# Patient Record
Sex: FEMALE | Race: WHITE | NOT HISPANIC OR LATINO | Employment: OTHER | URBAN - METROPOLITAN AREA
[De-identification: names, ages, dates, MRNs, and addresses within clinical notes are randomized per-mention and may not be internally consistent; named-entity substitution may affect disease eponyms.]

---

## 2017-02-16 ENCOUNTER — TELEPHONE (OUTPATIENT)
Dept: FAMILY MEDICINE | Facility: CLINIC | Age: 77
End: 2017-02-16

## 2017-02-16 NOTE — TELEPHONE ENCOUNTER
----- Message from Edwin Ramirez sent at 2/16/2017  1:01 PM CST -----  Contact: Self  Pt wants to know if she will need labs before her OV on 2/23. Pt can be reached @ [phone].

## 2017-02-17 NOTE — TELEPHONE ENCOUNTER
Patient advised to come to visit fasting since this will be the first  Physical  Visit & Dr. Martinez may order labs once you complete the visit.

## 2017-02-23 ENCOUNTER — OFFICE VISIT (OUTPATIENT)
Dept: FAMILY MEDICINE | Facility: CLINIC | Age: 77
End: 2017-02-23
Payer: MEDICARE

## 2017-02-23 ENCOUNTER — LAB VISIT (OUTPATIENT)
Dept: LAB | Facility: HOSPITAL | Age: 77
End: 2017-02-23
Attending: FAMILY MEDICINE
Payer: MEDICARE

## 2017-02-23 VITALS
WEIGHT: 146.63 LBS | SYSTOLIC BLOOD PRESSURE: 122 MMHG | RESPIRATION RATE: 16 BRPM | OXYGEN SATURATION: 96 % | BODY MASS INDEX: 25.03 KG/M2 | DIASTOLIC BLOOD PRESSURE: 72 MMHG | HEART RATE: 60 BPM | HEIGHT: 64 IN | TEMPERATURE: 98 F

## 2017-02-23 DIAGNOSIS — Z86.2 HISTORY OF IRON DEFICIENCY ANEMIA: ICD-10-CM

## 2017-02-23 DIAGNOSIS — I10 ESSENTIAL HYPERTENSION: Chronic | ICD-10-CM

## 2017-02-23 DIAGNOSIS — F13.20 SEDATIVE HYPNOTIC OR ANXIOLYTIC DEPENDENCE: Chronic | ICD-10-CM

## 2017-02-23 DIAGNOSIS — N60.82 SEBACEOUS CYST OF BREAST, LEFT: ICD-10-CM

## 2017-02-23 DIAGNOSIS — G47.00 INSOMNIA, UNSPECIFIED TYPE: ICD-10-CM

## 2017-02-23 DIAGNOSIS — F41.9 ANXIETY: ICD-10-CM

## 2017-02-23 DIAGNOSIS — Z00.00 ROUTINE GENERAL MEDICAL EXAMINATION AT A HEALTH CARE FACILITY: Primary | ICD-10-CM

## 2017-02-23 DIAGNOSIS — E78.5 HYPERLIPIDEMIA, UNSPECIFIED HYPERLIPIDEMIA TYPE: Chronic | ICD-10-CM

## 2017-02-23 DIAGNOSIS — N39.41 URGE INCONTINENCE: ICD-10-CM

## 2017-02-23 LAB
ALBUMIN SERPL BCP-MCNC: 3.9 G/DL
ALP SERPL-CCNC: 70 U/L
ALT SERPL W/O P-5'-P-CCNC: 23 U/L
ANION GAP SERPL CALC-SCNC: 10 MMOL/L
AST SERPL-CCNC: 29 U/L
BASOPHILS # BLD AUTO: 0.01 K/UL
BASOPHILS NFR BLD: 0.2 %
BILIRUB SERPL-MCNC: 0.5 MG/DL
BUN SERPL-MCNC: 15 MG/DL
CALCIUM SERPL-MCNC: 9.9 MG/DL
CHLORIDE SERPL-SCNC: 102 MMOL/L
CHOLEST/HDLC SERPL: 2.9 {RATIO}
CO2 SERPL-SCNC: 28 MMOL/L
CREAT SERPL-MCNC: 0.8 MG/DL
DIFFERENTIAL METHOD: ABNORMAL
EOSINOPHIL # BLD AUTO: 0 K/UL
EOSINOPHIL NFR BLD: 0.5 %
ERYTHROCYTE [DISTWIDTH] IN BLOOD BY AUTOMATED COUNT: 12.9 %
EST. GFR  (AFRICAN AMERICAN): >60 ML/MIN/1.73 M^2
EST. GFR  (NON AFRICAN AMERICAN): >60 ML/MIN/1.73 M^2
FERRITIN SERPL-MCNC: 47 NG/ML
GLUCOSE SERPL-MCNC: 97 MG/DL
HCT VFR BLD AUTO: 39.4 %
HDL/CHOLESTEROL RATIO: 34.1 %
HDLC SERPL-MCNC: 205 MG/DL
HDLC SERPL-MCNC: 70 MG/DL
HGB BLD-MCNC: 13.4 G/DL
IRON SERPL-MCNC: 78 UG/DL
LDLC SERPL CALC-MCNC: 116.2 MG/DL
LYMPHOCYTES # BLD AUTO: 1.2 K/UL
LYMPHOCYTES NFR BLD: 18.1 %
MCH RBC QN AUTO: 31.6 PG
MCHC RBC AUTO-ENTMCNC: 34 %
MCV RBC AUTO: 93 FL
MONOCYTES # BLD AUTO: 0.8 K/UL
MONOCYTES NFR BLD: 11.7 %
NEUTROPHILS # BLD AUTO: 4.5 K/UL
NEUTROPHILS NFR BLD: 69.5 %
NONHDLC SERPL-MCNC: 135 MG/DL
PLATELET # BLD AUTO: 246 K/UL
PMV BLD AUTO: 9.7 FL
POTASSIUM SERPL-SCNC: 4.8 MMOL/L
PROT SERPL-MCNC: 8.1 G/DL
RBC # BLD AUTO: 4.24 M/UL
SATURATED IRON: 17 %
SODIUM SERPL-SCNC: 140 MMOL/L
TOTAL IRON BINDING CAPACITY: 460 UG/DL
TRANSFERRIN SERPL-MCNC: 311 MG/DL
TRIGL SERPL-MCNC: 94 MG/DL
WBC # BLD AUTO: 6.4 K/UL

## 2017-02-23 PROCEDURE — 3078F DIAST BP <80 MM HG: CPT | Mod: S$GLB,,, | Performed by: FAMILY MEDICINE

## 2017-02-23 PROCEDURE — 36415 COLL VENOUS BLD VENIPUNCTURE: CPT | Mod: PO

## 2017-02-23 PROCEDURE — 3074F SYST BP LT 130 MM HG: CPT | Mod: S$GLB,,, | Performed by: FAMILY MEDICINE

## 2017-02-23 PROCEDURE — 99499 UNLISTED E&M SERVICE: CPT | Mod: S$GLB,,, | Performed by: FAMILY MEDICINE

## 2017-02-23 PROCEDURE — 82728 ASSAY OF FERRITIN: CPT

## 2017-02-23 PROCEDURE — 99397 PER PM REEVAL EST PAT 65+ YR: CPT | Mod: S$GLB,,, | Performed by: FAMILY MEDICINE

## 2017-02-23 PROCEDURE — 85025 COMPLETE CBC W/AUTO DIFF WBC: CPT

## 2017-02-23 PROCEDURE — 80061 LIPID PANEL: CPT

## 2017-02-23 PROCEDURE — 99999 PR PBB SHADOW E&M-EST. PATIENT-LVL III: CPT | Mod: PBBFAC,,, | Performed by: FAMILY MEDICINE

## 2017-02-23 PROCEDURE — 80053 COMPREHEN METABOLIC PANEL: CPT

## 2017-02-23 PROCEDURE — 83540 ASSAY OF IRON: CPT

## 2017-02-23 RX ORDER — SERTRALINE HYDROCHLORIDE 100 MG/1
100 TABLET, FILM COATED ORAL DAILY
Qty: 30 TABLET | Refills: 5 | Status: SHIPPED | OUTPATIENT
Start: 2017-02-23 | End: 2017-10-24 | Stop reason: SDUPTHER

## 2017-02-23 RX ORDER — ZOLPIDEM TARTRATE 5 MG/1
5 TABLET ORAL NIGHTLY PRN
Qty: 30 TABLET | Refills: 2 | Status: SHIPPED | OUTPATIENT
Start: 2017-02-23 | End: 2017-10-24 | Stop reason: SDUPTHER

## 2017-02-23 RX ORDER — METOPROLOL TARTRATE 25 MG/1
12.5 TABLET, FILM COATED ORAL 2 TIMES DAILY
Qty: 60 TABLET | Refills: 3 | Status: SHIPPED | OUTPATIENT
Start: 2017-02-23 | End: 2017-08-18 | Stop reason: SDUPTHER

## 2017-02-23 RX ORDER — HYDROCHLOROTHIAZIDE 25 MG/1
25 TABLET ORAL DAILY
Qty: 30 TABLET | Refills: 5 | Status: SHIPPED | OUTPATIENT
Start: 2017-02-23 | End: 2017-10-24 | Stop reason: SDUPTHER

## 2017-02-23 RX ORDER — LOSARTAN POTASSIUM 50 MG/1
50 TABLET ORAL DAILY
Qty: 30 TABLET | Refills: 5 | Status: SHIPPED | OUTPATIENT
Start: 2017-02-23 | End: 2017-12-19 | Stop reason: SDUPTHER

## 2017-02-23 RX ORDER — PRAVASTATIN SODIUM 20 MG/1
20 TABLET ORAL DAILY
Qty: 30 TABLET | Refills: 5 | Status: SHIPPED | OUTPATIENT
Start: 2017-02-23 | End: 2017-10-24 | Stop reason: SDUPTHER

## 2017-02-23 RX ORDER — OXYBUTYNIN CHLORIDE 5 MG/1
5 TABLET ORAL 2 TIMES DAILY
Qty: 60 TABLET | Refills: 11 | Status: SHIPPED | OUTPATIENT
Start: 2017-02-23 | End: 2017-07-11

## 2017-02-23 NOTE — MR AVS SNAPSHOT
[facility]  [address]  [place] [address]  Phone: [phone]  Fax: [phone]                  Nereyda Hernandez   2017 10:00 AM   Office Visit    Description:  Female : 1940   Provider:  Genny Martinez MD   Department:  [facility]           Reason for Visit     Annual Exam           Diagnoses this Visit        Comments    Routine general medical examination at a health care facility    -  Primary     Essential hypertension         Urge incontinence         Hyperlipidemia, unspecified hyperlipidemia type         Anxiety         Insomnia, unspecified type         Sedative hypnotic or anxiolytic dependence         History of iron deficiency anemia         Sebaceous cyst of breast, left                To Do List           Goals (5 Years of Data)              Today    10/26/16    10/25/16    Blood Pressure < 150/90   122/72  128/82  128/76      Follow-Up and Disposition     Return in about 6 months (around 2017) for Follow up.       These Medications        Disp Refills Start End    hydrochlorothiazide (HYDRODIURIL) 25 MG tablet 30 tablet 5 2017     Take 1 tablet (25 mg total) by mouth once daily. - Oral    Pharmacy: [address] Ph #: [phone]       metoprolol tartrate (LOPRESSOR) 25 MG tablet 60 tablet 3 2017     Take 0.5 tablets (12.5 mg total) by mouth 2 (two) times daily. - Oral    Pharmacy: [address] - [address] Ph #: [phone]       losartan (COZAAR) 50 MG tablet 30 tablet 5 2017     Take 1 tablet (50 mg total) by mouth once daily. - Oral    Pharmacy: [facility] [address] [place] [address] [address] Ph #: [phone]       oxybutynin (DITROPAN) 5 MG Tab 60 tablet 11 2017    Take 1 tablet (5 mg total) by mouth 2 (two) times daily. - Oral    Pharmacy: [address] Ph #: [phone]       pravastatin  (PRAVACHOL) 20 MG tablet 30 tablet 5 2/23/2017     Take 1 tablet (20 mg total) by mouth once daily. - Oral    Pharmacy: [facility] [address] [place] [address] [address] Ph #: [phone]       sertraline (ZOLOFT) 100 MG tablet 30 tablet 5 2/23/2017     Take 1 tablet (100 mg total) by mouth once daily. - Oral    Pharmacy: [address] Ph #: [phone]       zolpidem (AMBIEN) 5 MG Tab 30 tablet 2 2/23/2017 5/24/2017    Take 1 tablet (5 mg total) by mouth nightly as needed. - Oral    Pharmacy: [address] [address] Ph #: [phone]         [facility]     [facility] On Call Nurse [facility] Line - 24/7 Assistance  Registered nurses in the [facility] On Call Center provide clinical advisement, health education, appointment booking, and other advisory services.  Call for this free service at [phone].             Medications           Message regarding Medications     Verify the changes and/or additions to your medication regime listed below are the same as discussed with your clinician today.  If any of these changes or additions are incorrect, please notify your healthcare provider.        STOP taking these medications     estradiol (ESTRACE) 0.01 % (0.1 mg/gram) vaginal cream Place 1 g vaginally once daily.           Verify that the below list of medications is an accurate representation of the medications you are currently taking.  If none reported, the list may be blank. If incorrect, please contact your healthcare provider. Carry this list with you in case of emergency.           Current Medications     aspirin (ECOTRIN) 81 MG EC tablet Take 81 mg by mouth once daily.    calcium citrate-vitamin D3 315-200 mg (CITRACAL+D) 315-200 mg-unit per tablet Take 1 tablet by mouth 2 (two) times daily.      fish oil-omega-3 fatty acids 300-1,000 mg capsule Take 2 g by mouth once daily.      hydrochlorothiazide (HYDRODIURIL) 25 MG tablet Take 1 tablet (25  "mg total) by mouth once daily.    latanoprost 0.005 % ophthalmic solution 1 drop every evening.    losartan (COZAAR) 50 MG tablet Take 1 tablet (50 mg total) by mouth once daily.    metoprolol tartrate (LOPRESSOR) 25 MG tablet Take 0.5 tablets (12.5 mg total) by mouth 2 (two) times daily.    multivit-iron-min-folic acid (MULTIVITAMIN-IRON-MINERALS-FOLIC ACID) 3,500-18-0.4 unit-mg-mg Chew Take by mouth.      oxybutynin (DITROPAN) 5 MG Tab Take 1 tablet (5 mg total) by mouth 2 (two) times daily.    pravastatin (PRAVACHOL) 20 MG tablet Take 1 tablet (20 mg total) by mouth once daily.    sertraline (ZOLOFT) 100 MG tablet Take 1 tablet (100 mg total) by mouth once daily.    zolpidem (AMBIEN) 5 MG Tab Take 1 tablet (5 mg total) by mouth nightly as needed.           Clinical Reference Information           Your Vitals Were     BP Pulse Temp Resp Height Weight    122/72 (BP Location: Left arm, Patient Position: Sitting, BP Method: Manual) 60 98.1 °F (36.7 °C) (Oral) 16 5' 4" (1.626 m) 66.5 kg (146 lb 9.7 oz)    SpO2 BMI             96% 25.16 kg/m2         Blood Pressure          Most Recent Value    BP  122/72      Allergies as of 2/23/2017     No Known Allergies      Immunizations Administered on Date of Encounter - 2/23/2017     None      Orders Placed During Today's Visit     Future Labs/Procedures Expected by Expires    CBC auto differential  2/23/2017 2/23/2018    Comprehensive metabolic panel  2/23/2017 2/23/2018    Ferritin  2/23/2017 2/23/2018    Iron and TIBC  2/23/2017 2/23/2018    Lipid panel  2/23/2017 4/24/2018      Language Assistance Services     ATTENTION: Language assistance services are available, free of charge. Please call [phone].      ATENCIÓN: Si habla elmer, tiene a craig disposición servicios gratuitos de asistencia lingüística. Llame al [phone].     CHÚ Ý: N?u b?n nói Ti?ng Vi?t, có các d?ch v? h? tr? ngôn ng? mi?n phí dành cho b?n. G?i s? [phone].         [facility] complies with applicable Federal civil rights laws and does not discriminate on the basis of race, color, national origin, age, disability, or sex.

## 2017-02-23 NOTE — PROGRESS NOTES
Chief Complaint   Patient presents with    Annual Exam       HPI  Nereyda Hernandez is a 76 y.o. female with multiple medical diagnoses as listed in the medical history and problem list that presents for annual exam .    She is concerned about a cyst on her left breast that resembles a pimple. It is non painful and she has not tried to drain it. She also needs refills on her medications. She has chronic allergic rhinitis, along with bloating.    PAST MEDICAL HISTORY:  Past Medical History   Diagnosis Date    Anxiety     Chronic disease anemia     Hyperlipidemia     Hypertension     Insomnia     KANWAL (obstructive sleep apnea)     Osteoporosis     Rosacea     Vertigo        PAST SURGICAL HISTORY:  Past Surgical History   Procedure Laterality Date    Tonsillectomy      Belt abdominoplasty      Total abdominal hysterectomy      Hernia repair       [facility]       SOCIAL HISTORY:  Social History     Social History    Marital status:      Spouse name: N/A    Number of children: 6    Years of education: college     Occupational History    retired  for [employer]      Social History Main Topics    Smoking status: Former Smoker     Packs/day: 0.50     Years: 10.00     Quit date: 9/20/1980    Smokeless tobacco: Not on file    Alcohol use 3.0 oz/week     5 Glasses of wine per week      Comment: Occasionally    Drug use: No    Sexual activity: Not Currently     Partners: Male     Other Topics Concern    Not on file     Social History Narrative       FAMILY HISTORY:  Family History   Problem Relation Age of Onset    Cancer Mother      liver       ALLERGIES AND MEDICATIONS: updated and reviewed.  Review of patient's allergies indicates:  No Known Allergies  Current Outpatient Prescriptions   Medication Sig Dispense Refill    aspirin (ECOTRIN) 81 MG EC tablet Take 81 mg by mouth once daily.      calcium citrate-vitamin D3 315-200 mg (CITRACAL+D) 315-200 mg-unit per tablet Take 1 tablet  by mouth 2 (two) times daily.        fish oil-omega-3 fatty acids 300-1,000 mg capsule Take 2 g by mouth once daily.        hydrochlorothiazide (HYDRODIURIL) 25 MG tablet Take 1 tablet (25 mg total) by mouth once daily. 30 tablet 5    latanoprost 0.005 % ophthalmic solution 1 drop every evening.      losartan (COZAAR) 50 MG tablet Take 1 tablet (50 mg total) by mouth once daily. 30 tablet 5    metoprolol tartrate (LOPRESSOR) 25 MG tablet Take 0.5 tablets (12.5 mg total) by mouth 2 (two) times daily. 60 tablet 3    multivit-iron-min-folic acid (MULTIVITAMIN-IRON-MINERALS-FOLIC ACID) 3,500-18-0.4 unit-mg-mg Chew Take by mouth.        oxybutynin (DITROPAN) 5 MG Tab Take 1 tablet (5 mg total) by mouth 2 (two) times daily. 60 tablet 11    pravastatin (PRAVACHOL) 20 MG tablet Take 1 tablet (20 mg total) by mouth once daily. 30 tablet 5    sertraline (ZOLOFT) 100 MG tablet Take 1 tablet (100 mg total) by mouth once daily. 30 tablet 5    zolpidem (AMBIEN) 5 MG Tab Take 1 tablet (5 mg total) by mouth nightly as needed. 30 tablet 2     No current facility-administered medications for this visit.        ROS  Review of Systems   Constitutional: Negative for chills, diaphoresis, fatigue, fever and unexpected weight change.   HENT: Negative for rhinorrhea, sinus pressure, sore throat and tinnitus.    Eyes: Negative for photophobia and visual disturbance.   Respiratory: Negative for cough, shortness of breath and wheezing.    Cardiovascular: Negative for chest pain and palpitations.   Gastrointestinal: Positive for constipation. Negative for abdominal pain, blood in stool, diarrhea, nausea and vomiting.   Genitourinary: Negative for dysuria, flank pain, frequency and vaginal discharge.   Musculoskeletal: Negative for arthralgias and joint swelling.   Skin: Positive for color change. Negative for rash.   Neurological: Negative for speech difficulty, weakness, light-headedness and headaches.   Psychiatric/Behavioral:  "Negative for behavioral problems and dysphoric mood.       Physical Exam  Vitals:    02/23/17 0935   BP: 122/72   BP Location: Left arm   Patient Position: Sitting   BP Method: Manual   Pulse: 60   Resp: 16   Temp: 98.1 °F (36.7 °C)   TempSrc: Oral   SpO2: 96%   Weight: 66.5 kg (146 lb 9.7 oz)   Height: 5' 4" (1.626 m)    Body mass index is 25.16 kg/(m^2).  Weight: 66.5 kg (146 lb 9.7 oz)   Height: 5' 4" (162.6 cm)     Physical Exam   Constitutional: She is oriented to person, place, and time. She appears well-developed and well-nourished. No distress.   HENT:   Head: Normocephalic and atraumatic.   Nose: Nose normal.   Mouth/Throat: No oropharyngeal exudate.   Eyes: EOM are normal.   Neck: Neck supple. No thyromegaly present.   Cardiovascular: Normal rate and regular rhythm.  Exam reveals no gallop and no friction rub.    No murmur heard.  Pulmonary/Chest: Effort normal and breath sounds normal. No respiratory distress. She has no wheezes. She has no rales.   Abdominal: Soft. Bowel sounds are normal. She exhibits no distension and no mass. There is no tenderness. There is no rebound and no guarding.   Genitourinary:   Genitourinary Comments: Left breast with small 0.5 cm sebaceous cyst present, no signs of infection   Lymphadenopathy:     She has no cervical adenopathy.   Neurological: She is alert and oriented to person, place, and time.   Skin: Skin is warm and dry. No rash noted.   Psychiatric: She has a normal mood and affect. Her behavior is normal.   Nursing note and vitals reviewed.      Health Maintenance       Date Due Completion Date    Lipid Panel 2/18/2017 2/18/2016    DEXA SCAN 6/23/2017 6/23/2014    Override on 8/12/2013: Done    TETANUS VACCINE 6/9/2024 6/9/2014            ASSESSMENT     1. Routine general medical examination at a health care facility    2. Essential hypertension    3. Urge incontinence    4. Hyperlipidemia, unspecified hyperlipidemia type    5. Anxiety    6. Insomnia, unspecified " type    7. Sedative hypnotic or anxiolytic dependence    8. History of iron deficiency anemia    9. Sebaceous cyst of breast, left        PLAN:     Routine general medical examination at a health care facility    Essential hypertension  -This is a chronic medical condition that is stable under the current regimen. Continue to monitor and we will make medication adjustments as needed.     -     hydrochlorothiazide (HYDRODIURIL) 25 MG tablet; Take 1 tablet (25 mg total) by mouth once daily.  Dispense: 30 tablet; Refill: 5  -     metoprolol tartrate (LOPRESSOR) 25 MG tablet; Take 0.5 tablets (12.5 mg total) by mouth 2 (two) times daily.  Dispense: 60 tablet; Refill: 3  -     losartan (COZAAR) 50 MG tablet; Take 1 tablet (50 mg total) by mouth once daily.  Dispense: 30 tablet; Refill: 5  -     Comprehensive metabolic panel; Future; Expected date: 2/23/17    Urge incontinence  -This is a chronic medical condition that is stable under the current regimen. Continue to monitor and we will make medication adjustments as needed.     -     oxybutynin (DITROPAN) 5 MG Tab; Take 1 tablet (5 mg total) by mouth 2 (two) times daily.  Dispense: 60 tablet; Refill: 11    Hyperlipidemia, unspecified hyperlipidemia type  -This is a chronic medical condition that is stable under the current regimen. Continue to monitor and we will make medication adjustments as needed.     -     Lipid panel; Future; Expected date: 2/23/17  -     pravastatin (PRAVACHOL) 20 MG tablet; Take 1 tablet (20 mg total) by mouth once daily.  Dispense: 30 tablet; Refill: 5    Anxiety  -This is a chronic medical condition that is stable under the current regimen. Continue to monitor and we will make medication adjustments as needed.     -     sertraline (ZOLOFT) 100 MG tablet; Take 1 tablet (100 mg total) by mouth once daily.  Dispense: 30 tablet; Refill: 5    Insomnia, unspecified type  -This is a chronic medical condition that is stable under the current regimen.  Continue to monitor and we will make medication adjustments as needed.     -     zolpidem (AMBIEN) 5 MG Tab; Take 1 tablet (5 mg total) by mouth nightly as needed.  Dispense: 30 tablet; Refill: 2    Sedative hypnotic or anxiolytic dependence  -This is a chronic medical condition that is stable under the current regimen. Continue to monitor and we will make medication adjustments as needed.     -     zolpidem (AMBIEN) 5 MG Tab; Take 1 tablet (5 mg total) by mouth nightly as needed.  Dispense: 30 tablet; Refill: 2    History of iron deficiency anemia  -     CBC auto differential; Future; Expected date: 2/23/17  -     Iron and TIBC; Future; Expected date: 2/23/17  -     Ferritin; Future; Expected date: 2/23/17    Sebaceous cyst of breast, left  -continue to monitor, if symptoms worsen we will consult dermatology        Genny Martinez MD  02/23/2017 10:33 AM        Return in about 6 months (around 8/23/2017) for Follow up.

## 2017-02-25 ENCOUNTER — PATIENT MESSAGE (OUTPATIENT)
Dept: FAMILY MEDICINE | Facility: CLINIC | Age: 77
End: 2017-02-25

## 2017-03-31 ENCOUNTER — TELEPHONE (OUTPATIENT)
Dept: SURGERY | Facility: CLINIC | Age: 77
End: 2017-03-31

## 2017-03-31 NOTE — TELEPHONE ENCOUNTER
"Patient called stated she saw her PCP and it was a blackhead on her left breast. Her PCP told her to go see a Dermatologist she call Dr. Bains could not get in until May. I asked the patient if she would like to see [facility], she replied, "No, she would like to wait until Dr Beckham returns to the office."  "

## 2017-04-27 ENCOUNTER — OFFICE VISIT (OUTPATIENT)
Dept: SLEEP MEDICINE | Facility: CLINIC | Age: 77
End: 2017-04-27
Payer: MEDICARE

## 2017-04-27 VITALS
HEART RATE: 59 BPM | SYSTOLIC BLOOD PRESSURE: 146 MMHG | OXYGEN SATURATION: 96 % | BODY MASS INDEX: 26.11 KG/M2 | WEIGHT: 152.13 LBS | DIASTOLIC BLOOD PRESSURE: 80 MMHG

## 2017-04-27 DIAGNOSIS — G47.00 INSOMNIA, UNSPECIFIED TYPE: ICD-10-CM

## 2017-04-27 DIAGNOSIS — G47.33 OSA (OBSTRUCTIVE SLEEP APNEA): Primary | ICD-10-CM

## 2017-04-27 PROCEDURE — 3077F SYST BP >= 140 MM HG: CPT | Mod: S$GLB,,, | Performed by: INTERNAL MEDICINE

## 2017-04-27 PROCEDURE — 1157F ADVNC CARE PLAN IN RCRD: CPT | Mod: S$GLB,,, | Performed by: INTERNAL MEDICINE

## 2017-04-27 PROCEDURE — 1159F MED LIST DOCD IN RCRD: CPT | Mod: S$GLB,,, | Performed by: INTERNAL MEDICINE

## 2017-04-27 PROCEDURE — 1160F RVW MEDS BY RX/DR IN RCRD: CPT | Mod: S$GLB,,, | Performed by: INTERNAL MEDICINE

## 2017-04-27 PROCEDURE — 99999 PR PBB SHADOW E&M-EST. PATIENT-LVL III: CPT | Mod: PBBFAC,,, | Performed by: INTERNAL MEDICINE

## 2017-04-27 PROCEDURE — 3079F DIAST BP 80-89 MM HG: CPT | Mod: S$GLB,,, | Performed by: INTERNAL MEDICINE

## 2017-04-27 PROCEDURE — 99204 OFFICE O/P NEW MOD 45 MIN: CPT | Mod: S$GLB,,, | Performed by: INTERNAL MEDICINE

## 2017-04-27 PROCEDURE — 1126F AMNT PAIN NOTED NONE PRSNT: CPT | Mod: S$GLB,,, | Performed by: INTERNAL MEDICINE

## 2017-04-27 NOTE — MR AVS SNAPSHOT
[facility]  [address]  [place] [address]  Phone: [phone]  Fax: [phone]                  Nereyda Hernandez   2017 10:00 AM   Office Visit    Description:  Female : 1940   Provider:  Koffi Preciado MD   Department:  [facility]           Reason for Visit     Sleep Apnea           Diagnoses this Visit        Comments    KANWAL (obstructive sleep apnea)    -  Primary     Insomnia, unspecified type                To Do List           Goals (5 Years of Data)              Today    2/23/17    10/26/16    Blood Pressure < 150/90   146/80  146/80  146/80      Follow-Up and Disposition     Return in about 3 months (around 2017).      [facility] On Call     [facility] Nurse Care Line -  Assistance  Unless otherwise directed by your provider, please contact [facility], our nurse care line that is available for  assistance.     Registered nurses in the [facility] On Call Center provide: appointment scheduling, clinical advisement, health education, and other advisory services.  Call: [phone] (toll free)               Medications           Message regarding Medications     Verify the changes and/or additions to your medication regime listed below are the same as discussed with your clinician today.  If any of these changes or additions are incorrect, please notify your healthcare provider.             Verify that the below list of medications is an accurate representation of the medications you are currently taking.  If none reported, the list may be blank. If incorrect, please contact your healthcare provider. Carry this list with you in case of emergency.           Current Medications     aspirin (ECOTRIN) 81 MG EC tablet Take 81 mg by mouth once daily.    calcium citrate-vitamin D3 315-200 mg (CITRACAL+D) 315-200 mg-unit per tablet Take 1 tablet by mouth 2 (two) times daily.      fish oil-omega-3 fatty acids 300-1,000 mg capsule Take 2 g by mouth once  daily.      hydrochlorothiazide (HYDRODIURIL) 25 MG tablet Take 1 tablet (25 mg total) by mouth once daily.    latanoprost 0.005 % ophthalmic solution 1 drop every evening.    losartan (COZAAR) 50 MG tablet Take 1 tablet (50 mg total) by mouth once daily.    metoprolol tartrate (LOPRESSOR) 25 MG tablet Take 0.5 tablets (12.5 mg total) by mouth 2 (two) times daily.    multivit-iron-min-folic acid (MULTIVITAMIN-IRON-MINERALS-FOLIC ACID) 3,500-18-0.4 unit-mg-mg Chew Take by mouth.      oxybutynin (DITROPAN) 5 MG Tab Take 1 tablet (5 mg total) by mouth 2 (two) times daily.    pravastatin (PRAVACHOL) 20 MG tablet Take 1 tablet (20 mg total) by mouth once daily.    sertraline (ZOLOFT) 100 MG tablet Take 1 tablet (100 mg total) by mouth once daily.    zolpidem (AMBIEN) 5 MG Tab Take 1 tablet (5 mg total) by mouth nightly as needed.           Clinical Reference Information           Your Vitals Were     BP Pulse Weight SpO2 BMI    146/80 59 69 kg (152 lb 1.9 oz) 96% 26.11 kg/m2      Blood Pressure          Most Recent Value    BP  (!)  146/80      Allergies as of 4/27/2017     No Known Allergies      Immunizations Administered on Date of Encounter - 4/27/2017     None      Orders Placed During Today's Visit      Normal Orders This Visit    CPAP FOR HOME USE       Instructions    Stimulus control  1. Go to bed only when sleepy   2. Do not watch television, read, eat, or worry while in bed. Use bed only for sleep and sex.   3. Get out of bed if unable to fall asleep within twenty minutes and go to another room.  Do something different like reading a book.  Dont engage in stimulating activity.  Return to bed only when you are sleepy.  Repeat this step as many times as necessary throughout the night.   4. Set an alarm clock to wake up at a fixed time each morning including weekends.   5. Do not take a nap during the day.        Language Assistance Services     ATTENTION: Language assistance services are available, free of  charge. Please call [phone].      ATENCIÓN: Si habla español, tiene a craig disposición servicios gratuitos de asistencia lingüística. Llame al [phone].     CHÚ Ý: N?u b?n nói Ti?ng Vi?t, có các d?ch v? h? tr? ngôn ng? mi?n phí dành cho b?n. G?i s? [phone].         [facility] complies with applicable Federal civil rights laws and does not discriminate on the basis of race, color, national origin, age, disability, or sex.

## 2017-04-27 NOTE — PATIENT INSTRUCTIONS
Stimulus control  1. Go to bed only when sleepy   2. Do not watch television, read, eat, or worry while in bed. Use bed only for sleep and sex.   3. Get out of bed if unable to fall asleep within twenty minutes and go to another room.  Do something different like reading a book.  Dont engage in stimulating activity.  Return to bed only when you are sleepy.  Repeat this step as many times as necessary throughout the night.   4. Set an alarm clock to wake up at a fixed time each morning including weekends.   5. Do not take a nap during the day.

## 2017-04-27 NOTE — PROGRESS NOTES
Nereyda Hernandez  was seen as a follow up.    CHIEF COMPLAINT:    Chief Complaint   Patient presents with    Sleep Apnea       HISTORY OF PRESENT ILLNESS: Nereyda Hernandez is a 76 y.o. female is here for sleep evaluation.    Patient was diagnosed with kaylee in 2004.  Pt currently on cpap of 9 cm H20.  Pt has been using machine nightly until 1 month ago.  +leakage around full face mask.  +snoring without machine.  +fatigue upon awakening.  No rls symptoms.  No parasomnia.  No cataplexy.  No bruxism.      On today's [place] Sleepiness Scale the patient scores a 8.    Last appointment was 2012.  At that time, patient was referred to Dr. Voss for oral appliance.  Patient was set up with [facility].  +persistent fatigue.  Still with fatigue.  Patient also has issue with teeth shifting.  She is enquiring about resuming cpap.      SLEEP ROUTINE:  Activity the hour prior to sleep: read     Bed partner:  none  Time to bed:  10 pm  Lights off:  yes  Sleep onset latency:  5 minutes with ambien 2.5 mg        Disruptions or awakenings:   1-2 times  (no difficulty going back to sleep)  Wakeup time:      5:45 am  Perceived sleep quality:  tired       Daytime naps:      none  Weekend sleep routine:      10 pm to 7 am  Caffeine use: 2 cups of coffee in am  exercise habit:   Line dancing 2 x per week.      PAST MEDICAL HISTORY:    Active Ambulatory Problems     Diagnosis Date Noted    Osteoporosis, unspecified 08/08/2012    Anxiety 08/08/2012    Essential hypertension 08/08/2012    KAYLEE on CPAP 08/08/2012    Hyperlipidemia 08/08/2012    Insomnia 08/12/2013    Urge incontinence 10/16/2014    White coat hypertension 06/18/2015    History of iron deficiency anemia 08/26/2015    Sedative hypnotic or anxiolytic dependence 10/25/2016     Resolved Ambulatory Problems     Diagnosis Date Noted    Chronic disease anemia 08/12/2013     Past Medical History:   Diagnosis Date    Anxiety     Chronic disease anemia     Hyperlipidemia     Hypertension      Insomnia     KANWAL (obstructive sleep apnea)     Osteoporosis     Rosacea     Vertigo                 PAST SURGICAL HISTORY:    Past Surgical History:   Procedure Laterality Date    BELT ABDOMINOPLASTY      HERNIA REPAIR      [facility]    TONSILLECTOMY      TOTAL ABDOMINAL HYSTERECTOMY           FAMILY HISTORY:                Family History   Problem Relation Age of Onset    Cancer Mother      liver       SOCIAL HISTORY:          Tobacco:   History   Smoking Status    Former Smoker    Packs/day: 0.50    Years: 10.00    Quit date: 9/20/1980   Smokeless Tobacco    Not on file       alcohol use:    History   Alcohol Use    3.0 oz/week    5 Glasses of wine per week     Comment: Occasionally                 Occupation: retired housewife, former customer service at [employer].    ALLERGIES:  Review of patient's allergies indicates:  No Known Allergies    CURRENT MEDICATIONS:    Current Outpatient Prescriptions   Medication Sig Dispense Refill    aspirin (ECOTRIN) 81 MG EC tablet Take 81 mg by mouth once daily.      calcium citrate-vitamin D3 315-200 mg (CITRACAL+D) 315-200 mg-unit per tablet Take 1 tablet by mouth 2 (two) times daily.        fish oil-omega-3 fatty acids 300-1,000 mg capsule Take 2 g by mouth once daily.        hydrochlorothiazide (HYDRODIURIL) 25 MG tablet Take 1 tablet (25 mg total) by mouth once daily. 30 tablet 5    latanoprost 0.005 % ophthalmic solution 1 drop every evening.      losartan (COZAAR) 50 MG tablet Take 1 tablet (50 mg total) by mouth once daily. 30 tablet 5    metoprolol tartrate (LOPRESSOR) 25 MG tablet Take 0.5 tablets (12.5 mg total) by mouth 2 (two) times daily. 60 tablet 3    multivit-iron-min-folic acid (MULTIVITAMIN-IRON-MINERALS-FOLIC ACID) 3,500-18-0.4 unit-mg-mg Chew Take by mouth.        oxybutynin (DITROPAN) 5 MG Tab Take 1 tablet (5 mg total) by mouth 2 (two) times daily. 60 tablet 11    pravastatin (PRAVACHOL) 20 MG tablet Take 1 tablet (20 mg total)  by mouth once daily. 30 tablet 5    sertraline (ZOLOFT) 100 MG tablet Take 1 tablet (100 mg total) by mouth once daily. 30 tablet 5    zolpidem (AMBIEN) 5 MG Tab Take 1 tablet (5 mg total) by mouth nightly as needed. 30 tablet 2     No current facility-administered medications for this visit.                   REVIEW OF SYSTEMS:     Sleep related symptoms as per HPI.  CONST:Denies weight gain    HEENT: Denies sinus problems  PULM: Denies dyspnea  CARD:  Denies palpitations   GI:  Denies acid reflux  : Denies polyuria  NEURO: Denies headaches  PSYCH: Denies mood disturbance  HEME: Denies anemia   Otherwise, a balance of systems reviewed is negative.          PHYSICAL EXAM:  Vitals:    04/27/17 0955   BP: (!) 146/80   Pulse: (!) 59   SpO2: 96%   Weight: 69 kg (152 lb 1.9 oz)   PainSc: 0-No pain     Body mass index is 26.11 kg/(m^2).     GENERAL: Normal development, well groomed  HEENT:  Conjunctivae are non-erythematous; Pupils equal, round, and reactive to light; Nose is symmetrical; Nasal mucosa is pink and moist; Septum is midline; Inferior turbinates are normal; Nasal airflow is normal; Posterior pharynx is pink; Modified Mallampati: 4; Posterior palate is normal; Tonsils +1; Uvula is normal and pink;Tongue is normal; Dentition is fair; No TMJ tenderness; Jaw opening and protrusion without click and without discomfort.  NECK: Supple. Neck circumference is 14 inches. No thyromegaly. No palpable nodes.     SKIN: On face and neck: No abrasions, no rashes, no lesions.  No subcutaneous nodules are palpable.  RESPIRATORY: Chest is clear to auscultation.  Normal chest expansion and non-labored breathing at rest.  CARDIOVASCULAR: Normal S1, S2.  No murmurs, gallops or rubs. No carotid bruits bilaterally.  EXTREMITIES: No edema. No clubbing. No cyanosis. Station normal. Gait normal.        NEURO/PSYCH: Oriented to time, place and person. Normal attention span and concentration. Affect is full. Mood is normal.                                               DATA 1/20/04 rdi 12.3 with optimal cpap of 9  ASSESSMENT  1. KANWAL (obstructive sleep apnea)  CPAP FOR HOME USE   2. Insomnia, unspecified type         PLAN:    Sleep Apnea - difficulty with tolerating mask.  Last titration was 2004.  Will switch back to apap.  Patient will decide pending co-pay.      Insomnia - doing well with 2.5 mg ambien.  Stimulus control d/w patient.      Education: During our discussion today, we talked about the etiology of obstructive sleep apnea as well as the potential ramifications of untreated sleep apnea, which could include daytime sleepiness, hypertension, heart disease and/or stroke.        Patient will Return in about 3 months (around 7/27/2017).    This is 45 minutes visit, over 50% of time spent in direct consultation with patient.

## 2017-05-19 ENCOUNTER — TELEPHONE (OUTPATIENT)
Dept: FAMILY MEDICINE | Facility: CLINIC | Age: 77
End: 2017-05-19

## 2017-05-19 NOTE — TELEPHONE ENCOUNTER
----- Message from Cally CRUZ Rascon sent at 5/19/2017  3:38 PM CDT -----  Contact: self   Pt request a mammogram order and also request a x-ray order for her tailbone. Please contact the pt at [phone]. Thanks!

## 2017-05-29 ENCOUNTER — OFFICE VISIT (OUTPATIENT)
Dept: FAMILY MEDICINE | Facility: CLINIC | Age: 77
End: 2017-05-29
Payer: MEDICARE

## 2017-05-29 ENCOUNTER — HOSPITAL ENCOUNTER (OUTPATIENT)
Dept: RADIOLOGY | Facility: HOSPITAL | Age: 77
Discharge: HOME | End: 2017-05-29
Attending: FAMILY MEDICINE
Payer: MEDICARE

## 2017-05-29 VITALS
OXYGEN SATURATION: 98 % | TEMPERATURE: 99 F | SYSTOLIC BLOOD PRESSURE: 130 MMHG | WEIGHT: 149.06 LBS | BODY MASS INDEX: 25.45 KG/M2 | HEART RATE: 55 BPM | DIASTOLIC BLOOD PRESSURE: 84 MMHG | HEIGHT: 64 IN

## 2017-05-29 DIAGNOSIS — M81.0 AGE-RELATED OSTEOPOROSIS WITHOUT CURRENT PATHOLOGICAL FRACTURE: ICD-10-CM

## 2017-05-29 DIAGNOSIS — N64.4 BREAST PAIN: ICD-10-CM

## 2017-05-29 DIAGNOSIS — Z13.820 SCREENING FOR OSTEOPOROSIS: ICD-10-CM

## 2017-05-29 DIAGNOSIS — M53.3 TAIL BONE PAIN: ICD-10-CM

## 2017-05-29 DIAGNOSIS — Z12.31 ENCOUNTER FOR SCREENING MAMMOGRAM FOR BREAST CANCER: ICD-10-CM

## 2017-05-29 DIAGNOSIS — M53.3 TAIL BONE PAIN: Primary | ICD-10-CM

## 2017-05-29 PROCEDURE — 1159F MED LIST DOCD IN RCRD: CPT | Mod: S$GLB,,, | Performed by: FAMILY MEDICINE

## 2017-05-29 PROCEDURE — 99999 PR PBB SHADOW E&M-EST. PATIENT-LVL III: CPT | Mod: PBBFAC,,, | Performed by: FAMILY MEDICINE

## 2017-05-29 PROCEDURE — 72220 X-RAY EXAM SACRUM TAILBONE: CPT | Mod: 26,,, | Performed by: RADIOLOGY

## 2017-05-29 PROCEDURE — 99214 OFFICE O/P EST MOD 30 MIN: CPT | Mod: S$GLB,,, | Performed by: FAMILY MEDICINE

## 2017-05-29 PROCEDURE — 72220 X-RAY EXAM SACRUM TAILBONE: CPT | Mod: TC,PO

## 2017-05-29 PROCEDURE — 99499 UNLISTED E&M SERVICE: CPT | Mod: S$GLB,,, | Performed by: FAMILY MEDICINE

## 2017-05-29 PROCEDURE — 1125F AMNT PAIN NOTED PAIN PRSNT: CPT | Mod: S$GLB,,, | Performed by: FAMILY MEDICINE

## 2017-05-29 NOTE — PROGRESS NOTES
Chief Complaint   Patient presents with    Tailbone Pain       HPI  Nereyda Hernandez is a 76 y.o. female with multiple medical diagnoses as listed in the medical history and problem list that presents for evaluation of tail bone pain that has been present since New Year's vonda when she fell while reaching from a stool and hit a corner of her counter. The pain has never really healed but was bearable. She has not seen a provider for this. It has been getting worse with getting up from a seated position. She has gotten cushions for her mattress to relieve pain while sitting in bed. She does not have numbness or tingling. She has a hx of osteoporosis and has been on a drug holiday as it had improved.     She has also been having pain in her right breast and would like to get her mammogram, she is unsure about her family history. She does feel the pain more when she works in her garden.    PAST MEDICAL HISTORY:  Past Medical History:   Diagnosis Date    Anxiety     Chronic disease anemia     Hyperlipidemia     Hypertension     Insomnia     KANWAL (obstructive sleep apnea)     Osteoporosis     Rosacea     Vertigo        PAST SURGICAL HISTORY:  Past Surgical History:   Procedure Laterality Date    BELT ABDOMINOPLASTY      HERNIA REPAIR      [facility]ral    TONSILLECTOMY      TOTAL ABDOMINAL HYSTERECTOMY         SOCIAL HISTORY:  Social History     Social History    Marital status:      Spouse name: N/A    Number of children: 6    Years of education: college     Occupational History    retired  for [employer]      Social History Main Topics    Smoking status: Former Smoker     Packs/day: 0.50     Years: 10.00     Quit date: 9/20/1980    Smokeless tobacco: Not on file    Alcohol use 3.0 oz/week     5 Glasses of wine per week      Comment: Occasionally    Drug use: No    Sexual activity: Not Currently     Partners: Male     Other Topics Concern    Not on file     Social History Narrative     No narrative on file       FAMILY HISTORY:  Family History   Problem Relation Age of Onset    Cancer Mother      liver       ALLERGIES AND MEDICATIONS: updated and reviewed.  Review of patient's allergies indicates:  No Known Allergies  Current Outpatient Prescriptions   Medication Sig Dispense Refill    aspirin (ECOTRIN) 81 MG EC tablet Take 81 mg by mouth once daily.      calcium citrate-vitamin D3 315-200 mg (CITRACAL+D) 315-200 mg-unit per tablet Take 1 tablet by mouth 2 (two) times daily.        fish oil-omega-3 fatty acids 300-1,000 mg capsule Take 2 g by mouth once daily.        hydrochlorothiazide (HYDRODIURIL) 25 MG tablet Take 1 tablet (25 mg total) by mouth once daily. 30 tablet 5    latanoprost 0.005 % ophthalmic solution 1 drop every evening.      losartan (COZAAR) 50 MG tablet Take 1 tablet (50 mg total) by mouth once daily. 30 tablet 5    metoprolol tartrate (LOPRESSOR) 25 MG tablet Take 0.5 tablets (12.5 mg total) by mouth 2 (two) times daily. 60 tablet 3    multivit-iron-min-folic acid (MULTIVITAMIN-IRON-MINERALS-FOLIC ACID) 3,500-18-0.4 unit-mg-mg Chew Take by mouth.        oxybutynin (DITROPAN) 5 MG Tab Take 1 tablet (5 mg total) by mouth 2 (two) times daily. 60 tablet 11    pravastatin (PRAVACHOL) 20 MG tablet Take 1 tablet (20 mg total) by mouth once daily. 30 tablet 5    sertraline (ZOLOFT) 100 MG tablet Take 1 tablet (100 mg total) by mouth once daily. 30 tablet 5    zolpidem (AMBIEN) 5 MG Tab Take 1 tablet (5 mg total) by mouth nightly as needed. 30 tablet 2     No current facility-administered medications for this visit.        ROS  Review of Systems   Constitutional: Negative for chills, diaphoresis, fatigue, fever and unexpected weight change.   HENT: Negative for rhinorrhea, sinus pressure, sore throat and tinnitus.    Eyes: Negative for photophobia and visual disturbance.   Respiratory: Negative for cough, shortness of breath and wheezing.    Cardiovascular: Negative for  "chest pain and palpitations.   Gastrointestinal: Negative for abdominal pain, blood in stool, constipation, diarrhea, nausea and vomiting.   Genitourinary: Negative for dysuria, flank pain, frequency and vaginal discharge.   Musculoskeletal: Positive for arthralgias and back pain. Negative for joint swelling.   Skin: Negative for rash.   Neurological: Negative for speech difficulty, weakness, light-headedness and headaches.   Psychiatric/Behavioral: Negative for behavioral problems and dysphoric mood.       Physical Exam  Vitals:    05/29/17 1014   BP: 130/84   BP Location: Right arm   Patient Position: Sitting   BP Method: Manual   Pulse: (!) 55   Temp: 98.6 °F (37 °C)   SpO2: 98%   Weight: 67.6 kg (149 lb 0.5 oz)   Height: 5' 4" (1.626 m)    Body mass index is 25.58 kg/m².  Weight: 67.6 kg (149 lb 0.5 oz)   Height: 5' 4" (162.6 cm)     Physical Exam   Constitutional: She is oriented to person, place, and time. She appears well-developed and well-nourished.   Eyes: EOM are normal.   Musculoskeletal:   Pain is very deep between her buttocks, reproduced with palpation   Neurological: She is alert and oriented to person, place, and time.   Negative straight leg raise bilaterally   Skin: Skin is warm and dry. No rash noted. No erythema.   Psychiatric: She has a normal mood and affect. Her behavior is normal.   Nursing note and vitals reviewed.      Health Maintenance       Date Due Completion Date    DEXA SCAN 06/23/2017 6/23/2014    Override on 8/12/2013: Done    Influenza Vaccine 08/01/2017 10/25/2016    Lipid Panel 02/23/2018 2/23/2017    TETANUS VACCINE 06/09/2024 6/9/2014            ASSESSMENT     1. Tail bone pain    2. Age-related osteoporosis without current pathological fracture    3. Screening for osteoporosis    4. Encounter for screening mammogram for breast cancer    5. Breast pain        PLAN:     Tail bone pain  -     DXA Bone Density Spine And Hip; Future; Expected date: 05/29/2017  -     X-Ray Sacrum " And Coccyx; Future; Expected date: 05/29/2017    Age-related osteoporosis without current pathological fracture  -     DXA Bone Density Spine And Hip; Future; Expected date: 05/29/2017    Screening for osteoporosis  -     DXA Bone Density Spine And Hip; Future; Expected date: 05/29/2017    Encounter for screening mammogram for breast cancer  -     Mammo Digital Screening Bilat with CAD; Future; Expected date: 05/29/2017    Breast pain  -     Mammo Digital Screening Bilat with CAD; Future; Expected date: 05/29/2017      Breast pain likely muscle strain but will confirm with mammogram  Will get DXA and make sure her osteoporosis is not worsening  X ray to r/o fracture of tailbone  Follow up pending x ray results    Genny Martinez MD  05/29/2017 10:40 AM        Return in about 6 weeks (around 7/10/2017) for Follow up.

## 2017-06-02 ENCOUNTER — TELEPHONE (OUTPATIENT)
Dept: FAMILY MEDICINE | Facility: CLINIC | Age: 77
End: 2017-06-02

## 2017-06-02 DIAGNOSIS — R30.0 DYSURIA: Primary | ICD-10-CM

## 2017-06-02 NOTE — TELEPHONE ENCOUNTER
----- Message from [employer] CRUZ Rascon sent at 6/1/2017  3:44 PM CDT -----  Contact: self   Pt request a urine test order for the pt for Monday. Please contact [phone]. Thanks!

## 2017-06-05 ENCOUNTER — HOSPITAL ENCOUNTER (OUTPATIENT)
Dept: RADIOLOGY | Facility: HOSPITAL | Age: 77
Discharge: HOME | End: 2017-06-05
Attending: FAMILY MEDICINE
Payer: MEDICARE

## 2017-06-05 ENCOUNTER — HOSPITAL ENCOUNTER (OUTPATIENT)
Dept: RADIOLOGY | Facility: CLINIC | Age: 77
Discharge: HOME | End: 2017-06-05
Attending: FAMILY MEDICINE
Payer: MEDICARE

## 2017-06-05 DIAGNOSIS — M53.3 TAIL BONE PAIN: ICD-10-CM

## 2017-06-05 DIAGNOSIS — N64.4 BREAST PAIN: ICD-10-CM

## 2017-06-05 DIAGNOSIS — M81.0 AGE-RELATED OSTEOPOROSIS WITHOUT CURRENT PATHOLOGICAL FRACTURE: ICD-10-CM

## 2017-06-05 DIAGNOSIS — Z12.31 ENCOUNTER FOR SCREENING MAMMOGRAM FOR BREAST CANCER: ICD-10-CM

## 2017-06-05 DIAGNOSIS — Z13.820 SCREENING FOR OSTEOPOROSIS: ICD-10-CM

## 2017-06-05 PROCEDURE — 77067 SCR MAMMO BI INCL CAD: CPT | Mod: TC

## 2017-06-05 PROCEDURE — 77063 BREAST TOMOSYNTHESIS BI: CPT | Mod: 26,,, | Performed by: RADIOLOGY

## 2017-06-05 PROCEDURE — 77067 SCR MAMMO BI INCL CAD: CPT | Mod: 26,,, | Performed by: RADIOLOGY

## 2017-06-05 PROCEDURE — 77080 DXA BONE DENSITY AXIAL: CPT | Mod: 26,,, | Performed by: INTERNAL MEDICINE

## 2017-06-05 NOTE — TELEPHONE ENCOUNTER
Patient is requesting to do a urine sample she's c/o burning & pain with urination for a few days she's coming for a test.

## 2017-06-08 ENCOUNTER — TELEPHONE (OUTPATIENT)
Dept: FAMILY MEDICINE | Facility: CLINIC | Age: 77
End: 2017-06-08

## 2017-06-08 DIAGNOSIS — B37.9 CANDIDIASIS: Primary | ICD-10-CM

## 2017-06-08 RX ORDER — FLUCONAZOLE 150 MG/1
150 TABLET ORAL ONCE
Qty: 2 TABLET | Refills: 2 | Status: SHIPPED | OUTPATIENT
Start: 2017-06-08 | End: 2017-06-08

## 2017-06-08 NOTE — TELEPHONE ENCOUNTER
Please let her know her urine is showing a large amount of yeast. I am still waiting on the culture, but will send some diflucan as the yeast could be causing her symptoms also.    We will call her back when we have the culture results

## 2017-06-12 ENCOUNTER — TELEPHONE (OUTPATIENT)
Dept: FAMILY MEDICINE | Facility: CLINIC | Age: 77
End: 2017-06-12

## 2017-06-12 DIAGNOSIS — N39.0 URINARY TRACT INFECTION WITHOUT HEMATURIA, SITE UNSPECIFIED: Primary | ICD-10-CM

## 2017-06-12 RX ORDER — CIPROFLOXACIN 500 MG/1
500 TABLET ORAL EVERY 12 HOURS
Qty: 14 TABLET | Refills: 0 | Status: SHIPPED | OUTPATIENT
Start: 2017-06-12 | End: 2017-06-16

## 2017-06-12 NOTE — TELEPHONE ENCOUNTER
Please let her know that her urine cultures shows infection with a bacteria that is sensitive to four antibiotics. I am sending cipro to her pharmacy as this should treat it.    Let us know if her symtpoms do not improve

## 2017-06-13 NOTE — TELEPHONE ENCOUNTER
Called patient to inform her of urine results also to schedule F/U appointment no answer left message to call clinic.

## 2017-06-15 NOTE — TELEPHONE ENCOUNTER
Called patient advised of physician's recommendation. Patient verbalized an understanding. DR. Martinez patient states when she collected her previous specimen she dropped it in the toilet.

## 2017-06-16 ENCOUNTER — LAB VISIT (OUTPATIENT)
Dept: LAB | Facility: HOSPITAL | Age: 77
End: 2017-06-16
Attending: FAMILY MEDICINE
Payer: MEDICARE

## 2017-06-16 ENCOUNTER — TELEPHONE (OUTPATIENT)
Dept: FAMILY MEDICINE | Facility: CLINIC | Age: 77
End: 2017-06-16

## 2017-06-16 DIAGNOSIS — N39.0 URINARY TRACT INFECTION WITHOUT HEMATURIA, SITE UNSPECIFIED: ICD-10-CM

## 2017-06-16 DIAGNOSIS — N39.0 URINARY TRACT INFECTION WITHOUT HEMATURIA, SITE UNSPECIFIED: Primary | ICD-10-CM

## 2017-06-16 LAB
BILIRUB UR QL STRIP: NEGATIVE
CLARITY UR REFRACT.AUTO: CLEAR
COLOR UR AUTO: YELLOW
GLUCOSE UR QL STRIP: NEGATIVE
HGB UR QL STRIP: NEGATIVE
KETONES UR QL STRIP: NEGATIVE
LEUKOCYTE ESTERASE UR QL STRIP: NEGATIVE
MICROSCOPIC COMMENT: NORMAL
NITRITE UR QL STRIP: NEGATIVE
PH UR STRIP: 6 [PH] (ref 5–8)
PROT UR QL STRIP: NEGATIVE
SP GR UR STRIP: 1.01 (ref 1–1.03)
URN SPEC COLLECT METH UR: NORMAL
UROBILINOGEN UR STRIP-ACNC: NEGATIVE EU/DL
WBC #/AREA URNS AUTO: 3 /HPF (ref 0–5)

## 2017-06-16 PROCEDURE — 81001 URINALYSIS AUTO W/SCOPE: CPT

## 2017-06-16 PROCEDURE — 87086 URINE CULTURE/COLONY COUNT: CPT

## 2017-06-16 NOTE — TELEPHONE ENCOUNTER
----- Message from Irene Tijerina sent at 6/15/2017  9:43 AM CDT -----  Contact: self  Pt returning calls for her test results. Please contact her at [phone].    Thanks

## 2017-06-16 NOTE — TELEPHONE ENCOUNTER
----- Message from Cally Rascon sent at 6/12/2017  9:23 AM CDT -----  Contact: self   Request to speak to the nurse. She said she has some questions about her mammogram result [phone]. Thanks!

## 2017-06-16 NOTE — ADDENDUM NOTE
Encounter addended by: Nakul Childs MA on: 6/16/2017  2:33 PM<BR>    Actions taken:  activity accessed

## 2017-06-16 NOTE — TELEPHONE ENCOUNTER
patient's lab orders need to be re-entered they were put in as future  clinic collect for Ua  urine culture

## 2017-06-17 LAB — BACTERIA UR CULT: NO GROWTH

## 2017-06-19 ENCOUNTER — TELEPHONE (OUTPATIENT)
Dept: FAMILY MEDICINE | Facility: CLINIC | Age: 77
End: 2017-06-19

## 2017-06-19 NOTE — TELEPHONE ENCOUNTER
Pt advised of physician recommendations.  MD advised for pt to complete antibiotic therapy due to pt c/o urinary pressure. Pt verbalized an understanding.

## 2017-06-19 NOTE — TELEPHONE ENCOUNTER
Please inform the patient that her urine culture results are negative.  Keep OV with Dr. Martinez in July.     Thank you,  Diego

## 2017-07-11 ENCOUNTER — OFFICE VISIT (OUTPATIENT)
Dept: FAMILY MEDICINE | Facility: CLINIC | Age: 77
End: 2017-07-11
Payer: MEDICARE

## 2017-07-11 VITALS
SYSTOLIC BLOOD PRESSURE: 138 MMHG | HEIGHT: 63 IN | HEART RATE: 62 BPM | WEIGHT: 147.63 LBS | TEMPERATURE: 98 F | OXYGEN SATURATION: 96 % | RESPIRATION RATE: 16 BRPM | DIASTOLIC BLOOD PRESSURE: 82 MMHG | BODY MASS INDEX: 26.16 KG/M2

## 2017-07-11 DIAGNOSIS — N63.0 FIBROUS BREAST LUMPS: ICD-10-CM

## 2017-07-11 DIAGNOSIS — M81.0 AGE-RELATED OSTEOPOROSIS WITHOUT CURRENT PATHOLOGICAL FRACTURE: Primary | ICD-10-CM

## 2017-07-11 DIAGNOSIS — N64.4 BREAST PAIN: ICD-10-CM

## 2017-07-11 DIAGNOSIS — N39.41 URGE INCONTINENCE: ICD-10-CM

## 2017-07-11 PROCEDURE — 99999 PR PBB SHADOW E&M-EST. PATIENT-LVL III: CPT | Mod: PBBFAC,,, | Performed by: FAMILY MEDICINE

## 2017-07-11 PROCEDURE — 1159F MED LIST DOCD IN RCRD: CPT | Mod: S$GLB,,, | Performed by: FAMILY MEDICINE

## 2017-07-11 PROCEDURE — 1126F AMNT PAIN NOTED NONE PRSNT: CPT | Mod: S$GLB,,, | Performed by: FAMILY MEDICINE

## 2017-07-11 PROCEDURE — 99214 OFFICE O/P EST MOD 30 MIN: CPT | Mod: S$GLB,,, | Performed by: FAMILY MEDICINE

## 2017-07-11 PROCEDURE — 99499 UNLISTED E&M SERVICE: CPT | Mod: S$GLB,,, | Performed by: FAMILY MEDICINE

## 2017-07-11 RX ORDER — ALENDRONATE SODIUM 70 MG/1
70 TABLET ORAL
Qty: 4 TABLET | Refills: 5 | Status: SHIPPED | OUTPATIENT
Start: 2017-07-11 | End: 2018-01-02 | Stop reason: SDUPTHER

## 2017-07-11 RX ORDER — OXYBUTYNIN CHLORIDE 5 MG/1
5 TABLET, EXTENDED RELEASE ORAL DAILY
Qty: 30 TABLET | Refills: 2 | Status: SHIPPED | OUTPATIENT
Start: 2017-07-11 | End: 2017-08-08

## 2017-07-11 NOTE — PROGRESS NOTES
Chief Complaint   Patient presents with    Results    Follow-up    Breast Pain     both       HPI  Nereyda Hernandez is a 76 y.o. female with multiple medical diagnoses as listed in the medical history and problem list that presents for follow-up to discuss her lab results.     She had a bone scan done that showed she had decreased bone mass and treatment was recommended. She has taken fosamax in the past but was taken off. She is still having pain in her tailbone after a fall but it is less frequent. She has been having pain in her right arm pit area and it has been present for the past several weeks. It is worse with movement. She has been working in her garden.     She had a mammogram also that was normal but it showed she has fibrocystic densities. She does drink coffee but it is decaf. She does not drink tea or soda.     She has also been having dry mouth that causes her breath to smell. She is taking ditropan twice daily as she does have nighttime incontinence.    PAST MEDICAL HISTORY:  Past Medical History:   Diagnosis Date    Anxiety     Chronic disease anemia     Hyperlipidemia     Hypertension     Insomnia     KANWAL (obstructive sleep apnea)     Osteoporosis     Rosacea     Vertigo        PAST SURGICAL HISTORY:  Past Surgical History:   Procedure Laterality Date    BELT ABDOMINOPLASTY      HERNIA REPAIR      Ventral    TONSILLECTOMY      TOTAL ABDOMINAL HYSTERECTOMY         SOCIAL HISTORY:  Social History     Social History    Marital status:      Spouse name: N/A    Number of children: 6    Years of education: college     Occupational History    retired  for [employer]      Social History Main Topics    Smoking status: Former Smoker     Packs/day: 0.50     Years: 10.00     Quit date: 9/20/1980    Smokeless tobacco: Not on file    Alcohol use 3.0 oz/week     5 Glasses of wine per week      Comment: Occasionally    Drug use: No    Sexual activity: Not Currently      Partners: Male     Other Topics Concern    Not on file     Social History Narrative    No narrative on file       FAMILY HISTORY:  Family History   Problem Relation Age of Onset    Cancer Mother      liver       ALLERGIES AND MEDICATIONS: updated and reviewed.  Review of patient's allergies indicates:  No Known Allergies  Current Outpatient Prescriptions   Medication Sig Dispense Refill    aspirin (ECOTRIN) 81 MG EC tablet Take 81 mg by mouth once daily.      calcium citrate-vitamin D3 315-200 mg (CITRACAL+D) 315-200 mg-unit per tablet Take 1 tablet by mouth 2 (two) times daily.        fish oil-omega-3 fatty acids 300-1,000 mg capsule Take 2 g by mouth once daily.        hydrochlorothiazide (HYDRODIURIL) 25 MG tablet Take 1 tablet (25 mg total) by mouth once daily. 30 tablet 5    latanoprost 0.005 % ophthalmic solution 1 drop every evening.      losartan (COZAAR) 50 MG tablet Take 1 tablet (50 mg total) by mouth once daily. 30 tablet 5    metoprolol tartrate (LOPRESSOR) 25 MG tablet Take 0.5 tablets (12.5 mg total) by mouth 2 (two) times daily. 60 tablet 3    multivit-iron-min-folic acid (MULTIVITAMIN-IRON-MINERALS-FOLIC ACID) 3,500-18-0.4 unit-mg-mg Chew Take by mouth.        pravastatin (PRAVACHOL) 20 MG tablet Take 1 tablet (20 mg total) by mouth once daily. 30 tablet 5    sertraline (ZOLOFT) 100 MG tablet Take 1 tablet (100 mg total) by mouth once daily. 30 tablet 5    zolpidem (AMBIEN) 5 MG Tab Take 1 tablet (5 mg total) by mouth nightly as needed. 30 tablet 2    alendronate (FOSAMAX) 70 MG tablet Take 1 tablet (70 mg total) by mouth every 7 days. 4 tablet 5    oxybutynin (DITROPAN-XL) 5 MG TR24 Take 1 tablet (5 mg total) by mouth once daily. 30 tablet 2     No current facility-administered medications for this visit.        ROS  Review of Systems   Constitutional: Negative for chills, diaphoresis, fatigue, fever and unexpected weight change.   HENT: Negative for rhinorrhea, sinus pressure, sore  "throat and tinnitus.    Eyes: Negative for photophobia and visual disturbance.   Respiratory: Negative for cough, shortness of breath and wheezing.    Cardiovascular: Negative for chest pain and palpitations.   Gastrointestinal: Negative for abdominal pain, blood in stool, constipation, diarrhea, nausea and vomiting.   Genitourinary: Negative for dysuria, flank pain, frequency and vaginal discharge.   Musculoskeletal: Positive for myalgias. Negative for arthralgias and joint swelling.   Skin: Negative for rash.   Neurological: Negative for speech difficulty, weakness, light-headedness and headaches.   Psychiatric/Behavioral: Negative for behavioral problems and dysphoric mood.       Physical Exam  Vitals:    07/11/17 1018   BP: 138/82   Pulse: 62   Resp: 16   Temp: 98.3 °F (36.8 °C)   TempSrc: Oral   SpO2: 96%   Weight: 67 kg (147 lb 9.6 oz)   Height: 5' 3" (1.6 m)    Body mass index is 26.15 kg/m².  Weight: 67 kg (147 lb 9.6 oz)   Height: 5' 3" (160 cm)     Physical Exam   Constitutional: She is oriented to person, place, and time. She appears well-developed and well-nourished.   Eyes: EOM are normal.   Neurological: She is alert and oriented to person, place, and time.   Skin: Skin is warm and dry. No rash noted. No erythema.   Psychiatric: She has a normal mood and affect. Her behavior is normal.   Nursing note and vitals reviewed.      Health Maintenance       Date Due Completion Date    Influenza Vaccine 08/01/2017 10/25/2016    Lipid Panel 02/23/2018 2/23/2017    DEXA SCAN 06/05/2020 6/5/2017    Override on 8/12/2013: Done    TETANUS VACCINE 06/09/2024 6/9/2014            ASSESSMENT     1. Age-related osteoporosis without current pathological fracture    2. Breast pain    3. Fibrous breast lumps    4. Urge incontinence        PLAN:     Age-related osteoporosis without current pathological fracture  -     alendronate (FOSAMAX) 70 MG tablet; Take 1 tablet (70 mg total) by mouth every 7 days.  Dispense: 4 tablet; " Refill: 5    Breast pain    Fibrous breast lumps    Urge incontinence  -     oxybutynin (DITROPAN-XL) 5 MG TR24; Take 1 tablet (5 mg total) by mouth once daily.  Dispense: 30 tablet; Refill: 2      Will resume treatment after reviewing her bone scan  We have reviewed her mammogram and she has fibrous breast tissue but it is otherwise benign  Continue to monitor her pain, it is likely muscle in nature  We will try XL ditropan to see if this improves her dryness    Genny Martinez MD  07/11/2017 3:33 PM        Return in about 4 weeks (around 8/8/2017) for Follow up.

## 2017-08-08 ENCOUNTER — OFFICE VISIT (OUTPATIENT)
Dept: FAMILY MEDICINE | Facility: CLINIC | Age: 77
End: 2017-08-08
Payer: MEDICARE

## 2017-08-08 VITALS
OXYGEN SATURATION: 97 % | BODY MASS INDEX: 26.21 KG/M2 | RESPIRATION RATE: 16 BRPM | TEMPERATURE: 98 F | SYSTOLIC BLOOD PRESSURE: 124 MMHG | HEART RATE: 60 BPM | WEIGHT: 147.94 LBS | DIASTOLIC BLOOD PRESSURE: 66 MMHG | HEIGHT: 63 IN

## 2017-08-08 DIAGNOSIS — M81.0 AGE-RELATED OSTEOPOROSIS WITHOUT CURRENT PATHOLOGICAL FRACTURE: ICD-10-CM

## 2017-08-08 DIAGNOSIS — N64.4 BREAST PAIN: Primary | ICD-10-CM

## 2017-08-08 DIAGNOSIS — N39.41 URGE INCONTINENCE: ICD-10-CM

## 2017-08-08 DIAGNOSIS — N60.19 FIBROCYSTIC BREAST DISEASE, UNSPECIFIED LATERALITY: ICD-10-CM

## 2017-08-08 PROCEDURE — 99214 OFFICE O/P EST MOD 30 MIN: CPT | Mod: S$GLB,,, | Performed by: FAMILY MEDICINE

## 2017-08-08 PROCEDURE — 99499 UNLISTED E&M SERVICE: CPT | Mod: S$GLB,,, | Performed by: FAMILY MEDICINE

## 2017-08-08 PROCEDURE — 3078F DIAST BP <80 MM HG: CPT | Mod: S$GLB,,, | Performed by: FAMILY MEDICINE

## 2017-08-08 PROCEDURE — 1159F MED LIST DOCD IN RCRD: CPT | Mod: S$GLB,,, | Performed by: FAMILY MEDICINE

## 2017-08-08 PROCEDURE — 1126F AMNT PAIN NOTED NONE PRSNT: CPT | Mod: S$GLB,,, | Performed by: FAMILY MEDICINE

## 2017-08-08 PROCEDURE — 3074F SYST BP LT 130 MM HG: CPT | Mod: S$GLB,,, | Performed by: FAMILY MEDICINE

## 2017-08-08 PROCEDURE — 3008F BODY MASS INDEX DOCD: CPT | Mod: S$GLB,,, | Performed by: FAMILY MEDICINE

## 2017-08-08 PROCEDURE — 99999 PR PBB SHADOW E&M-EST. PATIENT-LVL III: CPT | Mod: PBBFAC,,, | Performed by: FAMILY MEDICINE

## 2017-08-08 RX ORDER — OXYBUTYNIN CHLORIDE 5 MG/1
5 TABLET ORAL 2 TIMES DAILY
Qty: 60 TABLET | Refills: 11 | Status: SHIPPED | OUTPATIENT
Start: 2017-08-08 | End: 2018-08-15 | Stop reason: SDUPTHER

## 2017-08-08 NOTE — PROGRESS NOTES
Chief Complaint   Patient presents with    Breast Pain     both    Follow-up       HPI  Nereyda Hernandez is a 77 y.o. female with multiple medical diagnoses as listed in the medical history and problem list that presents for follow-up for breast pain. This has been chronic for the past 6 wks. She had a negative mammogram. She has had some axillary soreness also along with a pain that is  Like a twitch in her chest. She is still having pain in her tailbone area also but this is intermittent.     PAST MEDICAL HISTORY:  Past Medical History:   Diagnosis Date    Anxiety     Chronic disease anemia     Hyperlipidemia     Hypertension     Insomnia     KANWAL (obstructive sleep apnea)     Osteoporosis     Rosacea     Vertigo        PAST SURGICAL HISTORY:  Past Surgical History:   Procedure Laterality Date    BELT ABDOMINOPLASTY      HERNIA REPAIR      [facility]    TONSILLECTOMY      TOTAL ABDOMINAL HYSTERECTOMY         SOCIAL HISTORY:  Social History     Social History    Marital status:      Spouse name: N/A    Number of children: 6    Years of education: college     Occupational History    retired  for [employer]      Social History Main Topics    Smoking status: Former Smoker     Packs/day: 0.50     Years: 10.00     Quit date: 9/20/1980    Smokeless tobacco: Not on file    Alcohol use 3.0 oz/week     5 Glasses of wine per week      Comment: Occasionally    Drug use: No    Sexual activity: Not Currently     Partners: Male     Other Topics Concern    Not on file     Social History Narrative    No narrative on file       FAMILY HISTORY:  Family History   Problem Relation Age of Onset    Cancer Mother      liver       ALLERGIES AND MEDICATIONS: updated and reviewed.  Review of patient's allergies indicates:  No Known Allergies  Current Outpatient Prescriptions   Medication Sig Dispense Refill    alendronate (FOSAMAX) 70 MG tablet Take 1 tablet (70 mg total) by mouth every 7 days. 4  tablet 5    aspirin (ECOTRIN) 81 MG EC tablet Take 81 mg by mouth once daily.      calcium citrate-vitamin D3 315-200 mg (CITRACAL+D) 315-200 mg-unit per tablet Take 1 tablet by mouth 2 (two) times daily.        fish oil-omega-3 fatty acids 300-1,000 mg capsule Take 2 g by mouth once daily.        hydrochlorothiazide (HYDRODIURIL) 25 MG tablet Take 1 tablet (25 mg total) by mouth once daily. 30 tablet 5    latanoprost 0.005 % ophthalmic solution 1 drop every evening.      losartan (COZAAR) 50 MG tablet Take 1 tablet (50 mg total) by mouth once daily. 30 tablet 5    metoprolol tartrate (LOPRESSOR) 25 MG tablet Take 0.5 tablets (12.5 mg total) by mouth 2 (two) times daily. 60 tablet 3    multivit-iron-min-folic acid (MULTIVITAMIN-IRON-MINERALS-FOLIC ACID) 3,500-18-0.4 unit-mg-mg Chew Take by mouth.        pravastatin (PRAVACHOL) 20 MG tablet Take 1 tablet (20 mg total) by mouth once daily. 30 tablet 5    sertraline (ZOLOFT) 100 MG tablet Take 1 tablet (100 mg total) by mouth once daily. 30 tablet 5    zolpidem (AMBIEN) 5 MG Tab Take 1 tablet (5 mg total) by mouth nightly as needed. 30 tablet 2    oxybutynin (DITROPAN) 5 MG Tab Take 1 tablet (5 mg total) by mouth 2 (two) times daily. 60 tablet 11     No current facility-administered medications for this visit.        ROS  Review of Systems   Constitutional: Negative for chills, diaphoresis, fatigue, fever and unexpected weight change.   HENT: Negative for rhinorrhea, sinus pressure, sore throat and tinnitus.    Eyes: Negative for photophobia and visual disturbance.   Respiratory: Negative for cough, shortness of breath and wheezing.    Cardiovascular: Negative for chest pain and palpitations.   Gastrointestinal: Negative for abdominal pain, blood in stool, constipation, diarrhea, nausea and vomiting.   Genitourinary: Negative for dysuria, flank pain, frequency and vaginal discharge.   Musculoskeletal: Positive for arthralgias and back pain. Negative for  "joint swelling.   Skin: Negative for rash.   Neurological: Negative for speech difficulty, weakness, light-headedness and headaches.   Psychiatric/Behavioral: Negative for behavioral problems and dysphoric mood.       Physical Exam  Vitals:    08/08/17 0941   BP: 124/66   Pulse: 60   Resp: 16   Temp: 98.2 °F (36.8 °C)   TempSrc: Oral   SpO2: 97%   Weight: 67.1 kg (147 lb 14.9 oz)   Height: 5' 3" (1.6 m)    Body mass index is 26.2 kg/m².  Weight: 67.1 kg (147 lb 14.9 oz)   Height: 5' 3" (160 cm)     Physical Exam   Constitutional: She is oriented to person, place, and time. She appears well-developed and well-nourished.   Eyes: EOM are normal.   Neurological: She is alert and oriented to person, place, and time.   Skin: Skin is warm and dry. No rash noted. No erythema.   Psychiatric: She has a normal mood and affect. Her behavior is normal.   Nursing note and vitals reviewed.      Health Maintenance       Date Due Completion Date    Influenza Vaccine 08/01/2017 10/25/2016    Lipid Panel 02/23/2018 2/23/2017    DEXA SCAN 06/05/2020 6/5/2017    Override on 8/12/2013: Done    TETANUS VACCINE 06/09/2024 6/9/2014            ASSESSMENT     1. Breast pain    2. Fibrocystic breast disease, unspecified laterality    3. Age-related osteoporosis without current pathological fracture    4. Urge incontinence        PLAN:     Breast pain  -     US Breast Bilateral Complete; Future; Expected date: 08/08/2017    Fibrocystic breast disease, unspecified laterality  -suspect muscle strain if ultrasound negative, avoid heavy lifting and yardwork and see if pain improves  -     US Breast Bilateral Complete; Future; Expected date: 08/08/2017    Age-related osteoporosis without current pathological fracture  -we discussed that she needs to continue her medication for this, this may improve her pain    Urge incontinence  -     oxybutynin (DITROPAN) 5 MG Tab; Take 1 tablet (5 mg total) by mouth 2 (two) times daily.  Dispense: 60 tablet; " Refill: 11          Genny Martinez MD  08/08/2017 1:55 PM        Return in about 6 weeks (around 9/19/2017) for Follow up.

## 2017-08-14 ENCOUNTER — TELEPHONE (OUTPATIENT)
Dept: FAMILY MEDICINE | Facility: CLINIC | Age: 77
End: 2017-08-14

## 2017-08-14 ENCOUNTER — HOSPITAL ENCOUNTER (OUTPATIENT)
Dept: RADIOLOGY | Facility: HOSPITAL | Age: 77
Discharge: HOME | End: 2017-08-14
Attending: FAMILY MEDICINE
Payer: MEDICARE

## 2017-08-14 VITALS — WEIGHT: 147 LBS | HEIGHT: 63 IN | BODY MASS INDEX: 26.05 KG/M2

## 2017-08-14 DIAGNOSIS — N64.4 BREAST PAIN: ICD-10-CM

## 2017-08-14 DIAGNOSIS — N60.19 FIBROCYSTIC BREAST DISEASE, UNSPECIFIED LATERALITY: ICD-10-CM

## 2017-08-14 PROCEDURE — 76641 ULTRASOUND BREAST COMPLETE: CPT | Mod: TC,50

## 2017-08-14 PROCEDURE — 76641 ULTRASOUND BREAST COMPLETE: CPT | Mod: 26,50,, | Performed by: RADIOLOGY

## 2017-08-14 NOTE — TELEPHONE ENCOUNTER
Please let her know her ultrasound is normal and her pain is likely muscle related; she should avoid lifting anything over 10 lbs for the next 2 weeks, let us know if her pain does not get better

## 2017-08-18 DIAGNOSIS — I10 ESSENTIAL HYPERTENSION: Chronic | ICD-10-CM

## 2017-08-18 RX ORDER — METOPROLOL TARTRATE 25 MG/1
12.5 TABLET, FILM COATED ORAL 2 TIMES DAILY
Qty: 60 TABLET | Refills: 3 | Status: SHIPPED | OUTPATIENT
Start: 2017-08-18 | End: 2018-04-17 | Stop reason: SDUPTHER

## 2017-08-18 RX ORDER — METOPROLOL TARTRATE 25 MG/1
12.5 TABLET, FILM COATED ORAL 2 TIMES DAILY
Qty: 60 TABLET | Refills: 3 | Status: SHIPPED | OUTPATIENT
Start: 2017-08-18 | End: 2017-08-18 | Stop reason: SDUPTHER

## 2017-08-21 ENCOUNTER — OFFICE VISIT (OUTPATIENT)
Dept: PULMONOLOGY | Facility: CLINIC | Age: 77
End: 2017-08-21
Payer: MEDICARE

## 2017-08-21 VITALS
HEART RATE: 71 BPM | BODY MASS INDEX: 26.05 KG/M2 | WEIGHT: 147 LBS | SYSTOLIC BLOOD PRESSURE: 108 MMHG | HEIGHT: 63 IN | DIASTOLIC BLOOD PRESSURE: 60 MMHG | OXYGEN SATURATION: 95 %

## 2017-08-21 DIAGNOSIS — G47.00 INSOMNIA, UNSPECIFIED TYPE: ICD-10-CM

## 2017-08-21 DIAGNOSIS — G47.33 OSA (OBSTRUCTIVE SLEEP APNEA): Primary | ICD-10-CM

## 2017-08-21 PROCEDURE — 1159F MED LIST DOCD IN RCRD: CPT | Mod: S$GLB,,, | Performed by: INTERNAL MEDICINE

## 2017-08-21 PROCEDURE — 3078F DIAST BP <80 MM HG: CPT | Mod: S$GLB,,, | Performed by: INTERNAL MEDICINE

## 2017-08-21 PROCEDURE — 99214 OFFICE O/P EST MOD 30 MIN: CPT | Mod: S$GLB,,, | Performed by: INTERNAL MEDICINE

## 2017-08-21 PROCEDURE — 3008F BODY MASS INDEX DOCD: CPT | Mod: S$GLB,,, | Performed by: INTERNAL MEDICINE

## 2017-08-21 PROCEDURE — 99999 PR PBB SHADOW E&M-EST. PATIENT-LVL III: CPT | Mod: PBBFAC,,, | Performed by: INTERNAL MEDICINE

## 2017-08-21 PROCEDURE — 1126F AMNT PAIN NOTED NONE PRSNT: CPT | Mod: S$GLB,,, | Performed by: INTERNAL MEDICINE

## 2017-08-21 PROCEDURE — 3074F SYST BP LT 130 MM HG: CPT | Mod: S$GLB,,, | Performed by: INTERNAL MEDICINE

## 2017-08-21 NOTE — PROGRESS NOTES
Nereyda Hernandez  was seen as a follow up.    CHIEF COMPLAINT:    Chief Complaint   Patient presents with    Sleep Apnea       HISTORY OF PRESENT ILLNESS: Nereyda Hernandez is a 77 y.o. female is here for sleep evaluation.    Our first encounter was 9/12.  Patient was diagnosed with kaylee in 2004.  At that time, patient was on cpap of 9 cm H20.  Pt has been using machine nightly until 1 month ago.  +leakage around full face mask.  +snoring without machine.  +fatigue upon awakening.  No rls symptoms.  No parasomnia.  No cataplexy.  No bruxism.  Patient was referred to Dr. Voss for oral appliance in 2012.        On today's Humphreys Sleepiness Scale the patient scores a 8.    Patient was set up with [facility] 2012.  +persistent fatigue.  Still with fatigue.  Patient also has issue with teeth shifting.  Patient was set up for cpap during last appointment.  Patient have been using cpap 5 times per week.  Sleep unchanged with or without cpap.      SLEEP ROUTINE:  Activity the hour prior to sleep: read     Bed partner:  none  Time to bed:  10 pm  Lights off:  yes  Sleep onset latency:  5 minutes with ambien 2.5 mg        Disruptions or awakenings:   1-2 times  (no difficulty going back to sleep)  Wakeup time:      5:45 am  Perceived sleep quality:  rested       Daytime naps:      none  Weekend sleep routine:      10 pm to 7 am  Caffeine use: 2 cups of coffee in am  exercise habit:   Line dancing 2 x per week.      PAST MEDICAL HISTORY:    Active Ambulatory Problems     Diagnosis Date Noted    Age-related osteoporosis without current pathological fracture 08/08/2012    Anxiety 08/08/2012    Essential hypertension 08/08/2012    KAYLEE on CPAP 08/08/2012    Hyperlipidemia 08/08/2012    Insomnia 08/12/2013    Urge incontinence 10/16/2014    White coat hypertension 06/18/2015    History of iron deficiency anemia 08/26/2015    Sedative hypnotic or anxiolytic dependence 10/25/2016     Resolved Ambulatory Problems     Diagnosis Date Noted     Chronic disease anemia 08/12/2013     Past Medical History:   Diagnosis Date    Anxiety     Breast cancer     Chronic disease anemia     Hyperlipidemia     Hypertension     Insomnia     Lobular carcinoma in situ     KANWAL (obstructive sleep apnea)     Osteoporosis     Rosacea     Vertigo                 PAST SURGICAL HISTORY:    Past Surgical History:   Procedure Laterality Date    BELT ABDOMINOPLASTY      BREAST BIOPSY      HERNIA REPAIR      [facility]    TONSILLECTOMY      TOTAL ABDOMINAL HYSTERECTOMY           FAMILY HISTORY:                Family History   Problem Relation Age of Onset    Cancer Mother      liver       SOCIAL HISTORY:          Tobacco:   History   Smoking Status    Former Smoker    Packs/day: 0.50    Years: 10.00    Quit date: 9/20/1980   Smokeless Tobacco    Never Used       alcohol use:    History   Alcohol Use    3.0 oz/week    5 Glasses of wine per week     Comment: Occasionally                 Occupation: retired housewife, former customer service at [employer].    ALLERGIES:  Review of patient's allergies indicates:  No Known Allergies    CURRENT MEDICATIONS:    Current Outpatient Prescriptions   Medication Sig Dispense Refill    alendronate (FOSAMAX) 70 MG tablet Take 1 tablet (70 mg total) by mouth every 7 days. 4 tablet 5    aspirin (ECOTRIN) 81 MG EC tablet Take 81 mg by mouth once daily.      calcium citrate-vitamin D3 315-200 mg (CITRACAL+D) 315-200 mg-unit per tablet Take 1 tablet by mouth 2 (two) times daily.        fish oil-omega-3 fatty acids 300-1,000 mg capsule Take 2 g by mouth once daily.        hydrochlorothiazide (HYDRODIURIL) 25 MG tablet Take 1 tablet (25 mg total) by mouth once daily. 30 tablet 5    latanoprost 0.005 % ophthalmic solution 1 drop every evening.      losartan (COZAAR) 50 MG tablet Take 1 tablet (50 mg total) by mouth once daily. 30 tablet 5    metoprolol tartrate (LOPRESSOR) 25 MG tablet Take 0.5 tablets (12.5 mg total) by mouth 2  "(two) times daily. 60 tablet 3    multivit-iron-min-folic acid (MULTIVITAMIN-IRON-MINERALS-FOLIC ACID) 3,500-18-0.4 unit-mg-mg Chew Take by mouth.        oxybutynin (DITROPAN) 5 MG Tab Take 1 tablet (5 mg total) by mouth 2 (two) times daily. 60 tablet 11    pravastatin (PRAVACHOL) 20 MG tablet Take 1 tablet (20 mg total) by mouth once daily. 30 tablet 5    sertraline (ZOLOFT) 100 MG tablet Take 1 tablet (100 mg total) by mouth once daily. 30 tablet 5    zolpidem (AMBIEN) 5 MG Tab Take 1 tablet (5 mg total) by mouth nightly as needed. 30 tablet 2     No current facility-administered medications for this visit.                   REVIEW OF SYSTEMS:     Sleep related symptoms as per HPI.  CONST:Denies weight gain    HEENT: Denies sinus problems  PULM: Denies dyspnea  CARD:  Denies palpitations   GI:  Denies acid reflux  : Denies polyuria  NEURO: Denies headaches  PSYCH: Denies mood disturbance  HEME: Denies anemia   Otherwise, a balance of systems reviewed is negative.          PHYSICAL EXAM:  Vitals:    08/21/17 1434   BP: 108/60   Pulse: 71   SpO2: 95%   Weight: 66.7 kg (147 lb)   Height: 5' 3" (1.6 m)   PainSc: 0-No pain     Body mass index is 26.04 kg/m².     GENERAL: Normal development, well groomed  HEENT:  Conjunctivae are non-erythematous; Pupils equal, round, and reactive to light; Nose is symmetrical; Nasal mucosa is pink and moist; Septum is midline; Inferior turbinates are normal; Nasal airflow is normal; Posterior pharynx is pink; Modified Mallampati: 4; Posterior palate is normal; Tonsils +1; Uvula is normal and pink;Tongue is normal; Dentition is fair; No TMJ tenderness; Jaw opening and protrusion without click and without discomfort.  NECK: Supple. Neck circumference is 14 inches. No thyromegaly. No palpable nodes.     SKIN: On face and neck: No abrasions, no rashes, no lesions.  No subcutaneous nodules are palpable.  RESPIRATORY: Chest is clear to auscultation.  Normal chest expansion and " non-labored breathing at rest.  CARDIOVASCULAR: Normal S1, S2.  No murmurs, gallops or rubs. No carotid bruits bilaterally.  EXTREMITIES: No edema. No clubbing. No cyanosis. Station normal. Gait normal.        NEURO/PSYCH: Oriented to time, place and person. Normal attention span and concentration. Affect is full. Mood is normal.                                              DATA 1/20/04 rdi 12.3 with optimal cpap of 9  ASSESSMENT  1. KANWAL (obstructive sleep apnea)     2. Insomnia, unspecified type         PLAN:    Sleep Apnea - tolerating apap.  Proper mask placement d/w patient.  Patient will need new hose.  Doing well with good compliance.      Insomnia - doing well with 2.5 mg ambien.  Stimulus control d/w patient.      Education: During our discussion today, we talked about the etiology of obstructive sleep apnea as well as the potential ramifications of untreated sleep apnea, which could include daytime sleepiness, hypertension, heart disease and/or stroke.        Patient will Return in about 6 months (around 2/21/2018).    This is 25 minutes visit, over 50% of time spent in direct consultation with patient.

## 2017-09-19 ENCOUNTER — TELEPHONE (OUTPATIENT)
Dept: FAMILY MEDICINE | Facility: CLINIC | Age: 77
End: 2017-09-19

## 2017-09-19 ENCOUNTER — OFFICE VISIT (OUTPATIENT)
Dept: FAMILY MEDICINE | Facility: CLINIC | Age: 77
End: 2017-09-19
Payer: MEDICARE

## 2017-09-19 VITALS
DIASTOLIC BLOOD PRESSURE: 76 MMHG | BODY MASS INDEX: 25.63 KG/M2 | OXYGEN SATURATION: 97 % | HEIGHT: 64 IN | TEMPERATURE: 99 F | WEIGHT: 150.13 LBS | HEART RATE: 60 BPM | SYSTOLIC BLOOD PRESSURE: 140 MMHG

## 2017-09-19 DIAGNOSIS — L01.00 IMPETIGO: ICD-10-CM

## 2017-09-19 DIAGNOSIS — I10 ESSENTIAL HYPERTENSION: Primary | Chronic | ICD-10-CM

## 2017-09-19 DIAGNOSIS — M17.11 PRIMARY OSTEOARTHRITIS OF RIGHT KNEE: ICD-10-CM

## 2017-09-19 PROCEDURE — 99214 OFFICE O/P EST MOD 30 MIN: CPT | Mod: S$GLB,,, | Performed by: FAMILY MEDICINE

## 2017-09-19 PROCEDURE — 3008F BODY MASS INDEX DOCD: CPT | Mod: S$GLB,,, | Performed by: FAMILY MEDICINE

## 2017-09-19 PROCEDURE — 99999 PR PBB SHADOW E&M-EST. PATIENT-LVL III: CPT | Mod: PBBFAC,,, | Performed by: FAMILY MEDICINE

## 2017-09-19 PROCEDURE — 3078F DIAST BP <80 MM HG: CPT | Mod: S$GLB,,, | Performed by: FAMILY MEDICINE

## 2017-09-19 PROCEDURE — 3077F SYST BP >= 140 MM HG: CPT | Mod: S$GLB,,, | Performed by: FAMILY MEDICINE

## 2017-09-19 PROCEDURE — 1159F MED LIST DOCD IN RCRD: CPT | Mod: S$GLB,,, | Performed by: FAMILY MEDICINE

## 2017-09-19 PROCEDURE — 1125F AMNT PAIN NOTED PAIN PRSNT: CPT | Mod: S$GLB,,, | Performed by: FAMILY MEDICINE

## 2017-09-19 PROCEDURE — 99499 UNLISTED E&M SERVICE: CPT | Mod: S$GLB,,, | Performed by: FAMILY MEDICINE

## 2017-09-19 RX ORDER — DOCUSATE SODIUM 100 MG/1
100 CAPSULE, LIQUID FILLED ORAL DAILY
COMMUNITY
End: 2019-07-25 | Stop reason: ALTCHOICE

## 2017-09-19 RX ORDER — MUPIROCIN 20 MG/G
OINTMENT TOPICAL 3 TIMES DAILY
Qty: 15 G | Refills: 0 | Status: SHIPPED | OUTPATIENT
Start: 2017-09-19 | End: 2018-06-05

## 2017-09-19 NOTE — TELEPHONE ENCOUNTER
Can we get her on the nurse BP schedule within the next two weeks, we did not recheck her pressure during her office visit

## 2017-09-19 NOTE — PROGRESS NOTES
Chief Complaint   Patient presents with    Follow-up     Breast pain       HPI  Nereyda Hernandez is a 77 y.o. female with multiple medical diagnoses as listed in the medical history and problem list that presents for follow-up for pain in her breast. She reports improvement in this and the pain has resolved. She has changed how she uses her elliptical machine so that she does not overextend her arms.    She has been having pain in her right knee that has occurred for some time now. Associated with clicking and in the mornings it feels weak. She has some swelling. She has not been icing it but has been wearing compression stockings, though this is difficult due to the heat.     She has also had an area on her left cheek that keeps scabbing up and will not resolve.    PAST MEDICAL HISTORY:  Past Medical History:   Diagnosis Date    Anxiety     Breast cancer     Chronic disease anemia     Hyperlipidemia     Hypertension     Insomnia     Lobular carcinoma in situ     KANWAL (obstructive sleep apnea)     Osteoporosis     Rosacea     Vertigo        PAST SURGICAL HISTORY:  Past Surgical History:   Procedure Laterality Date    BELT ABDOMINOPLASTY      BREAST BIOPSY      HERNIA REPAIR      [facility]    TONSILLECTOMY      TOTAL ABDOMINAL HYSTERECTOMY         SOCIAL HISTORY:  Social History     Social History    Marital status:      Spouse name: N/A    Number of children: 6    Years of education: college     Occupational History    retired  for [employer]      Social History Main Topics    Smoking status: Former Smoker     Packs/day: 0.50     Years: 10.00     Quit date: 9/20/1980    Smokeless tobacco: Never Used    Alcohol use 3.0 oz/week     5 Glasses of wine per week      Comment: Occasionally    Drug use: No    Sexual activity: Not Currently     Partners: Male     Other Topics Concern    Not on file     Social History Narrative    No narrative on file       FAMILY HISTORY:  Family  History   Problem Relation Age of Onset    Cancer Mother      liver       ALLERGIES AND MEDICATIONS: updated and reviewed.  Review of patient's allergies indicates:  No Known Allergies  Current Outpatient Prescriptions   Medication Sig Dispense Refill    alendronate (FOSAMAX) 70 MG tablet Take 1 tablet (70 mg total) by mouth every 7 days. 4 tablet 5    aspirin (ECOTRIN) 81 MG EC tablet Take 81 mg by mouth once daily.      calcium citrate-vitamin D3 500 mg calcium -400 unit Chew Take 1 tablet by mouth 2 (two) times daily.        docusate sodium (COLACE) 100 MG capsule Take 100 mg by mouth once daily.      fish oil-omega-3 fatty acids 300-1,000 mg capsule Take 2 g by mouth once daily.        hydrochlorothiazide (HYDRODIURIL) 25 MG tablet Take 1 tablet (25 mg total) by mouth once daily. 30 tablet 5    latanoprost 0.005 % ophthalmic solution 1 drop every evening.      losartan (COZAAR) 50 MG tablet Take 1 tablet (50 mg total) by mouth once daily. 30 tablet 5    metoprolol tartrate (LOPRESSOR) 25 MG tablet Take 0.5 tablets (12.5 mg total) by mouth 2 (two) times daily. 60 tablet 3    multivit-iron-min-folic acid (MULTIVITAMIN-IRON-MINERALS-FOLIC ACID) 3,500-18-0.4 unit-mg-mg Chew Take by mouth.        oxybutynin (DITROPAN) 5 MG Tab Take 1 tablet (5 mg total) by mouth 2 (two) times daily. 60 tablet 11    pravastatin (PRAVACHOL) 20 MG tablet Take 1 tablet (20 mg total) by mouth once daily. 30 tablet 5    sertraline (ZOLOFT) 100 MG tablet Take 1 tablet (100 mg total) by mouth once daily. 30 tablet 5    zolpidem (AMBIEN) 5 MG Tab Take 1 tablet (5 mg total) by mouth nightly as needed. 30 tablet 2    mupirocin (BACTROBAN) 2 % ointment Apply topically 3 (three) times daily. 15 g 0     No current facility-administered medications for this visit.        ROS  Review of Systems   Constitutional: Negative for chills, diaphoresis, fatigue, fever and unexpected weight change.   HENT: Negative for rhinorrhea, sinus  "pressure, sore throat and tinnitus.    Eyes: Negative for photophobia and visual disturbance.   Respiratory: Negative for cough, shortness of breath and wheezing.    Cardiovascular: Negative for chest pain and palpitations.   Gastrointestinal: Negative for abdominal pain, blood in stool, constipation, diarrhea, nausea and vomiting.   Genitourinary: Negative for dysuria, flank pain, frequency and vaginal discharge.   Musculoskeletal: Positive for arthralgias and gait problem. Negative for joint swelling.   Skin: Negative for rash.   Neurological: Negative for speech difficulty, weakness, light-headedness and headaches.   Psychiatric/Behavioral: Negative for behavioral problems and dysphoric mood.       Physical Exam  Vitals:    09/19/17 0942   BP: (!) 140/76   BP Location: Left arm   Patient Position: Sitting   Pulse: 60   Temp: 98.5 °F (36.9 °C)   TempSrc: Oral   SpO2: 97%   Weight: 68.1 kg (150 lb 2.1 oz)   Height: 5' 4" (1.626 m)    Body mass index is 25.77 kg/m².  Weight: 68.1 kg (150 lb 2.1 oz)   Height: 5' 4" (162.6 cm)     Physical Exam   Constitutional: She is oriented to person, place, and time. She appears well-developed and well-nourished.   HENT:   Head: Normocephalic and atraumatic.       Eyes: EOM are normal.   Musculoskeletal:   Right knee with a small amt of edema present, crepitus with flexion, no laxity of ligaments   Neurological: She is alert and oriented to person, place, and time.   Skin: Skin is warm and dry. No rash noted. No erythema.   Psychiatric: She has a normal mood and affect. Her behavior is normal.   Nursing note and vitals reviewed.      Health Maintenance       Date Due Completion Date    Influenza Vaccine 08/01/2017 10/25/2016    Lipid Panel 02/23/2018 2/23/2017    DEXA SCAN 06/05/2020 6/5/2017    Override on 8/12/2013: Done    TETANUS VACCINE 06/09/2024 6/9/2014            ASSESSMENT     1. Essential hypertension    2. Primary osteoarthritis of right knee    3. Impetigo  "       PLAN:     Problem List Items Addressed This Visit        Cardiac/Vascular    Essential hypertension - Primary (Chronic)  -BP recheck at nurse BP clinic within two weeks       Orthopedic    Osteoarthritis of right knee    Current Assessment & Plan     Recommend knee brace with exercise, apply ice, low impact exercise such as elliptical           Other Visit Diagnoses     Impetigo      -will consult derm if this does not clear as biopsy may be needed    Relevant Medications    mupirocin (BACTROBAN) 2 % ointment            Genny Martinez MD  09/19/2017 10:52 AM        Return in about 3 months (around 12/19/2017) for Follow up.

## 2017-09-26 ENCOUNTER — CLINICAL SUPPORT (OUTPATIENT)
Dept: FAMILY MEDICINE | Facility: CLINIC | Age: 77
End: 2017-09-26
Payer: MEDICARE

## 2017-09-26 VITALS — HEART RATE: 68 BPM | SYSTOLIC BLOOD PRESSURE: 120 MMHG | DIASTOLIC BLOOD PRESSURE: 64 MMHG

## 2017-09-26 DIAGNOSIS — I10 BENIGN ESSENTIAL HTN: Primary | ICD-10-CM

## 2017-09-26 PROCEDURE — 99499 UNLISTED E&M SERVICE: CPT | Mod: S$GLB,,, | Performed by: FAMILY MEDICINE

## 2017-09-26 PROCEDURE — 99999 PR PBB SHADOW E&M-EST. PATIENT-LVL I: CPT | Mod: PBBFAC,,,

## 2017-09-26 NOTE — PROGRESS NOTES
Nereyda Hernandez 77 y.o. female is here today for Blood Pressure check.   History of HTN yes.    Review of patient's allergies indicates:  No Known Allergies  Creatinine   Date Value Ref Range Status   02/23/2017 0.8 0.5 - 1.4 mg/dL Final     Sodium   Date Value Ref Range Status   02/23/2017 140 136 - 145 mmol/L Final     Potassium   Date Value Ref Range Status   02/23/2017 4.8 3.5 - 5.1 mmol/L Final   ]  Patient verifies taking blood pressure medications on a regular basis at the same time of the day.     Current Outpatient Prescriptions:     alendronate (FOSAMAX) 70 MG tablet, Take 1 tablet (70 mg total) by mouth every 7 days., Disp: 4 tablet, Rfl: 5    aspirin (ECOTRIN) 81 MG EC tablet, Take 81 mg by mouth once daily., Disp: , Rfl:     calcium citrate-vitamin D3 500 mg calcium -400 unit Chew, Take 1 tablet by mouth 2 (two) times daily.  , Disp: , Rfl:     docusate sodium (COLACE) 100 MG capsule, Take 100 mg by mouth once daily., Disp: , Rfl:     fish oil-omega-3 fatty acids 300-1,000 mg capsule, Take 2 g by mouth once daily.  , Disp: , Rfl:     hydrochlorothiazide (HYDRODIURIL) 25 MG tablet, Take 1 tablet (25 mg total) by mouth once daily., Disp: 30 tablet, Rfl: 5    latanoprost 0.005 % ophthalmic solution, 1 drop every evening., Disp: , Rfl:     losartan (COZAAR) 50 MG tablet, Take 1 tablet (50 mg total) by mouth once daily., Disp: 30 tablet, Rfl: 5    metoprolol tartrate (LOPRESSOR) 25 MG tablet, Take 0.5 tablets (12.5 mg total) by mouth 2 (two) times daily., Disp: 60 tablet, Rfl: 3    multivit-iron-min-folic acid (MULTIVITAMIN-IRON-MINERALS-FOLIC ACID) 3,500-18-0.4 unit-mg-mg Chew, Take by mouth.  , Disp: , Rfl:     mupirocin (BACTROBAN) 2 % ointment, Apply topically 3 (three) times daily., Disp: 15 g, Rfl: 0    oxybutynin (DITROPAN) 5 MG Tab, Take 1 tablet (5 mg total) by mouth 2 (two) times daily., Disp: 60 tablet, Rfl: 11    pravastatin (PRAVACHOL) 20 MG tablet, Take 1 tablet (20 mg total) by mouth  once daily., Disp: 30 tablet, Rfl: 5    sertraline (ZOLOFT) 100 MG tablet, Take 1 tablet (100 mg total) by mouth once daily., Disp: 30 tablet, Rfl: 5    zolpidem (AMBIEN) 5 MG Tab, Take 1 tablet (5 mg total) by mouth nightly as needed., Disp: 30 tablet, Rfl: 2  Does patient have record of home blood pressure readings yes. Readings have been averaging 145//68.   Last dose of blood pressure medication was taken at 7:30 this morning.  Patient is asymptomatic.   Complains of none.    Vitals:    09/26/17 0952   BP: 120/64   BP Location: Right arm   Patient Position: Sitting   BP Method: Small (Manual)   Pulse: 68         Dr. Martinez notified.

## 2017-10-09 ENCOUNTER — TELEPHONE (OUTPATIENT)
Dept: FAMILY MEDICINE | Facility: CLINIC | Age: 77
End: 2017-10-09

## 2017-10-09 DIAGNOSIS — R21 RASH: Primary | ICD-10-CM

## 2017-10-09 NOTE — TELEPHONE ENCOUNTER
----- Message from Taj Ahn sent at 10/9/2017  9:22 AM CDT -----  Contact: Self/[phone]  Patient would like to speak to the staff regarding a Dermatology Physician as discussed at her previous visit. Thank you.

## 2017-10-09 NOTE — TELEPHONE ENCOUNTER
Pt requesting the referral to a dermatologist.  Pt reports that bactroban ointment did not assist.  Please advise

## 2017-10-24 DIAGNOSIS — F13.20 SEDATIVE HYPNOTIC OR ANXIOLYTIC DEPENDENCE: Chronic | ICD-10-CM

## 2017-10-24 DIAGNOSIS — F41.9 ANXIETY: ICD-10-CM

## 2017-10-24 DIAGNOSIS — I10 ESSENTIAL HYPERTENSION: Chronic | ICD-10-CM

## 2017-10-24 DIAGNOSIS — E78.5 HYPERLIPIDEMIA, UNSPECIFIED HYPERLIPIDEMIA TYPE: Chronic | ICD-10-CM

## 2017-10-24 DIAGNOSIS — G47.00 INSOMNIA, UNSPECIFIED TYPE: ICD-10-CM

## 2017-10-24 RX ORDER — ZOLPIDEM TARTRATE 5 MG/1
TABLET ORAL
Qty: 30 TABLET | Refills: 2 | Status: SHIPPED | OUTPATIENT
Start: 2017-10-24 | End: 2018-04-25 | Stop reason: SDUPTHER

## 2017-10-24 RX ORDER — PRAVASTATIN SODIUM 20 MG/1
TABLET ORAL
Qty: 30 TABLET | Refills: 5 | Status: SHIPPED | OUTPATIENT
Start: 2017-10-24 | End: 2018-04-25 | Stop reason: SDUPTHER

## 2017-10-24 RX ORDER — HYDROCHLOROTHIAZIDE 25 MG/1
TABLET ORAL
Qty: 30 TABLET | Refills: 5 | Status: SHIPPED | OUTPATIENT
Start: 2017-10-24 | End: 2018-04-25 | Stop reason: SDUPTHER

## 2017-10-24 RX ORDER — SERTRALINE HYDROCHLORIDE 100 MG/1
TABLET, FILM COATED ORAL
Qty: 30 TABLET | Refills: 5 | Status: SHIPPED | OUTPATIENT
Start: 2017-10-24 | End: 2018-04-25 | Stop reason: SDUPTHER

## 2017-10-25 DIAGNOSIS — F13.20 SEDATIVE HYPNOTIC OR ANXIOLYTIC DEPENDENCE: Chronic | ICD-10-CM

## 2017-10-25 DIAGNOSIS — G47.00 INSOMNIA, UNSPECIFIED TYPE: ICD-10-CM

## 2017-10-25 RX ORDER — ZOLPIDEM TARTRATE 5 MG/1
TABLET ORAL
Qty: 30 TABLET | Refills: 2 | OUTPATIENT
Start: 2017-10-25

## 2017-10-26 NOTE — TELEPHONE ENCOUNTER
----- Message from Hannah Healy sent at 10/26/2017  8:09 AM CDT -----  Contact: Self   Patient would like her Ambien sent to [facility] instead of her having to come here to pick it up. Please call patient at [phone].

## 2017-11-07 ENCOUNTER — CLINICAL SUPPORT (OUTPATIENT)
Dept: FAMILY MEDICINE | Facility: CLINIC | Age: 77
End: 2017-11-07
Payer: MEDICARE

## 2017-11-07 DIAGNOSIS — Z23 NEED FOR PROPHYLACTIC VACCINATION AND INOCULATION AGAINST INFLUENZA: Primary | ICD-10-CM

## 2017-11-07 PROCEDURE — 90662 IIV NO PRSV INCREASED AG IM: CPT | Mod: S$GLB,,, | Performed by: FAMILY MEDICINE

## 2017-11-07 PROCEDURE — G0008 ADMIN INFLUENZA VIRUS VAC: HCPCS | Mod: S$GLB,,, | Performed by: FAMILY MEDICINE

## 2017-11-07 PROCEDURE — 99499 UNLISTED E&M SERVICE: CPT | Mod: S$GLB,,, | Performed by: FAMILY MEDICINE

## 2017-11-07 NOTE — PROGRESS NOTES
Flu given &.VIS given. Tolerated injection well.Patient instructed to wait 15 minutes for monitoring.

## 2017-12-05 ENCOUNTER — OFFICE VISIT (OUTPATIENT)
Dept: FAMILY MEDICINE | Facility: CLINIC | Age: 77
End: 2017-12-05
Payer: MEDICARE

## 2017-12-05 VITALS
DIASTOLIC BLOOD PRESSURE: 80 MMHG | HEART RATE: 60 BPM | HEIGHT: 64 IN | RESPIRATION RATE: 14 BRPM | OXYGEN SATURATION: 97 % | WEIGHT: 151.56 LBS | BODY MASS INDEX: 25.88 KG/M2 | SYSTOLIC BLOOD PRESSURE: 136 MMHG | TEMPERATURE: 98 F

## 2017-12-05 DIAGNOSIS — R13.10 DYSPHAGIA, UNSPECIFIED TYPE: Primary | ICD-10-CM

## 2017-12-05 DIAGNOSIS — M81.0 AGE-RELATED OSTEOPOROSIS WITHOUT CURRENT PATHOLOGICAL FRACTURE: ICD-10-CM

## 2017-12-05 DIAGNOSIS — I10 ESSENTIAL HYPERTENSION: Chronic | ICD-10-CM

## 2017-12-05 DIAGNOSIS — J30.9 CHRONIC ALLERGIC RHINITIS, UNSPECIFIED SEASONALITY, UNSPECIFIED TRIGGER: ICD-10-CM

## 2017-12-05 PROCEDURE — 99999 PR PBB SHADOW E&M-EST. PATIENT-LVL IV: CPT | Mod: PBBFAC,,, | Performed by: FAMILY MEDICINE

## 2017-12-05 PROCEDURE — 99499 UNLISTED E&M SERVICE: CPT | Mod: S$GLB,,, | Performed by: FAMILY MEDICINE

## 2017-12-05 PROCEDURE — 99214 OFFICE O/P EST MOD 30 MIN: CPT | Mod: S$GLB,,, | Performed by: FAMILY MEDICINE

## 2017-12-05 NOTE — PROGRESS NOTES
Chief Complaint   Patient presents with    Hypertension    Follow-up    Discuss Derm visit       HPI  Nereyda Hernandez is a 77 y.o. female with multiple medical diagnoses as listed in the medical history and problem list that presents for follow-up for hypertension and to discuss her derm visit. She was referred for a patch of skin on her left cheek and biopsy showed squamous cell carcinoma. She has followup with surgery for this condition and is awaiting an appt. She feels fine. She is still having right breast pain made worse by her sleeping position at night. She also has right groin pain that is intermittent. She has also been having trouble with swallowing where food feels stuck and at times she has vomited food up. This has been worse the past two months. She has been taking fosamax for her osteoporosis.    PAST MEDICAL HISTORY:  Past Medical History:   Diagnosis Date    Anxiety     Breast cancer     Chronic disease anemia     Hyperlipidemia     Hypertension     Insomnia     Lobular carcinoma in situ     KANWAL (obstructive sleep apnea)     Osteoporosis     Rosacea     Vertigo        PAST SURGICAL HISTORY:  Past Surgical History:   Procedure Laterality Date    BELT ABDOMINOPLASTY      BREAST BIOPSY      HERNIA REPAIR      Ventral    TONSILLECTOMY      TOTAL ABDOMINAL HYSTERECTOMY         SOCIAL HISTORY:  Social History     Social History    Marital status:      Spouse name: N/A    Number of children: 6    Years of education: college     Occupational History    retired  for [employer]      Social History Main Topics    Smoking status: Former Smoker     Packs/day: 0.50     Years: 10.00     Quit date: 9/20/1980    Smokeless tobacco: Never Used    Alcohol use 3.0 oz/week     5 Glasses of wine per week      Comment: Occasionally    Drug use: No    Sexual activity: Not Currently     Partners: Male     Other Topics Concern    Not on file     Social History Narrative     No narrative on file       FAMILY HISTORY:  Family History   Problem Relation Age of Onset    Cancer Mother      liver       ALLERGIES AND MEDICATIONS: updated and reviewed.  Review of patient's allergies indicates:  No Known Allergies  Current Outpatient Prescriptions   Medication Sig Dispense Refill    alendronate (FOSAMAX) 70 MG tablet Take 1 tablet (70 mg total) by mouth every 7 days. 4 tablet 5    aspirin (ECOTRIN) 81 MG EC tablet Take 81 mg by mouth once daily.      calcium citrate-vitamin D3 500 mg calcium -400 unit Chew Take 1 tablet by mouth 2 (two) times daily.        docusate sodium (COLACE) 100 MG capsule Take 100 mg by mouth once daily.      fish oil-omega-3 fatty acids 300-1,000 mg capsule Take 2 g by mouth once daily.        hydroCHLOROthiazide (HYDRODIURIL) 25 MG tablet TAKE ONE TABLET BY MOUTH ONCE DAILY 30 tablet 5    latanoprost 0.005 % ophthalmic solution 1 drop every evening.      losartan (COZAAR) 50 MG tablet Take 1 tablet (50 mg total) by mouth once daily. 30 tablet 5    metoprolol tartrate (LOPRESSOR) 25 MG tablet Take 0.5 tablets (12.5 mg total) by mouth 2 (two) times daily. 60 tablet 3    multivit-iron-min-folic acid (MULTIVITAMIN-IRON-MINERALS-FOLIC ACID) 3,500-18-0.4 unit-mg-mg Chew Take by mouth.        mupirocin (BACTROBAN) 2 % ointment Apply topically 3 (three) times daily. 15 g 0    oxybutynin (DITROPAN) 5 MG Tab Take 1 tablet (5 mg total) by mouth 2 (two) times daily. 60 tablet 11    pravastatin (PRAVACHOL) 20 MG tablet TAKE ONE TABLET BY MOUTH ONCE DAILY 30 tablet 5    sertraline (ZOLOFT) 100 MG tablet TAKE ONE TABLET BY MOUTH ONCE DAILY 30 tablet 5    zolpidem (AMBIEN) 5 MG Tab TAKE ONE TABLET BY MOUTH NIGHTLY AS NEEDED 30 tablet 2     No current facility-administered medications for this visit.        ROS  Review of Systems   Constitutional: Negative for chills, diaphoresis, fatigue, fever and unexpected weight change.   HENT: Negative for rhinorrhea, sinus  "pressure, sore throat and tinnitus.    Eyes: Negative for photophobia and visual disturbance.   Respiratory: Negative for cough, shortness of breath and wheezing.    Cardiovascular: Negative for chest pain and palpitations.   Gastrointestinal: Negative for abdominal pain, blood in stool, constipation, diarrhea, nausea and vomiting.   Genitourinary: Negative for dysuria, flank pain, frequency and vaginal discharge.   Musculoskeletal: Positive for arthralgias. Negative for joint swelling.   Skin: Negative for rash.   Neurological: Negative for speech difficulty, weakness, light-headedness and headaches.   Psychiatric/Behavioral: Negative for behavioral problems and dysphoric mood.       Physical Exam  Vitals:    12/05/17 1001   BP: 136/80   Pulse: 60   Resp: 14   Temp: 98.2 °F (36.8 °C)   TempSrc: Oral   SpO2: 97%   Weight: 68.8 kg (151 lb 9.1 oz)   Height: 5' 4" (1.626 m)    Body mass index is 26.02 kg/m².  Weight: 68.8 kg (151 lb 9.1 oz)   Height: 5' 4" (162.6 cm)     Physical Exam   Constitutional: She is oriented to person, place, and time. She appears well-developed and well-nourished. No distress.   Eyes: EOM are normal.   Neck: Neck supple.   Cardiovascular: Normal rate and regular rhythm.  Exam reveals no gallop and no friction rub.    No murmur heard.  Pulmonary/Chest: Effort normal and breath sounds normal. No respiratory distress. She has no wheezes. She has no rales.   Abdominal: Soft. Bowel sounds are normal. She exhibits no distension and no mass. There is no tenderness. There is no rebound and no guarding. No hernia.   Right groin exam with no point tenderness or hernia palpated   Lymphadenopathy:     She has no cervical adenopathy.   Neurological: She is alert and oriented to person, place, and time.   Skin: Skin is warm and dry. No rash noted.   Psychiatric: She has a normal mood and affect. Her behavior is normal.   Nursing note and vitals reviewed.      Health Maintenance       Date Due Completion " Date    Lipid Panel 02/23/2018 2/23/2017    DEXA SCAN 06/05/2020 6/5/2017    Override on 8/12/2013: Done    TETANUS VACCINE 06/09/2024 6/9/2014            ASSESSMENT     1. Dysphagia, unspecified type    2. Squamous cell carcinoma    3. Essential hypertension    4. Age-related osteoporosis without current pathological fracture    5. Chronic allergic rhinitis, unspecified seasonality, unspecified trigger        PLAN:     Problem List Items Addressed This Visit        Cardiac/Vascular    Essential hypertension (Chronic)  -This is a chronic medical condition that is stable under the current regimen. Continue to monitor and we will make medication adjustments as needed.          Oncology    Squamous cell carcinoma  -she has follow up with derm for this       Orthopedic    Age-related osteoporosis without current pathological fracture  -consult GI for eval, we may have to stop the fosamax      Other Visit Diagnoses     Dysphagia, unspecified type    -  Primary    Relevant Orders    Ambulatory consult to Gastroenterology    Chronic allergic rhinitis, unspecified seasonality, unspecified trigger      -she may take an OTC antihistamine but avoid ones that have a D present            Genny Martinez MD  12/05/2017 10:35 AM        Return in about 3 months (around 3/5/2018) for Follow up.

## 2017-12-19 DIAGNOSIS — I10 ESSENTIAL HYPERTENSION: Chronic | ICD-10-CM

## 2017-12-19 RX ORDER — LOSARTAN POTASSIUM 50 MG/1
TABLET ORAL
Qty: 30 TABLET | Refills: 5 | Status: SHIPPED | OUTPATIENT
Start: 2017-12-19 | End: 2018-06-21 | Stop reason: SDUPTHER

## 2018-01-02 DIAGNOSIS — M81.0 AGE-RELATED OSTEOPOROSIS WITHOUT CURRENT PATHOLOGICAL FRACTURE: ICD-10-CM

## 2018-01-02 RX ORDER — ALENDRONATE SODIUM 70 MG/1
TABLET ORAL
Qty: 4 TABLET | Refills: 5 | Status: SHIPPED | OUTPATIENT
Start: 2018-01-02 | End: 2018-07-09 | Stop reason: SDUPTHER

## 2018-01-04 ENCOUNTER — TELEPHONE (OUTPATIENT)
Dept: ADMINISTRATIVE | Facility: HOSPITAL | Age: 78
End: 2018-01-04

## 2018-03-05 ENCOUNTER — OFFICE VISIT (OUTPATIENT)
Dept: FAMILY MEDICINE | Facility: CLINIC | Age: 78
End: 2018-03-05
Payer: MEDICARE

## 2018-03-05 VITALS
BODY MASS INDEX: 26.34 KG/M2 | OXYGEN SATURATION: 97 % | SYSTOLIC BLOOD PRESSURE: 110 MMHG | HEIGHT: 64 IN | HEART RATE: 63 BPM | DIASTOLIC BLOOD PRESSURE: 70 MMHG | TEMPERATURE: 98 F | WEIGHT: 154.31 LBS

## 2018-03-05 DIAGNOSIS — F13.20 SEDATIVE HYPNOTIC OR ANXIOLYTIC DEPENDENCE: Chronic | ICD-10-CM

## 2018-03-05 DIAGNOSIS — L84 CALLUS OF FOOT: ICD-10-CM

## 2018-03-05 DIAGNOSIS — R13.19 OTHER DYSPHAGIA: ICD-10-CM

## 2018-03-05 DIAGNOSIS — M81.0 AGE-RELATED OSTEOPOROSIS WITHOUT CURRENT PATHOLOGICAL FRACTURE: ICD-10-CM

## 2018-03-05 DIAGNOSIS — I10 ESSENTIAL HYPERTENSION: Primary | Chronic | ICD-10-CM

## 2018-03-05 DIAGNOSIS — R49.0 HOARSENESS: ICD-10-CM

## 2018-03-05 DIAGNOSIS — Z86.2 HISTORY OF IRON DEFICIENCY ANEMIA: ICD-10-CM

## 2018-03-05 PROCEDURE — 3078F DIAST BP <80 MM HG: CPT | Mod: S$GLB,,, | Performed by: FAMILY MEDICINE

## 2018-03-05 PROCEDURE — 99999 PR PBB SHADOW E&M-EST. PATIENT-LVL III: CPT | Mod: PBBFAC,,, | Performed by: FAMILY MEDICINE

## 2018-03-05 PROCEDURE — 99499 UNLISTED E&M SERVICE: CPT | Mod: S$GLB,,, | Performed by: FAMILY MEDICINE

## 2018-03-05 PROCEDURE — 99214 OFFICE O/P EST MOD 30 MIN: CPT | Mod: S$GLB,,, | Performed by: FAMILY MEDICINE

## 2018-03-05 PROCEDURE — 3074F SYST BP LT 130 MM HG: CPT | Mod: S$GLB,,, | Performed by: FAMILY MEDICINE

## 2018-03-05 RX ORDER — PANTOPRAZOLE SODIUM 20 MG/1
20 TABLET, DELAYED RELEASE ORAL DAILY
COMMUNITY
End: 2019-05-21 | Stop reason: SDUPTHER

## 2018-03-05 NOTE — PROGRESS NOTES
Chief Complaint   Patient presents with    Hypertension    Osteoporosis    GI follow up       HPI  Nereyda Hernandez is a 77 y.o. female with multiple medical diagnoses as listed in the medical history and problem list that presents for follow-up for hypertension and osteoporosis. She has been seen by GI with Dr. 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 She was also started on GERD medication. She still has some hoarseness and a feeling of a frog in her throat. She was recommended to see ENT for this but wants to delay it until she has a colonoscopy.    PAST MEDICAL HISTORY:  Past Medical History:   Diagnosis Date    Anxiety     Breast cancer     Chronic disease anemia     Hyperlipidemia     Hypertension     Insomnia     Lobular carcinoma in situ     KANWAL (obstructive sleep apnea)     Osteoporosis     Rosacea     Vertigo        PAST SURGICAL HISTORY:  Past Surgical History:   Procedure Laterality Date    BELT ABDOMINOPLASTY      BREAST BIOPSY      HERNIA REPAIR      Ventral    TONSILLECTOMY      TOTAL ABDOMINAL HYSTERECTOMY         SOCIAL HISTORY:  Social History     Social History    Marital status:      Spouse name: N/A    Number of children: 6    Years of education: college     Occupational History    retired  for [email or web address]      Social History Main Topics    Smoking status: Former Smoker     Packs/day: 0.50     Years: 10.00     Quit date: 9/20/1980    Smokeless tobacco: Never Used    Alcohol use 3.0 oz/week     5 Glasses of wine per week      Comment: Occasionally    Drug use: No    Sexual activity: Not Currently     Partners: Male     Other Topics Concern    Not on file     Social History Narrative    No narrative on file       FAMILY HISTORY:  Family History   Problem Relation Age of Onset    Cancer Mother      liver       ALLERGIES AND MEDICATIONS: updated and reviewed.  Review of patient's allergies indicates:  No Known Allergies  Current  Outpatient Prescriptions   Medication Sig Dispense Refill    alendronate (FOSAMAX) 70 MG tablet TAKE ONE TABLET BY MOUTH ONCE EVERY 7 DAYS 4 tablet 5    aspirin (ECOTRIN) 81 MG EC tablet Take 81 mg by mouth once daily.      calcium citrate-vitamin D3 500 mg calcium -400 unit Chew Take 1 tablet by mouth 2 (two) times daily.        docusate sodium (COLACE) 100 MG capsule Take 100 mg by mouth once daily.      fish oil-omega-3 fatty acids 300-1,000 mg capsule Take 2 g by mouth once daily.        hydroCHLOROthiazide (HYDRODIURIL) 25 MG tablet TAKE ONE TABLET BY MOUTH ONCE DAILY 30 tablet 5    latanoprost 0.005 % ophthalmic solution 1 drop every evening.      losartan (COZAAR) 50 MG tablet TAKE ONE TABLET BY MOUTH ONCE DAILY 30 tablet 5    metoprolol tartrate (LOPRESSOR) 25 MG tablet Take 0.5 tablets (12.5 mg total) by mouth 2 (two) times daily. 60 tablet 3    multivit-iron-min-folic acid (MULTIVITAMIN-IRON-MINERALS-FOLIC ACID) 3,500-18-0.4 unit-mg-mg Chew Take by mouth.        mupirocin (BACTROBAN) 2 % ointment Apply topically 3 (three) times daily. 15 g 0    oxybutynin (DITROPAN) 5 MG Tab Take 1 tablet (5 mg total) by mouth 2 (two) times daily. 60 tablet 11    pantoprazole (PROTONIX) 20 MG tablet Take 20 mg by mouth once daily.      pravastatin (PRAVACHOL) 20 MG tablet TAKE ONE TABLET BY MOUTH ONCE DAILY 30 tablet 5    sertraline (ZOLOFT) 100 MG tablet TAKE ONE TABLET BY MOUTH ONCE DAILY 30 tablet 5    zolpidem (AMBIEN) 5 MG Tab TAKE ONE TABLET BY MOUTH NIGHTLY AS NEEDED 30 tablet 2     No current facility-administered medications for this visit.        ROS  Review of Systems   Constitutional: Negative for chills, diaphoresis, fatigue, fever and unexpected weight change.   HENT: Positive for trouble swallowing. Negative for rhinorrhea, sinus pressure, sore throat and tinnitus.    Eyes: Negative for photophobia and visual disturbance.   Respiratory: Negative for cough, shortness of breath and wheezing.   "  Cardiovascular: Negative for chest pain and palpitations.   Gastrointestinal: Negative for abdominal pain, blood in stool, constipation, diarrhea, nausea and vomiting.   Genitourinary: Negative for dysuria, flank pain, frequency and vaginal discharge.   Musculoskeletal: Negative for arthralgias and joint swelling.   Skin: Negative for rash.   Neurological: Negative for speech difficulty, weakness, light-headedness and headaches.   Psychiatric/Behavioral: Negative for behavioral problems and dysphoric mood.       Physical Exam  Vitals:    03/05/18 1024   BP: 110/70   BP Location: Left arm   Patient Position: Sitting   BP Method: Large (Manual)   Pulse: 63   Temp: 98.1 °F (36.7 °C)   TempSrc: Oral   SpO2: 97%   Weight: 70 kg (154 lb 5.2 oz)   Height: 5' 4" (1.626 m)    Body mass index is 26.49 kg/m².  Weight: 70 kg (154 lb 5.2 oz)   Height: 5' 4" (162.6 cm)     Physical Exam   Constitutional: She is oriented to person, place, and time. She appears well-developed and well-nourished.   Eyes: EOM are normal.   Neurological: She is alert and oriented to person, place, and time.   Skin: Skin is warm and dry. No rash noted. No erythema.   Psychiatric: She has a normal mood and affect. Her behavior is normal.   Nursing note and vitals reviewed.      Health Maintenance       Date Due Completion Date    Lipid Panel 02/23/2018 2/23/2017    DEXA SCAN 06/05/2020 6/5/2017    Override on 8/12/2013: Done    TETANUS VACCINE 06/09/2024 6/9/2014            ASSESSMENT     1. Essential hypertension    2. Age-related osteoporosis without current pathological fracture    3. Sedative hypnotic or anxiolytic dependence    4. Other dysphagia    5. Hoarseness    6. History of iron deficiency anemia    7. Callus of foot        PLAN:     Problem List Items Addressed This Visit        Psychiatric    Sedative hypnotic or anxiolytic dependence (Chronic)  -This is a chronic medical condition that is stable under the current regimen. Continue to " monitor and we will make medication adjustments as needed.          ENT    Hoarseness  -discuss ENT follow up for vocal cord pathology, she has been given the number and will make the appointment       Cardiac/Vascular    Essential hypertension - Primary (Chronic)  -This is a chronic medical condition that is stable under the current regimen. Continue to monitor and we will make medication adjustments as needed.       Relevant Orders    Comprehensive metabolic panel    Lipid panel       Oncology    History of iron deficiency anemia  -check iron levels    Relevant Orders    CBC auto differential    Comprehensive metabolic panel    Ferritin    Iron and TIBC       GI    Other dysphagia  -s/p esophageal dilatation, continue PPI as recommended       Orthopedic    Age-related osteoporosis without current pathological fracture  -continue current medication      Other Visit Diagnoses     Callus of foot      -she would like to have this debrided    Relevant Orders    Ambulatory consult to Podiatry            Genny Martinez MD  03/05/2018 10:51 AM        Follow-up in about 3 months (around 6/5/2018) for Follow up.

## 2018-03-08 ENCOUNTER — LAB VISIT (OUTPATIENT)
Dept: LAB | Facility: HOSPITAL | Age: 78
End: 2018-03-08
Attending: FAMILY MEDICINE
Payer: MEDICARE

## 2018-03-08 DIAGNOSIS — I10 ESSENTIAL HYPERTENSION: Chronic | ICD-10-CM

## 2018-03-08 DIAGNOSIS — Z86.2 HISTORY OF IRON DEFICIENCY ANEMIA: ICD-10-CM

## 2018-03-08 LAB
ALBUMIN SERPL BCP-MCNC: 3.7 G/DL
ALP SERPL-CCNC: 58 U/L
ALT SERPL W/O P-5'-P-CCNC: 30 U/L
ANION GAP SERPL CALC-SCNC: 8 MMOL/L
AST SERPL-CCNC: 31 U/L
BASOPHILS # BLD AUTO: 0.04 K/UL
BASOPHILS NFR BLD: 1.1 %
BILIRUB SERPL-MCNC: 0.4 MG/DL
BUN SERPL-MCNC: 14 MG/DL
CALCIUM SERPL-MCNC: 9.6 MG/DL
CHLORIDE SERPL-SCNC: 102 MMOL/L
CHOLEST SERPL-MCNC: 159 MG/DL
CHOLEST/HDLC SERPL: 2.4 {RATIO}
CO2 SERPL-SCNC: 31 MMOL/L
CREAT SERPL-MCNC: 0.8 MG/DL
DIFFERENTIAL METHOD: ABNORMAL
EOSINOPHIL # BLD AUTO: 0.1 K/UL
EOSINOPHIL NFR BLD: 1.7 %
ERYTHROCYTE [DISTWIDTH] IN BLOOD BY AUTOMATED COUNT: 12.2 %
EST. GFR  (AFRICAN AMERICAN): >60 ML/MIN/1.73 M^2
EST. GFR  (NON AFRICAN AMERICAN): >60 ML/MIN/1.73 M^2
FERRITIN SERPL-MCNC: 45 NG/ML
GLUCOSE SERPL-MCNC: 95 MG/DL
HCT VFR BLD AUTO: 34.9 %
HDLC SERPL-MCNC: 65 MG/DL
HDLC SERPL: 40.9 %
HGB BLD-MCNC: 11.8 G/DL
IMM GRANULOCYTES # BLD AUTO: 0.01 K/UL
IMM GRANULOCYTES NFR BLD AUTO: 0.3 %
IRON SERPL-MCNC: 82 UG/DL
LDLC SERPL CALC-MCNC: 82 MG/DL
LYMPHOCYTES # BLD AUTO: 1 K/UL
LYMPHOCYTES NFR BLD: 26.9 %
MCH RBC QN AUTO: 31.1 PG
MCHC RBC AUTO-ENTMCNC: 33.8 G/DL
MCV RBC AUTO: 92 FL
MONOCYTES # BLD AUTO: 0.4 K/UL
MONOCYTES NFR BLD: 11.8 %
NEUTROPHILS # BLD AUTO: 2.1 K/UL
NEUTROPHILS NFR BLD: 58.2 %
NONHDLC SERPL-MCNC: 94 MG/DL
NRBC BLD-RTO: 0 /100 WBC
PLATELET # BLD AUTO: 204 K/UL
PMV BLD AUTO: 9.9 FL
POTASSIUM SERPL-SCNC: 4.2 MMOL/L
PROT SERPL-MCNC: 7.1 G/DL
RBC # BLD AUTO: 3.79 M/UL
SATURATED IRON: 19 %
SODIUM SERPL-SCNC: 141 MMOL/L
TOTAL IRON BINDING CAPACITY: 426 UG/DL
TRANSFERRIN SERPL-MCNC: 288 MG/DL
TRIGL SERPL-MCNC: 60 MG/DL
WBC # BLD AUTO: 3.57 K/UL

## 2018-03-08 PROCEDURE — 83540 ASSAY OF IRON: CPT

## 2018-03-08 PROCEDURE — 36415 COLL VENOUS BLD VENIPUNCTURE: CPT | Mod: PO

## 2018-03-08 PROCEDURE — 80053 COMPREHEN METABOLIC PANEL: CPT

## 2018-03-08 PROCEDURE — 82728 ASSAY OF FERRITIN: CPT

## 2018-03-08 PROCEDURE — 85025 COMPLETE CBC W/AUTO DIFF WBC: CPT

## 2018-03-08 PROCEDURE — 80061 LIPID PANEL: CPT

## 2018-03-13 ENCOUNTER — TELEPHONE (OUTPATIENT)
Dept: FAMILY MEDICINE | Facility: CLINIC | Age: 78
End: 2018-03-13

## 2018-03-13 NOTE — TELEPHONE ENCOUNTER
Spoke with pt and she refused to schedule her Podiatry referral stating she made a appt to see Dr.Dana Campoverde who is a Dermatology.

## 2018-03-16 ENCOUNTER — PES CALL (OUTPATIENT)
Dept: ADMINISTRATIVE | Facility: CLINIC | Age: 78
End: 2018-03-16

## 2018-04-17 DIAGNOSIS — I10 ESSENTIAL HYPERTENSION: Chronic | ICD-10-CM

## 2018-04-18 RX ORDER — METOPROLOL TARTRATE 25 MG/1
12.5 TABLET, FILM COATED ORAL 2 TIMES DAILY
Qty: 30 TABLET | Refills: 0 | Status: SHIPPED | OUTPATIENT
Start: 2018-04-18 | End: 2018-04-27 | Stop reason: SDUPTHER

## 2018-04-25 DIAGNOSIS — F13.20 SEDATIVE HYPNOTIC OR ANXIOLYTIC DEPENDENCE: Chronic | ICD-10-CM

## 2018-04-25 DIAGNOSIS — G47.00 INSOMNIA, UNSPECIFIED TYPE: ICD-10-CM

## 2018-04-25 DIAGNOSIS — F41.9 ANXIETY: ICD-10-CM

## 2018-04-25 DIAGNOSIS — I10 ESSENTIAL HYPERTENSION: Chronic | ICD-10-CM

## 2018-04-25 DIAGNOSIS — E78.5 HYPERLIPIDEMIA, UNSPECIFIED HYPERLIPIDEMIA TYPE: Chronic | ICD-10-CM

## 2018-04-25 RX ORDER — ZOLPIDEM TARTRATE 5 MG/1
TABLET ORAL
Qty: 30 TABLET | Refills: 2 | Status: SHIPPED | OUTPATIENT
Start: 2018-04-25 | End: 2018-08-23 | Stop reason: SDUPTHER

## 2018-04-25 RX ORDER — HYDROCHLOROTHIAZIDE 25 MG/1
TABLET ORAL
Qty: 30 TABLET | Refills: 5 | Status: SHIPPED | OUTPATIENT
Start: 2018-04-25 | End: 2018-08-23 | Stop reason: SDUPTHER

## 2018-04-25 RX ORDER — PRAVASTATIN SODIUM 20 MG/1
TABLET ORAL
Qty: 30 TABLET | Refills: 5 | Status: SHIPPED | OUTPATIENT
Start: 2018-04-25 | End: 2018-08-23 | Stop reason: SDUPTHER

## 2018-04-25 RX ORDER — SERTRALINE HYDROCHLORIDE 100 MG/1
TABLET, FILM COATED ORAL
Qty: 30 TABLET | Refills: 5 | Status: SHIPPED | OUTPATIENT
Start: 2018-04-25 | End: 2018-08-23 | Stop reason: SDUPTHER

## 2018-04-27 ENCOUNTER — OFFICE VISIT (OUTPATIENT)
Dept: CARDIOLOGY | Facility: CLINIC | Age: 78
End: 2018-04-27
Payer: MEDICARE

## 2018-04-27 VITALS
OXYGEN SATURATION: 96 % | HEART RATE: 64 BPM | DIASTOLIC BLOOD PRESSURE: 72 MMHG | WEIGHT: 143.31 LBS | BODY MASS INDEX: 24.6 KG/M2 | RESPIRATION RATE: 20 BRPM | SYSTOLIC BLOOD PRESSURE: 124 MMHG

## 2018-04-27 DIAGNOSIS — E78.2 MIXED HYPERLIPIDEMIA: ICD-10-CM

## 2018-04-27 DIAGNOSIS — R07.9 CHEST PAIN, UNSPECIFIED TYPE: ICD-10-CM

## 2018-04-27 DIAGNOSIS — R06.02 SHORTNESS OF BREATH: ICD-10-CM

## 2018-04-27 DIAGNOSIS — I10 ESSENTIAL HYPERTENSION: Chronic | ICD-10-CM

## 2018-04-27 DIAGNOSIS — Z87.891 FORMER SMOKER, STOPPED SMOKING IN DISTANT PAST: ICD-10-CM

## 2018-04-27 DIAGNOSIS — G47.33 OSA ON CPAP: ICD-10-CM

## 2018-04-27 DIAGNOSIS — R00.2 PALPITATIONS: Primary | ICD-10-CM

## 2018-04-27 DIAGNOSIS — I10 HYPERTENSION, ESSENTIAL: ICD-10-CM

## 2018-04-27 DIAGNOSIS — R07.9 ACUTE CHEST PAIN: ICD-10-CM

## 2018-04-27 DIAGNOSIS — I10 HTN (HYPERTENSION): ICD-10-CM

## 2018-04-27 PROCEDURE — 99214 OFFICE O/P EST MOD 30 MIN: CPT | Mod: S$GLB,,, | Performed by: INTERNAL MEDICINE

## 2018-04-27 PROCEDURE — 99999 PR PBB SHADOW E&M-EST. PATIENT-LVL III: CPT | Mod: PBBFAC,,, | Performed by: INTERNAL MEDICINE

## 2018-04-27 PROCEDURE — 99499 UNLISTED E&M SERVICE: CPT | Mod: S$PBB,,, | Performed by: INTERNAL MEDICINE

## 2018-04-27 PROCEDURE — 3078F DIAST BP <80 MM HG: CPT | Mod: CPTII,S$GLB,, | Performed by: INTERNAL MEDICINE

## 2018-04-27 PROCEDURE — 93010 ELECTROCARDIOGRAM REPORT: CPT | Mod: S$GLB,,, | Performed by: INTERNAL MEDICINE

## 2018-04-27 PROCEDURE — 3074F SYST BP LT 130 MM HG: CPT | Mod: CPTII,S$GLB,, | Performed by: INTERNAL MEDICINE

## 2018-04-27 RX ORDER — METOPROLOL TARTRATE 25 MG/1
12.5 TABLET, FILM COATED ORAL 2 TIMES DAILY
Qty: 30 TABLET | Refills: 3 | Status: SHIPPED | OUTPATIENT
Start: 2018-04-27 | End: 2018-09-14 | Stop reason: SDUPTHER
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
## 2018-05-11 ENCOUNTER — HOSPITAL ENCOUNTER (OUTPATIENT)
Dept: CARDIOLOGY | Facility: HOSPITAL | Age: 78
Discharge: HOME | End: 2018-05-11
Attending: INTERNAL MEDICINE
Payer: MEDICARE

## 2018-05-11 ENCOUNTER — HOSPITAL ENCOUNTER (OUTPATIENT)
Dept: RADIOLOGY | Facility: HOSPITAL | Age: 78
Discharge: HOME | End: 2018-05-11
Attending: INTERNAL MEDICINE
Payer: MEDICARE

## 2018-05-11 DIAGNOSIS — R07.9 ACUTE CHEST PAIN: ICD-10-CM

## 2018-05-11 DIAGNOSIS — R00.2 PALPITATIONS: ICD-10-CM

## 2018-05-11 DIAGNOSIS — R06.02 SHORTNESS OF BREATH: ICD-10-CM

## 2018-05-11 DIAGNOSIS — R07.9 CHEST PAIN, UNSPECIFIED TYPE: ICD-10-CM

## 2018-05-11 LAB
AORTIC VALVE REGURGITATION: ABNORMAL
DIASTOLIC DYSFUNCTION: NO
DIASTOLIC DYSFUNCTION: YES
ESTIMATED PA SYSTOLIC PRESSURE: 36.52
GLOBAL PERICARDIAL EFFUSION: ABNORMAL
MITRAL VALVE MOBILITY: NORMAL
MITRAL VALVE REGURGITATION: ABNORMAL
RETIRED EF AND QEF - SEE NOTES: 55 (ref 55–65)
TRICUSPID VALVE REGURGITATION: ABNORMAL

## 2018-05-11 PROCEDURE — 93226 XTRNL ECG REC<48 HR SCAN A/R: CPT

## 2018-05-11 PROCEDURE — 93018 CV STRESS TEST I&R ONLY: CPT | Mod: ,,, | Performed by: INTERNAL MEDICINE

## 2018-05-11 PROCEDURE — 93016 CV STRESS TEST SUPVJ ONLY: CPT | Mod: ,,, | Performed by: INTERNAL MEDICINE

## 2018-05-11 PROCEDURE — A9502 TC99M TETROFOSMIN: HCPCS

## 2018-05-11 PROCEDURE — 93306 TTE W/DOPPLER COMPLETE: CPT

## 2018-05-11 PROCEDURE — 93017 CV STRESS TEST TRACING ONLY: CPT

## 2018-05-11 PROCEDURE — 63600175 PHARM REV CODE 636 W HCPCS

## 2018-05-11 PROCEDURE — 78452 HT MUSCLE IMAGE SPECT MULT: CPT | Mod: 26,,, | Performed by: INTERNAL MEDICINE

## 2018-05-11 PROCEDURE — 93306 TTE W/DOPPLER COMPLETE: CPT | Mod: 26,,, | Performed by: INTERNAL MEDICINE

## 2018-05-11 PROCEDURE — 93227 XTRNL ECG REC<48 HR R&I: CPT | Mod: ,,, | Performed by: INTERNAL MEDICINE

## 2018-05-11 RX ORDER — REGADENOSON 0.08 MG/ML
INJECTION, SOLUTION INTRAVENOUS
Status: DISPENSED
Start: 2018-05-11 | End: 2018-05-11

## 2018-05-25 ENCOUNTER — OFFICE VISIT (OUTPATIENT)
Dept: CARDIOLOGY | Facility: CLINIC | Age: 78
End: 2018-05-25
Payer: MEDICARE

## 2018-05-25 VITALS
RESPIRATION RATE: 20 BRPM | HEART RATE: 78 BPM | OXYGEN SATURATION: 96 % | DIASTOLIC BLOOD PRESSURE: 76 MMHG | BODY MASS INDEX: 24.6 KG/M2 | SYSTOLIC BLOOD PRESSURE: 138 MMHG | WEIGHT: 143.31 LBS

## 2018-05-25 DIAGNOSIS — E78.2 MIXED HYPERLIPIDEMIA: ICD-10-CM

## 2018-05-25 DIAGNOSIS — G47.33 OSA ON CPAP: ICD-10-CM

## 2018-05-25 DIAGNOSIS — R07.9 CHEST PAIN, UNSPECIFIED TYPE: ICD-10-CM

## 2018-05-25 DIAGNOSIS — R06.02 SHORTNESS OF BREATH: ICD-10-CM

## 2018-05-25 DIAGNOSIS — R00.2 PALPITATIONS: Primary | ICD-10-CM

## 2018-05-25 DIAGNOSIS — Z87.891 FORMER SMOKER, STOPPED SMOKING IN DISTANT PAST: ICD-10-CM

## 2018-05-25 DIAGNOSIS — I10 HYPERTENSION, ESSENTIAL: ICD-10-CM

## 2018-05-25 PROCEDURE — 99499 UNLISTED E&M SERVICE: CPT | Mod: S$PBB,,, | Performed by: INTERNAL MEDICINE

## 2018-05-25 PROCEDURE — 99214 OFFICE O/P EST MOD 30 MIN: CPT | Mod: S$GLB,,, | Performed by: INTERNAL MEDICINE

## 2018-05-25 PROCEDURE — 3075F SYST BP GE 130 - 139MM HG: CPT | Mod: CPTII,S$GLB,, | Performed by: INTERNAL MEDICINE

## 2018-05-25 PROCEDURE — 99999 PR PBB SHADOW E&M-EST. PATIENT-LVL III: CPT | Mod: PBBFAC,,, | Performed by: INTERNAL MEDICINE

## 2018-05-25 PROCEDURE — 3078F DIAST BP <80 MM HG: CPT | Mod: CPTII,S$GLB,, | Performed by: INTERNAL MEDICINE

## 2018-05-25 NOTE — PROGRESS NOTES
Subjective:    Patient ID:  Nereyda Hernandez is a 77 y.o. female who presents for follow-up of No chief complaint on file.      HPI   previous history:  Here for follow-up of shortness of breath, chest pain and palpitations.  She was last seen 10-16.  She still from time to time has the same palpitations but again feels less her to a degree on the beta blocker.  She says they are associated with chest pain shortness of breath.  They can occur at rest or with exertion.  She says she usually just lets them resolve spontaneously.  She denies any PND, orthopnea or lower edema.  She's not expressing dizziness, presyncope or syncope.    Today:  Here follow-up of shortness of breath, chest pain and palpitations.  She denies any cardiopulmonary complaints.  She still is having occasional palpitations but better on beta blocker.  She underwent diagnostic testing as below.  This was within normal limits.  Otherwise she's been in her usual state of health without any lifestyle limiting symptoms.    Review of Systems   Constitution: Negative.   HENT: Negative for congestion.    Eyes: Negative.    Cardiovascular: Positive for chest pain, irregular heartbeat and palpitations. Negative for dyspnea on exertion, leg swelling, near-syncope, orthopnea, paroxysmal nocturnal dyspnea and syncope.   Respiratory: Positive for shortness of breath. Negative for cough.    Skin: Negative.    Musculoskeletal: Negative.    Gastrointestinal: Negative for abdominal pain, constipation and diarrhea.   Genitourinary: Negative for dysuria.   Neurological: Negative.    Psychiatric/Behavioral: Negative.         Objective:    Physical Exam   Constitutional: She is oriented to person, place, and time. She appears well-developed and well-nourished.   HENT:   Head: Normocephalic and atraumatic.   Eyes: Conjunctivae and EOM are normal. Pupils are equal, round, and reactive to light.   Neck: Normal range of motion. Neck supple. No thyromegaly present.    Cardiovascular: Normal rate and regular rhythm.    No murmur heard.  Pulmonary/Chest: Effort normal and breath sounds normal. No respiratory distress.   Abdominal: Soft. Bowel sounds are normal.   Musculoskeletal: She exhibits edema.   Prominent varicosities bilateral lower extremities   Neurological: She is alert and oriented to person, place, and time.   Skin: Skin is warm and dry.   Psychiatric: She has a normal mood and affect. Her behavior is normal.       echo: 5-18  CONCLUSIONS     1 - Normal left ventricular systolic function (EF 55-60%).     2 - No wall motion abnormalities.     3 - Concentric remodeling.     4 - Impaired LV relaxation, elevated LAP (grade 2 diastolic dysfunction).     5 - Trivial to mild aortic regurgitation.     6 - Trivial mitral regurgitation.     7 - Trivial tricuspid regurgitation.     8 - The estimated PA systolic pressure is 37 mmHg.     NST:  Impression: NORMAL MYOCARDIAL PERFUSION  1. The perfusion scan is free of evidence for myocardial ischemia or injury.   2. There is a trivial to mild intensity fixed defect in the anteroapical wall of the left ventricle, secondary to breast attenuation.   3. Resting wall motion is physiologic.   4. Visually estimated LV function is normal.   5. The ventricular volumes are normal at rest and stress.   6. The extracardiac distribution of radioactivity is normal.   7. When compared to the previous study from 11/02/2015, anteroapical attenuation artifact now suggested.    Holter within normal limits    Assessment:       1. Palpitations    2. Chest pain, unspecified type    3. Shortness of breath    4. Former smoker, stopped smoking in distant past    5. Mixed hyperlipidemia    6. Hypertension, essential    7. KANWAL on CPAP         Plan:       -Mainly reassurance in light of testing  -improved low dose bb, may consider weaning off in future if stable  -avoid stimulants  -consider inhaler ie spiriva or singulair if sob/cough worsens  -Compression  stockings    RTC 6 mo

## 2018-05-29 ENCOUNTER — TELEPHONE (OUTPATIENT)
Dept: FAMILY MEDICINE | Facility: CLINIC | Age: 78
End: 2018-05-29

## 2018-05-29 DIAGNOSIS — Z12.31 BREAST CANCER SCREENING BY MAMMOGRAM: Primary | ICD-10-CM

## 2018-05-29 NOTE — TELEPHONE ENCOUNTER
----- Message from Pati Goff sent at 5/28/2018  4:03 PM CDT -----  Contact: [phone]/PT  Calling TO speak with nurse TO set up orders for Mammo and labs

## 2018-06-05 ENCOUNTER — OFFICE VISIT (OUTPATIENT)
Dept: FAMILY MEDICINE | Facility: CLINIC | Age: 78
End: 2018-06-05
Payer: MEDICARE

## 2018-06-05 VITALS
HEART RATE: 63 BPM | SYSTOLIC BLOOD PRESSURE: 118 MMHG | BODY MASS INDEX: 25.95 KG/M2 | TEMPERATURE: 99 F | WEIGHT: 152 LBS | HEIGHT: 64 IN | OXYGEN SATURATION: 97 % | DIASTOLIC BLOOD PRESSURE: 68 MMHG | RESPIRATION RATE: 16 BRPM

## 2018-06-05 DIAGNOSIS — R13.19 OTHER DYSPHAGIA: ICD-10-CM

## 2018-06-05 DIAGNOSIS — Z86.2 HISTORY OF IRON DEFICIENCY ANEMIA: ICD-10-CM

## 2018-06-05 DIAGNOSIS — R09.82 POST-NASAL DRIP: ICD-10-CM

## 2018-06-05 DIAGNOSIS — R49.0 HOARSENESS: Primary | ICD-10-CM

## 2018-06-05 DIAGNOSIS — I10 ESSENTIAL HYPERTENSION: Chronic | ICD-10-CM

## 2018-06-05 PROCEDURE — 99999 PR PBB SHADOW E&M-EST. PATIENT-LVL III: CPT | Mod: PBBFAC,,, | Performed by: FAMILY MEDICINE

## 2018-06-05 PROCEDURE — 99499 UNLISTED E&M SERVICE: CPT | Mod: S$PBB,,, | Performed by: FAMILY MEDICINE

## 2018-06-05 PROCEDURE — 3074F SYST BP LT 130 MM HG: CPT | Mod: CPTII,S$GLB,, | Performed by: FAMILY MEDICINE

## 2018-06-05 PROCEDURE — 3078F DIAST BP <80 MM HG: CPT | Mod: CPTII,S$GLB,, | Performed by: FAMILY MEDICINE

## 2018-06-05 PROCEDURE — 99214 OFFICE O/P EST MOD 30 MIN: CPT | Mod: S$GLB,,, | Performed by: FAMILY MEDICINE

## 2018-06-05 RX ORDER — FLUTICASONE PROPIONATE 50 MCG
1 SPRAY, SUSPENSION (ML) NASAL 2 TIMES DAILY
Qty: 1 BOTTLE | Refills: 5 | Status: SHIPPED | OUTPATIENT
Start: 2018-06-05 | End: 2018-08-23 | Stop reason: SDUPTHER

## 2018-06-05 NOTE — PROGRESS NOTES
Chief Complaint   Patient presents with    Hypertension    Follow-up       HPI  Nereyda Hernandez is a 77 y.o. female with multiple medical diagnoses as listed in the medical history and problem list that presents for follow-up for hypertension, dysphagia, and hoarseness. She has seen Dr. Esteves and had treatment for an esophageal ring. She was also evaluated for hoarseness by an ENT and allergist. She had negative testing but was told this could be due to the medicine she takes. She has throat clearing and a nasal drip. No fever/chills.    PAST MEDICAL HISTORY:  Past Medical History:   Diagnosis Date    Anxiety     Breast cancer     Chronic disease anemia     Hyperlipidemia     Hypertension     Insomnia     Lobular carcinoma in situ     KANWAL (obstructive sleep apnea)     Osteoporosis     Rosacea     Vertigo        PAST SURGICAL HISTORY:  Past Surgical History:   Procedure Laterality Date    BELT ABDOMINOPLASTY      BREAST BIOPSY      HERNIA REPAIR      Ventral    TONSILLECTOMY      TOTAL ABDOMINAL HYSTERECTOMY         SOCIAL HISTORY:  Social History     Social History    Marital status:      Spouse name: N/A    Number of children: 6    Years of education: college     Occupational History    retired  for [employer]      Social History Main Topics    Smoking status: Former Smoker     Packs/day: 0.50     Years: 10.00     Quit date: 9/20/1980    Smokeless tobacco: Never Used    Alcohol use 3.0 oz/week     5 Glasses of wine per week      Comment: Occasionally    Drug use: No    Sexual activity: Not Currently     Partners: Male     Other Topics Concern    Not on file     Social History Narrative    No narrative on file       FAMILY HISTORY:  Family History   Problem Relation Age of Onset    Cancer Mother         liver       ALLERGIES AND MEDICATIONS: updated and reviewed.  Review of patient's allergies indicates:  No Known Allergies  Current Outpatient Prescriptions    Medication Sig Dispense Refill    alendronate (FOSAMAX) 70 MG tablet TAKE ONE TABLET BY MOUTH ONCE EVERY 7 DAYS 4 tablet 5    aspirin (ECOTRIN) 81 MG EC tablet Take 81 mg by mouth once daily.      calcium citrate-vitamin D3 500 mg calcium -400 unit Chew Take 1 tablet by mouth 2 (two) times daily.        docusate sodium (COLACE) 100 MG capsule Take 100 mg by mouth once daily.      fish oil-omega-3 fatty acids 300-1,000 mg capsule Take 2 g by mouth once daily.        hydroCHLOROthiazide (HYDRODIURIL) 25 MG tablet TAKE ONE TABLET BY MOUTH ONCE DAILY 30 tablet 5    latanoprost 0.005 % ophthalmic solution 1 drop every evening.      losartan (COZAAR) 50 MG tablet TAKE ONE TABLET BY MOUTH ONCE DAILY 30 tablet 5    metoprolol tartrate (LOPRESSOR) 25 MG tablet Take 0.5 tablets (12.5 mg total) by mouth 2 (two) times daily. 30 tablet 3    multivit-iron-min-folic acid (MULTIVITAMIN-IRON-MINERALS-FOLIC ACID) 3,500-18-0.4 unit-mg-mg Chew Take by mouth.        oxybutynin (DITROPAN) 5 MG Tab Take 1 tablet (5 mg total) by mouth 2 (two) times daily. 60 tablet 11    pantoprazole (PROTONIX) 20 MG tablet Take 20 mg by mouth once daily.      pravastatin (PRAVACHOL) 20 MG tablet TAKE ONE TABLET BY MOUTH ONCE DAILY 30 tablet 5    sertraline (ZOLOFT) 100 MG tablet TAKE ONE TABLET BY MOUTH ONCE DAILY 30 tablet 5    zolpidem (AMBIEN) 5 MG Tab TAKE ONE TABLET BY MOUTH AT BEDTIME AS NEEDED 30 tablet 2    fluticasone (FLONASE) 50 mcg/actuation nasal spray 1 spray (50 mcg total) by Each Nare route 2 (two) times daily. 1 Bottle 5     No current facility-administered medications for this visit.        ROS  Review of Systems   Constitutional: Negative for chills, diaphoresis, fatigue, fever and unexpected weight change.   HENT: Positive for postnasal drip. Negative for rhinorrhea, sinus pressure, sore throat and tinnitus.    Eyes: Negative for photophobia and visual disturbance.   Respiratory: Negative for cough, shortness of  "breath and wheezing.    Cardiovascular: Negative for chest pain and palpitations.   Gastrointestinal: Negative for abdominal pain, blood in stool, constipation, diarrhea, nausea and vomiting.   Genitourinary: Negative for dysuria, flank pain, frequency and vaginal discharge.   Musculoskeletal: Negative for arthralgias and joint swelling.   Skin: Negative for rash.   Neurological: Negative for speech difficulty, weakness, light-headedness and headaches.   Psychiatric/Behavioral: Negative for behavioral problems and dysphoric mood.       Physical Exam  Vitals:    06/05/18 1014   BP: 118/68   Pulse: 63   Resp: 16   Temp: 98.5 °F (36.9 °C)   TempSrc: Oral   SpO2: 97%   Weight: 68.9 kg (152 lb)   Height: 5' 4" (1.626 m)    Body mass index is 26.09 kg/m².  Weight: 68.9 kg (152 lb)   Height: 5' 4" (162.6 cm)     Physical Exam   Constitutional: She is oriented to person, place, and time. She appears well-developed and well-nourished. No distress.   HENT:   Head: Normocephalic and atraumatic.   Left TM- WNL    Right TM-WNL    Maxillary and Frontal sinus-no pain with palpation    Turbinates-boggy, erythematous and enlarged     Eyes: EOM are normal.   Neck: Neck supple.   Cardiovascular: Normal rate and regular rhythm.  Exam reveals no gallop and no friction rub.    No murmur heard.  Pulmonary/Chest: Effort normal and breath sounds normal. No respiratory distress. She has no wheezes. She has no rales.   Lymphadenopathy:     She has no cervical adenopathy.   Neurological: She is alert and oriented to person, place, and time.   Skin: Skin is warm and dry. No rash noted.   Psychiatric: She has a normal mood and affect. Her behavior is normal.   Nursing note and vitals reviewed.      Health Maintenance       Date Due Completion Date    Influenza Vaccine 08/01/2018 11/7/2017    Lipid Panel 03/08/2019 3/8/2018    DEXA SCAN 06/05/2019 6/5/2017    Override on 8/12/2013: Done    TETANUS VACCINE 06/09/2024 6/9/2014            ASSESSMENT "     1. Hoarseness    2. Essential hypertension    3. Post-nasal drip    4. Other dysphagia    5. History of iron deficiency anemia        PLAN:     Problem List Items Addressed This Visit        ENT    Hoarseness - Primary  -get records from ENT, she reports normal vocal cord evaluation       Cardiac/Vascular    Essential hypertension (Chronic)  -stable on current regimen    Relevant Orders    CBC auto differential    Lipid panel    Comprehensive metabolic panel       Oncology    History of iron deficiency anemia  -she is taking vitamin with iron  -will check lab work in three months    Relevant Orders    Iron and TIBC    Ferritin       GI    Other dysphagia  -pending record review, will get records, this has improved after treatment, continue PPI      Other Visit Diagnoses     Post-nasal drip      -discussed proper nasal spray use    Relevant Medications    fluticasone (FLONASE) 50 mcg/actuation nasal spray            Genny Martinez MD  06/05/2018 11:36 AM        Follow-up in about 3 months (around 9/5/2018) for Follow up.

## 2018-06-12 ENCOUNTER — HOSPITAL ENCOUNTER (OUTPATIENT)
Dept: RADIOLOGY | Facility: HOSPITAL | Age: 78
Discharge: HOME | End: 2018-06-12
Attending: FAMILY MEDICINE
Payer: MEDICARE

## 2018-06-12 VITALS — HEIGHT: 64 IN | WEIGHT: 152 LBS | BODY MASS INDEX: 25.95 KG/M2

## 2018-06-12 DIAGNOSIS — Z12.31 BREAST CANCER SCREENING BY MAMMOGRAM: ICD-10-CM

## 2018-06-12 PROCEDURE — 77067 SCR MAMMO BI INCL CAD: CPT | Mod: TC,PO

## 2018-06-12 PROCEDURE — 77063 BREAST TOMOSYNTHESIS BI: CPT | Mod: 26,,, | Performed by: RADIOLOGY

## 2018-06-12 PROCEDURE — 77067 SCR MAMMO BI INCL CAD: CPT | Mod: 26,,, | Performed by: RADIOLOGY

## 2018-06-21 DIAGNOSIS — I10 ESSENTIAL HYPERTENSION: Chronic | ICD-10-CM

## 2018-06-22 RX ORDER — LOSARTAN POTASSIUM 50 MG/1
TABLET ORAL
Qty: 30 TABLET | Refills: 5 | Status: SHIPPED | OUTPATIENT
Start: 2018-06-22 | End: 2018-08-23 | Stop reason: SDUPTHER

## 2018-06-23 ENCOUNTER — TELEPHONE (OUTPATIENT)
Dept: ADMINISTRATIVE | Facility: HOSPITAL | Age: 78
End: 2018-06-23

## 2018-07-09 DIAGNOSIS — M81.0 AGE-RELATED OSTEOPOROSIS WITHOUT CURRENT PATHOLOGICAL FRACTURE: ICD-10-CM

## 2018-07-09 RX ORDER — ALENDRONATE SODIUM 70 MG/1
TABLET ORAL
Qty: 4 TABLET | Refills: 5 | Status: SHIPPED | OUTPATIENT
Start: 2018-07-09 | End: 2018-08-23 | Stop reason: SDUPTHER

## 2018-08-09 ENCOUNTER — LAB VISIT (OUTPATIENT)
Dept: LAB | Facility: HOSPITAL | Age: 78
End: 2018-08-09
Attending: FAMILY MEDICINE
Payer: MEDICARE

## 2018-08-09 DIAGNOSIS — Z86.2 HISTORY OF IRON DEFICIENCY ANEMIA: ICD-10-CM

## 2018-08-09 DIAGNOSIS — I10 ESSENTIAL HYPERTENSION: Chronic | ICD-10-CM

## 2018-08-09 LAB
ALBUMIN SERPL BCP-MCNC: 3.6 G/DL
ALP SERPL-CCNC: 57 U/L
ALT SERPL W/O P-5'-P-CCNC: 25 U/L
ANION GAP SERPL CALC-SCNC: 7 MMOL/L
AST SERPL-CCNC: 30 U/L
BASOPHILS # BLD AUTO: 0.03 K/UL
BASOPHILS NFR BLD: 0.8 %
BILIRUB SERPL-MCNC: 0.5 MG/DL
BUN SERPL-MCNC: 13 MG/DL
CALCIUM SERPL-MCNC: 9.4 MG/DL
CHLORIDE SERPL-SCNC: 103 MMOL/L
CHOLEST SERPL-MCNC: 177 MG/DL
CHOLEST/HDLC SERPL: 2.9 {RATIO}
CO2 SERPL-SCNC: 28 MMOL/L
CREAT SERPL-MCNC: 0.8 MG/DL
DIFFERENTIAL METHOD: ABNORMAL
EOSINOPHIL # BLD AUTO: 0 K/UL
EOSINOPHIL NFR BLD: 1.1 %
ERYTHROCYTE [DISTWIDTH] IN BLOOD BY AUTOMATED COUNT: 12.6 %
EST. GFR  (AFRICAN AMERICAN): >60 ML/MIN/1.73 M^2
EST. GFR  (NON AFRICAN AMERICAN): >60 ML/MIN/1.73 M^2
FERRITIN SERPL-MCNC: 36 NG/ML
GLUCOSE SERPL-MCNC: 93 MG/DL
HCT VFR BLD AUTO: 36.3 %
HDLC SERPL-MCNC: 61 MG/DL
HDLC SERPL: 34.5 %
HGB BLD-MCNC: 12 G/DL
IMM GRANULOCYTES # BLD AUTO: 0.02 K/UL
IMM GRANULOCYTES NFR BLD AUTO: 0.6 %
IRON SERPL-MCNC: 69 UG/DL
LDLC SERPL CALC-MCNC: 100.8 MG/DL
LYMPHOCYTES # BLD AUTO: 1.1 K/UL
LYMPHOCYTES NFR BLD: 29.8 %
MCH RBC QN AUTO: 31.4 PG
MCHC RBC AUTO-ENTMCNC: 33.1 G/DL
MCV RBC AUTO: 95 FL
MONOCYTES # BLD AUTO: 0.4 K/UL
MONOCYTES NFR BLD: 10.2 %
NEUTROPHILS # BLD AUTO: 2.1 K/UL
NEUTROPHILS NFR BLD: 57.5 %
NONHDLC SERPL-MCNC: 116 MG/DL
NRBC BLD-RTO: 0 /100 WBC
PLATELET # BLD AUTO: 209 K/UL
PMV BLD AUTO: 9.9 FL
POTASSIUM SERPL-SCNC: 4.1 MMOL/L
PROT SERPL-MCNC: 7.1 G/DL
RBC # BLD AUTO: 3.82 M/UL
SATURATED IRON: 16 %
SODIUM SERPL-SCNC: 138 MMOL/L
TOTAL IRON BINDING CAPACITY: 438 UG/DL
TRANSFERRIN SERPL-MCNC: 296 MG/DL
TRIGL SERPL-MCNC: 76 MG/DL
WBC # BLD AUTO: 3.62 K/UL

## 2018-08-09 PROCEDURE — 82728 ASSAY OF FERRITIN: CPT

## 2018-08-09 PROCEDURE — 80061 LIPID PANEL: CPT

## 2018-08-09 PROCEDURE — 85025 COMPLETE CBC W/AUTO DIFF WBC: CPT

## 2018-08-09 PROCEDURE — 36415 COLL VENOUS BLD VENIPUNCTURE: CPT | Mod: PO

## 2018-08-09 PROCEDURE — 80053 COMPREHEN METABOLIC PANEL: CPT

## 2018-08-09 PROCEDURE — 83540 ASSAY OF IRON: CPT

## 2018-08-15 DIAGNOSIS — N39.41 URGE INCONTINENCE: ICD-10-CM

## 2018-08-15 RX ORDER — OXYBUTYNIN CHLORIDE 5 MG/1
TABLET ORAL
Qty: 60 TABLET | Refills: 11 | Status: SHIPPED | OUTPATIENT
Start: 2018-08-15 | End: 2018-08-23 | Stop reason: SDUPTHER

## 2018-08-23 ENCOUNTER — PES CALL (OUTPATIENT)
Dept: ADMINISTRATIVE | Facility: CLINIC | Age: 78
End: 2018-08-23

## 2018-08-23 ENCOUNTER — OFFICE VISIT (OUTPATIENT)
Dept: FAMILY MEDICINE | Facility: CLINIC | Age: 78
End: 2018-08-23
Payer: MEDICARE

## 2018-08-23 VITALS
HEART RATE: 58 BPM | DIASTOLIC BLOOD PRESSURE: 70 MMHG | TEMPERATURE: 98 F | WEIGHT: 152 LBS | RESPIRATION RATE: 18 BRPM | SYSTOLIC BLOOD PRESSURE: 118 MMHG | HEIGHT: 63 IN | OXYGEN SATURATION: 96 % | BODY MASS INDEX: 26.93 KG/M2

## 2018-08-23 DIAGNOSIS — I10 ESSENTIAL HYPERTENSION: Primary | Chronic | ICD-10-CM

## 2018-08-23 DIAGNOSIS — N39.41 URGE INCONTINENCE: ICD-10-CM

## 2018-08-23 DIAGNOSIS — M81.0 AGE-RELATED OSTEOPOROSIS WITHOUT CURRENT PATHOLOGICAL FRACTURE: ICD-10-CM

## 2018-08-23 DIAGNOSIS — F41.9 ANXIETY: ICD-10-CM

## 2018-08-23 DIAGNOSIS — G47.00 INSOMNIA, UNSPECIFIED TYPE: ICD-10-CM

## 2018-08-23 DIAGNOSIS — G47.33 OSA ON CPAP: ICD-10-CM

## 2018-08-23 DIAGNOSIS — F13.20 SEDATIVE HYPNOTIC OR ANXIOLYTIC DEPENDENCE: Chronic | ICD-10-CM

## 2018-08-23 DIAGNOSIS — E78.5 HYPERLIPIDEMIA, UNSPECIFIED HYPERLIPIDEMIA TYPE: Chronic | ICD-10-CM

## 2018-08-23 DIAGNOSIS — R09.82 POST-NASAL DRIP: ICD-10-CM

## 2018-08-23 PROCEDURE — 99214 OFFICE O/P EST MOD 30 MIN: CPT | Mod: S$GLB,,, | Performed by: FAMILY MEDICINE

## 2018-08-23 PROCEDURE — 3074F SYST BP LT 130 MM HG: CPT | Mod: CPTII,S$GLB,, | Performed by: FAMILY MEDICINE

## 2018-08-23 PROCEDURE — 99999 PR PBB SHADOW E&M-EST. PATIENT-LVL IV: CPT | Mod: PBBFAC,,, | Performed by: FAMILY MEDICINE

## 2018-08-23 PROCEDURE — 3078F DIAST BP <80 MM HG: CPT | Mod: CPTII,S$GLB,, | Performed by: FAMILY MEDICINE

## 2018-08-23 RX ORDER — FLUTICASONE PROPIONATE 50 MCG
1 SPRAY, SUSPENSION (ML) NASAL 2 TIMES DAILY
Qty: 1 BOTTLE | Refills: 5 | Status: SHIPPED | OUTPATIENT
Start: 2018-08-23 | End: 2019-05-21 | Stop reason: SDUPTHER

## 2018-08-23 RX ORDER — OXYBUTYNIN CHLORIDE 5 MG/1
5 TABLET ORAL 2 TIMES DAILY
Qty: 90 TABLET | Refills: 1 | Status: SHIPPED | OUTPATIENT
Start: 2018-08-23 | End: 2019-05-21 | Stop reason: SDUPTHER

## 2018-08-23 RX ORDER — SERTRALINE HYDROCHLORIDE 100 MG/1
100 TABLET, FILM COATED ORAL DAILY
Qty: 90 TABLET | Refills: 1 | Status: SHIPPED | OUTPATIENT
Start: 2018-08-23 | End: 2018-11-20

## 2018-08-23 RX ORDER — HYDROCHLOROTHIAZIDE 25 MG/1
25 TABLET ORAL DAILY
Qty: 90 TABLET | Refills: 1 | Status: SHIPPED | OUTPATIENT
Start: 2018-08-23 | End: 2019-01-15 | Stop reason: SDUPTHER

## 2018-08-23 RX ORDER — PRAVASTATIN SODIUM 20 MG/1
20 TABLET ORAL DAILY
Qty: 90 TABLET | Refills: 1 | Status: SHIPPED | OUTPATIENT
Start: 2018-08-23 | End: 2019-04-16 | Stop reason: SDUPTHER

## 2018-08-23 RX ORDER — ZOLPIDEM TARTRATE 5 MG/1
5 TABLET ORAL NIGHTLY PRN
Qty: 30 TABLET | Refills: 2 | Status: SHIPPED | OUTPATIENT
Start: 2018-08-23 | End: 2018-09-04

## 2018-08-23 RX ORDER — LOSARTAN POTASSIUM 50 MG/1
50 TABLET ORAL DAILY
Qty: 90 TABLET | Refills: 1 | Status: SHIPPED | OUTPATIENT
Start: 2018-08-23 | End: 2019-05-21 | Stop reason: SDUPTHER

## 2018-08-23 RX ORDER — ALENDRONATE SODIUM 70 MG/1
70 TABLET ORAL WEEKLY
Qty: 12 TABLET | Refills: 3 | Status: SHIPPED | OUTPATIENT
Start: 2018-08-23 | End: 2019-05-21 | Stop reason: SDUPTHER

## 2018-08-23 NOTE — PROGRESS NOTES
Chief Complaint   Patient presents with    Hypertension    Follow-up       HPI  Nereyda Hernandez is a 78 y.o. female with multiple medical diagnoses as listed in the medical history and problem list that presents for follow-up for hypertension. She has been having some fatigue, she has not been able to use her CPAP machine while she is having some left sided tooth pain.     PAST MEDICAL HISTORY:  Past Medical History:   Diagnosis Date    Anxiety     Breast cancer     Chronic disease anemia     Hyperlipidemia     Hypertension     Insomnia     Lobular carcinoma in situ     KANWAL (obstructive sleep apnea)     Osteoporosis     Rosacea     Vertigo        PAST SURGICAL HISTORY:  Past Surgical History:   Procedure Laterality Date    BELT ABDOMINOPLASTY      BREAST BIOPSY      BREAST LUMPECTOMY      HERNIA REPAIR      [facility]ral    OOPHORECTOMY      TONSILLECTOMY      TOTAL ABDOMINAL HYSTERECTOMY         SOCIAL HISTORY:  Social History     Socioeconomic History    Marital status:      Spouse name: Not on file    Number of children: 6    Years of education: college    Highest education level: Not on file   Social Needs    Financial resource strain: Not on file    Food insecurity - worry: Not on file    Food insecurity - inability: Not on file    Transportation needs - medical: Not on file    Transportation needs - non-medical: Not on file   Occupational History    Occupation: retired  for [employer]   Tobacco Use    Smoking status: Former Smoker     Packs/day: 0.50     Years: 10.00     Pack years: 5.00     Last attempt to quit: 1980     Years since quittin.9    Smokeless tobacco: Never Used   Substance and Sexual Activity    Alcohol use: Yes     Alcohol/week: 3.0 oz     Types: 5 Glasses of wine per week     Comment: Occasionally    Drug use: No    Sexual activity: Not Currently     Partners: Male   Other Topics Concern    Not on file   Social History Narrative     Not on file       FAMILY HISTORY:  Family History   Problem Relation Age of Onset    Cancer Mother         liver       ALLERGIES AND MEDICATIONS: updated and reviewed.  Review of patient's allergies indicates:  No Known Allergies  Current Outpatient Medications   Medication Sig Dispense Refill    alendronate (FOSAMAX) 70 MG tablet Take 1 tablet (70 mg total) by mouth once a week. 12 tablet 3    aspirin (ECOTRIN) 81 MG EC tablet Take 81 mg by mouth once daily.      calcium citrate-vitamin D3 500 mg calcium -400 unit Chew Take 1 tablet by mouth 2 (two) times daily.        docusate sodium (COLACE) 100 MG capsule Take 100 mg by mouth once daily.      fish oil-omega-3 fatty acids 300-1,000 mg capsule Take 2 g by mouth once daily.        fluticasone (FLONASE) 50 mcg/actuation nasal spray 1 spray (50 mcg total) by Each Nare route 2 (two) times daily. 1 Bottle 5    hydroCHLOROthiazide (HYDRODIURIL) 25 MG tablet Take 1 tablet (25 mg total) by mouth once daily. 90 tablet 1    latanoprost 0.005 % ophthalmic solution 1 drop every evening.      losartan (COZAAR) 50 MG tablet Take 1 tablet (50 mg total) by mouth once daily. 90 tablet 1    metoprolol tartrate (LOPRESSOR) 25 MG tablet Take 0.5 tablets (12.5 mg total) by mouth 2 (two) times daily. 30 tablet 3    multivit-iron-min-folic acid (MULTIVITAMIN-IRON-MINERALS-FOLIC ACID) 3,500-18-0.4 unit-mg-mg Chew Take by mouth.        oxybutynin (DITROPAN) 5 MG Tab Take 1 tablet (5 mg total) by mouth 2 (two) times daily. 90 tablet 1    pantoprazole (PROTONIX) 20 MG tablet Take 20 mg by mouth once daily.      pravastatin (PRAVACHOL) 20 MG tablet Take 1 tablet (20 mg total) by mouth once daily. 90 tablet 1    sertraline (ZOLOFT) 100 MG tablet Take 1 tablet (100 mg total) by mouth once daily. 90 tablet 1    zolpidem (AMBIEN) 5 MG Tab Take 1 tablet (5 mg total) by mouth nightly as needed. 30 tablet 2     No current facility-administered medications for this visit.   "      ROS  Review of Systems   Constitutional: Negative for chills, diaphoresis, fatigue, fever and unexpected weight change.   HENT: Negative for rhinorrhea, sinus pressure, sore throat and tinnitus.    Eyes: Negative for photophobia and visual disturbance.   Respiratory: Negative for cough, shortness of breath and wheezing.    Cardiovascular: Negative for chest pain and palpitations.   Gastrointestinal: Negative for abdominal pain, blood in stool, constipation, diarrhea, nausea and vomiting.   Genitourinary: Negative for dysuria, flank pain, frequency and vaginal discharge.   Musculoskeletal: Negative for arthralgias and joint swelling.   Skin: Negative for rash.   Neurological: Negative for speech difficulty, weakness, light-headedness and headaches.   Psychiatric/Behavioral: Negative for behavioral problems and dysphoric mood.       Physical Exam  Vitals:    08/23/18 1000   BP: 118/70   Pulse: (!) 58   Resp: 18   Temp: 97.8 °F (36.6 °C)   SpO2: 96%   Weight: 68.9 kg (152 lb)   Height: 5' 3" (1.6 m)    Body mass index is 26.93 kg/m².  Weight: 68.9 kg (152 lb)   Height: 5' 3" (160 cm)     Physical Exam   Constitutional: She is oriented to person, place, and time. She appears well-developed and well-nourished.   HENT:   Head: Normocephalic and atraumatic.   Eyes: EOM are normal.   Neurological: She is alert and oriented to person, place, and time.   Skin: Skin is warm and dry. No rash noted. No erythema.   Psychiatric: She has a normal mood and affect. Her behavior is normal.   Nursing note and vitals reviewed.      Health Maintenance       Date Due Completion Date    Influenza Vaccine 08/01/2018 11/7/2017    DEXA SCAN 06/05/2019 6/5/2017    Override on 8/12/2013: Done    Lipid Panel 08/09/2019 8/9/2018    Colonoscopy 04/26/2023 4/26/2018    TETANUS VACCINE 06/09/2024 6/9/2014            ASSESSMENT     1. Essential hypertension    2. Age-related osteoporosis without current pathological fracture    3. Post-nasal " drip    4. Urge incontinence    5. Hyperlipidemia, unspecified hyperlipidemia type    6. Anxiety    7. Insomnia, unspecified type    8. Sedative hypnotic or anxiolytic dependence    9. KANWAL on CPAP        PLAN:     Problem List Items Addressed This Visit        Psychiatric    Sedative hypnotic or anxiolytic dependence (Chronic)    Current Assessment & Plan     Continue ambien         Relevant Medications    zolpidem (AMBIEN) 5 MG Tab    Anxiety    Current Assessment & Plan     She is going to decrease this and begin taking half a tab to see if this helps with her fatigue         Relevant Medications    sertraline (ZOLOFT) 100 MG tablet       Cardiac/Vascular    Essential hypertension - Primary (Chronic)    Current Assessment & Plan     Controlled on current medication, hctz and losartan         Relevant Medications    hydroCHLOROthiazide (HYDRODIURIL) 25 MG tablet    losartan (COZAAR) 50 MG tablet    Hyperlipidemia (Chronic)    Current Assessment & Plan     On statin         Relevant Medications    pravastatin (PRAVACHOL) 20 MG tablet       Renal/    Urge incontinence    Current Assessment & Plan     Continue ditropan at current dose         Relevant Medications    oxybutynin (DITROPAN) 5 MG Tab       Orthopedic    Age-related osteoporosis without current pathological fracture    Current Assessment & Plan      On alendronate, continue         Relevant Medications    alendronate (FOSAMAX) 70 MG tablet       Other    KANWAL on CPAP    Current Assessment & Plan     Continue CPAP, she will begin use as soon as she can         Insomnia    Current Assessment & Plan     Continue ambien         Relevant Medications    zolpidem (AMBIEN) 5 MG Tab      Other Visit Diagnoses     Post-nasal drip      -continue flonase at current dose    Relevant Medications    fluticasone (FLONASE) 50 mcg/actuation nasal spray            Genny Martinez MD  08/23/2018 10:30 AM        Follow-up in about 3 months (around 11/23/2018) for Follow  up.

## 2018-09-02 DIAGNOSIS — G47.00 INSOMNIA, UNSPECIFIED TYPE: ICD-10-CM

## 2018-09-02 DIAGNOSIS — F13.20 SEDATIVE HYPNOTIC OR ANXIOLYTIC DEPENDENCE: Chronic | ICD-10-CM

## 2018-09-04 RX ORDER — ZOLPIDEM TARTRATE 5 MG/1
TABLET ORAL
Qty: 30 TABLET | Refills: 2 | Status: SHIPPED | OUTPATIENT
Start: 2018-09-04 | End: 2018-11-20

## 2018-09-14 DIAGNOSIS — I10 ESSENTIAL HYPERTENSION: Chronic | ICD-10-CM

## 2018-09-17 RX ORDER — METOPROLOL TARTRATE 25 MG/1
12.5 TABLET, FILM COATED ORAL 2 TIMES DAILY
Qty: 30 TABLET | Refills: 3 | Status: SHIPPED | OUTPATIENT
Start: 2018-09-17 | End: 2018-12-11 | Stop reason: SDUPTHER

## 2018-10-30 ENCOUNTER — CLINICAL SUPPORT (OUTPATIENT)
Dept: FAMILY MEDICINE | Facility: CLINIC | Age: 78
End: 2018-10-30
Payer: MEDICARE

## 2018-10-30 DIAGNOSIS — Z23 NEED FOR PROPHYLACTIC VACCINATION AND INOCULATION AGAINST INFLUENZA: Primary | ICD-10-CM

## 2018-10-30 PROCEDURE — 99499 UNLISTED E&M SERVICE: CPT | Mod: S$PBB,HCNC,, | Performed by: FAMILY MEDICINE

## 2018-10-30 PROCEDURE — 90662 IIV NO PRSV INCREASED AG IM: CPT | Mod: PBBFAC,HCNC,PO

## 2018-10-30 NOTE — PROGRESS NOTES
Patient tolerated flu injection well.  Instructed to wait 15 minutes after injection for monitoring. Verbalized understanding. VIS given

## 2018-11-20 ENCOUNTER — OFFICE VISIT (OUTPATIENT)
Dept: FAMILY MEDICINE | Facility: CLINIC | Age: 78
End: 2018-11-20
Payer: MEDICARE

## 2018-11-20 VITALS
RESPIRATION RATE: 18 BRPM | BODY MASS INDEX: 26.29 KG/M2 | HEART RATE: 61 BPM | DIASTOLIC BLOOD PRESSURE: 78 MMHG | SYSTOLIC BLOOD PRESSURE: 126 MMHG | WEIGHT: 154 LBS | OXYGEN SATURATION: 97 % | TEMPERATURE: 98 F | HEIGHT: 64 IN

## 2018-11-20 DIAGNOSIS — G47.33 OSA ON CPAP: ICD-10-CM

## 2018-11-20 DIAGNOSIS — G47.00 INSOMNIA, UNSPECIFIED TYPE: ICD-10-CM

## 2018-11-20 DIAGNOSIS — F41.9 ANXIETY: ICD-10-CM

## 2018-11-20 DIAGNOSIS — I10 ESSENTIAL HYPERTENSION: Primary | Chronic | ICD-10-CM

## 2018-11-20 PROCEDURE — 99999 PR PBB SHADOW E&M-EST. PATIENT-LVL IV: CPT | Mod: PBBFAC,HCNC,, | Performed by: FAMILY MEDICINE

## 2018-11-20 PROCEDURE — 3078F DIAST BP <80 MM HG: CPT | Mod: CPTII,HCNC,S$GLB, | Performed by: FAMILY MEDICINE

## 2018-11-20 PROCEDURE — 3074F SYST BP LT 130 MM HG: CPT | Mod: CPTII,HCNC,S$GLB, | Performed by: FAMILY MEDICINE

## 2018-11-20 PROCEDURE — 99214 OFFICE O/P EST MOD 30 MIN: CPT | Mod: HCNC,S$GLB,, | Performed by: FAMILY MEDICINE

## 2018-11-20 PROCEDURE — 1101F PT FALLS ASSESS-DOCD LE1/YR: CPT | Mod: CPTII,HCNC,S$GLB, | Performed by: FAMILY MEDICINE

## 2018-11-20 RX ORDER — SERTRALINE HYDROCHLORIDE 25 MG/1
TABLET, FILM COATED ORAL
Qty: 30 TABLET | Refills: 0 | Status: SHIPPED | OUTPATIENT
Start: 2018-11-20 | End: 2019-02-19

## 2018-11-20 NOTE — PROGRESS NOTES
Chief Complaint   Patient presents with    Hypertension    Anxiety    Follow-up       HPI  Nereyda Hernandez is a 78 y.o. female with multiple medical diagnoses as listed in the medical history and problem list that presents for follow-up for anxiety and insomnia.    Anxiety-she has been weaning herself off of her zoloft and has decreased it to 50mg. She is wondering what the next step is to getting off of it. She has not noticed any worsening of mood    Insomnia- she has a letter from [facility] stating they will not cover her ambien any more.     She is still having some dry mouth and would like to stop any medications that are causing this with the exception of her bladder medication.    PAST MEDICAL HISTORY:  Past Medical History:   Diagnosis Date    Anxiety     Breast cancer     Chronic disease anemia     Hyperlipidemia     Hypertension     Insomnia     Lobular carcinoma in situ     KANWAL (obstructive sleep apnea)     Osteoporosis     Rosacea     Vertigo        PAST SURGICAL HISTORY:  Past Surgical History:   Procedure Laterality Date    BELT ABDOMINOPLASTY      BREAST BIOPSY      BREAST LUMPECTOMY      HERNIA REPAIR      Ventral    OOPHORECTOMY      TONSILLECTOMY      TOTAL ABDOMINAL HYSTERECTOMY         SOCIAL HISTORY:  Social History     Socioeconomic History    Marital status:      Spouse name: Not on file    Number of children: 6    Years of education: college    Highest education level: Not on file   Social Needs    Financial resource strain: Not on file    Food insecurity - worry: Not on file    Food insecurity - inability: Not on file    Transportation needs - medical: Not on file    Transportation needs - non-medical: Not on file   Occupational History    Occupation: retired  for [employer]   Tobacco Use    Smoking status: Former Smoker     Packs/day: 0.50     Years: 10.00     Pack years: 5.00     Last attempt to quit: 9/20/1980     Years since quitting:  38.1    Smokeless tobacco: Never Used   Substance and Sexual Activity    Alcohol use: Yes     Alcohol/week: 3.0 oz     Types: 5 Glasses of wine per week     Comment: Occasionally    Drug use: No    Sexual activity: Not Currently     Partners: Male   Other Topics Concern    Not on file   Social History Narrative    Not on file       FAMILY HISTORY:  Family History   Problem Relation Age of Onset    Cancer Mother         liver       ALLERGIES AND MEDICATIONS: updated and reviewed.  Review of patient's allergies indicates:  No Known Allergies  Current Outpatient Medications   Medication Sig Dispense Refill    alendronate (FOSAMAX) 70 MG tablet Take 1 tablet (70 mg total) by mouth once a week. 12 tablet 3    aspirin (ECOTRIN) 81 MG EC tablet Take 81 mg by mouth once daily.      calcium citrate-vitamin D3 500 mg calcium -400 unit Chew Take 1 tablet by mouth 2 (two) times daily.        docusate sodium (COLACE) 100 MG capsule Take 100 mg by mouth once daily.      fish oil-omega-3 fatty acids 300-1,000 mg capsule Take 2 g by mouth once daily.        fluticasone (FLONASE) 50 mcg/actuation nasal spray 1 spray (50 mcg total) by Each Nare route 2 (two) times daily. 1 Bottle 5    hydroCHLOROthiazide (HYDRODIURIL) 25 MG tablet Take 1 tablet (25 mg total) by mouth once daily. 90 tablet 1    latanoprost 0.005 % ophthalmic solution 1 drop every evening.      losartan (COZAAR) 50 MG tablet Take 1 tablet (50 mg total) by mouth once daily. 90 tablet 1    metoprolol tartrate (LOPRESSOR) 25 MG tablet Take 0.5 tablets (12.5 mg total) by mouth 2 (two) times daily. 30 tablet 3    multivit-iron-min-folic acid (MULTIVITAMIN-IRON-MINERALS-FOLIC ACID) 3,500-18-0.4 unit-mg-mg Chew Take by mouth.        oxybutynin (DITROPAN) 5 MG Tab Take 1 tablet (5 mg total) by mouth 2 (two) times daily. 90 tablet 1    pantoprazole (PROTONIX) 20 MG tablet Take 20 mg by mouth once daily.      pravastatin (PRAVACHOL) 20 MG tablet Take 1  "tablet (20 mg total) by mouth once daily. 90 tablet 1    sertraline (ZOLOFT) 25 MG tablet Every other day for two weeks 30 tablet 0    suvorexant (BELSOMRA) 5 mg Tab Take 1 tablet by mouth every evening. 30 tablet 2     No current facility-administered medications for this visit.        ROS  Review of Systems   Constitutional: Negative for chills, diaphoresis, fatigue, fever and unexpected weight change.   HENT: Negative for rhinorrhea, sinus pressure, sore throat and tinnitus.    Eyes: Negative for photophobia and visual disturbance.   Respiratory: Negative for cough, shortness of breath and wheezing.    Cardiovascular: Negative for chest pain and palpitations.   Gastrointestinal: Negative for abdominal pain, blood in stool, constipation, diarrhea, nausea and vomiting.   Genitourinary: Negative for dysuria, flank pain, frequency and vaginal discharge.   Musculoskeletal: Negative for arthralgias and joint swelling.   Skin: Negative for rash.   Neurological: Negative for speech difficulty, weakness, light-headedness and headaches.   Psychiatric/Behavioral: Positive for sleep disturbance. Negative for behavioral problems and dysphoric mood.       Physical Exam  Vitals:    11/20/18 1046   BP: 126/78   Pulse: 61   Resp: 18   Temp: 98 °F (36.7 °C)   TempSrc: Oral   SpO2: 97%   Weight: 69.9 kg (154 lb)   Height: 5' 4" (1.626 m)    Body mass index is 26.43 kg/m².  Weight: 69.9 kg (154 lb)   Height: 5' 4" (162.6 cm)     Physical Exam   Constitutional: She is oriented to person, place, and time. She appears well-developed and well-nourished.   HENT:   Head: Normocephalic and atraumatic.   Eyes: EOM are normal.   Neurological: She is alert and oriented to person, place, and time.   Skin: Skin is warm and dry. No rash noted. No erythema.   Psychiatric: She has a normal mood and affect. Her behavior is normal.   Nursing note and vitals reviewed.      Health Maintenance       Date Due Completion Date    DEXA SCAN 06/05/2019 " 6/5/2017    Override on 8/12/2013: Done    Lipid Panel 08/09/2019 8/9/2018    Colonoscopy 04/26/2023 4/26/2018    TETANUS VACCINE 06/09/2024 6/9/2014            ASSESSMENT     1. Essential hypertension    2. Anxiety    3. Insomnia, unspecified type    4. KANWAL on CPAP        PLAN:     Problem List Items Addressed This Visit        Psychiatric    Anxiety  -decrease dose to 25mg and take every other day for two weeks then stop    Relevant Medications    sertraline (ZOLOFT) 25 MG tablet       Cardiac/Vascular    Essential hypertension - Primary (Chronic)  -decrease hctz to half dose  -continue current regimen       Other    KANWAL on CPAP  -she is having this adjusted so that she can improve her compliance with it    Insomnia  -d/c ambien  -begin lowest dose, notify me if side effects occur    Relevant Medications    suvorexant (BELSOMRA) 5 mg Tab            Genny Martinez MD  11/20/2018 11:12 AM        Follow-up in about 3 months (around 2/20/2019) for Follow up.

## 2018-12-11 DIAGNOSIS — I10 ESSENTIAL HYPERTENSION: Chronic | ICD-10-CM

## 2018-12-12 RX ORDER — METOPROLOL TARTRATE 25 MG/1
12.5 TABLET, FILM COATED ORAL 2 TIMES DAILY
Qty: 30 TABLET | Refills: 0 | Status: SHIPPED | OUTPATIENT
Start: 2018-12-12 | End: 2019-01-15 | Stop reason: SDUPTHER

## 2018-12-19 ENCOUNTER — OFFICE VISIT (OUTPATIENT)
Dept: URGENT CARE | Facility: CLINIC | Age: 78
End: 2018-12-19
Payer: MEDICARE

## 2018-12-19 VITALS
OXYGEN SATURATION: 98 % | HEART RATE: 86 BPM | BODY MASS INDEX: 25.61 KG/M2 | DIASTOLIC BLOOD PRESSURE: 75 MMHG | HEIGHT: 64 IN | TEMPERATURE: 97 F | SYSTOLIC BLOOD PRESSURE: 118 MMHG | WEIGHT: 150 LBS

## 2018-12-19 DIAGNOSIS — H81.11 BENIGN PAROXYSMAL POSITIONAL VERTIGO OF RIGHT EAR: ICD-10-CM

## 2018-12-19 DIAGNOSIS — R30.0 DYSURIA: Primary | ICD-10-CM

## 2018-12-19 DIAGNOSIS — B02.8 HERPES ZOSTER WITH COMPLICATION: ICD-10-CM

## 2018-12-19 LAB
BILIRUB UR QL STRIP: NEGATIVE
GLUCOSE UR QL STRIP: NEGATIVE
KETONES UR QL STRIP: NEGATIVE
LEUKOCYTE ESTERASE UR QL STRIP: POSITIVE
PH, POC UA: 6.5
POC BLOOD, URINE: NEGATIVE
POC NITRATES, URINE: NEGATIVE
PROT UR QL STRIP: NEGATIVE
SP GR UR STRIP: 1.01 (ref 1–1.03)
UROBILINOGEN UR STRIP-ACNC: NORMAL (ref 0.1–1.1)

## 2018-12-19 PROCEDURE — 99214 OFFICE O/P EST MOD 30 MIN: CPT | Mod: 25,S$GLB,, | Performed by: NURSE PRACTITIONER

## 2018-12-19 PROCEDURE — 3078F DIAST BP <80 MM HG: CPT | Mod: CPTII,S$GLB,, | Performed by: NURSE PRACTITIONER

## 2018-12-19 PROCEDURE — 81003 URINALYSIS AUTO W/O SCOPE: CPT | Mod: QW,S$GLB,, | Performed by: NURSE PRACTITIONER

## 2018-12-19 PROCEDURE — 1101F PT FALLS ASSESS-DOCD LE1/YR: CPT | Mod: CPTII,S$GLB,, | Performed by: NURSE PRACTITIONER

## 2018-12-19 PROCEDURE — 3074F SYST BP LT 130 MM HG: CPT | Mod: CPTII,S$GLB,, | Performed by: NURSE PRACTITIONER

## 2018-12-19 PROCEDURE — 99000 SPECIMEN HANDLING OFFICE-LAB: CPT | Mod: S$GLB,,, | Performed by: NURSE PRACTITIONER

## 2018-12-19 RX ORDER — MECLIZINE HYDROCHLORIDE 25 MG/1
12.5 TABLET ORAL 3 TIMES DAILY PRN
Qty: 12 TABLET | Refills: 0 | Status: SHIPPED | OUTPATIENT
Start: 2018-12-19 | End: 2019-07-25

## 2018-12-19 RX ORDER — VALACYCLOVIR HYDROCHLORIDE 1 G/1
1000 TABLET, FILM COATED ORAL 3 TIMES DAILY
Qty: 21 TABLET | Refills: 0 | Status: SHIPPED | OUTPATIENT
Start: 2018-12-19 | End: 2019-07-25

## 2018-12-19 RX ORDER — CIPROFLOXACIN 500 MG/1
500 TABLET ORAL 2 TIMES DAILY
Qty: 6 TABLET | Refills: 0 | Status: SHIPPED | OUTPATIENT
Start: 2018-12-19 | End: 2018-12-22

## 2018-12-19 NOTE — PROGRESS NOTES
"Subjective:       Patient ID: Nereyda Hernandez is a 78 y.o. female.    Vitals:  height is 5' 4" (1.626 m) and weight is 68 kg (150 lb). Her oral temperature is 97 °F (36.1 °C). Her blood pressure is 118/75 and her pulse is 86. Her oxygen saturation is 98%.     Chief Complaint: Rash (sore throat )    Patient presents today with several different symptoms. She has right eyelid swelling , rash on right side of face. She reports it burns slightly does not itch. She also says rash has extended to her mouth causing her to have mouth pain. Patient says she also has neck stiffness on the right side also ,and irritation when she urinates .   Has a history of vertigo and also notes dizziness when he turns his head.        Rash   This is a new problem. The current episode started yesterday. The problem has been gradually worsening since onset. The affected locations include the face, neck and right eye. The rash is characterized by burning, pain and redness. Associated symptoms include a sore throat. Pertinent negatives include no cough or fever. Past treatments include nothing. The treatment provided no relief.       Constitution: Negative for chills and fever.   HENT: Positive for ear pain, mouth sores and sore throat. Negative for facial swelling.    Neck: Positive for neck stiffness. Negative for painful lymph nodes.   Eyes: Positive for eyelid swelling. Negative for eye itching.   Respiratory: Negative for cough.    Genitourinary: Positive for dysuria.   Musculoskeletal: Negative for joint pain and joint swelling.   Skin: Positive for rash, erythema and hives. Negative for color change, pale, wound, abrasion, laceration, lesion, skin thickening/induration, puncture wound, bruising, abscess and avulsion.   Allergic/Immunologic: Positive for hives. Negative for environmental allergies and immunocompromised state.   Hematologic/Lymphatic: Negative for swollen lymph nodes.       Objective:      Physical Exam   Constitutional: She " is oriented to person, place, and time. She appears well-developed and well-nourished.   HENT:   Head: Normocephalic and atraumatic. Head is without abrasion, without contusion and without laceration.   Right Ear: External ear normal.   Left Ear: External ear normal.   Nose: Nose normal.   Mouth/Throat: Oropharynx is clear and moist.   Eyes: Conjunctivae, EOM and lids are normal. Pupils are equal, round, and reactive to light.   Neck: Trachea normal, full passive range of motion without pain and phonation normal. Neck supple.   Cardiovascular: Normal rate, regular rhythm and normal heart sounds.   Pulmonary/Chest: Effort normal and breath sounds normal. No stridor. No respiratory distress.   Musculoskeletal: Normal range of motion.   Neurological: She is alert and oriented to person, place, and time. She has normal strength. She is not disoriented. No cranial nerve deficit or sensory deficit. She displays a negative Romberg sign. GCS eye subscore is 4. GCS verbal subscore is 5. GCS motor subscore is 6.   Skin: Skin is warm, dry and intact. Capillary refill takes less than 2 seconds. Rash (noted to right cheek and upper lip as well as the lower portion of right eye area.  Also noted on hard palate of the mouth area. ) noted. No abrasion, no bruising, no burn, no ecchymosis, no laceration and no lesion noted. Rash is vesicular. There is erythema. No cyanosis. Nails show no clubbing.        Psychiatric: She has a normal mood and affect. Her speech is normal and behavior is normal. Judgment and thought content normal. Cognition and memory are normal.   Nursing note and vitals reviewed.      Assessment:       1. Dysuria    2. Herpes zoster with complication    3. Benign paroxysmal positional vertigo of right ear        Plan:       Advised to follow up with an eye doctor today and follow up with PCP within 48 hours in order for them to monitor her rash progression.  Verbalize understanding.     Dysuria  -     POCT  Urinalysis, Dipstick, Automated, W/O Scope  -     Urine culture  -     ciprofloxacin HCl (CIPRO) 500 MG tablet; Take 1 tablet (500 mg total) by mouth 2 (two) times daily. for 3 days  Dispense: 6 tablet; Refill: 0    Herpes zoster with complication  -     valACYclovir (VALTREX) 1000 MG tablet; Take 1 tablet (1,000 mg total) by mouth 3 (three) times daily. for 7 days  Dispense: 21 tablet; Refill: 0    Benign paroxysmal positional vertigo of right ear  -     meclizine (ANTIVERT) 25 mg tablet; Take 0.5 tablets (12.5 mg total) by mouth 3 (three) times daily as needed for Dizziness. Can cause drowsiness  Dispense: 12 tablet; Refill: 0      Patient Instructions   Please follow up with your Primary care provider within 2-5 days if your signs and symptoms have not resolved or worsen.     If your condition worsens or fails to improve we recommend that you receive another evaluation at the emergency room immediately or contact your primary medical clinic to discuss your concerns.    You must understand that you have received an Urgent Care treatment only and that you may be released before all of your medical problems are known or treated.   You, the patient, will arrange for follow up care as instructed.       You have been given an antibiotic to treat your condition today.  Please complete the antibiotic as directed on the bottle.     As with any antibiotics, use probiotics and/or high culture yogurt about 2 hours apart from the antibiotic and about 1 week after the antibiotic to replace the gut maurilio lost with antibiotic use.      If you are female and on BCP use additional methods to prevent pregnancy while on antibiotics and for one cycle after.   Flouroquinolones have a risk of Tendon Rupture.  Please avoid physical activity during it's use.      If you notice pain in the joints, particularly the achilles tendon, discontinue the medication and contact your regular doctor immediately.  Make them aware you are on a  flouroquinolone.       As with any antibiotics, use probiotics and/or high culture yogurt about 2 hours apart from the antibiotic and about 1 week after the antibiotic to replace the gut maurilio lost with antibiotic use.      If you are female and on BCP use additional methods to prevent pregnancy while on antibiotics and for one cycle after.     Urinary Tract Infection    If your condition worsens or fails to improve we recommend that you receive another evaluation at the emergency room immediately or contact your primary medical clinic to discuss your concerns.    You must understand that you have received an Urgent Care treatment only and that you may be released before all of your medical problems are known or treated.     You, the patient, will arrange for follow up care as instructed.   You have been given an antibiotic to treat your condition today.  Please complete the antibiotic as directed on the bottle.     If you are female and on BCP use additional methods to prevent pregnancy while on antibiotics and for one cycle after.     You may take AZO for discomfort as directed on box. Take after a meal.  Use NO MORE than 2 full days as this can mask worsening symptoms.      You have been given an antibiotic to treat your condition today.  Please complete the antibiotic as directed on the bottle.     As with any antibiotics, use probiotics and/or high culture yogurt about 2 hours apart from the antibiotic and about 1 week after the antibiotic to replace the gut maurilio lost with antibiotic use.      If you are female and on BCP use additional methods to prevent pregnancy while on antibiotics and for one cycle after.     You have been given an antiviral today for treatment of your condition.  Please complete the antiviral as directed.       Urinary Tract Infections in Women    Urinary tract infections (UTIs) are most often caused by bacteria (germs). These bacteria enter the urinary tract. The bacteria may come from  outside the body. Or they may travel from the skin outside the rectum or vagina into the urethra. Female anatomy makes it easy for bacteria from the bowel to enter a womans urinary tract, which is the most common source of UTI. This means women develop UTIs more often than men. Pain in or around the urinary tract is a common UTI symptom. But the only way to know for sure if you have a UTI for the healthcare provider to test your urine. The two tests that may be done are the urinalysis and urine culture.  Types of UTIs  · Cystitis: A bladder infection (cystitis) is the most common UTI in women. You may have urgent or frequent urination. You may also have pain, burning when you urinate, and bloody urine.  · Urethritis: This is an inflamed urethra, which is the tube that carries urine from the bladder to outside the body. You may have lower stomach or back pain. You may also have urgent or frequent urination.  · Pyelonephritis: This is a kidney infection. If not treated, it can be serious and damage your kidneys. In severe cases, you may be hospitalized. You may have a fever and lower back pain.  Medicines to treat a UTI  Most UTIs are treated with antibiotics. These kill the bacteria. The length of time you need to take them depends on the type of infection. It may be as short as 3 days. If you have repeated UTIs, a low-dose antibiotic may be needed for several months. Take antibiotics exactly as directed. Dont stop taking them until all of the medicine is gone. If you stop taking the antibiotic too soon, the infection may not go away, and you may develop a resistance to the antibiotic. This can make it much harder to treat.  Lifestyle changes to treat and prevent UTIs  The lifestyle changes below will help get rid of your UTI. They may also help prevent future UTIs.  · Drink plenty of fluids. This includes water, juice, or other caffeine-free drinks. Fluids help flush bacteria out of your body.  · Empty your  bladder. Always empty your bladder when you feel the urge to urinate. And always urinate before going to sleep. Urine that stays in your bladder can lead to infection. Try to urinate before and after sex as well.  · Practice good personal hygiene. Wipe yourself from front to back after using the toilet. This helps keep bacteria from getting into the urethra.  · Use condoms during sex. These help prevent UTIs caused by sexually transmitted bacteria. Also, avoid using spermicides during sex. These can increase the risk of UTIs. Choose other forms of birth control instead. For women who tend to get UTIs after sex, a low-dose of a preventive antibiotic may be used. Be sure to discuss this option with your healthcare provider.  · Follow up with your healthcare provider as directed. He or she may test to make sure the infection has cleared. If needed, more treatment may be started.  Date Last Reviewed: 1/1/2017  © 9013-5876 [employer]. [address] [place] [address], [place] [address]. All rights reserved. This information is not intended as a substitute for professional medical care. Always follow your healthcare professional's instructions.

## 2018-12-19 NOTE — PATIENT INSTRUCTIONS
Please follow up with your Primary care provider within 2-5 days if your signs and symptoms have not resolved or worsen.     If your condition worsens or fails to improve we recommend that you receive another evaluation at the emergency room immediately or contact your primary medical clinic to discuss your concerns.    You must understand that you have received an Urgent Care treatment only and that you may be released before all of your medical problems are known or treated.   You, the patient, will arrange for follow up care as instructed.       You have been given an antibiotic to treat your condition today.  Please complete the antibiotic as directed on the bottle.     As with any antibiotics, use probiotics and/or high culture yogurt about 2 hours apart from the antibiotic and about 1 week after the antibiotic to replace the gut maurilio lost with antibiotic use.      If you are female and on BCP use additional methods to prevent pregnancy while on antibiotics and for one cycle after.   Flouroquinolones have a risk of Tendon Rupture.  Please avoid physical activity during it's use.      If you notice pain in the joints, particularly the achilles tendon, discontinue the medication and contact your regular doctor immediately.  Make them aware you are on a flouroquinolone.       As with any antibiotics, use probiotics and/or high culture yogurt about 2 hours apart from the antibiotic and about 1 week after the antibiotic to replace the gut maurilio lost with antibiotic use.      If you are female and on BCP use additional methods to prevent pregnancy while on antibiotics and for one cycle after.     Urinary Tract Infection    If your condition worsens or fails to improve we recommend that you receive another evaluation at the emergency room immediately or contact your primary medical clinic to discuss your concerns.    You must understand that you have received an Urgent Care treatment only and that you may be released  before all of your medical problems are known or treated.     You, the patient, will arrange for follow up care as instructed.   You have been given an antibiotic to treat your condition today.  Please complete the antibiotic as directed on the bottle.     If you are female and on BCP use additional methods to prevent pregnancy while on antibiotics and for one cycle after.     You may take AZO for discomfort as directed on box. Take after a meal.  Use NO MORE than 2 full days as this can mask worsening symptoms.      You have been given an antibiotic to treat your condition today.  Please complete the antibiotic as directed on the bottle.     As with any antibiotics, use probiotics and/or high culture yogurt about 2 hours apart from the antibiotic and about 1 week after the antibiotic to replace the gut maurilio lost with antibiotic use.      If you are female and on BCP use additional methods to prevent pregnancy while on antibiotics and for one cycle after.     You have been given an antiviral today for treatment of your condition.  Please complete the antiviral as directed.       Urinary Tract Infections in Women    Urinary tract infections (UTIs) are most often caused by bacteria (germs). These bacteria enter the urinary tract. The bacteria may come from outside the body. Or they may travel from the skin outside the rectum or vagina into the urethra. Female anatomy makes it easy for bacteria from the bowel to enter a womans urinary tract, which is the most common source of UTI. This means women develop UTIs more often than men. Pain in or around the urinary tract is a common UTI symptom. But the only way to know for sure if you have a UTI for the healthcare provider to test your urine. The two tests that may be done are the urinalysis and urine culture.  Types of UTIs  · Cystitis: A bladder infection (cystitis) is the most common UTI in women. You may have urgent or frequent urination. You may also have pain,  burning when you urinate, and bloody urine.  · Urethritis: This is an inflamed urethra, which is the tube that carries urine from the bladder to outside the body. You may have lower stomach or back pain. You may also have urgent or frequent urination.  · Pyelonephritis: This is a kidney infection. If not treated, it can be serious and damage your kidneys. In severe cases, you may be hospitalized. You may have a fever and lower back pain.  Medicines to treat a UTI  Most UTIs are treated with antibiotics. These kill the bacteria. The length of time you need to take them depends on the type of infection. It may be as short as 3 days. If you have repeated UTIs, a low-dose antibiotic may be needed for several months. Take antibiotics exactly as directed. Dont stop taking them until all of the medicine is gone. If you stop taking the antibiotic too soon, the infection may not go away, and you may develop a resistance to the antibiotic. This can make it much harder to treat.  Lifestyle changes to treat and prevent UTIs  The lifestyle changes below will help get rid of your UTI. They may also help prevent future UTIs.  · Drink plenty of fluids. This includes water, juice, or other caffeine-free drinks. Fluids help flush bacteria out of your body.  · Empty your bladder. Always empty your bladder when you feel the urge to urinate. And always urinate before going to sleep. Urine that stays in your bladder can lead to infection. Try to urinate before and after sex as well.  · Practice good personal hygiene. Wipe yourself from front to back after using the toilet. This helps keep bacteria from getting into the urethra.  · Use condoms during sex. These help prevent UTIs caused by sexually transmitted bacteria. Also, avoid using spermicides during sex. These can increase the risk of UTIs. Choose other forms of birth control instead. For women who tend to get UTIs after sex, a low-dose of a preventive antibiotic may be used. Be  sure to discuss this option with your healthcare provider.  · Follow up with your healthcare provider as directed. He or she may test to make sure the infection has cleared. If needed, more treatment may be started.  Date Last Reviewed: 1/1/2017  © 2151-1973 [employer]. [address] [place] [address]. All rights reserved. This information is not intended as a substitute for professional medical care. Always follow your healthcare professional's instructions.

## 2018-12-23 ENCOUNTER — TELEPHONE (OUTPATIENT)
Dept: URGENT CARE | Facility: CLINIC | Age: 78
End: 2018-12-23

## 2018-12-23 LAB
BACTERIA UR CULT: ABNORMAL
BACTERIA UR CULT: ABNORMAL
OTHER ANTIBIOTIC SUSC ISLT: ABNORMAL

## 2018-12-24 ENCOUNTER — TELEPHONE (OUTPATIENT)
Dept: FAMILY MEDICINE | Facility: CLINIC | Age: 78
End: 2018-12-24

## 2018-12-24 ENCOUNTER — OFFICE VISIT (OUTPATIENT)
Dept: FAMILY MEDICINE | Facility: CLINIC | Age: 78
End: 2018-12-24
Payer: MEDICARE

## 2018-12-24 VITALS
DIASTOLIC BLOOD PRESSURE: 78 MMHG | HEART RATE: 63 BPM | RESPIRATION RATE: 18 BRPM | BODY MASS INDEX: 26.29 KG/M2 | HEIGHT: 64 IN | SYSTOLIC BLOOD PRESSURE: 120 MMHG | TEMPERATURE: 98 F | WEIGHT: 154 LBS | OXYGEN SATURATION: 96 %

## 2018-12-24 DIAGNOSIS — B02.8 HERPES ZOSTER COMPLICATED: Primary | ICD-10-CM

## 2018-12-24 PROCEDURE — 99999 PR PBB SHADOW E&M-EST. PATIENT-LVL V: CPT | Mod: PBBFAC,HCNC,, | Performed by: FAMILY MEDICINE

## 2018-12-24 PROCEDURE — 3074F SYST BP LT 130 MM HG: CPT | Mod: CPTII,HCNC,S$GLB, | Performed by: FAMILY MEDICINE

## 2018-12-24 PROCEDURE — 99213 OFFICE O/P EST LOW 20 MIN: CPT | Mod: HCNC,S$GLB,, | Performed by: FAMILY MEDICINE

## 2018-12-24 PROCEDURE — 3078F DIAST BP <80 MM HG: CPT | Mod: CPTII,HCNC,S$GLB, | Performed by: FAMILY MEDICINE

## 2018-12-24 PROCEDURE — 1101F PT FALLS ASSESS-DOCD LE1/YR: CPT | Mod: CPTII,HCNC,S$GLB, | Performed by: FAMILY MEDICINE

## 2018-12-24 RX ORDER — HYDROCODONE BITARTRATE AND ACETAMINOPHEN 5; 325 MG/1; MG/1
1 TABLET ORAL EVERY 6 HOURS PRN
Qty: 30 TABLET | Refills: 0 | Status: SHIPPED | OUTPATIENT
Start: 2018-12-24 | End: 2019-01-09

## 2018-12-24 NOTE — TELEPHONE ENCOUNTER
Spoke with pt,pt went to urgent care 12/19/18 for shingles but now she needs pain med.Please Advise    [facility] [id number]   [id number] [facility]   [place] [id number]  Ph.[phone]  Fax [phone]

## 2018-12-24 NOTE — PROGRESS NOTES
Chief Complaint   Patient presents with    Herpes Zoster       HPI  Nereyda Hernandez is a 78 y.o. female with multiple medical diagnoses as listed in the medical history and problem list that presents for follow-up for shingles on her right face    She was seen one week ago at urgent care.     Treated there with valtrex and cipro for UTI  She was not given pain medication and had no pain at that time; has seen the [place] [facility] and no optical involvment    Last night began having pain in her teeth and upper mouth    PAST MEDICAL HISTORY:  Past Medical History:   Diagnosis Date    Anxiety     Breast cancer     Chronic disease anemia     Hyperlipidemia     Hypertension     Insomnia     Lobular carcinoma in situ     KANWAL (obstructive sleep apnea)     Osteoporosis     Rosacea     Vertigo        PAST SURGICAL HISTORY:  Past Surgical History:   Procedure Laterality Date    BELT ABDOMINOPLASTY      BREAST BIOPSY      BREAST LUMPECTOMY      HERNIA REPAIR      [facility]    OOPHORECTOMY      TONSILLECTOMY      TOTAL ABDOMINAL HYSTERECTOMY         SOCIAL HISTORY:  Social History     Socioeconomic History    Marital status:      Spouse name: Not on file    Number of children: 6    Years of education: college    Highest education level: Not on file   Social Needs    Financial resource strain: Not on file    Food insecurity - worry: Not on file    Food insecurity - inability: Not on file    Transportation needs - medical: Not on file    Transportation needs - non-medical: Not on file   Occupational History    Occupation: retired  for [employer]   Tobacco Use    Smoking status: Former Smoker     Packs/day: 0.50     Years: 10.00     Pack years: 5.00     Last attempt to quit: 1980     Years since quittin.2    Smokeless tobacco: Never Used   Substance and Sexual Activity    Alcohol use: Yes     Alcohol/week: 3.0 oz     Types: 5 Glasses of wine per week     Comment:  Occasionally    Drug use: No    Sexual activity: Not Currently     Partners: Male   Other Topics Concern    Not on file   Social History Narrative    Not on file       FAMILY HISTORY:  Family History   Problem Relation Age of Onset    Cancer Mother         liver       ALLERGIES AND MEDICATIONS: updated and reviewed.  Review of patient's allergies indicates:  No Known Allergies  Current Outpatient Medications   Medication Sig Dispense Refill    alendronate (FOSAMAX) 70 MG tablet Take 1 tablet (70 mg total) by mouth once a week. 12 tablet 3    aspirin (ECOTRIN) 81 MG EC tablet Take 81 mg by mouth once daily.      calcium citrate-vitamin D3 500 mg calcium -400 unit Chew Take 1 tablet by mouth 2 (two) times daily.        docusate sodium (COLACE) 100 MG capsule Take 100 mg by mouth once daily.      fluticasone (FLONASE) 50 mcg/actuation nasal spray 1 spray (50 mcg total) by Each Nare route 2 (two) times daily. 1 Bottle 5    hydroCHLOROthiazide (HYDRODIURIL) 25 MG tablet Take 1 tablet (25 mg total) by mouth once daily. 90 tablet 1    latanoprost 0.005 % ophthalmic solution 1 drop every evening.      losartan (COZAAR) 50 MG tablet Take 1 tablet (50 mg total) by mouth once daily. 90 tablet 1    meclizine (ANTIVERT) 25 mg tablet Take 0.5 tablets (12.5 mg total) by mouth 3 (three) times daily as needed for Dizziness. Can cause drowsiness 12 tablet 0    metoprolol tartrate (LOPRESSOR) 25 MG tablet Take 0.5 tablets (12.5 mg total) by mouth 2 (two) times daily. 30 tablet 0    multivit-iron-min-folic acid (MULTIVITAMIN-IRON-MINERALS-FOLIC ACID) 3,500-18-0.4 unit-mg-mg Chew Take by mouth.        oxybutynin (DITROPAN) 5 MG Tab Take 1 tablet (5 mg total) by mouth 2 (two) times daily. 90 tablet 1    pantoprazole (PROTONIX) 20 MG tablet Take 20 mg by mouth once daily.      pravastatin (PRAVACHOL) 20 MG tablet Take 1 tablet (20 mg total) by mouth once daily. 90 tablet 1    valACYclovir (VALTREX) 1000 MG tablet Take  "1 tablet (1,000 mg total) by mouth 3 (three) times daily. for 7 days 21 tablet 0    fish oil-omega-3 fatty acids 300-1,000 mg capsule Take 2 g by mouth once daily.        HYDROcodone-acetaminophen (NORCO) 5-325 mg per tablet Take 1 tablet by mouth every 6 (six) hours as needed for Pain. 30 tablet 0    sertraline (ZOLOFT) 25 MG tablet Every other day for two weeks 30 tablet 0    suvorexant (BELSOMRA) 5 mg Tab Take 1 tablet by mouth every evening. 30 tablet 2     No current facility-administered medications for this visit.        ROS  Review of Systems   Constitutional: Negative for chills, diaphoresis, fatigue, fever and unexpected weight change.   HENT: Negative for rhinorrhea, sinus pressure, sore throat and tinnitus.    Eyes: Negative for photophobia and visual disturbance.   Respiratory: Negative for cough, shortness of breath and wheezing.    Cardiovascular: Negative for chest pain and palpitations.   Gastrointestinal: Negative for abdominal pain, blood in stool, constipation, diarrhea, nausea and vomiting.   Genitourinary: Negative for dysuria, flank pain, frequency and vaginal discharge.   Musculoskeletal: Negative for arthralgias and joint swelling.   Skin: Positive for rash.   Neurological: Negative for speech difficulty, weakness, light-headedness and headaches.   Psychiatric/Behavioral: Negative for behavioral problems and dysphoric mood.       Physical Exam  Vitals:    12/24/18 1210   BP: 120/78   Pulse: 63   Resp: 18   Temp: 97.8 °F (36.6 °C)   TempSrc: Oral   SpO2: 96%   Weight: 69.8 kg (153 lb 15.9 oz)   Height: 5' 4" (1.626 m)    Body mass index is 26.43 kg/m².  Weight: 69.8 kg (153 lb 15.9 oz)   Height: 5' 4" (162.6 cm)     Physical Exam   Constitutional: She is oriented to person, place, and time. She appears well-developed and well-nourished.   HENT:   Head:       Mouth/Throat:       Eyes: EOM are normal.   Neurological: She is alert and oriented to person, place, and time.   Skin: Skin is warm " and dry. No rash noted. No erythema.   Psychiatric: She has a normal mood and affect. Her behavior is normal.   Nursing note and vitals reviewed.      Health Maintenance       Date Due Completion Date    DEXA SCAN 06/05/2019 6/5/2017    Override on 8/12/2013: Done    Lipid Panel 08/09/2019 8/9/2018    Colonoscopy 04/26/2023 4/26/2018    TETANUS VACCINE 06/09/2024 6/9/2014            ASSESSMENT     1. Herpes zoster complicated        PLAN:     Problem List Items Addressed This Visit     None      Visit Diagnoses     Herpes zoster complicated    -  Primary  -continue valtrex  -add norco, take with food  -discussed contact precautions, avoid sharing drinks and spoons    Relevant Medications    HYDROcodone-acetaminophen (NORCO) 5-325 mg per tablet            Genny Martinez MD  12/24/2018 12:44 PM        Follow-up if symptoms worsen or fail to improve.

## 2018-12-24 NOTE — TELEPHONE ENCOUNTER
----- Message from Jesusita Beck LPN sent at 12/21/2018 11:50 AM CST -----  Contact: Self/[phone]      ----- Message -----  From: Taj Ahn  Sent: 12/21/2018   7:49 AM  To: [facility] Staff    Patient called to request pain medication for Shingles. She would like the staff to call it in to:      [id number]  [place] [facility] [address] [facility]  [address] [address] [place] [address]  Phone: [phone] Fax: [phone]    Thank you.

## 2019-01-09 ENCOUNTER — OFFICE VISIT (OUTPATIENT)
Dept: FAMILY MEDICINE | Facility: CLINIC | Age: 79
End: 2019-01-09
Payer: MEDICARE

## 2019-01-09 VITALS
SYSTOLIC BLOOD PRESSURE: 140 MMHG | BODY MASS INDEX: 26.69 KG/M2 | TEMPERATURE: 98 F | WEIGHT: 156.31 LBS | HEART RATE: 65 BPM | DIASTOLIC BLOOD PRESSURE: 80 MMHG | OXYGEN SATURATION: 97 % | HEIGHT: 64 IN

## 2019-01-09 DIAGNOSIS — I10 ESSENTIAL HYPERTENSION: Chronic | ICD-10-CM

## 2019-01-09 DIAGNOSIS — B37.41 CANDIDA CYSTITIS: ICD-10-CM

## 2019-01-09 DIAGNOSIS — N89.8 VAGINAL DISCHARGE: ICD-10-CM

## 2019-01-09 DIAGNOSIS — N89.8 VAGINAL IRRITATION: ICD-10-CM

## 2019-01-09 DIAGNOSIS — N30.00 ACUTE CYSTITIS WITHOUT HEMATURIA: Primary | ICD-10-CM

## 2019-01-09 LAB
BILIRUB SERPL-MCNC: NORMAL MG/DL
BLOOD URINE, POC: NORMAL
COLOR, POC UA: YELLOW
GLUCOSE UR QL STRIP: NORMAL
KETONES UR QL STRIP: NORMAL
LEUKOCYTE ESTERASE URINE, POC: NORMAL
NITRITE, POC UA: NORMAL
PH, POC UA: 8
PROTEIN, POC: NORMAL
SPECIFIC GRAVITY, POC UA: 1
UROBILINOGEN, POC UA: NORMAL

## 2019-01-09 PROCEDURE — 99499 UNLISTED E&M SERVICE: CPT | Mod: HCNC,S$GLB,, | Performed by: NURSE PRACTITIONER

## 2019-01-09 PROCEDURE — 81002 URINALYSIS NONAUTO W/O SCOPE: CPT | Mod: HCNC,S$GLB,, | Performed by: NURSE PRACTITIONER

## 2019-01-09 PROCEDURE — 87086 URINE CULTURE/COLONY COUNT: CPT | Mod: HCNC

## 2019-01-09 PROCEDURE — 81002 POCT URINE DIPSTICK WITHOUT MICROSCOPE: ICD-10-PCS | Mod: HCNC,S$GLB,, | Performed by: NURSE PRACTITIONER

## 2019-01-09 PROCEDURE — 3079F PR MOST RECENT DIASTOLIC BLOOD PRESSURE 80-89 MM HG: ICD-10-PCS | Mod: CPTII,HCNC,S$GLB, | Performed by: NURSE PRACTITIONER

## 2019-01-09 PROCEDURE — 99214 PR OFFICE/OUTPT VISIT, EST, LEVL IV, 30-39 MIN: ICD-10-PCS | Mod: HCNC,25,S$GLB, | Performed by: NURSE PRACTITIONER

## 2019-01-09 PROCEDURE — 3077F SYST BP >= 140 MM HG: CPT | Mod: CPTII,HCNC,S$GLB, | Performed by: NURSE PRACTITIONER

## 2019-01-09 PROCEDURE — 1101F PR PT FALLS ASSESS DOC 0-1 FALLS W/OUT INJ PAST YR: ICD-10-PCS | Mod: CPTII,HCNC,S$GLB, | Performed by: NURSE PRACTITIONER

## 2019-01-09 PROCEDURE — 1101F PT FALLS ASSESS-DOCD LE1/YR: CPT | Mod: CPTII,HCNC,S$GLB, | Performed by: NURSE PRACTITIONER

## 2019-01-09 PROCEDURE — 99214 OFFICE O/P EST MOD 30 MIN: CPT | Mod: HCNC,25,S$GLB, | Performed by: NURSE PRACTITIONER

## 2019-01-09 PROCEDURE — 3079F DIAST BP 80-89 MM HG: CPT | Mod: CPTII,HCNC,S$GLB, | Performed by: NURSE PRACTITIONER

## 2019-01-09 PROCEDURE — 87480 CANDIDA DNA DIR PROBE: CPT | Mod: HCNC

## 2019-01-09 PROCEDURE — 87510 GARDNER VAG DNA DIR PROBE: CPT | Mod: HCNC

## 2019-01-09 PROCEDURE — 99999 PR PBB SHADOW E&M-EST. PATIENT-LVL V: CPT | Mod: PBBFAC,HCNC,, | Performed by: NURSE PRACTITIONER

## 2019-01-09 PROCEDURE — 99999 PR PBB SHADOW E&M-EST. PATIENT-LVL V: ICD-10-PCS | Mod: PBBFAC,HCNC,, | Performed by: NURSE PRACTITIONER

## 2019-01-09 PROCEDURE — 3077F PR MOST RECENT SYSTOLIC BLOOD PRESSURE >= 140 MM HG: ICD-10-PCS | Mod: CPTII,HCNC,S$GLB, | Performed by: NURSE PRACTITIONER

## 2019-01-09 PROCEDURE — 99499 RISK ADDL DX/OHS AUDIT: ICD-10-PCS | Mod: HCNC,S$GLB,, | Performed by: NURSE PRACTITIONER

## 2019-01-09 PROCEDURE — 87088 URINE BACTERIA CULTURE: CPT | Mod: HCNC

## 2019-01-09 RX ORDER — FLUCONAZOLE 150 MG/1
150 TABLET ORAL ONCE
Qty: 1 TABLET | Refills: 0 | Status: SHIPPED | OUTPATIENT
Start: 2019-01-09 | End: 2019-01-09

## 2019-01-09 RX ORDER — NYSTATIN 100000 U/G
CREAM TOPICAL 2 TIMES DAILY
Qty: 30 G | Refills: 0 | Status: SHIPPED | OUTPATIENT
Start: 2019-01-09 | End: 2019-07-25

## 2019-01-09 RX ORDER — NITROFURANTOIN 25; 75 MG/1; MG/1
100 CAPSULE ORAL 2 TIMES DAILY
Qty: 10 CAPSULE | Refills: 0 | Status: SHIPPED | OUTPATIENT
Start: 2019-01-09 | End: 2019-01-14

## 2019-01-09 NOTE — PATIENT INSTRUCTIONS
Candida Skin Infection (Adult)  Candida is type of yeast. It grows naturally on the skin and in the mouth. If it grows out of control, it can cause an infection. Candida can cause infections in the genital area, skin folds, in the mouth, and under the breasts. Anyone can get this infection. It is more common in a person with a weak immune system, such as from diabetes, HIV, or cancer. Its also more common in someone who has been on antibiotic therapy. And its more common people who are overweight or who have incontinence. Wearing tight-fitting clothing and taking part in activities with lots of skin-to-skin contact can also put you at risk.  Candida causes the skin to become bright red and inflamed. The border of the infected part of the skin is often raised. The infection causes pain and itching. Sometimes the skin peels and bleeds. In the mouth, candida is called thrush, and may cause white thickened areas.  A Candida rash is most often treated with an antifungal cream or ointment. The rash will clear a few days after starting the medicine. Infections that dont go away may need a prescription medicine. In rare cases, a bacterial infection can also occur.  Home care  Your healthcare provider will recommend an antifungal cream or ointment for the rash. He or she may also prescribe a medicine for the itch. Follow all instructions for using these medicines. Dont use cornstarch powder. Cornstarch can cause the Candida infection to get worse.  General care:  · Keep your skin clean by washing the area twice a day.  · Use the cream as directed until your rash is gone. Once the skin has healed, keep it dry to prevent another infection.   · If you are overweight, talk with your healthcare provider about a plan to lose excess weight.  · Avoid clothes that fit tightly.  Follow-up care  Follow up with your healthcare provider, or as advised. Your rash will clear in 7 to 14 days. Call your healthcare provider if the rash  "is not gone after 14 days.  When to seek medical advice  Call your healthcare provider right away if any of these occur:  · Pain or redness that gets worse or spreads  · Fluid coming from the skin  · Yellow crusts on the skin  · Fever of 100.4°F (38°C) or higher, or as directed by your healthcare provider  Date Last Reviewed: 9/1/2016 © 2000-2017 [employer]. [address] [place] [address], [place] [address]. All rights reserved. This information is not intended as a substitute for professional medical care. Always follow your healthcare professional's instructions.          Bladder Infection, Female (Adult)    Urine is normally doesn't have any bacteria in it. But bacteria can get into the urinary tract from the skin around the rectum. Or they can travel in the blood from elsewhere in the body. Once they are in your urinary tract, they can cause infection in the urethra (urethritis), the bladder (cystitis), or the kidneys (pyelonephritis).  The most common place for an infection is in the bladder. This is called a bladder infection. This is one of the most common infections in women. Most bladder infections are easily treated. They are not serious unless the infection spreads to the kidney.  The phrases "bladder infection," "UTI," and "cystitis" are often used to describe the same thing. But they are not always the same. Cystitis is an inflammation of the bladder. The most common cause of cystitis is an infection.  Symptoms  The infection causes inflammation in the urethra and bladder. This causes many of the symptoms. The most common symptoms of a bladder infection are:  · Pain or burning when urinating  · Having to urinate more often than usual  · Urgent need to urinate  · Only a small amount of urine comes out  · Blood in urine  · Abdominal discomfort. This is usually in the lower abdomen above the pubic bone.  · Cloudy urine  · Strong- or bad-smelling urine  · Unable to urinate (urinary " retention)  · Unable to hold urine in (urinary incontinence)  · Fever  · Loss of appetite  · Confusion (in older adults)  Causes  Bladder infections are not contagious. You can't get one from someone else, from a toilet seat, or from sharing a bath.  The most common cause of bladder infections is bacteria from the bowels. The bacteria get onto the skin around the opening of the urethra. From there, they can get into the urine and travel up to the bladder, causing inflammation and infection. This usually happens because of:  · Wiping improperly after urinating. Always wipe from front to back.  · Bowel incontinence  · Pregnancy  · Procedures such as having a catheter inserted  · Older age  · Not emptying your bladder. This can allow bacteria a chance to grow in your urine.  · Dehydration  · Constipation  · Sex  · Use of a diaphragm for birth control   Treatment  Bladder infections are diagnosed by a urine test. They are treated with antibiotics and usually clear up quickly without complications. Treatment helps prevent a more serious kidney infection.  Medicines  Medicines can help in the treatment of a bladder infection:  · Take antibiotics until they are used up, even if you feel better. It is important to finish them to make sure the infection has cleared.  · You can use acetaminophen or ibuprofen for pain, fever, or discomfort, unless another medicine was prescribed. If you have chronic liver or kidney disease, talk with your healthcare provider before using these medicines. Also talk with your provider if you've ever had a stomach ulcer or gastrointestinal bleeding, or are taking blood-thinner medicines.  · If you are given phenazopydridine to reduce burning with urination, it will cause your urine to become a bright orange color. This can stain clothing.  Care and prevention  These self-care steps can help prevent future infections:  · Drink plenty of fluids to prevent dehydration and flush out your bladder. Do  this unless you must restrict fluids for other health reasons, or your doctor told you not to.  · Proper cleaning after going to the bathroom is important. Wipe from front to back after using the toilet to prevent the spread of bacteria.  · Urinate more often. Don't try to hold urine in for a long time.  · Wear loose-fitting clothes and cotton underwear. Avoid tight-fitting pants.  · Improve your diet and prevent constipation. Eat more fresh fruit and vegetables, and fiber, and less junk and fatty foods.  · Avoid sex until your symptoms are gone.  · Avoid caffeine, alcohol, and spicy foods. These can irritate your bladder.  · Urinate right after intercourse to flush out your bladder.  · If you use birth control pills and have frequent bladder infections, discuss it with your doctor.  Follow-up care  Call your healthcare provider if all symptoms are not gone after 3 days of treatment. This is especially important if you have repeat infections.  If a culture was done, you will be told if your treatment needs to be changed. If directed, you can call to find out the results.  If X-rays were done, you will be told if the results will affect your treatment.  Call 911  Call 911 if any of the following occur:  · Trouble breathing  · Hard to wake up or confusion  · Fainting or loss of consciousness  · Rapid heart rate  When to seek medical advice  Call your healthcare provider right away if any of these occur:  · Fever of 100.4ºF (38.0ºC) or higher, or as directed by your healthcare provider  · Symptoms are not better by the third day of treatment  · Back or belly (abdominal) pain that gets worse  · Repeated vomiting, or unable to keep medicine down  · Weakness or dizziness  · Vaginal discharge  · Pain, redness, or swelling in the outer vaginal area (labia)  Date Last Reviewed: 10/1/2016  © 9113-7366 [employer]. [address] [place] [address], [place] [address]. All rights reserved. This information is not intended as  a substitute for professional medical care. Always follow your healthcare professional's instructions.

## 2019-01-09 NOTE — PROGRESS NOTES
Subjective:       Patient ID: Nereyda Hernandez is a 78 y.o. female.    Chief Complaint: Urinary Tract Infection and Vaginitis (one year itching burning)    Urinary Tract Infection    This is a new problem. The current episode started gradual onset. The problem occurs every urination. The problem has been unchanged. The quality of the pain is described as aching and burning. The pain is at a severity of 4/10. The pain is moderate. There has been no fever. She is not sexually active. There is no history of pyelonephritis. Associated symptoms include a discharge, frequency and urgency. Pertinent negatives include no behavior changes, chills, flank pain, hematuria, hesitancy, nausea, possible pregnancy, sweats, vomiting, weight loss, bubble bath use, constipation, rash or withholding. She has tried nothing for the symptoms. Her past medical history is significant for recurrent UTIs.     Review of Systems   Constitutional: Negative for chills, fatigue, fever and weight loss.   Respiratory: Negative for chest tightness and shortness of breath.    Gastrointestinal: Negative for abdominal pain, constipation, nausea and vomiting.   Genitourinary: Positive for dysuria, frequency, urgency and vaginal discharge. Negative for flank pain, hematuria and hesitancy.   Musculoskeletal: Negative for arthralgias, back pain and myalgias.   Skin: Negative for rash.   Neurological: Negative for dizziness, weakness and headaches.       Objective:      Physical Exam   Constitutional: She is oriented to person, place, and time. Vital signs are normal. She appears well-developed and well-nourished.   HENT:   Head: Atraumatic.   Left Ear: External ear normal.   Nose: Nose normal.   Mouth/Throat: No oropharyngeal exudate.   Cardiovascular: Normal rate, regular rhythm and normal heart sounds.   Pulmonary/Chest: Effort normal and breath sounds normal.   Abdominal: Soft. Normal appearance and bowel sounds are normal. There is no tenderness. There is no  rigidity, no rebound, no guarding, no CVA tenderness, no tenderness at McBurney's point and negative Salcido's sign.   Genitourinary: Uterus normal. Pelvic exam was performed with patient supine. There is no rash or tenderness on the right labia. There is no rash or tenderness on the left labia. Cervix exhibits discharge. Cervix exhibits no motion tenderness and no friability. Right adnexum displays no tenderness. Left adnexum displays no tenderness. There is erythema in the vagina. No tenderness or bleeding in the vagina. Vaginal discharge found.   Neurological: She is alert and oriented to person, place, and time.   Skin: Skin is warm, dry and intact. Capillary refill takes less than 2 seconds.   Psychiatric: She has a normal mood and affect.       Assessment:       1. Acute cystitis without hematuria    2. Candida cystitis    3. Vaginal discharge    4. Vaginal irritation    5. Essential hypertension        Plan:       Nereyda was seen today for urinary tract infection and vaginitis.    Diagnoses and all orders for this visit:    Acute cystitis without hematuria  -     Urine culture  -     nitrofurantoin, macrocrystal-monohydrate, (MACROBID) 100 MG capsule; Take 1 capsule (100 mg total) by mouth 2 (two) times daily. for 5 days    Candida cystitis  -     nystatin (MYCOSTATIN) cream; Apply topically 2 (two) times daily.  -     fluconazole (DIFLUCAN) 150 MG Tab; Take 1 tablet (150 mg total) by mouth once. for 1 dose    Vaginal discharge  -     Vaginosis Screen by DNA Probe  -     POCT URINE DIPSTICK WITHOUT MICROSCOPE  -     Urinalysis; Future    Vaginal irritation  -     Vaginosis Screen by DNA Probe  -     POCT URINE DIPSTICK WITHOUT MICROSCOPE  -     Urinalysis; Future                                                              UTI   If your condition worsens or fails to improve we recommend that you receive another evaluation at the ER immediately or contact your PCP to discuss your concerns or return here. You must  understand that you've received an urgent care treatment only and that you may be released before all your medical problems are known or treated. You the patient will arrange for followup care as instructed.   If you were prescribed antibiotics, please take them to full completion.    If you had cultures done it will take 3-5 days to result. We will call you with the result.   If you are are female and on BCP use additional methods to prevent pregnancy while on the antibiotics and for one cycle after.   Cranberry juice may help. Get the 100% cranberry juice and mix 4 oz of juice with 4 oz of water and drink this 8 oz glass of liquid once a day.    Essential hypertension  The current medical regimen is effective;  continue present plan and medications.

## 2019-01-10 LAB
CANDIDA RRNA VAG QL PROBE: NEGATIVE
G VAGINALIS RRNA GENITAL QL PROBE: NEGATIVE
T VAGINALIS RRNA GENITAL QL PROBE: NEGATIVE

## 2019-01-11 LAB — BACTERIA UR CULT: NORMAL

## 2019-01-15 ENCOUNTER — OFFICE VISIT (OUTPATIENT)
Dept: CARDIOLOGY | Facility: CLINIC | Age: 79
End: 2019-01-15
Payer: MEDICARE

## 2019-01-15 VITALS
DIASTOLIC BLOOD PRESSURE: 74 MMHG | BODY MASS INDEX: 25.73 KG/M2 | HEART RATE: 78 BPM | SYSTOLIC BLOOD PRESSURE: 134 MMHG | RESPIRATION RATE: 16 BRPM | OXYGEN SATURATION: 88 % | WEIGHT: 149.94 LBS

## 2019-01-15 DIAGNOSIS — R06.02 SHORTNESS OF BREATH: ICD-10-CM

## 2019-01-15 DIAGNOSIS — G47.33 OSA ON CPAP: ICD-10-CM

## 2019-01-15 DIAGNOSIS — Z87.891 FORMER SMOKER, STOPPED SMOKING IN DISTANT PAST: ICD-10-CM

## 2019-01-15 DIAGNOSIS — R07.9 CHEST PAIN, UNSPECIFIED TYPE: ICD-10-CM

## 2019-01-15 DIAGNOSIS — E78.2 MIXED HYPERLIPIDEMIA: ICD-10-CM

## 2019-01-15 DIAGNOSIS — I10 ESSENTIAL HYPERTENSION: Chronic | ICD-10-CM

## 2019-01-15 DIAGNOSIS — R00.2 PALPITATIONS: Primary | ICD-10-CM

## 2019-01-15 DIAGNOSIS — I10 HYPERTENSION, ESSENTIAL: ICD-10-CM

## 2019-01-15 DIAGNOSIS — Z09 FOLLOW UP: ICD-10-CM

## 2019-01-15 PROCEDURE — 93000 ELECTROCARDIOGRAM COMPLETE: CPT | Mod: S$GLB,,, | Performed by: INTERNAL MEDICINE

## 2019-01-15 PROCEDURE — 1101F PT FALLS ASSESS-DOCD LE1/YR: CPT | Mod: CPTII,HCNC,S$GLB, | Performed by: INTERNAL MEDICINE

## 2019-01-15 PROCEDURE — 3078F DIAST BP <80 MM HG: CPT | Mod: CPTII,HCNC,S$GLB, | Performed by: INTERNAL MEDICINE

## 2019-01-15 PROCEDURE — 99214 PR OFFICE/OUTPT VISIT, EST, LEVL IV, 30-39 MIN: ICD-10-PCS | Mod: HCNC,S$GLB,, | Performed by: INTERNAL MEDICINE

## 2019-01-15 PROCEDURE — 99214 OFFICE O/P EST MOD 30 MIN: CPT | Mod: HCNC,S$GLB,, | Performed by: INTERNAL MEDICINE

## 2019-01-15 PROCEDURE — 3078F PR MOST RECENT DIASTOLIC BLOOD PRESSURE < 80 MM HG: ICD-10-PCS | Mod: CPTII,HCNC,S$GLB, | Performed by: INTERNAL MEDICINE

## 2019-01-15 PROCEDURE — 99999 PR PBB SHADOW E&M-EST. PATIENT-LVL III: CPT | Mod: PBBFAC,HCNC,, | Performed by: INTERNAL MEDICINE

## 2019-01-15 PROCEDURE — 3075F PR MOST RECENT SYSTOLIC BLOOD PRESS GE 130-139MM HG: ICD-10-PCS | Mod: CPTII,HCNC,S$GLB, | Performed by: INTERNAL MEDICINE

## 2019-01-15 PROCEDURE — 99999 PR PBB SHADOW E&M-EST. PATIENT-LVL III: ICD-10-PCS | Mod: PBBFAC,HCNC,, | Performed by: INTERNAL MEDICINE

## 2019-01-15 PROCEDURE — 93000 EKG 12-LEAD: ICD-10-PCS | Mod: S$GLB,,, | Performed by: INTERNAL MEDICINE

## 2019-01-15 PROCEDURE — 1101F PR PT FALLS ASSESS DOC 0-1 FALLS W/OUT INJ PAST YR: ICD-10-PCS | Mod: CPTII,HCNC,S$GLB, | Performed by: INTERNAL MEDICINE

## 2019-01-15 PROCEDURE — 3075F SYST BP GE 130 - 139MM HG: CPT | Mod: CPTII,HCNC,S$GLB, | Performed by: INTERNAL MEDICINE

## 2019-01-15 RX ORDER — METOPROLOL TARTRATE 25 MG/1
TABLET, FILM COATED ORAL
Qty: 90 TABLET | Refills: 2 | Status: SHIPPED | OUTPATIENT
Start: 2019-01-15 | End: 2019-08-20 | Stop reason: SDUPTHER

## 2019-01-15 RX ORDER — HYDROCHLOROTHIAZIDE 25 MG/1
25 TABLET ORAL DAILY
Qty: 90 TABLET | Refills: 3 | Status: SHIPPED | OUTPATIENT
Start: 2019-01-15 | End: 2019-09-16 | Stop reason: SDUPTHER

## 2019-01-15 NOTE — TELEPHONE ENCOUNTER
----- Message from Pratima Pichardo sent at 1/15/2019 12:13 PM CST -----  Contact: Mamta Pharmacist with [facility]/ [phone]  Mamta calling says pt at pharmacy now waiting on RX, but nothing was called in. Please call to advise. Thank you.  .  [facility] [address] [facility] [place] - [address]  [address]  [facility] [address]  Phone: [phone] Fax: [phone]

## 2019-01-15 NOTE — PROGRESS NOTES
Subjective:    Patient ID:  Nereyda Hernandez is a 78 y.o. female who presents for follow-up of Follow-up (6 mo )      HPI   previous history:  Here for follow-up of shortness of breath, chest pain and palpitations.  She was last seen 10-16.  She still from time to time has the same palpitations but again feels less her to a degree on the beta blocker.  She says they are associated with chest pain shortness of breath.  They can occur at rest or with exertion.  She says she usually just lets them resolve spontaneously.  She denies any PND, orthopnea or lower edema.  She's not expressing dizziness, presyncope or syncope.    Today:  Here follow-up of shortness of breath, chest pain and palpitations.  She denies any cardiopulmonary complaints.  She still is having occasional palpitations but better on beta blocker.  She underwent diagnostic testing as below.  This was within normal limits.  Otherwise she's been in her usual state of health without any lifestyle limiting symptoms.  She is not compliant with sodium restriction over the holidays had some swelling in her lower extremity which is now improved with compression stockings.    Review of Systems   Constitution: Negative.   HENT: Negative for congestion.    Eyes: Negative.    Cardiovascular: Positive for chest pain, irregular heartbeat and palpitations. Negative for dyspnea on exertion, leg swelling, near-syncope, orthopnea, paroxysmal nocturnal dyspnea and syncope.   Respiratory: Positive for shortness of breath. Negative for cough.    Skin: Negative.    Musculoskeletal: Negative.    Gastrointestinal: Negative for abdominal pain, constipation and diarrhea.   Genitourinary: Negative for dysuria.   Neurological: Negative.    Psychiatric/Behavioral: Negative.         Objective:    Physical Exam   Constitutional: She is oriented to person, place, and time. She appears well-developed and well-nourished.   HENT:   Head: Normocephalic and atraumatic.   Eyes: Conjunctivae and  EOM are normal. Pupils are equal, round, and reactive to light.   Neck: Normal range of motion. Neck supple. No thyromegaly present.   Cardiovascular: Normal rate and regular rhythm.   No murmur heard.  Pulmonary/Chest: Effort normal and breath sounds normal. No respiratory distress.   Abdominal: Soft. Bowel sounds are normal.   Musculoskeletal: She exhibits edema.   Prominent varicosities bilateral lower extremities   Neurological: She is alert and oriented to person, place, and time.   Skin: Skin is warm and dry.   Psychiatric: She has a normal mood and affect. Her behavior is normal.       echo: 5-18  CONCLUSIONS     1 - Normal left ventricular systolic function (EF 55-60%).     2 - No wall motion abnormalities.     3 - Concentric remodeling.     4 - Impaired LV relaxation, elevated LAP (grade 2 diastolic dysfunction).     5 - Trivial to mild aortic regurgitation.     6 - Trivial mitral regurgitation.     7 - Trivial tricuspid regurgitation.     8 - The estimated PA systolic pressure is 37 mmHg.     NST:  Impression: NORMAL MYOCARDIAL PERFUSION  1. The perfusion scan is free of evidence for myocardial ischemia or injury.   2. There is a trivial to mild intensity fixed defect in the anteroapical wall of the left ventricle, secondary to breast attenuation.   3. Resting wall motion is physiologic.   4. Visually estimated LV function is normal.   5. The ventricular volumes are normal at rest and stress.   6. The extracardiac distribution of radioactivity is normal.   7. When compared to the previous study from 11/02/2015, anteroapical attenuation artifact now suggested.    Holter within normal limits    Assessment:       1. Palpitations    2. Chest pain, unspecified type    3. Shortness of breath    4. Former smoker, stopped smoking in distant past    5. Mixed hyperlipidemia    6. Hypertension, essential    7. KANWAL on CPAP         Plan:       -Mainly reassurance in light of testing  -improved low dose bb, may consider  weaning off in future if stable  -avoid stimulants  -consider inhaler ie spiriva or singulair if sob/cough worsens  -Compression stockings    RTC 12 mo

## 2019-01-24 ENCOUNTER — TELEPHONE (OUTPATIENT)
Dept: CARDIOLOGY | Facility: CLINIC | Age: 79
End: 2019-01-24

## 2019-01-24 NOTE — TELEPHONE ENCOUNTER
Spoke to pt, advised the ekg was the same as 6 mo ago and Dr Pena is not making any changes to her care         ----- Message from Pratima Pichardo sent at 1/24/2019 12:41 PM CST -----  Contact: Self/ [phone]  Pt states she spoke to Dr. Pena about EKG on 01/15/19 recently, but received message later about abnormal results of same EKG. She is requesting call back from staff to speak about EKG results, again. Thank you.

## 2019-02-19 ENCOUNTER — OFFICE VISIT (OUTPATIENT)
Dept: FAMILY MEDICINE | Facility: CLINIC | Age: 79
End: 2019-02-19
Payer: MEDICARE

## 2019-02-19 VITALS
TEMPERATURE: 98 F | OXYGEN SATURATION: 96 % | HEIGHT: 63 IN | BODY MASS INDEX: 27.05 KG/M2 | SYSTOLIC BLOOD PRESSURE: 134 MMHG | DIASTOLIC BLOOD PRESSURE: 78 MMHG | RESPIRATION RATE: 18 BRPM | WEIGHT: 152.69 LBS | HEART RATE: 70 BPM

## 2019-02-19 DIAGNOSIS — I10 ESSENTIAL HYPERTENSION: Chronic | ICD-10-CM

## 2019-02-19 DIAGNOSIS — F41.9 ANXIETY: Primary | ICD-10-CM

## 2019-02-19 DIAGNOSIS — D64.9 ANEMIA, UNSPECIFIED TYPE: ICD-10-CM

## 2019-02-19 DIAGNOSIS — G47.00 INSOMNIA, UNSPECIFIED TYPE: ICD-10-CM

## 2019-02-19 DIAGNOSIS — G47.33 OSA (OBSTRUCTIVE SLEEP APNEA): ICD-10-CM

## 2019-02-19 DIAGNOSIS — F13.20 SEDATIVE HYPNOTIC OR ANXIOLYTIC DEPENDENCE: ICD-10-CM

## 2019-02-19 DIAGNOSIS — K59.00 CONSTIPATION, UNSPECIFIED CONSTIPATION TYPE: ICD-10-CM

## 2019-02-19 PROCEDURE — 99499 RISK ADDL DX/OHS AUDIT: ICD-10-PCS | Mod: HCNC,S$GLB,, | Performed by: FAMILY MEDICINE

## 2019-02-19 PROCEDURE — 99999 PR PBB SHADOW E&M-EST. PATIENT-LVL V: CPT | Mod: PBBFAC,HCNC,, | Performed by: FAMILY MEDICINE

## 2019-02-19 PROCEDURE — 3078F PR MOST RECENT DIASTOLIC BLOOD PRESSURE < 80 MM HG: ICD-10-PCS | Mod: HCNC,CPTII,S$GLB, | Performed by: FAMILY MEDICINE

## 2019-02-19 PROCEDURE — 1101F PR PT FALLS ASSESS DOC 0-1 FALLS W/OUT INJ PAST YR: ICD-10-PCS | Mod: HCNC,CPTII,S$GLB, | Performed by: FAMILY MEDICINE

## 2019-02-19 PROCEDURE — 99499 UNLISTED E&M SERVICE: CPT | Mod: HCNC,S$GLB,, | Performed by: FAMILY MEDICINE

## 2019-02-19 PROCEDURE — 99999 PR PBB SHADOW E&M-EST. PATIENT-LVL V: ICD-10-PCS | Mod: PBBFAC,HCNC,, | Performed by: FAMILY MEDICINE

## 2019-02-19 PROCEDURE — 99214 PR OFFICE/OUTPT VISIT, EST, LEVL IV, 30-39 MIN: ICD-10-PCS | Mod: HCNC,S$GLB,, | Performed by: FAMILY MEDICINE

## 2019-02-19 PROCEDURE — 3078F DIAST BP <80 MM HG: CPT | Mod: HCNC,CPTII,S$GLB, | Performed by: FAMILY MEDICINE

## 2019-02-19 PROCEDURE — 1101F PT FALLS ASSESS-DOCD LE1/YR: CPT | Mod: HCNC,CPTII,S$GLB, | Performed by: FAMILY MEDICINE

## 2019-02-19 PROCEDURE — 3075F PR MOST RECENT SYSTOLIC BLOOD PRESS GE 130-139MM HG: ICD-10-PCS | Mod: HCNC,CPTII,S$GLB, | Performed by: FAMILY MEDICINE

## 2019-02-19 PROCEDURE — 3075F SYST BP GE 130 - 139MM HG: CPT | Mod: HCNC,CPTII,S$GLB, | Performed by: FAMILY MEDICINE

## 2019-02-19 PROCEDURE — 99214 OFFICE O/P EST MOD 30 MIN: CPT | Mod: HCNC,S$GLB,, | Performed by: FAMILY MEDICINE

## 2019-02-19 RX ORDER — TRAZODONE HYDROCHLORIDE 50 MG/1
50 TABLET ORAL NIGHTLY
Qty: 90 TABLET | Refills: 1 | Status: SHIPPED | OUTPATIENT
Start: 2019-02-19 | End: 2019-08-20 | Stop reason: SDUPTHER

## 2019-02-19 NOTE — PROGRESS NOTES
Chief Complaint   Patient presents with    Hypertension    Insomnia    Fatigue       HPI  Nereyda Hernandez is a 78 y.o. female with multiple medical diagnoses as listed in the medical history and problem list that presents for follow-up for hypertension and insomnia.    She has concerns about fatigue as she has had trouble sleeping even with the belsomra.   Her insurance company has approved trazodone also and it is more affordable.  Has hx of KANWAL but is not on CPAP, is wearing a dental device but not sure if this is working properly. Has fatigue in the daytime.     She has had constipation, improved with prunes, taking stool softener once daily.       PAST MEDICAL HISTORY:  Past Medical History:   Diagnosis Date    Anxiety     Breast cancer     Chronic disease anemia     Hyperlipidemia     Hypertension     Insomnia     Lobular carcinoma in situ     KANWAL (obstructive sleep apnea)     Osteoporosis     Rosacea     Vertigo        PAST SURGICAL HISTORY:  Past Surgical History:   Procedure Laterality Date    BELT ABDOMINOPLASTY      BREAST BIOPSY      BREAST LUMPECTOMY      HERNIA REPAIR      Ventral    OOPHORECTOMY      TONSILLECTOMY      TOTAL ABDOMINAL HYSTERECTOMY         SOCIAL HISTORY:  Social History     Socioeconomic History    Marital status:      Spouse name: Not on file    Number of children: 6    Years of education: college    Highest education level: Not on file   Social Needs    Financial resource strain: Not on file    Food insecurity - worry: Not on file    Food insecurity - inability: Not on file    Transportation needs - medical: Not on file    Transportation needs - non-medical: Not on file   Occupational History    Occupation: retired  for [employer]   Tobacco Use    Smoking status: Former Smoker     Packs/day: 0.50     Years: 10.00     Pack years: 5.00     Last attempt to quit: 1980     Years since quittin.4    Smokeless tobacco: Never  Used   Substance and Sexual Activity    Alcohol use: Yes     Alcohol/week: 3.0 oz     Types: 5 Glasses of wine per week     Comment: Occasionally    Drug use: No    Sexual activity: Not Currently     Partners: Male   Other Topics Concern    Not on file   Social History Narrative    Not on file       FAMILY HISTORY:  Family History   Problem Relation Age of Onset    Cancer Mother         liver       ALLERGIES AND MEDICATIONS: updated and reviewed.  Review of patient's allergies indicates:  No Known Allergies  Current Outpatient Medications   Medication Sig Dispense Refill    aspirin (ECOTRIN) 81 MG EC tablet Take 81 mg by mouth once daily.      calcium citrate-vitamin D3 500 mg calcium -400 unit Chew Take 1 tablet by mouth 2 (two) times daily.        docusate sodium (COLACE) 100 MG capsule Take 100 mg by mouth once daily.      fish oil-omega-3 fatty acids 300-1,000 mg capsule Take 2 g by mouth once daily.        hydroCHLOROthiazide (HYDRODIURIL) 25 MG tablet Take 1 tablet (25 mg total) by mouth once daily. 90 tablet 3    latanoprost 0.005 % ophthalmic solution 1 drop every evening.      losartan (COZAAR) 50 MG tablet Take 1 tablet (50 mg total) by mouth once daily. 90 tablet 1    metoprolol tartrate (LOPRESSOR) 25 MG tablet TAKE 1/2 (ONE-HALF) TABLET BY MOUTH TWICE DAILY 90 tablet 2    multivit-iron-min-folic acid (MULTIVITAMIN-IRON-MINERALS-FOLIC ACID) 3,500-18-0.4 unit-mg-mg Chew Take by mouth.        nystatin (MYCOSTATIN) cream Apply topically 2 (two) times daily. 30 g 0    oxybutynin (DITROPAN) 5 MG Tab Take 1 tablet (5 mg total) by mouth 2 (two) times daily. 90 tablet 1    pantoprazole (PROTONIX) 20 MG tablet Take 20 mg by mouth once daily.      pravastatin (PRAVACHOL) 20 MG tablet Take 1 tablet (20 mg total) by mouth once daily. 90 tablet 1    alendronate (FOSAMAX) 70 MG tablet Take 1 tablet (70 mg total) by mouth once a week. 12 tablet 3    fluticasone (FLONASE) 50 mcg/actuation nasal  "spray 1 spray (50 mcg total) by Each Nare route 2 (two) times daily. 1 Bottle 5    meclizine (ANTIVERT) 25 mg tablet Take 0.5 tablets (12.5 mg total) by mouth 3 (three) times daily as needed for Dizziness. Can cause drowsiness 12 tablet 0    traZODone (DESYREL) 50 MG tablet Take 1 tablet (50 mg total) by mouth every evening. 90 tablet 1    valACYclovir (VALTREX) 1000 MG tablet Take 1 tablet (1,000 mg total) by mouth 3 (three) times daily. for 7 days 21 tablet 0     No current facility-administered medications for this visit.        ROS  Review of Systems   Constitutional: Positive for fatigue. Negative for chills, diaphoresis, fever and unexpected weight change.   HENT: Negative for rhinorrhea, sinus pressure, sore throat and tinnitus.    Eyes: Negative for photophobia and visual disturbance.   Respiratory: Negative for cough, shortness of breath and wheezing.    Cardiovascular: Negative for chest pain and palpitations.   Gastrointestinal: Negative for abdominal pain, blood in stool, constipation, diarrhea, nausea and vomiting.   Genitourinary: Negative for dysuria, flank pain, frequency and vaginal discharge.   Musculoskeletal: Negative for arthralgias and joint swelling.   Skin: Negative for rash.   Neurological: Negative for speech difficulty, weakness, light-headedness and headaches.   Psychiatric/Behavioral: Negative for behavioral problems and dysphoric mood.       Physical Exam  Vitals:    02/19/19 1058   BP: 134/78   Pulse: 70   Resp: 18   Temp: 98 °F (36.7 °C)   TempSrc: Oral   SpO2: 96%   Weight: 69.3 kg (152 lb 10.7 oz)   Height: 5' 3" (1.6 m)    Body mass index is 27.04 kg/m².  Weight: 69.3 kg (152 lb 10.7 oz)   Height: 5' 3" (160 cm)     Physical Exam   Constitutional: She is oriented to person, place, and time. She appears well-developed and well-nourished.   Eyes: EOM are normal.   Neurological: She is alert and oriented to person, place, and time.   Skin: Skin is warm and dry. No rash noted. No " erythema.   Psychiatric: She has a normal mood and affect. Her behavior is normal.   Nursing note and vitals reviewed.      Health Maintenance       Date Due Completion Date    DEXA SCAN 06/05/2019 6/5/2017    Override on 8/12/2013: Done    Lipid Panel 08/09/2019 8/9/2018    Colonoscopy 04/26/2023 4/26/2018    TETANUS VACCINE 06/09/2024 6/9/2014            ASSESSMENT     1. Anxiety    2. Essential hypertension    3. Sedative hypnotic or anxiolytic dependence    4. Insomnia, unspecified type    5. KANWAL (obstructive sleep apnea)    6. Constipation, unspecified constipation type    7. Anemia, unspecified type        PLAN:     Problem List Items Addressed This Visit        Psychiatric    Sedative hypnotic or anxiolytic dependence (Chronic)  -d/c ambien and belsomra    Anxiety - Primary  -will begin trazodone, she has weaned off of zoloft       Cardiac/Vascular    Essential hypertension (Chronic)  -stable, she was advised by her cardiologist to continue her hctz    Relevant Orders    CBC auto differential    Comprehensive metabolic panel    Lipid panel       Other    KANWAL on CPAP  -will refer to the sleep clinic as she may need a new sleep study  -she is going to f/u with dental also to see if her device is working properly    Insomnia  -take half tablet for one week then increase to a whole  Discussed common side effects including drowsiness and upset stomach, along with any black box warnings if applicable. This drug can alter sleep patterns and mood and the patient will notify me if changes occur.       Relevant Medications    traZODone (DESYREL) 50 MG tablet      Other Visit Diagnoses     Constipation, unspecified constipation type      -increase stool softener to 200mg    Anemia, unspecified type      -will check at her next visit    Relevant Orders    Iron and TIBC    Ferritin            Genny Martinez MD  02/19/2019 11:20 AM        Follow-up in about 3 months (around 5/19/2019) for Follow up.

## 2019-02-20 ENCOUNTER — TELEPHONE (OUTPATIENT)
Dept: FAMILY MEDICINE | Facility: CLINIC | Age: 79
End: 2019-02-20

## 2019-02-20 ENCOUNTER — TELEPHONE (OUTPATIENT)
Dept: PULMONOLOGY | Facility: CLINIC | Age: 79
End: 2019-02-20

## 2019-02-20 DIAGNOSIS — Z23 NEED FOR SHINGLES VACCINE: Primary | ICD-10-CM

## 2019-02-20 NOTE — TELEPHONE ENCOUNTER
Advised to contact insurance for out of pocket cost of Shingrix vaccine and place to get it.  She said that insurance was contacted and she was told that she may come to the doctor's office and only pay $45.  Appointement sched. with injection nurse, please order.

## 2019-02-20 NOTE — TELEPHONE ENCOUNTER
----- Message from Tatiana Mantilla sent at 2/20/2019  9:55 AM CST -----  Contact: pt   Name of Who is Calling: CHAS LEONE [[id number]]    What is the request in detail:Pateint is requesting a call back in regards to having a sleep study at home...Please contact to further discuss and advise      Can the clinic reply by [facility]: No     What Number to Call Back if not in [facility]: [phone] please call after 1:30pm...

## 2019-02-20 NOTE — TELEPHONE ENCOUNTER
Spoke with patient and scheduled appointment with Sleep provider

## 2019-02-20 NOTE — TELEPHONE ENCOUNTER
----- Message from Nubia Horta sent at 2/20/2019  9:27 AM CST -----  Contact: Self   Patient is calling to schedule a shingles vaccination. Please call at [phone].

## 2019-02-26 ENCOUNTER — CLINICAL SUPPORT (OUTPATIENT)
Dept: FAMILY MEDICINE | Facility: CLINIC | Age: 79
End: 2019-02-26
Payer: MEDICARE

## 2019-02-26 DIAGNOSIS — Z23 NEED FOR SHINGLES VACCINE: Primary | ICD-10-CM

## 2019-02-26 PROCEDURE — 90471 PR IMMUNIZ ADMIN,1 SINGLE/COMB VAC/TOXOID: ICD-10-PCS | Mod: HCNC,S$GLB,, | Performed by: FAMILY MEDICINE

## 2019-02-26 PROCEDURE — 99499 UNLISTED E&M SERVICE: CPT | Mod: HCNC,S$GLB,, | Performed by: FAMILY MEDICINE

## 2019-02-26 PROCEDURE — 90750 ZOSTER RECOMBINANT VACCINE: ICD-10-PCS | Mod: HCNC,S$GLB,, | Performed by: FAMILY MEDICINE

## 2019-02-26 PROCEDURE — 90471 IMMUNIZATION ADMIN: CPT | Mod: HCNC,S$GLB,, | Performed by: FAMILY MEDICINE

## 2019-02-26 PROCEDURE — 90750 HZV VACC RECOMBINANT IM: CPT | Mod: HCNC,S$GLB,, | Performed by: FAMILY MEDICINE

## 2019-02-26 PROCEDURE — 99499 NO LOS: ICD-10-PCS | Mod: HCNC,S$GLB,, | Performed by: FAMILY MEDICINE

## 2019-02-26 NOTE — PROGRESS NOTES
Shingrix vaccine given, patient advised to wait 15 mins.for  Monitoring. Patient advised to return in 2-6 months for second dose. Patient verbalized an understanding.

## 2019-02-27 ENCOUNTER — PES CALL (OUTPATIENT)
Dept: ADMINISTRATIVE | Facility: CLINIC | Age: 79
End: 2019-02-27

## 2019-03-12 ENCOUNTER — OFFICE VISIT (OUTPATIENT)
Dept: PULMONOLOGY | Facility: CLINIC | Age: 79
End: 2019-03-12
Payer: MEDICARE

## 2019-03-12 VITALS
BODY MASS INDEX: 27.64 KG/M2 | HEART RATE: 60 BPM | DIASTOLIC BLOOD PRESSURE: 84 MMHG | SYSTOLIC BLOOD PRESSURE: 146 MMHG | HEIGHT: 63 IN | WEIGHT: 156 LBS | OXYGEN SATURATION: 97 %

## 2019-03-12 DIAGNOSIS — F51.04 PSYCHOPHYSIOLOGICAL INSOMNIA: ICD-10-CM

## 2019-03-12 DIAGNOSIS — G47.33 OSA ON CPAP: ICD-10-CM

## 2019-03-12 PROCEDURE — 1101F PT FALLS ASSESS-DOCD LE1/YR: CPT | Mod: CPTII,S$GLB,, | Performed by: INTERNAL MEDICINE

## 2019-03-12 PROCEDURE — 3079F DIAST BP 80-89 MM HG: CPT | Mod: CPTII,S$GLB,, | Performed by: INTERNAL MEDICINE

## 2019-03-12 PROCEDURE — 3079F PR MOST RECENT DIASTOLIC BLOOD PRESSURE 80-89 MM HG: ICD-10-PCS | Mod: CPTII,S$GLB,, | Performed by: INTERNAL MEDICINE

## 2019-03-12 PROCEDURE — 3077F SYST BP >= 140 MM HG: CPT | Mod: CPTII,S$GLB,, | Performed by: INTERNAL MEDICINE

## 2019-03-12 PROCEDURE — 3077F PR MOST RECENT SYSTOLIC BLOOD PRESSURE >= 140 MM HG: ICD-10-PCS | Mod: CPTII,S$GLB,, | Performed by: INTERNAL MEDICINE

## 2019-03-12 PROCEDURE — 1101F PR PT FALLS ASSESS DOC 0-1 FALLS W/OUT INJ PAST YR: ICD-10-PCS | Mod: CPTII,S$GLB,, | Performed by: INTERNAL MEDICINE

## 2019-03-12 PROCEDURE — 99999 PR PBB SHADOW E&M-EST. PATIENT-LVL III: ICD-10-PCS | Mod: PBBFAC,HCNC,, | Performed by: INTERNAL MEDICINE

## 2019-03-12 PROCEDURE — 99214 OFFICE O/P EST MOD 30 MIN: CPT | Mod: S$GLB,,, | Performed by: INTERNAL MEDICINE

## 2019-03-12 PROCEDURE — 99214 PR OFFICE/OUTPT VISIT, EST, LEVL IV, 30-39 MIN: ICD-10-PCS | Mod: S$GLB,,, | Performed by: INTERNAL MEDICINE

## 2019-03-12 PROCEDURE — 99999 PR PBB SHADOW E&M-EST. PATIENT-LVL III: CPT | Mod: PBBFAC,HCNC,, | Performed by: INTERNAL MEDICINE

## 2019-03-12 NOTE — PATIENT INSTRUCTIONS
Stimulus control  1. Go to bed only when sleepy AND after 11 pm  2. Do not watch television, read, eat, or worry while in bed. Use bed only for sleep and sex.   3. Get out of bed if unable to fall asleep within twenty minutes and go to another room.  Do something different like reading a book.  Dont engage in stimulating activity.  Return to bed only when you are sleepy.  Repeat this step as many times as necessary throughout the night.   4. Set an alarm clock to wake up at a fixed time each morning including weekends.  Wake up at 5:45 AM.  5. Do not take a nap during the day.       Call psychologist for sleep counseling.    Cassandra Rockweiler, LCSW   [phone] phone   [phone] fax   [address] [place] [address]

## 2019-03-12 NOTE — PROGRESS NOTES
Nereyda Hernandez  was seen as a follow up.    CHIEF COMPLAINT:    No chief complaint on file.      HISTORY OF PRESENT ILLNESS: Nereyda Hernandez is a 78 y.o. female is here for sleep evaluation.    Our first encounter was 9/12.  Patient was diagnosed with kaylee in 2004.  At that time, patient was on cpap of 9 cm H20.  Pt has been using machine nightly until 1 month ago.  +leakage around full face mask.  +snoring without machine.  +fatigue upon awakening.  No rls symptoms.  No parasomnia.  No cataplexy.  No bruxism.  Patient was referred to Dr. Voss for oral appliance in 2012.        On today's [place] Sleepiness Scale the patient scores a 8.    Patient was set up with [facility] 2012.  +persistent fatigue.  Still with fatigue.  Patient also has issue with teeth shifting.  Patient was set up for cpap during last appointment.  During our last encounter, patient resumed using cpap 5 times per week.  Since our last encounter, patient has stopped cpap and had another oral appliance made by Dr. Pastora Voss.  Patient has been using oral appliance without issue.    With oral appliance, patient do not snore.  Patient was sleeping well until 12/18.  At that time, patient experience shingle across lips and right cheek area.  After shingle, patient has difficulty fatigue and difficulty sleeping.  Currently on trazadone.    SLEEP ROUTINE:  Activity the hour prior to sleep: read     Bed partner:  none  Time to bed:  10 pm  Lights off:  yes  Sleep onset latency:  60 minutes or longer  Disruptions or awakenings:   1-2 times  (difficulty going back to sleep)  Wakeup time:      5:45 am  Perceived sleep quality:  tire  Daytime naps:      none  Weekend sleep routine:      10 pm to 7 am  Caffeine use: 2 cups of coffee in am  exercise habit:   Line dancing 2 x per week.      PAST MEDICAL HISTORY:    Active Ambulatory Problems     Diagnosis Date Noted    Age-related osteoporosis without current pathological fracture 08/08/2012    Anxiety 08/08/2012     Essential hypertension 2012    KANWAL on CPAP 2012    Hyperlipidemia 2012    Psychophysiological insomnia 2013    Urge incontinence 10/16/2014    White coat hypertension 2015    History of iron deficiency anemia 2015    Sedative hypnotic or anxiolytic dependence 10/25/2016    Osteoarthritis of right knee 2017    Squamous cell carcinoma 2017    Other dysphagia 2018    Hoarseness 2018     Resolved Ambulatory Problems     Diagnosis Date Noted    Chronic disease anemia 2013     Past Medical History:   Diagnosis Date    Anxiety     Breast cancer     Chronic disease anemia     Hyperlipidemia     Hypertension     Insomnia     Lobular carcinoma in situ     KANWAL (obstructive sleep apnea)     Osteoporosis     Rosacea     Vertigo                 PAST SURGICAL HISTORY:    Past Surgical History:   Procedure Laterality Date    BELT ABDOMINOPLASTY      BREAST BIOPSY      BREAST LUMPECTOMY      HERNIA REPAIR      Ventral    OOPHORECTOMY      TONSILLECTOMY      TOTAL ABDOMINAL HYSTERECTOMY           FAMILY HISTORY:                Family History   Problem Relation Age of Onset    Cancer Mother         liver       SOCIAL HISTORY:          Tobacco:   Social History     Tobacco Use   Smoking Status Former Smoker    Packs/day: 0.50    Years: 10.00    Pack years: 5.00    Last attempt to quit: 1980    Years since quittin.4   Smokeless Tobacco Never Used       alcohol use:    Social History     Substance and Sexual Activity   Alcohol Use Yes    Alcohol/week: 3.0 oz    Types: 5 Glasses of wine per week    Comment: Occasionally                 Occupation: retired housewife, former customer service at [employer].    ALLERGIES:  Review of patient's allergies indicates:  No Known Allergies    CURRENT MEDICATIONS:    Current Outpatient Medications   Medication Sig Dispense Refill    aspirin (ECOTRIN) 81 MG EC tablet Take 81 mg by mouth once  daily.      calcium citrate-vitamin D3 500 mg calcium -400 unit Chew Take 1 tablet by mouth 2 (two) times daily.        docusate sodium (COLACE) 100 MG capsule Take 100 mg by mouth once daily.      fish oil-omega-3 fatty acids 300-1,000 mg capsule Take 2 g by mouth once daily.        fluticasone (FLONASE) 50 mcg/actuation nasal spray 1 spray (50 mcg total) by Each Nare route 2 (two) times daily. 1 Bottle 5    hydroCHLOROthiazide (HYDRODIURIL) 25 MG tablet Take 1 tablet (25 mg total) by mouth once daily. 90 tablet 3    latanoprost 0.005 % ophthalmic solution 1 drop every evening.      meclizine (ANTIVERT) 25 mg tablet Take 0.5 tablets (12.5 mg total) by mouth 3 (three) times daily as needed for Dizziness. Can cause drowsiness 12 tablet 0    metoprolol tartrate (LOPRESSOR) 25 MG tablet TAKE 1/2 (ONE-HALF) TABLET BY MOUTH TWICE DAILY 90 tablet 2    multivit-iron-min-folic acid (MULTIVITAMIN-IRON-MINERALS-FOLIC ACID) 3,500-18-0.4 unit-mg-mg Chew Take by mouth.        nystatin (MYCOSTATIN) cream Apply topically 2 (two) times daily. 30 g 0    pantoprazole (PROTONIX) 20 MG tablet Take 20 mg by mouth once daily.      traZODone (DESYREL) 50 MG tablet Take 1 tablet (50 mg total) by mouth every evening. 90 tablet 1    alendronate (FOSAMAX) 70 MG tablet Take 1 tablet (70 mg total) by mouth once a week. 12 tablet 3    losartan (COZAAR) 50 MG tablet Take 1 tablet (50 mg total) by mouth once daily. 90 tablet 1    oxybutynin (DITROPAN) 5 MG Tab Take 1 tablet (5 mg total) by mouth 2 (two) times daily. 90 tablet 1    pravastatin (PRAVACHOL) 20 MG tablet Take 1 tablet (20 mg total) by mouth once daily. 90 tablet 1    valACYclovir (VALTREX) 1000 MG tablet Take 1 tablet (1,000 mg total) by mouth 3 (three) times daily. for 7 days 21 tablet 0     No current facility-administered medications for this visit.                   REVIEW OF SYSTEMS:     Sleep related symptoms as per HPI.  CONST:Denies weight gain    HEENT:  "Denies sinus problems  PULM: Denies dyspnea  CARD:  Denies palpitations   GI:  Denies acid reflux  : Denies polyuria  NEURO: Denies headaches  PSYCH: Denies mood disturbance  HEME: Denies anemia   Otherwise, a balance of systems reviewed is negative.          PHYSICAL EXAM:  Vitals:    03/12/19 1114   BP: (!) 146/84   Pulse: 60   SpO2: 97%   Weight: 70.8 kg (156 lb)   Height: 5' 3" (1.6 m)   PainSc:   1   PainLoc: Leg     Body mass index is 27.63 kg/m².     GENERAL: Normal development, well groomed  HEENT:  Conjunctivae are non-erythematous; Pupils equal, round, and reactive to light; Nose is symmetrical; Nasal mucosa is pink and moist; Septum is midline; Inferior turbinates are normal; Nasal airflow is normal; Posterior pharynx is pink; Modified Mallampati: 4; Posterior palate is normal; Tonsils +1; Uvula is normal and pink;Tongue is normal; Dentition is fair; No TMJ tenderness; Jaw opening and protrusion without click and without discomfort.  NECK: Supple. Neck circumference is 14 inches. No thyromegaly. No palpable nodes.     SKIN: On face and neck: No abrasions, no rashes, no lesions.  No subcutaneous nodules are palpable.  RESPIRATORY: Chest is clear to auscultation.  Normal chest expansion and non-labored breathing at rest.  CARDIOVASCULAR: Normal S1, S2.  No murmurs, gallops or rubs. No carotid bruits bilaterally.  EXTREMITIES: No edema. No clubbing. No cyanosis. Station normal. Gait normal.        NEURO/PSYCH: Oriented to time, place and person. Normal attention span and concentration. Affect is full. Mood is normal.                                              DATA 1/20/04 rdi 12.3 with optimal cpap of 9  ASSESSMENT  Problem List Items Addressed This Visit     KANWAL on CPAP    Overview     mild kanwal.  Currently with oral appliance without problem.  Sleeping well prior to insomnia.  Will manage insomnia and reassess.             Psychophysiological insomnia    Overview     difficutly with sleep initiation and " maintenance.  Started after shingle.  Suspect psychophysilogic.  currently on trazadone.  Stimulus control d/w patient.  Sleep time from 11 pm to 5:30 am.  Will refer to psychology for cbt.                  Education: During our discussion today, we talked about the etiology of obstructive sleep apnea as well as the potential ramifications of untreated sleep apnea, which could include daytime sleepiness, hypertension, heart disease and/or stroke.        Patient will Follow-up in about 3 months (around 6/12/2019).    This is 25 minutes visit, over 50% of time spent in direct consultation with patient.

## 2019-03-12 NOTE — LETTER
March 12, 2019      Genny Martinez MD  [address] [facility]  [place] [address]           [facility]  [address] [facility] [address]  [place] [address]  Phone: [phone]  Fax: [phone]          Patient: Nereyda Hernandez   MR Number: [id number]   YOB: 1940   Date of Visit: 3/12/2019       Dear Dr. Genny Martinez:    Thank you for referring Nereyda Hernandez to me for evaluation. Attached you will find relevant portions of my assessment and plan of care.    If you have questions, please do not hesitate to call me. I look forward to following Nereyda Hernandez along with you.    Sincerely,    Koffi Preciado MD    Enclosure  CC:  No Recipients    If you would like to receive this communication electronically, please contact [email or web address] or [phone] to request more information on [employer] Link access.    For providers and/or their staff who would like to refer a patient to [facility], please contact us through our one-stop-shop provider referral line, [facility], at [phone].    If you feel you have received this communication in error or would no longer like to receive these types of communications, please e-mail [email or web address]

## 2019-04-16 DIAGNOSIS — E78.5 HYPERLIPIDEMIA, UNSPECIFIED HYPERLIPIDEMIA TYPE: Chronic | ICD-10-CM

## 2019-04-16 RX ORDER — PRAVASTATIN SODIUM 20 MG/1
TABLET ORAL
Qty: 90 TABLET | Refills: 1 | Status: SHIPPED | OUTPATIENT
Start: 2019-04-16 | End: 2019-05-21 | Stop reason: SDUPTHER

## 2019-04-25 ENCOUNTER — TELEPHONE (OUTPATIENT)
Dept: FAMILY MEDICINE | Facility: CLINIC | Age: 79
End: 2019-04-25

## 2019-04-25 NOTE — TELEPHONE ENCOUNTER
----- Message from Nubia Horta sent at 4/25/2019 11:37 AM CDT -----  Contact: Self   Type: Patient Call Back    Who called: Self     What is the request in detail: Patient is asking if its time for her Mammo     Can the clinic reply by [facility]? No     Would the patient rather a call back or a response via [facility]? Call     Best call back number: [phone]  Additional Information:

## 2019-04-30 ENCOUNTER — CLINICAL SUPPORT (OUTPATIENT)
Dept: FAMILY MEDICINE | Facility: CLINIC | Age: 79
End: 2019-04-30
Payer: MEDICARE

## 2019-04-30 DIAGNOSIS — Z23 NEED FOR SHINGLES VACCINE: Primary | ICD-10-CM

## 2019-04-30 PROCEDURE — 90750 HZV VACC RECOMBINANT IM: CPT | Mod: HCNC,S$GLB,, | Performed by: FAMILY MEDICINE

## 2019-04-30 PROCEDURE — 99499 UNLISTED E&M SERVICE: CPT | Mod: HCNC,S$GLB,, | Performed by: FAMILY MEDICINE

## 2019-04-30 PROCEDURE — 90471 IMMUNIZATION ADMIN: CPT | Mod: HCNC,S$GLB,, | Performed by: FAMILY MEDICINE

## 2019-04-30 PROCEDURE — 99499 NO LOS: ICD-10-PCS | Mod: HCNC,S$GLB,, | Performed by: FAMILY MEDICINE

## 2019-04-30 PROCEDURE — 90750 ZOSTER RECOMBINANT VACCINE: ICD-10-PCS | Mod: HCNC,S$GLB,, | Performed by: FAMILY MEDICINE

## 2019-04-30 PROCEDURE — 90471 ZOSTER RECOMBINANT VACCINE: ICD-10-PCS | Mod: HCNC,S$GLB,, | Performed by: FAMILY MEDICINE

## 2019-05-14 ENCOUNTER — LAB VISIT (OUTPATIENT)
Dept: LAB | Facility: HOSPITAL | Age: 79
End: 2019-05-14
Attending: FAMILY MEDICINE
Payer: MEDICARE

## 2019-05-14 DIAGNOSIS — I10 ESSENTIAL HYPERTENSION: Chronic | ICD-10-CM

## 2019-05-14 DIAGNOSIS — D64.9 ANEMIA, UNSPECIFIED TYPE: ICD-10-CM

## 2019-05-14 LAB
ALBUMIN SERPL BCP-MCNC: 3.5 G/DL (ref 3.5–5.2)
ALP SERPL-CCNC: 50 U/L (ref 55–135)
ALT SERPL W/O P-5'-P-CCNC: 20 U/L (ref 10–44)
ANION GAP SERPL CALC-SCNC: 9 MMOL/L (ref 8–16)
AST SERPL-CCNC: 25 U/L (ref 10–40)
BASOPHILS # BLD AUTO: 0.03 K/UL (ref 0–0.2)
BASOPHILS NFR BLD: 0.7 % (ref 0–1.9)
BILIRUB SERPL-MCNC: 0.4 MG/DL (ref 0.1–1)
BUN SERPL-MCNC: 14 MG/DL (ref 8–23)
CALCIUM SERPL-MCNC: 9.5 MG/DL (ref 8.7–10.5)
CHLORIDE SERPL-SCNC: 102 MMOL/L (ref 95–110)
CHOLEST SERPL-MCNC: 161 MG/DL (ref 120–199)
CHOLEST/HDLC SERPL: 2.6 {RATIO} (ref 2–5)
CO2 SERPL-SCNC: 28 MMOL/L (ref 23–29)
CREAT SERPL-MCNC: 0.7 MG/DL (ref 0.5–1.4)
DIFFERENTIAL METHOD: ABNORMAL
EOSINOPHIL # BLD AUTO: 0.1 K/UL (ref 0–0.5)
EOSINOPHIL NFR BLD: 1.2 % (ref 0–8)
ERYTHROCYTE [DISTWIDTH] IN BLOOD BY AUTOMATED COUNT: 12.7 % (ref 11.5–14.5)
EST. GFR  (AFRICAN AMERICAN): >60 ML/MIN/1.73 M^2
EST. GFR  (NON AFRICAN AMERICAN): >60 ML/MIN/1.73 M^2
FERRITIN SERPL-MCNC: 33 NG/ML (ref 20–300)
GLUCOSE SERPL-MCNC: 92 MG/DL (ref 70–110)
HCT VFR BLD AUTO: 36.7 % (ref 37–48.5)
HDLC SERPL-MCNC: 63 MG/DL (ref 40–75)
HDLC SERPL: 39.1 % (ref 20–50)
HGB BLD-MCNC: 12 G/DL (ref 12–16)
IMM GRANULOCYTES # BLD AUTO: 0.01 K/UL (ref 0–0.04)
IMM GRANULOCYTES NFR BLD AUTO: 0.2 % (ref 0–0.5)
IRON SERPL-MCNC: 64 UG/DL (ref 30–160)
LDLC SERPL CALC-MCNC: 83.4 MG/DL (ref 63–159)
LYMPHOCYTES # BLD AUTO: 1.3 K/UL (ref 1–4.8)
LYMPHOCYTES NFR BLD: 31.1 % (ref 18–48)
MCH RBC QN AUTO: 31.4 PG (ref 27–31)
MCHC RBC AUTO-ENTMCNC: 32.7 G/DL (ref 32–36)
MCV RBC AUTO: 96 FL (ref 82–98)
MONOCYTES # BLD AUTO: 0.5 K/UL (ref 0.3–1)
MONOCYTES NFR BLD: 10.8 % (ref 4–15)
NEUTROPHILS # BLD AUTO: 2.3 K/UL (ref 1.8–7.7)
NEUTROPHILS NFR BLD: 56 % (ref 38–73)
NONHDLC SERPL-MCNC: 98 MG/DL
NRBC BLD-RTO: 0 /100 WBC
PLATELET # BLD AUTO: 221 K/UL (ref 150–350)
PMV BLD AUTO: 9.8 FL (ref 9.2–12.9)
POTASSIUM SERPL-SCNC: 3.9 MMOL/L (ref 3.5–5.1)
PROT SERPL-MCNC: 7 G/DL (ref 6–8.4)
RBC # BLD AUTO: 3.82 M/UL (ref 4–5.4)
SATURATED IRON: 15 % (ref 20–50)
SODIUM SERPL-SCNC: 139 MMOL/L (ref 136–145)
TOTAL IRON BINDING CAPACITY: 435 UG/DL (ref 250–450)
TRANSFERRIN SERPL-MCNC: 294 MG/DL (ref 200–375)
TRIGL SERPL-MCNC: 73 MG/DL (ref 30–150)
WBC # BLD AUTO: 4.15 K/UL (ref 3.9–12.7)

## 2019-05-14 PROCEDURE — 80053 COMPREHEN METABOLIC PANEL: CPT | Mod: HCNC

## 2019-05-14 PROCEDURE — 36415 COLL VENOUS BLD VENIPUNCTURE: CPT | Mod: HCNC,PO

## 2019-05-14 PROCEDURE — 80061 LIPID PANEL: CPT | Mod: HCNC

## 2019-05-14 PROCEDURE — 85025 COMPLETE CBC W/AUTO DIFF WBC: CPT | Mod: HCNC

## 2019-05-14 PROCEDURE — 82728 ASSAY OF FERRITIN: CPT | Mod: HCNC

## 2019-05-14 PROCEDURE — 83540 ASSAY OF IRON: CPT | Mod: HCNC

## 2019-05-21 ENCOUNTER — OFFICE VISIT (OUTPATIENT)
Dept: FAMILY MEDICINE | Facility: CLINIC | Age: 79
End: 2019-05-21
Payer: MEDICARE

## 2019-05-21 VITALS
OXYGEN SATURATION: 96 % | RESPIRATION RATE: 18 BRPM | HEIGHT: 63 IN | DIASTOLIC BLOOD PRESSURE: 64 MMHG | TEMPERATURE: 98 F | SYSTOLIC BLOOD PRESSURE: 120 MMHG | BODY MASS INDEX: 26.11 KG/M2 | WEIGHT: 147.38 LBS | HEART RATE: 55 BPM

## 2019-05-21 DIAGNOSIS — I10 ESSENTIAL HYPERTENSION: Chronic | ICD-10-CM

## 2019-05-21 DIAGNOSIS — Z86.2 HISTORY OF IRON DEFICIENCY ANEMIA: ICD-10-CM

## 2019-05-21 DIAGNOSIS — F51.04 PSYCHOPHYSIOLOGICAL INSOMNIA: ICD-10-CM

## 2019-05-21 DIAGNOSIS — G47.33 OSA ON CPAP: ICD-10-CM

## 2019-05-21 DIAGNOSIS — M81.0 AGE-RELATED OSTEOPOROSIS WITHOUT CURRENT PATHOLOGICAL FRACTURE: ICD-10-CM

## 2019-05-21 DIAGNOSIS — E78.5 HYPERLIPIDEMIA, UNSPECIFIED HYPERLIPIDEMIA TYPE: Primary | Chronic | ICD-10-CM

## 2019-05-21 DIAGNOSIS — R09.82 POST-NASAL DRIP: ICD-10-CM

## 2019-05-21 DIAGNOSIS — N39.41 URGE INCONTINENCE: ICD-10-CM

## 2019-05-21 PROCEDURE — 3078F PR MOST RECENT DIASTOLIC BLOOD PRESSURE < 80 MM HG: ICD-10-PCS | Mod: HCNC,CPTII,S$GLB, | Performed by: FAMILY MEDICINE

## 2019-05-21 PROCEDURE — 99499 UNLISTED E&M SERVICE: CPT | Mod: HCNC,S$GLB,, | Performed by: FAMILY MEDICINE

## 2019-05-21 PROCEDURE — 1101F PR PT FALLS ASSESS DOC 0-1 FALLS W/OUT INJ PAST YR: ICD-10-PCS | Mod: HCNC,CPTII,S$GLB, | Performed by: FAMILY MEDICINE

## 2019-05-21 PROCEDURE — 99214 OFFICE O/P EST MOD 30 MIN: CPT | Mod: HCNC,S$GLB,, | Performed by: FAMILY MEDICINE

## 2019-05-21 PROCEDURE — 1101F PT FALLS ASSESS-DOCD LE1/YR: CPT | Mod: HCNC,CPTII,S$GLB, | Performed by: FAMILY MEDICINE

## 2019-05-21 PROCEDURE — 99999 PR PBB SHADOW E&M-EST. PATIENT-LVL III: CPT | Mod: PBBFAC,HCNC,, | Performed by: FAMILY MEDICINE

## 2019-05-21 PROCEDURE — 99499 RISK ADDL DX/OHS AUDIT: ICD-10-PCS | Mod: HCNC,S$GLB,, | Performed by: FAMILY MEDICINE

## 2019-05-21 PROCEDURE — 3074F SYST BP LT 130 MM HG: CPT | Mod: HCNC,CPTII,S$GLB, | Performed by: FAMILY MEDICINE

## 2019-05-21 PROCEDURE — 99214 PR OFFICE/OUTPT VISIT, EST, LEVL IV, 30-39 MIN: ICD-10-PCS | Mod: HCNC,S$GLB,, | Performed by: FAMILY MEDICINE

## 2019-05-21 PROCEDURE — 3074F PR MOST RECENT SYSTOLIC BLOOD PRESSURE < 130 MM HG: ICD-10-PCS | Mod: HCNC,CPTII,S$GLB, | Performed by: FAMILY MEDICINE

## 2019-05-21 PROCEDURE — 3078F DIAST BP <80 MM HG: CPT | Mod: HCNC,CPTII,S$GLB, | Performed by: FAMILY MEDICINE

## 2019-05-21 PROCEDURE — 99999 PR PBB SHADOW E&M-EST. PATIENT-LVL III: ICD-10-PCS | Mod: PBBFAC,HCNC,, | Performed by: FAMILY MEDICINE

## 2019-05-21 RX ORDER — PRAVASTATIN SODIUM 20 MG/1
20 TABLET ORAL DAILY
Qty: 90 TABLET | Refills: 3 | Status: SHIPPED | OUTPATIENT
Start: 2019-05-21 | End: 2019-10-09 | Stop reason: SDUPTHER

## 2019-05-21 RX ORDER — LOSARTAN POTASSIUM 50 MG/1
50 TABLET ORAL DAILY
Qty: 90 TABLET | Refills: 1 | Status: SHIPPED | OUTPATIENT
Start: 2019-05-21 | End: 2019-09-16 | Stop reason: SDUPTHER

## 2019-05-21 RX ORDER — FLUTICASONE PROPIONATE 50 MCG
1 SPRAY, SUSPENSION (ML) NASAL 2 TIMES DAILY
Qty: 1 BOTTLE | Refills: 5 | Status: SHIPPED | OUTPATIENT
Start: 2019-05-21 | End: 2019-07-25

## 2019-05-21 RX ORDER — PANTOPRAZOLE SODIUM 20 MG/1
20 TABLET, DELAYED RELEASE ORAL DAILY
Qty: 90 TABLET | Refills: 3 | Status: SHIPPED | OUTPATIENT
Start: 2019-05-21 | End: 2019-07-25 | Stop reason: DRUGHIGH

## 2019-05-21 RX ORDER — OXYBUTYNIN CHLORIDE 5 MG/1
5 TABLET ORAL 2 TIMES DAILY
Qty: 90 TABLET | Refills: 3 | Status: SHIPPED | OUTPATIENT
Start: 2019-05-21 | End: 2019-09-04 | Stop reason: SDUPTHER

## 2019-05-21 RX ORDER — ALENDRONATE SODIUM 70 MG/1
70 TABLET ORAL WEEKLY
Qty: 12 TABLET | Refills: 3 | Status: SHIPPED | OUTPATIENT
Start: 2019-05-21 | End: 2019-09-19

## 2019-05-21 RX ORDER — SERTRALINE HYDROCHLORIDE 100 MG/1
100 TABLET, FILM COATED ORAL DAILY
Qty: 90 TABLET | Refills: 3 | Status: SHIPPED | OUTPATIENT
Start: 2019-05-21 | End: 2019-09-16 | Stop reason: SDUPTHER

## 2019-05-21 RX ORDER — FERROUS SULFATE 220 (44)/5
220 SOLUTION, ORAL ORAL DAILY
Qty: 473 ML | Refills: 3 | Status: SHIPPED | OUTPATIENT
Start: 2019-05-21 | End: 2019-07-25

## 2019-05-21 RX ORDER — SERTRALINE HYDROCHLORIDE 100 MG/1
TABLET, FILM COATED ORAL
Refills: 5 | COMMUNITY
Start: 2019-04-11 | End: 2019-05-21 | Stop reason: SDUPTHER

## 2019-05-21 NOTE — PROGRESS NOTES
Chief Complaint   Patient presents with    Hypertension    Follow-up    Fatigue       HPI  Nereyda Hernandez is a 78 y.o. female with multiple medical diagnoses as listed in the medical history and problem list that presents for follow-up for fatigue and HTN    Fatigue- she reports sleeping much better. She has not yet had a consistent bedtime.previously doing well w/ oral appliance but she noted having soreness in her mouth. She has not been able to use, she has a hx of iron def anemia, reports having trouble taking iron due to constipation in  The past. Currently takes a multivitamin w/ iron and takes colace.    She has had trouble eating due to problems w/ her dentures. She reports trouble eating foods that are hard. She eats three times daily.     PAST MEDICAL HISTORY:  Past Medical History:   Diagnosis Date    Anxiety     Breast cancer     Chronic disease anemia     Hyperlipidemia     Hypertension     Insomnia     Lobular carcinoma in situ     KANWAL (obstructive sleep apnea)     Osteoporosis     Rosacea     Vertigo        PAST SURGICAL HISTORY:  Past Surgical History:   Procedure Laterality Date    BELT ABDOMINOPLASTY      BREAST BIOPSY      BREAST LUMPECTOMY      HERNIA REPAIR      [facility]    OOPHORECTOMY      TONSILLECTOMY      TOTAL ABDOMINAL HYSTERECTOMY         SOCIAL HISTORY:  Social History     Socioeconomic History    Marital status:      Spouse name: Not on file    Number of children: 6    Years of education: college    Highest education level: Not on file   Occupational History    Occupation: retired  for [employer] Social Needs    Financial resource strain: Not on file    Food insecurity:     Worry: Not on file     Inability: Not on file    Transportation needs:     Medical: Not on file     Non-medical: Not on file   Tobacco Use    Smoking status: Former Smoker     Packs/day: 0.50     Years: 10.00     Pack years: 5.00     Last attempt to quit:  1980     Years since quittin.6    Smokeless tobacco: Never Used   Substance and Sexual Activity    Alcohol use: Yes     Alcohol/week: 3.0 oz     Types: 5 Glasses of wine per week     Comment: Occasionally    Drug use: No    Sexual activity: Not Currently     Partners: Male   Lifestyle    Physical activity:     Days per week: Not on file     Minutes per session: Not on file    Stress: Not on file   Relationships    Social connections:     Talks on phone: Not on file     Gets together: Not on file     Attends Bahai service: Not on file     Active member of club or organization: Not on file     Attends meetings of clubs or organizations: Not on file     Relationship status: Not on file   Other Topics Concern    Not on file   Social History Narrative    Not on file       FAMILY HISTORY:  Family History   Problem Relation Age of Onset    Cancer Mother         liver       ALLERGIES AND MEDICATIONS: updated and reviewed.  Review of patient's allergies indicates:  No Known Allergies  Current Outpatient Medications   Medication Sig Dispense Refill    alendronate (FOSAMAX) 70 MG tablet Take 1 tablet (70 mg total) by mouth once a week. 12 tablet 3    aspirin (ECOTRIN) 81 MG EC tablet Take 81 mg by mouth once daily.      calcium citrate-vitamin D3 500 mg calcium -400 unit Chew Take 1 tablet by mouth 2 (two) times daily.        docusate sodium (COLACE) 100 MG capsule Take 100 mg by mouth once daily.      fish oil-omega-3 fatty acids 300-1,000 mg capsule Take 2 g by mouth once daily.        fluticasone propionate (FLONASE) 50 mcg/actuation nasal spray 1 spray (50 mcg total) by Each Nare route 2 (two) times daily. 1 Bottle 5    hydroCHLOROthiazide (HYDRODIURIL) 25 MG tablet Take 1 tablet (25 mg total) by mouth once daily. 90 tablet 3    latanoprost 0.005 % ophthalmic solution 1 drop every evening.      meclizine (ANTIVERT) 25 mg tablet Take 0.5 tablets (12.5 mg total) by mouth 3 (three) times daily  as needed for Dizziness. Can cause drowsiness 12 tablet 0    metoprolol tartrate (LOPRESSOR) 25 MG tablet TAKE 1/2 (ONE-HALF) TABLET BY MOUTH TWICE DAILY 90 tablet 2    multivit-iron-min-folic acid (MULTIVITAMIN-IRON-MINERALS-FOLIC ACID) 3,500-18-0.4 unit-mg-mg Chew Take by mouth.        nystatin (MYCOSTATIN) cream Apply topically 2 (two) times daily. 30 g 0    pantoprazole (PROTONIX) 20 MG tablet Take 1 tablet (20 mg total) by mouth once daily. 90 tablet 3    pravastatin (PRAVACHOL) 20 MG tablet Take 1 tablet (20 mg total) by mouth once daily. 90 tablet 3    traZODone (DESYREL) 50 MG tablet Take 1 tablet (50 mg total) by mouth every evening. 90 tablet 1    valACYclovir (VALTREX) 1000 MG tablet Take 1 tablet (1,000 mg total) by mouth 3 (three) times daily. for 7 days 21 tablet 0    ferrous sulfate 220 mg (44 mg iron)/5 mL solution Take 5 mLs (220 mg total) by mouth once daily. 473 mL 3    losartan (COZAAR) 50 MG tablet Take 1 tablet (50 mg total) by mouth once daily. 90 tablet 1    oxybutynin (DITROPAN) 5 MG Tab Take 1 tablet (5 mg total) by mouth 2 (two) times daily. 90 tablet 3    sertraline (ZOLOFT) 100 MG tablet Take 1 tablet (100 mg total) by mouth once daily. 90 tablet 3     No current facility-administered medications for this visit.        ROS  Review of Systems   Constitutional: Positive for fatigue. Negative for chills, diaphoresis, fever and unexpected weight change.   HENT: Negative for rhinorrhea, sinus pressure, sore throat and tinnitus.    Eyes: Negative for photophobia and visual disturbance.   Respiratory: Negative for cough, shortness of breath and wheezing.    Cardiovascular: Negative for chest pain and palpitations.   Gastrointestinal: Negative for abdominal pain, blood in stool, constipation, diarrhea, nausea and vomiting.   Genitourinary: Negative for dysuria, flank pain, frequency and vaginal discharge.   Musculoskeletal: Negative for arthralgias and joint swelling.   Skin: Negative  "for rash.   Neurological: Negative for speech difficulty, weakness, light-headedness and headaches.   Psychiatric/Behavioral: Positive for dysphoric mood and sleep disturbance. Negative for behavioral problems.        Has resumed her zoloft       Physical Exam  Vitals:    05/21/19 1110   BP: 120/64   Pulse: (!) 55   Resp: 18   Temp: 98.3 °F (36.8 °C)   TempSrc: Oral   SpO2: 96%   Weight: 66.8 kg (147 lb 6 oz)   Height: 5' 3" (1.6 m)    Body mass index is 26.11 kg/m².  Weight: 66.8 kg (147 lb 6 oz)   Height: 5' 3" (160 cm)     Physical Exam   Constitutional: She is oriented to person, place, and time. She appears well-developed and well-nourished.   Eyes: EOM are normal.   Neurological: She is alert and oriented to person, place, and time.   Skin: Skin is warm and dry. No rash noted. No erythema.   Psychiatric: She has a normal mood and affect. Her behavior is normal.   Nursing note and vitals reviewed.      Health Maintenance       Date Due Completion Date    DEXA SCAN 06/05/2019 6/5/2017    Override on 8/12/2013: Done    Influenza Vaccine 08/01/2019 10/30/2018    Lipid Panel 05/14/2020 5/14/2019    Colonoscopy 04/26/2023 4/26/2018    TETANUS VACCINE 06/09/2024 6/9/2014          Health maintenance reviewed and addressed as ordered  DXA scan at next visit      ASSESSMENT     1. Hyperlipidemia, unspecified hyperlipidemia type    2. Urge incontinence    3. Essential hypertension    4. Post-nasal drip    5. Age-related osteoporosis without current pathological fracture    6. KANWAL on CPAP    7. Psychophysiological insomnia    8. History of iron deficiency anemia        PLAN:     Problem List Items Addressed This Visit        Cardiac/Vascular    Essential hypertension (Chronic)  -continue current regimen    Relevant Medications    losartan (COZAAR) 50 MG tablet    Hyperlipidemia - Primary (Chronic)  -on statin    Relevant Medications    pravastatin (PRAVACHOL) 20 MG tablet       Renal/    Urge incontinence  -stable on " ditropan    Relevant Medications    oxybutynin (DITROPAN) 5 MG Tab       Oncology    History of iron deficiency anemia  -will begin liquid oral iron   -take w/ stool softener       Orthopedic    Age-related osteoporosis without current pathological fracture  -continue fosamax    Relevant Medications    alendronate (FOSAMAX) 70 MG tablet       Other    KANWAL on CPAP  -plans to f/u w/ sleep medicine as she is no longer using her dental device  Discussed fatigue may also be due to anemia    Overview     mild kanwal.  Currently with oral appliance without problem.  Sleeping well prior to insomnia.  Will manage insomnia and reassess.             Psychophysiological insomnia    Overview-per sleep med notes     difficutly with sleep initiation and maintenance.  Started after shingle.  Suspect psychophysilogic.  currently on trazadone.  Stimulus control d/w patient.  Sleep time from 11 pm to 5:30 am.  Will refer to psychology for cbt.               Other Visit Diagnoses     Post-nasal drip      -for prn use    Relevant Medications    fluticasone propionate (FLONASE) 50 mcg/actuation nasal spray            Genny Martinez MD  05/21/2019 11:31 AM        Follow up in about 3 months (around 8/21/2019) for Follow up.

## 2019-05-31 ENCOUNTER — TELEPHONE (OUTPATIENT)
Dept: FAMILY MEDICINE | Facility: CLINIC | Age: 79
End: 2019-05-31

## 2019-05-31 DIAGNOSIS — Z12.31 ENCOUNTER FOR SCREENING MAMMOGRAM FOR BREAST CANCER: Primary | ICD-10-CM

## 2019-05-31 NOTE — TELEPHONE ENCOUNTER
----- Message from Nubia Horta sent at 5/31/2019  1:18 PM CDT -----  Contact: Self   Type: Lab    Caller is requesting to schedule their Lab appointment prior to annual appointment.    Order is not listed in EPIC.  Please enter order and contact patient to schedule.    Name of Caller: Self     Preferred Date and Time of Labs: Doesn't matter       Where would they like the lab performed? Requesting a Mammo order     Would the patient rather a call back or a response via [facility]?  Call     Best Call Back Number: [phone]

## 2019-06-03 NOTE — TELEPHONE ENCOUNTER
Patient notified of information below and verbalized understanding but states that she requested a mammogram and not lab orders.  Mammo ordered and scheduled.

## 2019-06-03 NOTE — TELEPHONE ENCOUNTER
We did labwork on her in May so this will not be due again for 6 more months    Genny Martinez MD

## 2019-06-18 ENCOUNTER — HOSPITAL ENCOUNTER (OUTPATIENT)
Dept: RADIOLOGY | Facility: HOSPITAL | Age: 79
Discharge: HOME | End: 2019-06-18
Attending: FAMILY MEDICINE
Payer: MEDICARE

## 2019-06-18 DIAGNOSIS — Z12.31 ENCOUNTER FOR SCREENING MAMMOGRAM FOR BREAST CANCER: ICD-10-CM

## 2019-06-18 PROCEDURE — 77067 SCR MAMMO BI INCL CAD: CPT | Mod: TC,HCNC

## 2019-06-18 PROCEDURE — 77063 MAMMO DIGITAL SCREENING BILAT WITH TOMOSYNTHESIS_CAD: ICD-10-PCS | Mod: 26,HCNC,, | Performed by: RADIOLOGY

## 2019-06-18 PROCEDURE — 77067 SCR MAMMO BI INCL CAD: CPT | Mod: 26,HCNC,, | Performed by: RADIOLOGY

## 2019-06-18 PROCEDURE — 77063 BREAST TOMOSYNTHESIS BI: CPT | Mod: 26,HCNC,, | Performed by: RADIOLOGY

## 2019-06-18 PROCEDURE — 77067 MAMMO DIGITAL SCREENING BILAT WITH TOMOSYNTHESIS_CAD: ICD-10-PCS | Mod: 26,HCNC,, | Performed by: RADIOLOGY

## 2019-07-22 ENCOUNTER — PES CALL (OUTPATIENT)
Dept: ADMINISTRATIVE | Facility: CLINIC | Age: 79
End: 2019-07-22

## 2019-07-25 ENCOUNTER — OFFICE VISIT (OUTPATIENT)
Dept: FAMILY MEDICINE | Facility: CLINIC | Age: 79
End: 2019-07-25
Payer: MEDICARE

## 2019-07-25 VITALS
SYSTOLIC BLOOD PRESSURE: 132 MMHG | HEIGHT: 63 IN | BODY MASS INDEX: 25.47 KG/M2 | OXYGEN SATURATION: 97 % | TEMPERATURE: 98 F | RESPIRATION RATE: 16 BRPM | DIASTOLIC BLOOD PRESSURE: 76 MMHG | WEIGHT: 143.75 LBS | HEART RATE: 57 BPM

## 2019-07-25 DIAGNOSIS — Z00.00 ENCOUNTER FOR PREVENTIVE HEALTH EXAMINATION: Primary | ICD-10-CM

## 2019-07-25 DIAGNOSIS — N39.41 URGE INCONTINENCE: ICD-10-CM

## 2019-07-25 DIAGNOSIS — I10 ESSENTIAL HYPERTENSION: Chronic | ICD-10-CM

## 2019-07-25 DIAGNOSIS — G47.33 OSA ON CPAP: ICD-10-CM

## 2019-07-25 DIAGNOSIS — M81.0 AGE-RELATED OSTEOPOROSIS WITHOUT CURRENT PATHOLOGICAL FRACTURE: ICD-10-CM

## 2019-07-25 DIAGNOSIS — F41.9 ANXIETY: ICD-10-CM

## 2019-07-25 DIAGNOSIS — M17.11 PRIMARY OSTEOARTHRITIS OF RIGHT KNEE: ICD-10-CM

## 2019-07-25 DIAGNOSIS — K21.9 GASTROESOPHAGEAL REFLUX DISEASE WITHOUT ESOPHAGITIS: ICD-10-CM

## 2019-07-25 DIAGNOSIS — E78.2 MIXED HYPERLIPIDEMIA: Chronic | ICD-10-CM

## 2019-07-25 DIAGNOSIS — F13.20 SEDATIVE HYPNOTIC OR ANXIOLYTIC DEPENDENCE: Chronic | ICD-10-CM

## 2019-07-25 DIAGNOSIS — Z86.2 HISTORY OF IRON DEFICIENCY ANEMIA: ICD-10-CM

## 2019-07-25 DIAGNOSIS — F51.04 PSYCHOPHYSIOLOGICAL INSOMNIA: ICD-10-CM

## 2019-07-25 PROCEDURE — 99999 PR PBB SHADOW E&M-EST. PATIENT-LVL IV: ICD-10-PCS | Mod: PBBFAC,HCNC,, | Performed by: NURSE PRACTITIONER

## 2019-07-25 PROCEDURE — 3078F PR MOST RECENT DIASTOLIC BLOOD PRESSURE < 80 MM HG: ICD-10-PCS | Mod: HCNC,CPTII,S$GLB, | Performed by: NURSE PRACTITIONER

## 2019-07-25 PROCEDURE — G0439 PPPS, SUBSEQ VISIT: HCPCS | Mod: HCNC,S$GLB,, | Performed by: NURSE PRACTITIONER

## 2019-07-25 PROCEDURE — 3078F DIAST BP <80 MM HG: CPT | Mod: HCNC,CPTII,S$GLB, | Performed by: NURSE PRACTITIONER

## 2019-07-25 PROCEDURE — 3075F PR MOST RECENT SYSTOLIC BLOOD PRESS GE 130-139MM HG: ICD-10-PCS | Mod: HCNC,CPTII,S$GLB, | Performed by: NURSE PRACTITIONER

## 2019-07-25 PROCEDURE — 99999 PR PBB SHADOW E&M-EST. PATIENT-LVL IV: CPT | Mod: PBBFAC,HCNC,, | Performed by: NURSE PRACTITIONER

## 2019-07-25 PROCEDURE — 3075F SYST BP GE 130 - 139MM HG: CPT | Mod: HCNC,CPTII,S$GLB, | Performed by: NURSE PRACTITIONER

## 2019-07-25 PROCEDURE — G0439 PR MEDICARE ANNUAL WELLNESS SUBSEQUENT VISIT: ICD-10-PCS | Mod: HCNC,S$GLB,, | Performed by: NURSE PRACTITIONER

## 2019-07-25 RX ORDER — PANTOPRAZOLE SODIUM 40 MG/1
40 TABLET, DELAYED RELEASE ORAL DAILY
Qty: 90 TABLET | Refills: 0 | Status: SHIPPED | OUTPATIENT
Start: 2019-07-25 | End: 2019-09-16 | Stop reason: SDUPTHER

## 2019-07-25 NOTE — PROGRESS NOTES
"Nereyda Hernandez presented for a  Medicare AWV and comprehensive Health Risk Assessment today. The following components were reviewed and updated:    · Medical history  · Family History  · Social history  · Allergies and Current Medications  · Health Risk Assessment  · Health Maintenance  · Care Team     ** See Completed Assessments for Annual Wellness Visit within the encounter summary.**       The following assessments were completed:  · Living Situation  · CAGE  · Depression Screening  · Timed Get Up and Go  · Whisper Test  · Cognitive Function Screening  · Nutrition Screening  · ADL Screening  · PAQ Screening    Vitals:    07/25/19 1031   BP: 132/76   BP Location: Right arm   Patient Position: Sitting   BP Method: Small (Manual)   Pulse: (!) 57   Resp: 16   Temp: 97.9 °F (36.6 °C)   TempSrc: Oral   SpO2: 97%   Weight: 65.2 kg (143 lb 11.8 oz)   Height: 5' 3" (1.6 m)     Body mass index is 25.46 kg/m².      Diagnoses and health risks identified today and associated recommendations/orders:    1. Encounter for preventive health examination  Provided Nereyda with a 5-10 year written screening schedule and personal prevention plan. Recommendations were developed using the USPSTF age appropriate recommendations. Education, counseling, and referrals were provided as needed. After Visit Summary printed and given to patient which includes a list of additional screenings\tests needed.    2. Age-related osteoporosis without current pathological fracture  - DXA Bone Density Appendicular Skeleton; Future  - Scheduled for 8/20/2019 following appointment with PCP    3. Gastroesophageal reflux disease without esophagitis  - pantoprazole (PROTONIX) 40 MG tablet; Take 1 tablet (40 mg total) by mouth once daily.  Dispense: 90 tablet; Refill: 0  - Increase dose from 20 to 40 due to burning sensation throughout the day    4. Sedative hypnotic or anxiolytic dependence  - Stable. Continue present management.     5. Anxiety  - Stable on zoloft " 100mg daily. Continue present management.     6. Essential hypertension  - BP controlled presently - reviewed anti-hypertensive regimen - continue current therapy    7. Mixed hyperlipidemia  - Stable. Next lipid panel due 5/2020.    8. Urge incontinence  - Stable. On Ditropan 5mg.    9. History of iron deficiency anemia  - No longer taking iron supplement due to GI upset. Taking a multi-vitamin with Iron.     10. Squamous cell carcinoma  - Stable. Asymptomatic.     11. Primary osteoarthritis of right knee  - Stable. Asymptomatic.     12. KANWAL on CPAP  - Stable. Followed by Sleep Medicine, Dr. Preciado.     13. Psychophysiological insomnia  - Stable. Followed by Sleep Medicine, Dr. Preciado.       I offered to discuss end of life issues, including information on how to make advance directives that the patient could use to name someone who would make medical decisions on their behalf if they became too ill to make themselves.    ___Patient declined  _X_Patient is interested, I provided paper work and offered to discuss.    Follow up in about 1 year (around 7/25/2020) for Annual Wellness Visit.    Keyanna Hardy, DNP

## 2019-07-25 NOTE — PATIENT INSTRUCTIONS
Start trying a protein supplement - [employer] (cold section by fruits/veggies), Boost, Ensure (in the aisle, not cold) at least once a day or twice!           Counseling and Referral of Other Preventative  (Italic type indicates deductible and co-insurance are waived)    Patient Name: Nereyda Hernandez  Today's Date: 7/25/2019    Health Maintenance       Date Due Completion Date    DEXA SCAN 06/05/2019 6/5/2017    Override on 8/12/2013: Done    Influenza Vaccine 08/01/2019 10/30/2018    Lipid Panel 05/14/2020 5/14/2019    Colonoscopy 04/26/2023 4/26/2018    TETANUS VACCINE 06/09/2024 6/9/2014        Orders Placed This Encounter   Procedures    DXA Bone Density Appendicular Skeleton     The following information is provided to all patients.  This information is to help you find resources for any of the problems found today that may be affecting your health:                Living healthy guide: www.[facility].[place].[facility]      Understanding Diabetes: www.diabetes.org      Eating healthy: www.cdc.gov/healthyweight      [facility] home safety checklist: www.cdc.gov/steadi/patient.html      Agency on Aging: www.goea.[place].[facility]      Alcoholics anonymous (AA): www.aa.org      Physical Activity: www.gonzalo.nih.gov/xg5hqfj      Tobacco use: www.quitwithusla.org

## 2019-08-20 ENCOUNTER — HOSPITAL ENCOUNTER (OUTPATIENT)
Dept: RADIOLOGY | Facility: CLINIC | Age: 79
Discharge: HOME | End: 2019-08-20
Attending: NURSE PRACTITIONER
Payer: MEDICARE

## 2019-08-20 ENCOUNTER — OFFICE VISIT (OUTPATIENT)
Dept: FAMILY MEDICINE | Facility: CLINIC | Age: 79
End: 2019-08-20
Payer: MEDICARE

## 2019-08-20 VITALS
HEART RATE: 55 BPM | DIASTOLIC BLOOD PRESSURE: 70 MMHG | SYSTOLIC BLOOD PRESSURE: 142 MMHG | WEIGHT: 143.88 LBS | HEIGHT: 63 IN | TEMPERATURE: 99 F | BODY MASS INDEX: 25.49 KG/M2 | OXYGEN SATURATION: 98 % | RESPIRATION RATE: 18 BRPM

## 2019-08-20 DIAGNOSIS — K21.9 GASTROESOPHAGEAL REFLUX DISEASE WITHOUT ESOPHAGITIS: ICD-10-CM

## 2019-08-20 DIAGNOSIS — M81.0 AGE-RELATED OSTEOPOROSIS WITHOUT CURRENT PATHOLOGICAL FRACTURE: ICD-10-CM

## 2019-08-20 DIAGNOSIS — I10 ESSENTIAL HYPERTENSION: Chronic | ICD-10-CM

## 2019-08-20 DIAGNOSIS — G47.00 INSOMNIA, UNSPECIFIED TYPE: Primary | ICD-10-CM

## 2019-08-20 DIAGNOSIS — R19.00 ABDOMINAL PULSATILE MASS: ICD-10-CM

## 2019-08-20 DIAGNOSIS — Z87.891 HX OF SMOKING: ICD-10-CM

## 2019-08-20 PROCEDURE — 1101F PR PT FALLS ASSESS DOC 0-1 FALLS W/OUT INJ PAST YR: ICD-10-PCS | Mod: HCNC,CPTII,S$GLB, | Performed by: FAMILY MEDICINE

## 2019-08-20 PROCEDURE — 99999 PR PBB SHADOW E&M-EST. PATIENT-LVL IV: CPT | Mod: PBBFAC,HCNC,, | Performed by: FAMILY MEDICINE

## 2019-08-20 PROCEDURE — 3077F PR MOST RECENT SYSTOLIC BLOOD PRESSURE >= 140 MM HG: ICD-10-PCS | Mod: HCNC,CPTII,S$GLB, | Performed by: FAMILY MEDICINE

## 2019-08-20 PROCEDURE — 99999 PR PBB SHADOW E&M-EST. PATIENT-LVL IV: ICD-10-PCS | Mod: PBBFAC,HCNC,, | Performed by: FAMILY MEDICINE

## 2019-08-20 PROCEDURE — 3078F PR MOST RECENT DIASTOLIC BLOOD PRESSURE < 80 MM HG: ICD-10-PCS | Mod: HCNC,CPTII,S$GLB, | Performed by: FAMILY MEDICINE

## 2019-08-20 PROCEDURE — 99214 OFFICE O/P EST MOD 30 MIN: CPT | Mod: HCNC,S$GLB,, | Performed by: FAMILY MEDICINE

## 2019-08-20 PROCEDURE — 77080 DEXA BONE DENSITY SPINE HIP: ICD-10-PCS | Mod: 26,HCNC,, | Performed by: INTERNAL MEDICINE

## 2019-08-20 PROCEDURE — 77080 DXA BONE DENSITY AXIAL: CPT | Mod: 26,HCNC,, | Performed by: INTERNAL MEDICINE

## 2019-08-20 PROCEDURE — 3078F DIAST BP <80 MM HG: CPT | Mod: HCNC,CPTII,S$GLB, | Performed by: FAMILY MEDICINE

## 2019-08-20 PROCEDURE — 99214 PR OFFICE/OUTPT VISIT, EST, LEVL IV, 30-39 MIN: ICD-10-PCS | Mod: HCNC,S$GLB,, | Performed by: FAMILY MEDICINE

## 2019-08-20 PROCEDURE — 77080 DXA BONE DENSITY AXIAL: CPT | Mod: TC,HCNC,PO

## 2019-08-20 PROCEDURE — 1101F PT FALLS ASSESS-DOCD LE1/YR: CPT | Mod: HCNC,CPTII,S$GLB, | Performed by: FAMILY MEDICINE

## 2019-08-20 PROCEDURE — 3077F SYST BP >= 140 MM HG: CPT | Mod: HCNC,CPTII,S$GLB, | Performed by: FAMILY MEDICINE

## 2019-08-20 RX ORDER — TRAZODONE HYDROCHLORIDE 50 MG/1
50 TABLET ORAL NIGHTLY
Qty: 90 TABLET | Refills: 1 | Status: SHIPPED | OUTPATIENT
Start: 2019-08-20 | End: 2021-03-02 | Stop reason: ALTCHOICE

## 2019-08-20 RX ORDER — METOPROLOL TARTRATE 25 MG/1
TABLET, FILM COATED ORAL
Qty: 90 TABLET | Refills: 2 | Status: SHIPPED | OUTPATIENT
Start: 2019-08-20 | End: 2019-10-10 | Stop reason: SDUPTHER

## 2019-08-20 NOTE — PROGRESS NOTES
Chief Complaint   Patient presents with    Anxiety    Nausea       HPI  Nereyda Hernandez is a 79 y.o. female with multiple medical diagnoses as listed in the medical history and problem list that presents for follow-up visit. She is having anxiety and stomach pain    Anxiety- she has been trying to get her affairs together and is having anxiety about this    Stomach pain- she has trouble with eating certain foods, like coffee. She has a pulsing feeling in her stomach. She has nausea, occasional belching    PAST MEDICAL HISTORY:  Past Medical History:   Diagnosis Date    Anxiety     Arthritis     Breast cancer 1990    left    Chronic disease anemia     Hyperlipidemia     Hypertension     Insomnia     Lobular carcinoma in situ     KANWAL (obstructive sleep apnea)     Osteoporosis     Rosacea     Squamous cell carcinoma     Urinary incontinence     Vertigo        PAST SURGICAL HISTORY:  Past Surgical History:   Procedure Laterality Date    BELT ABDOMINOPLASTY      BREAST BIOPSY Left     ex bx    BREAST LUMPECTOMY      HERNIA REPAIR      Ventral    OOPHORECTOMY      TONSILLECTOMY      TOTAL ABDOMINAL HYSTERECTOMY         SOCIAL HISTORY:  Social History     Socioeconomic History    Marital status:      Spouse name: Not on file    Number of children: 6    Years of education: college    Highest education level: Not on file   Occupational History    Occupation: retired  for [employer] Social Needs    Financial resource strain: Not on file    Food insecurity:     Worry: Not on file     Inability: Not on file    Transportation needs:     Medical: Not on file     Non-medical: Not on file   Tobacco Use    Smoking status: Former Smoker     Packs/day: 0.50     Years: 21.00     Pack years: 10.50     Start date: 1959     Last attempt to quit: 1980     Years since quittin.9    Smokeless tobacco: Never Used   Substance and Sexual Activity    Alcohol use: Yes      Alcohol/week: 3.0 oz     Types: 5 Glasses of wine per week     Comment: Occasionally    Drug use: No    Sexual activity: Not Currently     Partners: Male   Lifestyle    Physical activity:     Days per week: Not on file     Minutes per session: Not on file    Stress: Not on file   Relationships    Social connections:     Talks on phone: Not on file     Gets together: Not on file     Attends Rastafarian service: Not on file     Active member of club or organization: Not on file     Attends meetings of clubs or organizations: Not on file     Relationship status: Not on file   Other Topics Concern    Not on file   Social History Narrative    Not on file       FAMILY HISTORY:  Family History   Problem Relation Age of Onset    Cancer Mother         liver    Pancreatic cancer Mother     Heart disease Mother     Depression Mother     Hypertension Father     Alzheimer's disease Father     Depression Brother     Depression Brother     Diabetes Brother     Arthritis Brother     Hypertension Brother     Heart disease Brother        ALLERGIES AND MEDICATIONS: updated and reviewed.  Review of patient's allergies indicates:  No Known Allergies  Current Outpatient Medications   Medication Sig Dispense Refill    alendronate (FOSAMAX) 70 MG tablet Take 1 tablet (70 mg total) by mouth once a week. 12 tablet 3    aspirin (ECOTRIN) 81 MG EC tablet Take 81 mg by mouth once daily.      calcium citrate-vitamin D3 500 mg calcium -400 unit Chew Take 1 tablet by mouth 2 (two) times daily.        fish oil-omega-3 fatty acids 300-1,000 mg capsule Take 2 g by mouth once daily.        hydroCHLOROthiazide (HYDRODIURIL) 25 MG tablet Take 1 tablet (25 mg total) by mouth once daily. 90 tablet 3    latanoprost 0.005 % ophthalmic solution 1 drop every evening.      losartan (COZAAR) 50 MG tablet Take 1 tablet (50 mg total) by mouth once daily. 90 tablet 1    metoprolol tartrate (LOPRESSOR) 25 MG tablet TAKE 1/2 (ONE-HALF)  "TABLET BY MOUTH TWICE DAILY 90 tablet 2    multivit-iron-min-folic acid (MULTIVITAMIN-IRON-MINERALS-FOLIC ACID) 3,500-18-0.4 unit-mg-mg Chew Take by mouth.        oxybutynin (DITROPAN) 5 MG Tab Take 1 tablet (5 mg total) by mouth 2 (two) times daily. 90 tablet 3    pantoprazole (PROTONIX) 40 MG tablet Take 1 tablet (40 mg total) by mouth once daily. 90 tablet 0    pravastatin (PRAVACHOL) 20 MG tablet Take 1 tablet (20 mg total) by mouth once daily. 90 tablet 3    sertraline (ZOLOFT) 100 MG tablet Take 1 tablet (100 mg total) by mouth once daily. 90 tablet 3    traZODone (DESYREL) 50 MG tablet Take 1 tablet (50 mg total) by mouth every evening. 90 tablet 1     No current facility-administered medications for this visit.        ROS  Review of Systems   Constitutional: Negative for chills, diaphoresis, fatigue, fever and unexpected weight change.   HENT: Negative for rhinorrhea, sinus pressure, sore throat and tinnitus.    Eyes: Negative for photophobia and visual disturbance.   Respiratory: Negative for cough, shortness of breath and wheezing.    Cardiovascular: Negative for chest pain and palpitations.   Gastrointestinal: Positive for abdominal pain. Negative for blood in stool, constipation, diarrhea, nausea and vomiting.   Genitourinary: Negative for dysuria, flank pain, frequency and vaginal discharge.   Musculoskeletal: Negative for arthralgias and joint swelling.   Skin: Negative for rash.   Neurological: Negative for speech difficulty, weakness, light-headedness and headaches.   Psychiatric/Behavioral: Negative for behavioral problems and dysphoric mood.       Physical Exam  Vitals:    08/20/19 1136 08/20/19 1141   BP: (!) 144/72 (!) 142/70   Pulse: (!) 55    Resp: 18    Temp: 98.5 °F (36.9 °C)    TempSrc: Oral    SpO2: 98%    Weight: 65.2 kg (143 lb 13.6 oz)    Height: 5' 3" (1.6 m)     Body mass index is 25.48 kg/m².  Weight: 65.2 kg (143 lb 13.6 oz)   Height: 5' 3" (160 cm)     Physical Exam "   Constitutional: She is oriented to person, place, and time. She appears well-developed and well-nourished. No distress.   HENT:   Head: Normocephalic and atraumatic.   Eyes: EOM are normal.   Neck: Neck supple.   Cardiovascular: Normal rate and regular rhythm. Exam reveals no gallop and no friction rub.   No murmur heard.  Pulmonary/Chest: Effort normal and breath sounds normal. No respiratory distress. She has no wheezes. She has no rales.   Abdominal: Soft. Bowel sounds are normal. She exhibits no distension and no mass. There is tenderness in the epigastric area. There is no rebound and no guarding.   Lymphadenopathy:     She has no cervical adenopathy.   Neurological: She is alert and oriented to person, place, and time.   Skin: Skin is warm and dry. No rash noted.   Psychiatric: She has a normal mood and affect. Her behavior is normal.   Nursing note and vitals reviewed.      Health Maintenance       Date Due Completion Date    DEXA SCAN 06/05/2019 6/5/2017    Override on 8/12/2013: Done    Influenza Vaccine (1) 09/01/2019 10/30/2018    Lipid Panel 05/14/2020 5/14/2019    Colonoscopy 04/26/2023 4/26/2018    TETANUS VACCINE 06/09/2024 6/9/2014          Health maintenance reviewed and addressed as ordered      ASSESSMENT     1. Insomnia, unspecified type    2. Essential hypertension    3. Gastroesophageal reflux disease without esophagitis    4. Abdominal pulsatile mass    5. Hx of smoking        PLAN:     Problem List Items Addressed This Visit        Cardiac/Vascular    Essential hypertension (Chronic)  -continue current regimen    Relevant Medications    metoprolol tartrate (LOPRESSOR) 25 MG tablet       GI    Gastroesophageal reflux disease without esophagitis  -will rule out H pylori, continue PPI therapy, consider EGD if positive    Relevant Orders    H. pylori antigen, stool      Other Visit Diagnoses     Insomnia, unspecified type    -  Primary  -continue current regimen as this is effective    Relevant  Medications    traZODone (DESYREL) 50 MG tablet    Abdominal pulsatile mass      -will get US to eval for aneurysm    Relevant Orders    US Abdominal Aorta    Hx of smoking      -will get abdominal aorta US due to her hx and the pulsing sensation she feels            Genny Martinez MD  08/21/2019 12:08 PM        Follow up in about 3 months (around 11/20/2019) for Follow up.

## 2019-08-21 ENCOUNTER — TELEPHONE (OUTPATIENT)
Dept: FAMILY MEDICINE | Facility: CLINIC | Age: 79
End: 2019-08-21

## 2019-08-21 NOTE — TELEPHONE ENCOUNTER
----- Message from Nubia Beltrantrang sent at 8/21/2019  3:55 PM CDT -----  Contact: Self   Type: Patient Call Back    Who called: Self   What is the request in detail: Patient is asking for clarification on her upcoming appointments.     Can the clinic reply by [facility]? Call     Would the patient rather a call back or a response via [facility]?  Call   Best call back number: [phone]

## 2019-08-21 NOTE — TELEPHONE ENCOUNTER
Spoke with pt states she wanted to cancel nurse appointment and follow up appoint in September, pt did schedule a three moth follow up appointment in [place]er

## 2019-08-23 ENCOUNTER — LAB VISIT (OUTPATIENT)
Dept: LAB | Facility: HOSPITAL | Age: 79
End: 2019-08-23
Attending: FAMILY MEDICINE
Payer: MEDICARE

## 2019-08-23 DIAGNOSIS — K21.9 GASTROESOPHAGEAL REFLUX DISEASE WITHOUT ESOPHAGITIS: ICD-10-CM

## 2019-08-23 PROCEDURE — 87338 HPYLORI STOOL AG IA: CPT | Mod: HCNC

## 2019-08-27 NOTE — PROGRESS NOTES
Bone loss present but medication is not required at this time. We can repeat her test in two years    Genny Martinez MD

## 2019-08-28 LAB — H PYLORI AG STL QL IA: NOT DETECTED

## 2019-08-29 NOTE — PROGRESS NOTES
Your stool test shows no signs of infection. I think you have reflux but no ulcer. I recommend you continue taking the medication you were given and let me know if things don't get better over the next 2-3 weeks    Genny Martinez MD

## 2019-09-03 ENCOUNTER — TELEPHONE (OUTPATIENT)
Dept: FAMILY MEDICINE | Facility: CLINIC | Age: 79
End: 2019-09-03

## 2019-09-03 DIAGNOSIS — N32.81 OVERACTIVE BLADDER: ICD-10-CM

## 2019-09-03 DIAGNOSIS — R00.2 PALPITATIONS: Primary | ICD-10-CM

## 2019-09-03 NOTE — TELEPHONE ENCOUNTER
----- Message from Oumou Sellers sent at 9/3/2019 10:44 AM CDT -----  Contact: Self [phone]  Type: Patient Call Back    Who called: Self    What is the request in detail: pt is calling in regards getting her oxybutynin (DITROPAN) 5 MG Tab possibly changed to Myrbetriq because the medication that she is currently on is causing her dry mouth    Can the clinic reply by [facility]? Call back    Would the patient rather a call back or a response via [facility]? Call back    Best call back number:[phone]

## 2019-09-04 DIAGNOSIS — N39.41 URGE INCONTINENCE: ICD-10-CM

## 2019-09-04 RX ORDER — OXYBUTYNIN CHLORIDE 5 MG/1
5 TABLET ORAL 2 TIMES DAILY
Qty: 180 TABLET | Refills: 1 | Status: SHIPPED | OUTPATIENT
Start: 2019-09-04 | End: 2019-09-16

## 2019-09-04 NOTE — TELEPHONE ENCOUNTER
Patient stated that she is aware of myrbetriq being more expensive. Is willing to still try it unless there's another medication that can be recommended that doesn't cause dry mouth.    Also would like an referral placed for cardiology since Dr. Pena left.

## 2019-09-04 NOTE — TELEPHONE ENCOUNTER
----- Message from Anastasia Diaz sent at 9/4/2019  1:24 PM CDT -----  Contact: Patient ph [phone]  Type:  Patient Returning Call    Who Called: Patient    Who Left Message for Patient: Claire PORTER    Does the patient know what this is regarding?: a different Rx for bladder    Would the patient rather a call back or a response via [facility]? Call back    Best Call Back Number: [phone]

## 2019-09-04 NOTE — TELEPHONE ENCOUNTER
----- Message from Yari Hinkle sent at 9/4/2019 10:19 AM CDT -----  Contact: Patient   Type: Patient Call Back    Who called: Patient     What is the request in detail: pt is calling to check the status of her oxybutynin (DITROPAN) 5 MG Tab.    Can the clinic reply by [facility]? No     Would the patient rather a call back or a response via [facility]? Call back     Best call back number:[phone]    Additional Information:

## 2019-09-04 NOTE — TELEPHONE ENCOUNTER
Is she aware that myrbetriq is a more expensive medication and may also cause dry mouth as a side effect    Genny Martinez MD

## 2019-09-16 ENCOUNTER — TELEPHONE (OUTPATIENT)
Dept: INTERNAL MEDICINE | Facility: CLINIC | Age: 79
End: 2019-09-16

## 2019-09-16 ENCOUNTER — OFFICE VISIT (OUTPATIENT)
Dept: FAMILY MEDICINE | Facility: CLINIC | Age: 79
End: 2019-09-16
Payer: MEDICARE

## 2019-09-16 VITALS
OXYGEN SATURATION: 97 % | WEIGHT: 141.81 LBS | HEIGHT: 63 IN | TEMPERATURE: 98 F | SYSTOLIC BLOOD PRESSURE: 192 MMHG | BODY MASS INDEX: 25.12 KG/M2 | HEART RATE: 64 BPM | DIASTOLIC BLOOD PRESSURE: 90 MMHG

## 2019-09-16 DIAGNOSIS — K21.9 GASTROESOPHAGEAL REFLUX DISEASE WITHOUT ESOPHAGITIS: Primary | ICD-10-CM

## 2019-09-16 DIAGNOSIS — R32 URINARY INCONTINENCE, UNSPECIFIED TYPE: ICD-10-CM

## 2019-09-16 DIAGNOSIS — I10 ESSENTIAL HYPERTENSION: Chronic | ICD-10-CM

## 2019-09-16 DIAGNOSIS — G47.33 OSA ON CPAP: ICD-10-CM

## 2019-09-16 DIAGNOSIS — R63.4 WEIGHT LOSS: ICD-10-CM

## 2019-09-16 DIAGNOSIS — M81.0 OSTEOPOROSIS, UNSPECIFIED OSTEOPOROSIS TYPE, UNSPECIFIED PATHOLOGICAL FRACTURE PRESENCE: ICD-10-CM

## 2019-09-16 DIAGNOSIS — F51.04 PSYCHOPHYSIOLOGICAL INSOMNIA: ICD-10-CM

## 2019-09-16 DIAGNOSIS — R10.84 GENERALIZED ABDOMINAL PAIN: ICD-10-CM

## 2019-09-16 DIAGNOSIS — E78.2 MIXED HYPERLIPIDEMIA: Chronic | ICD-10-CM

## 2019-09-16 DIAGNOSIS — R00.2 PALPITATIONS: ICD-10-CM

## 2019-09-16 PROCEDURE — 99999 PR PBB SHADOW E&M-EST. PATIENT-LVL V: ICD-10-PCS | Mod: PBBFAC,HCNC,, | Performed by: INTERNAL MEDICINE

## 2019-09-16 PROCEDURE — 3080F DIAST BP >= 90 MM HG: CPT | Mod: HCNC,CPTII,S$GLB, | Performed by: INTERNAL MEDICINE

## 2019-09-16 PROCEDURE — 1101F PT FALLS ASSESS-DOCD LE1/YR: CPT | Mod: HCNC,CPTII,S$GLB, | Performed by: INTERNAL MEDICINE

## 2019-09-16 PROCEDURE — 3077F SYST BP >= 140 MM HG: CPT | Mod: HCNC,CPTII,S$GLB, | Performed by: INTERNAL MEDICINE

## 2019-09-16 PROCEDURE — 99215 PR OFFICE/OUTPT VISIT, EST, LEVL V, 40-54 MIN: ICD-10-PCS | Mod: HCNC,S$GLB,, | Performed by: INTERNAL MEDICINE

## 2019-09-16 PROCEDURE — 3077F PR MOST RECENT SYSTOLIC BLOOD PRESSURE >= 140 MM HG: ICD-10-PCS | Mod: HCNC,CPTII,S$GLB, | Performed by: INTERNAL MEDICINE

## 2019-09-16 PROCEDURE — 99999 PR PBB SHADOW E&M-EST. PATIENT-LVL V: CPT | Mod: PBBFAC,HCNC,, | Performed by: INTERNAL MEDICINE

## 2019-09-16 PROCEDURE — 99215 OFFICE O/P EST HI 40 MIN: CPT | Mod: HCNC,S$GLB,, | Performed by: INTERNAL MEDICINE

## 2019-09-16 PROCEDURE — 1101F PR PT FALLS ASSESS DOC 0-1 FALLS W/OUT INJ PAST YR: ICD-10-PCS | Mod: HCNC,CPTII,S$GLB, | Performed by: INTERNAL MEDICINE

## 2019-09-16 PROCEDURE — 3080F PR MOST RECENT DIASTOLIC BLOOD PRESSURE >= 90 MM HG: ICD-10-PCS | Mod: HCNC,CPTII,S$GLB, | Performed by: INTERNAL MEDICINE

## 2019-09-16 RX ORDER — PANTOPRAZOLE SODIUM 40 MG/1
40 TABLET, DELAYED RELEASE ORAL 2 TIMES DAILY
Qty: 180 TABLET | Refills: 1 | Status: SHIPPED | OUTPATIENT
Start: 2019-09-16 | End: 2020-07-16

## 2019-09-16 RX ORDER — HYDROCHLOROTHIAZIDE 25 MG/1
25 TABLET ORAL DAILY
Refills: 3 | COMMUNITY
Start: 2019-08-22 | End: 2019-10-01

## 2019-09-16 RX ORDER — CLONIDINE HYDROCHLORIDE 0.1 MG/1
0.1 TABLET ORAL
Status: COMPLETED | OUTPATIENT
Start: 2019-09-16 | End: 2019-09-16

## 2019-09-16 RX ORDER — LOSARTAN POTASSIUM 100 MG/1
100 TABLET ORAL DAILY
Qty: 30 TABLET | Refills: 3 | Status: SHIPPED | OUTPATIENT
Start: 2019-09-16 | End: 2020-01-22 | Stop reason: SDUPTHER

## 2019-09-16 RX ORDER — SERTRALINE HYDROCHLORIDE 100 MG/1
100 TABLET, FILM COATED ORAL DAILY
Refills: 3 | COMMUNITY
Start: 2019-08-22 | End: 2020-05-26

## 2019-09-16 RX ADMIN — CLONIDINE HYDROCHLORIDE 0.1 MG: 0.1 TABLET ORAL at 03:09

## 2019-09-16 NOTE — PATIENT INSTRUCTIONS
We have reviewed your prescription medications. We will increase pantoprazole to 40 mg twice a day and refer to GI. We will check additional labs. We gave you a clonidine in the office to bring down blood pressure. I am also increasing the losartan to 100 mg a day. We will also refer to cardiology for palpitations and high BP.

## 2019-09-18 ENCOUNTER — PATIENT OUTREACH (OUTPATIENT)
Dept: ADMINISTRATIVE | Facility: OTHER | Age: 79
End: 2019-09-18

## 2019-09-18 ENCOUNTER — TELEPHONE (OUTPATIENT)
Dept: FAMILY MEDICINE | Facility: CLINIC | Age: 79
End: 2019-09-18

## 2019-09-18 NOTE — TELEPHONE ENCOUNTER
Spoke to patient and informed that he doctor has not had a chance to preview the labs and once previewed we will call with recommendations.

## 2019-09-18 NOTE — TELEPHONE ENCOUNTER
----- Message from Ana Tran sent at 9/18/2019  3:57 PM CDT -----  Contact: [phone]/self  Type:  Test Results    Who Called:  self  Name of Test (Lab/Mammo/Etc):  Blood work  Date of Test:  09/16  Ordering Provider:    Where the test was performed[facility]  Would the patient rather a call back or a response via [facility]? call  Best Call Back Number:    Additional Information:

## 2019-09-19 ENCOUNTER — OFFICE VISIT (OUTPATIENT)
Dept: PULMONOLOGY | Facility: CLINIC | Age: 79
End: 2019-09-19
Payer: MEDICARE

## 2019-09-19 ENCOUNTER — OFFICE VISIT (OUTPATIENT)
Dept: CARDIOLOGY | Facility: CLINIC | Age: 79
End: 2019-09-19
Payer: MEDICARE

## 2019-09-19 VITALS
DIASTOLIC BLOOD PRESSURE: 71 MMHG | HEART RATE: 71 BPM | WEIGHT: 144.06 LBS | BODY MASS INDEX: 25.52 KG/M2 | SYSTOLIC BLOOD PRESSURE: 129 MMHG | HEIGHT: 63 IN | OXYGEN SATURATION: 95 %

## 2019-09-19 VITALS
WEIGHT: 143.63 LBS | HEART RATE: 59 BPM | HEIGHT: 63 IN | OXYGEN SATURATION: 97 % | SYSTOLIC BLOOD PRESSURE: 151 MMHG | BODY MASS INDEX: 25.45 KG/M2 | DIASTOLIC BLOOD PRESSURE: 86 MMHG

## 2019-09-19 DIAGNOSIS — I10 ESSENTIAL HYPERTENSION: Chronic | ICD-10-CM

## 2019-09-19 DIAGNOSIS — G47.33 OSA (OBSTRUCTIVE SLEEP APNEA): ICD-10-CM

## 2019-09-19 DIAGNOSIS — F51.04 PSYCHOPHYSIOLOGICAL INSOMNIA: ICD-10-CM

## 2019-09-19 DIAGNOSIS — R06.09 DOE (DYSPNEA ON EXERTION): ICD-10-CM

## 2019-09-19 DIAGNOSIS — R00.2 PALPITATIONS: Primary | ICD-10-CM

## 2019-09-19 DIAGNOSIS — E78.2 MIXED HYPERLIPIDEMIA: Chronic | ICD-10-CM

## 2019-09-19 PROCEDURE — 3078F DIAST BP <80 MM HG: CPT | Mod: HCNC,CPTII,S$GLB, | Performed by: INTERNAL MEDICINE

## 2019-09-19 PROCEDURE — 3074F SYST BP LT 130 MM HG: CPT | Mod: HCNC,CPTII,S$GLB, | Performed by: INTERNAL MEDICINE

## 2019-09-19 PROCEDURE — 3078F PR MOST RECENT DIASTOLIC BLOOD PRESSURE < 80 MM HG: ICD-10-PCS | Mod: HCNC,CPTII,S$GLB, | Performed by: INTERNAL MEDICINE

## 2019-09-19 PROCEDURE — 99214 PR OFFICE/OUTPT VISIT, EST, LEVL IV, 30-39 MIN: ICD-10-PCS | Mod: HCNC,S$GLB,, | Performed by: INTERNAL MEDICINE

## 2019-09-19 PROCEDURE — 3079F DIAST BP 80-89 MM HG: CPT | Mod: HCNC,CPTII,S$GLB, | Performed by: INTERNAL MEDICINE

## 2019-09-19 PROCEDURE — 99214 OFFICE O/P EST MOD 30 MIN: CPT | Mod: HCNC,S$GLB,, | Performed by: INTERNAL MEDICINE

## 2019-09-19 PROCEDURE — 3077F SYST BP >= 140 MM HG: CPT | Mod: HCNC,CPTII,S$GLB, | Performed by: INTERNAL MEDICINE

## 2019-09-19 PROCEDURE — 99999 PR PBB SHADOW E&M-EST. PATIENT-LVL IV: CPT | Mod: PBBFAC,HCNC,, | Performed by: INTERNAL MEDICINE

## 2019-09-19 PROCEDURE — 99215 PR OFFICE/OUTPT VISIT, EST, LEVL V, 40-54 MIN: ICD-10-PCS | Mod: HCNC,S$GLB,, | Performed by: INTERNAL MEDICINE

## 2019-09-19 PROCEDURE — 99999 PR PBB SHADOW E&M-EST. PATIENT-LVL III: ICD-10-PCS | Mod: PBBFAC,HCNC,, | Performed by: INTERNAL MEDICINE

## 2019-09-19 PROCEDURE — 3077F PR MOST RECENT SYSTOLIC BLOOD PRESSURE >= 140 MM HG: ICD-10-PCS | Mod: HCNC,CPTII,S$GLB, | Performed by: INTERNAL MEDICINE

## 2019-09-19 PROCEDURE — 99999 PR PBB SHADOW E&M-EST. PATIENT-LVL III: CPT | Mod: PBBFAC,HCNC,, | Performed by: INTERNAL MEDICINE

## 2019-09-19 PROCEDURE — 99999 PR PBB SHADOW E&M-EST. PATIENT-LVL IV: ICD-10-PCS | Mod: PBBFAC,HCNC,, | Performed by: INTERNAL MEDICINE

## 2019-09-19 PROCEDURE — 93000 EKG 12-LEAD: ICD-10-PCS | Mod: HCNC,S$GLB,, | Performed by: INTERNAL MEDICINE

## 2019-09-19 PROCEDURE — 93000 ELECTROCARDIOGRAM COMPLETE: CPT | Mod: HCNC,S$GLB,, | Performed by: INTERNAL MEDICINE

## 2019-09-19 PROCEDURE — 1101F PR PT FALLS ASSESS DOC 0-1 FALLS W/OUT INJ PAST YR: ICD-10-PCS | Mod: HCNC,CPTII,S$GLB, | Performed by: INTERNAL MEDICINE

## 2019-09-19 PROCEDURE — 3074F PR MOST RECENT SYSTOLIC BLOOD PRESSURE < 130 MM HG: ICD-10-PCS | Mod: HCNC,CPTII,S$GLB, | Performed by: INTERNAL MEDICINE

## 2019-09-19 PROCEDURE — 1101F PT FALLS ASSESS-DOCD LE1/YR: CPT | Mod: HCNC,CPTII,S$GLB, | Performed by: INTERNAL MEDICINE

## 2019-09-19 PROCEDURE — 99215 OFFICE O/P EST HI 40 MIN: CPT | Mod: HCNC,S$GLB,, | Performed by: INTERNAL MEDICINE

## 2019-09-19 PROCEDURE — 3079F PR MOST RECENT DIASTOLIC BLOOD PRESSURE 80-89 MM HG: ICD-10-PCS | Mod: HCNC,CPTII,S$GLB, | Performed by: INTERNAL MEDICINE

## 2019-09-19 NOTE — ASSESSMENT & PLAN NOTE
Vague symptoms, may be related to deconditioning.  Pt noted to have symptoms prior to last cardiac w/u in 5/2018 which was negative for cardiac pathology.    - will re-eval cardiac function w/ echo given elevated BP  - f/u in 3 weeks

## 2019-09-19 NOTE — PROGRESS NOTES
Nereyda Hernandez  was seen as a follow up.    CHIEF COMPLAINT:    Chief Complaint   Patient presents with    Sleep Apnea       HISTORY OF PRESENT ILLNESS: Nereyda Hernandez is a 79 y.o. female is here for sleep evaluation.    Our first encounter was 9/12.  Patient was diagnosed with kaylee in 2004.  At that time, patient was on cpap of 9 cm H20.  Pt has been using machine nightly until 1 month ago.  +leakage around full face mask.  +snoring without machine.  +fatigue upon awakening.  No rls symptoms.  No parasomnia.  No cataplexy.  No bruxism.  Patient was referred to Dr. Voss for oral appliance in 2012.        On today's [place] Sleepiness Scale the patient scores a 8.    Patient was set up with oral appliance 2012.  +persistent fatigue.  Still with fatigue.  Patient also has issue with teeth shifting.  Patient was set up for cpap during last appointment.  During our last encounter, patient resumed using cpap 5 times per week.  Since our last encounter, patient has stopped cpap and had another oral appliance made by Dr. Pastora Voss.  Patient has been using oral appliance without issue.    With oral appliance, patient do not snore.  Patient was sleeping well until 12/18.  At that time, patient experience shingle across lips and right cheek area.  After shingle, patient has difficulty fatigue and difficulty sleeping.  Currently on trazadone.    During last appointment, stimulus control and sleep restriction d/w patient.  Sleep has improve with change in sleep routine.  Sleep latency improved.      SLEEP ROUTINE:  Activity the hour prior to sleep: read     Bed partner:  none  Time to bed:  10-11 pm  Lights off:  yes  Sleep onset latency:  15 minutes with trazadone.    Disruptions or awakenings:   1 (no difficulty going back to sleep)  Wakeup time:      5:45 am  Perceived sleep quality:  tire  Daytime naps:      none  Weekend sleep routine:      10 pm to 7 am  Caffeine use: 2 cups of coffee in am  exercise habit:   Silver sneaker 2  times per week.      PAST MEDICAL HISTORY:    Active Ambulatory Problems     Diagnosis Date Noted    Age-related osteoporosis without current pathological fracture 08/08/2012    Anxiety 08/08/2012    Essential hypertension 08/08/2012    KANWAL (obstructive sleep apnea) 08/08/2012    Hyperlipidemia 08/08/2012    Psychophysiological insomnia 08/12/2013    Urge incontinence 10/16/2014    History of iron deficiency anemia 08/26/2015    Sedative hypnotic or anxiolytic dependence 10/25/2016    Osteoarthritis of right knee 09/19/2017    Squamous cell carcinoma 12/05/2017    Gastroesophageal reflux disease without esophagitis 08/20/2019    Palpitations 09/19/2019    HUBBARD (dyspnea on exertion) 09/19/2019     Resolved Ambulatory Problems     Diagnosis Date Noted    Chronic disease anemia 08/12/2013     Past Medical History:   Diagnosis Date    Arthritis     Breast cancer 1990    Hypertension     Insomnia     Lobular carcinoma in situ     Osteoporosis     Rosacea     Urinary incontinence     Vertigo                 PAST SURGICAL HISTORY:    Past Surgical History:   Procedure Laterality Date    BELT ABDOMINOPLASTY      BREAST BIOPSY Left 1990    ex bx    BREAST LUMPECTOMY      HERNIA REPAIR      Ventral    OOPHORECTOMY      TONSILLECTOMY      TOTAL ABDOMINAL HYSTERECTOMY           FAMILY HISTORY:                Family History   Problem Relation Age of Onset    Cancer Mother         liver    Pancreatic cancer Mother     Heart disease Mother     Depression Mother     Hypertension Father     Alzheimer's disease Father     Depression Brother     Depression Brother     Diabetes Brother     Arthritis Brother     Hypertension Brother     Heart disease Brother        SOCIAL HISTORY:          Tobacco:   Social History     Tobacco Use   Smoking Status Former Smoker    Packs/day: 0.50    Years: 21.00    Pack years: 10.50    Start date: 1/1/1959    Last attempt to quit: 9/20/1980    Years since  quittin.0   Smokeless Tobacco Never Used       alcohol use:    Social History     Substance and Sexual Activity   Alcohol Use Yes    Alcohol/week: 5.0 standard drinks    Types: 5 Glasses of wine per week    Comment: Occasionally                 Occupation: retired housewife, former customer service at [employer].    ALLERGIES:  Review of patient's allergies indicates:  No Known Allergies    CURRENT MEDICATIONS:    Current Outpatient Medications   Medication Sig Dispense Refill    aspirin (ECOTRIN) 81 MG EC tablet Take 81 mg by mouth once daily.      calcium citrate-vitamin D3 500 mg calcium -400 unit Chew Take 1 tablet by mouth 2 (two) times daily.        fish oil-omega-3 fatty acids 300-1,000 mg capsule Take 2 g by mouth once daily.        hydroCHLOROthiazide (HYDRODIURIL) 25 MG tablet Take 25 mg by mouth once daily.  3    latanoprost 0.005 % ophthalmic solution 1 drop every evening.      losartan (COZAAR) 100 MG tablet Take 1 tablet (100 mg total) by mouth once daily. 30 tablet 3    metoprolol tartrate (LOPRESSOR) 25 MG tablet TAKE 1/2 (ONE-HALF) TABLET BY MOUTH TWICE DAILY 90 tablet 2    mirabegron (MYRBETRIQ) 25 mg Tb24 ER tablet Take 1 tablet (25 mg total) by mouth once daily. 90 tablet 0    multivit-iron-min-folic acid (MULTIVITAMIN-IRON-MINERALS-FOLIC ACID) 3,500-18-0.4 unit-mg-mg Chew Take by mouth.        pantoprazole (PROTONIX) 40 MG tablet Take 1 tablet (40 mg total) by mouth 2 (two) times daily. 180 tablet 1    pravastatin (PRAVACHOL) 20 MG tablet Take 1 tablet (20 mg total) by mouth once daily. 90 tablet 3    sertraline (ZOLOFT) 100 MG tablet Take 100 mg by mouth once daily.  3    traZODone (DESYREL) 50 MG tablet Take 1 tablet (50 mg total) by mouth every evening. 90 tablet 1     No current facility-administered medications for this visit.                   REVIEW OF SYSTEMS:     Sleep related symptoms as per HPI.  CONST:Denies weight gain    HEENT: Denies sinus problems  PULM: Denies  "dyspnea  CARD:  Denies palpitations   GI:  Denies acid reflux  : Denies polyuria  NEURO: Denies headaches  PSYCH: Denies mood disturbance  HEME: Denies anemia   Otherwise, a balance of systems reviewed is negative.          PHYSICAL EXAM:  Vitals:    09/19/19 1425   BP: 129/71   Pulse: 71   SpO2: 95%   Weight: 65.4 kg (144 lb 1.1 oz)   Height: 5' 3" (1.6 m)   PainSc:   4   PainLoc: Head     Body mass index is 25.52 kg/m².     GENERAL: Normal development, well groomed  HEENT:  Conjunctivae are non-erythematous; Pupils equal, round, and reactive to light; Nose is symmetrical; Nasal mucosa is pink and moist; Septum is midline; Inferior turbinates are normal; Nasal airflow is normal; Posterior pharynx is pink; Modified Mallampati: 4; Posterior palate is normal; Tonsils +1; Uvula is normal and pink;Tongue is normal; Dentition is fair; No TMJ tenderness; Jaw opening and protrusion without click and without discomfort.  NECK: Supple. Neck circumference is 14 inches. No thyromegaly. No palpable nodes.     SKIN: On face and neck: No abrasions, no rashes, no lesions.  No subcutaneous nodules are palpable.  RESPIRATORY: Chest is clear to auscultation.  Normal chest expansion and non-labored breathing at rest.  CARDIOVASCULAR: Normal S1, S2.  No murmurs, gallops or rubs. No carotid bruits bilaterally.  EXTREMITIES: No edema. No clubbing. No cyanosis. Station normal. Gait normal.        NEURO/PSYCH: Oriented to time, place and person. Normal attention span and concentration. Affect is full. Mood is normal.                                            DATA 1/20/04 rdi 12.3 with optimal cpap of 9    ASSESSMENT  Problem List Items Addressed This Visit     KANWAL (obstructive sleep apnea)    Overview     mild kanwal.             Current Assessment & Plan     Having difficulty with oral appliance.  Will bring back for retitration.           Relevant Orders    Polysomnogram (CPAP will be added if patient meets diagnostic criteria.)    " Psychophysiological insomnia    Overview     difficutly with sleep initiation and maintenance.  Started after shingle.  Suspect psychophysilogic.  currently on trazadone.           Current Assessment & Plan     Improve with stimulus control.  Advise patient to cut back on trazadone.                  Education: During our discussion today, we talked about the etiology of obstructive sleep apnea as well as the potential ramifications of untreated sleep apnea, which could include daytime sleepiness, hypertension, heart disease and/or stroke.        Patient will Follow up for after sleep study.    This is 25 minutes visit, over 50% of time spent in direct consultation with patient.

## 2019-09-19 NOTE — ASSESSMENT & PLAN NOTE
Pt currently on statin and LDL 83, goal < 130.    - continue current medical therapy  - continue lifestyle and risk factor modifications

## 2019-09-19 NOTE — ASSESSMENT & PLAN NOTE
Likely related to PVCs/PACs as pt noted to have to have on 24 hr Holter 5/2018.  Currently on metoprolol.  HR 59 and in 50s at home.  - continue current medical therapy (will not adjust given bradycardia)  - continue to optimize BP as may be confounding sensation of palpitations

## 2019-09-19 NOTE — LETTER
September 19, 2019      Lara Mac MD  [address] [facility]  [address]   [facility] [address]           [facility]  [address] [place] [address], [id number]  [facility] [address]  Phone: [phone]  Fax: [phone]          Patient: Nereyda Hernandez   MR Number: [id number]   YOB: 1940   Date of Visit: 9/19/2019       Dear Dr. Lara Mac:    Thank you for referring Nereyda Hernandez to me for evaluation. Attached you will find relevant portions of my assessment and plan of care.    If you have questions, please do not hesitate to call me. I look forward to following Nereyda Hernandez along with you.    Sincerely,    Sujit Dent III, MD    Enclosure  CC:  No Recipients    If you would like to receive this communication electronically, please contact [email or web address] or [phone] to request more information on [employer] vascular solution Link access.    For providers and/or their staff who would like to refer a patient to [facility], please contact us through our one-stop-shop provider referral line, [facility], at [phone].    If you feel you have received this communication in error or would no longer like to receive these types of communications, please e-mail [email or web address]

## 2019-09-19 NOTE — ASSESSMENT & PLAN NOTE
Noted to be elevated, recent increase of losartan to 100 mg daily.  Goal BP <140/80, noted to be at goal at home.    - continue current medical therapy  - pt to continue to monitor BP and will record and bring in at next appt  - continue risk factor and lifestyle modifications

## 2019-09-19 NOTE — TELEPHONE ENCOUNTER
Thyroid test was normal. The blood chemistries, kidney function test  and liver function tests were within normal range.Blood counts were normal.

## 2019-09-19 NOTE — PROGRESS NOTES
"Subjective:    Patient ID:  Nereyda Hernandez is a 79 y.o. female who presents for evaluation of Palpitations (Ref by Dr Mac)      PCP: Genny Martinez MD     Referring Provider: Dr. Lara Mac    Prior Cardiologist: Dr. Jean PEREZ  Pt is a 80 yo F w/ PMH of HTN, HLD, KANWAL not on CPAP, and PVCs/PACs who presents for evaluation for palpitations and elevated BP.  She notes that she has palpitations or a "hard heartbeats" for which she has been on metoprolol and had a 24hr Holter in 5/2018 which noted PVCs and PACs.  She was referred by her PCP, Dr. Mac for further evaluation and assistance w/ BP management.  Pt mentions that she has been having abdominal pain for which she is planned to see GI.  She denies cp, sob, orthopnea, PND, presyncope, LOC, or claudication.  She admits to BLE edema for which she wears compression stockings and camacho which has been presents for years (prior to her stress MPI and echo in 5/2018).  She is compliant w/ her medical therapy and has been monitoring her BP at home w/ a wrist cuff and runs <140/80s.  Her PCP recently increased her losartan to 100 mg daily.  She mentions she goes to the gym a few times a week and has an aerobics class and elliptical machine with fatigue but no chest pain.             Past Medical History:   Diagnosis Date    Anxiety     Arthritis     Breast cancer 1990    left    Chronic disease anemia     Hyperlipidemia     Hypertension     Insomnia     Lobular carcinoma in situ     KANWAL (obstructive sleep apnea)     Osteoporosis     Rosacea     Squamous cell carcinoma     Urinary incontinence     Vertigo      Past Surgical History:   Procedure Laterality Date    BELT ABDOMINOPLASTY      BREAST BIOPSY Left 1990    ex bx    BREAST LUMPECTOMY      HERNIA REPAIR      [facility]    OOPHORECTOMY      TONSILLECTOMY      TOTAL ABDOMINAL HYSTERECTOMY       Social History     Socioeconomic History    Marital status:      Spouse name: Not on file    " Number of children: 6    Years of education: college    Highest education level: Not on file   Occupational History    Occupation: retired  for [employer]   Social Needs    Financial resource strain: Not on file    Food insecurity:     Worry: Not on file     Inability: Not on file    Transportation needs:     Medical: Not on file     Non-medical: Not on file   Tobacco Use    Smoking status: Former Smoker     Packs/day: 0.50     Years: 21.00     Pack years: 10.50     Start date: 1959     Last attempt to quit: 1980     Years since quittin.0    Smokeless tobacco: Never Used   Substance and Sexual Activity    Alcohol use: Yes     Alcohol/week: 3.0 oz     Types: 5 Glasses of wine per week     Comment: Occasionally    Drug use: No    Sexual activity: Not Currently     Partners: Male   Lifestyle    Physical activity:     Days per week: Not on file     Minutes per session: Not on file    Stress: Not on file   Relationships    Social connections:     Talks on phone: Not on file     Gets together: Not on file     Attends [employer] service: Not on file     Active member of club or organization: Not on file     Attends meetings of clubs or organizations: Not on file     Relationship status: Not on file   Other Topics Concern    Not on file   Social History Narrative    Not on file     Family History   Problem Relation Age of Onset    Cancer Mother         liver    Pancreatic cancer Mother     Heart disease Mother     Depression Mother     Hypertension Father     Alzheimer's disease Father     Depression Brother     Depression Brother     Diabetes Brother     Arthritis Brother     Hypertension Brother     Heart disease Brother        Review of patient's allergies indicates:  No Known Allergies    Medication List with Changes/Refills   Current Medications    ASPIRIN (ECOTRIN) 81 MG EC TABLET    Take 81 mg by mouth once daily.    CALCIUM CITRATE-VITAMIN D3 500 MG CALCIUM  -400 UNIT CHEW    Take 1 tablet by mouth 2 (two) times daily.      FISH OIL-OMEGA-3 FATTY ACIDS 300-1,000 MG CAPSULE    Take 2 g by mouth once daily.      HYDROCHLOROTHIAZIDE (HYDRODIURIL) 25 MG TABLET    Take 25 mg by mouth once daily.    LATANOPROST 0.005 % OPHTHALMIC SOLUTION    1 drop every evening.    LOSARTAN (COZAAR) 100 MG TABLET    Take 1 tablet (100 mg total) by mouth once daily.    METOPROLOL TARTRATE (LOPRESSOR) 25 MG TABLET    TAKE 1/2 (ONE-HALF) TABLET BY MOUTH TWICE DAILY    MIRABEGRON (MYRBETRIQ) 25 MG TB24 ER TABLET    Take 1 tablet (25 mg total) by mouth once daily.    MULTIVIT-IRON-MIN-FOLIC ACID (MULTIVITAMIN-IRON-MINERALS-FOLIC ACID) 3,500-18-0.4 UNIT-MG-MG CHEW    Take by mouth.      PANTOPRAZOLE (PROTONIX) 40 MG TABLET    Take 1 tablet (40 mg total) by mouth 2 (two) times daily.    PRAVASTATIN (PRAVACHOL) 20 MG TABLET    Take 1 tablet (20 mg total) by mouth once daily.    SERTRALINE (ZOLOFT) 100 MG TABLET    Take 100 mg by mouth once daily.    TRAZODONE (DESYREL) 50 MG TABLET    Take 1 tablet (50 mg total) by mouth every evening.   Discontinued Medications    ALENDRONATE (FOSAMAX) 70 MG TABLET    Take 1 tablet (70 mg total) by mouth once a week.       Review of Systems   Constitution: Positive for malaise/fatigue. Negative for diaphoresis and fever.   HENT: Negative for congestion and hearing loss.    Eyes: Negative for blurred vision and pain.   Cardiovascular: Positive for dyspnea on exertion and leg swelling. Negative for chest pain, claudication, near-syncope, palpitations and syncope.   Respiratory: Negative for shortness of breath and sleep disturbances due to breathing.    Hematologic/Lymphatic: Negative for bleeding problem. Does not bruise/bleed easily.   Skin: Negative for color change and poor wound healing.   Gastrointestinal: Negative for abdominal pain and nausea.   Genitourinary: Negative for bladder incontinence and flank pain.   Neurological: Negative for focal weakness and  "light-headedness.        Objective:   BP (!) 151/86 (BP Location: Left arm, Patient Position: Sitting, BP Method: Large (Automatic))   Pulse (!) 59   Ht 5' 3" (1.6 m)   Wt 65.1 kg (143 lb 9.6 oz)   SpO2 97%   BMI 25.44 kg/m²    Physical Exam   Constitutional: She is oriented to person, place, and time. She appears well-developed and well-nourished.   HENT:   Head: Normocephalic and atraumatic.   Mouth/Throat: Oropharynx is clear and moist.   Eyes: Pupils are equal, round, and reactive to light. EOM are normal. No scleral icterus.   Neck: Normal range of motion. Neck supple. No JVD present.   Cardiovascular: Normal rate, regular rhythm, S1 normal, S2 normal and intact distal pulses. Exam reveals no gallop and no friction rub.   No murmur heard.  Pulmonary/Chest: Effort normal and breath sounds normal. No respiratory distress. She has no wheezes. She has no rales. She exhibits no tenderness.   Abdominal: Soft. Bowel sounds are normal. She exhibits no distension and no mass. There is no tenderness. There is no rebound.   Musculoskeletal: Normal range of motion. She exhibits edema. She exhibits no tenderness.   Neurological: She is alert and oriented to person, place, and time. She displays normal reflexes. Coordination normal.   Skin: Skin is warm and dry. She is not diaphoretic. No pallor.   Psychiatric: She has a normal mood and affect. Her behavior is normal. Judgment normal.         Assessment:       1. Palpitations    2. Essential hypertension    3. Mixed hyperlipidemia    4. HUBBARD (dyspnea on exertion)         Plan:         Palpitations  Likely related to PVCs/PACs as pt noted to have to have on 24 hr Holter 5/2018.  Currently on metoprolol.  HR 59 and in 50s at home.  - continue current medical therapy (will not adjust given bradycardia)  - continue to optimize BP as may be confounding sensation of palpitations      Essential hypertension  Noted to be elevated, recent increase of losartan to 100 mg daily.  " Goal BP <140/80, noted to be at goal at home.    - continue current medical therapy  - pt to continue to monitor BP and will record and bring in at next appt  - continue risk factor and lifestyle modifications    Hyperlipidemia  Pt currently on statin and LDL 83, goal < 130.    - continue current medical therapy  - continue lifestyle and risk factor modifications    HUBBARD (dyspnea on exertion)  Vague symptoms, may be related to deconditioning.  Pt noted to have symptoms prior to last cardiac w/u in 5/2018 which was negative for cardiac pathology.    - will re-eval cardiac function w/ echo given elevated BP  - f/u in 3 weeks        Sujit Dent M.D.  Interventional Cardiology

## 2019-09-22 NOTE — PROGRESS NOTES
Subjective:       Patient ID: Nereyda Hernandez is a 79 y.o. female.    Chief Complaint: Abdominal Pain     I have reviewed the PMH,  and  for this patient. She  has a past medical history of Anxiety, Arthritis, Breast cancer (1990), Chronic disease anemia, Hyperlipidemia, Hypertension, Insomnia, Lobular carcinoma in situ, KANWAL (obstructive sleep apnea), Osteoporosis, Rosacea, Squamous cell carcinoma, Urinary incontinence, and Vertigo. She is here for evaluation of abdominal pain. She has been having pain for the last few months. Her BP is elevated and she says that she has had her medicines today. She saw a NP in [facility]. At that time, she had an ultrasound which was normal. She has had a normal stool kit. She has had anemia and she is taking iron. She also started miralax because of constipation. She has a headache now. She has shortness of breath, but that has been constant. She feels like she has been having palpitations. She has gas pain sometimes. She does have a history of sleep apnea.     Active Ambulatory Problems     Diagnosis Date Noted    Age-related osteoporosis without current pathological fracture 08/08/2012    Anxiety 08/08/2012    Essential hypertension 08/08/2012    KANWAL (obstructive sleep apnea) 08/08/2012    Hyperlipidemia 08/08/2012    Psychophysiological insomnia 08/12/2013    Urge incontinence 10/16/2014    History of iron deficiency anemia 08/26/2015    Sedative hypnotic or anxiolytic dependence 10/25/2016    Osteoarthritis of right knee 09/19/2017    Squamous cell carcinoma 12/05/2017    Gastroesophageal reflux disease without esophagitis 08/20/2019    Palpitations 09/19/2019    HUBBARD (dyspnea on exertion) 09/19/2019     Resolved Ambulatory Problems     Diagnosis Date Noted    Chronic disease anemia 08/12/2013     Past Medical History:   Diagnosis Date    Arthritis     Breast cancer 1990    Hypertension     Insomnia     Lobular carcinoma in situ     Osteoporosis     Rosacea      Urinary incontinence     Vertigo          MEDICATIONS:  Current Outpatient Medications:     aspirin (ECOTRIN) 81 MG EC tablet, Take 81 mg by mouth once daily., Disp: , Rfl:     calcium citrate-vitamin D3 500 mg calcium -400 unit Chew, Take 1 tablet by mouth 2 (two) times daily.  , Disp: , Rfl:     fish oil-omega-3 fatty acids 300-1,000 mg capsule, Take 2 g by mouth once daily.  , Disp: , Rfl:     hydroCHLOROthiazide (HYDRODIURIL) 25 MG tablet, Take 25 mg by mouth once daily., Disp: , Rfl: 3    latanoprost 0.005 % ophthalmic solution, 1 drop every evening., Disp: , Rfl:     losartan (COZAAR) 100 MG tablet, Take 1 tablet (100 mg total) by mouth once daily., Disp: 30 tablet, Rfl: 3    metoprolol tartrate (LOPRESSOR) 25 MG tablet, TAKE 1/2 (ONE-HALF) TABLET BY MOUTH TWICE DAILY, Disp: 90 tablet, Rfl: 2    mirabegron (MYRBETRIQ) 25 mg Tb24 ER tablet, Take 1 tablet (25 mg total) by mouth once daily., Disp: 90 tablet, Rfl: 0    multivit-iron-min-folic acid (MULTIVITAMIN-IRON-MINERALS-FOLIC ACID) 3,500-18-0.4 unit-mg-mg Chew, Take by mouth.  , Disp: , Rfl:     pantoprazole (PROTONIX) 40 MG tablet, Take 1 tablet (40 mg total) by mouth 2 (two) times daily., Disp: 180 tablet, Rfl: 1    pravastatin (PRAVACHOL) 20 MG tablet, Take 1 tablet (20 mg total) by mouth once daily., Disp: 90 tablet, Rfl: 3    sertraline (ZOLOFT) 100 MG tablet, Take 100 mg by mouth once daily., Disp: , Rfl: 3    traZODone (DESYREL) 50 MG tablet, Take 1 tablet (50 mg total) by mouth every evening., Disp: 90 tablet, Rfl: 1      HEALTH MAINTENANCE:   Health Maintenance   Topic Date Due    Lipid Panel  05/14/2020    DEXA SCAN  08/20/2021    Colonoscopy  04/26/2023    TETANUS VACCINE  06/09/2024    Pneumococcal Vaccine (65+ High/Highest Risk)  Completed       Review of Systems   Constitutional: Negative for chills, fatigue and fever.   HENT: Negative for congestion, ear discharge, ear pain, rhinorrhea and sore throat.    Eyes: Negative  for discharge, redness and itching.   Respiratory: Positive for shortness of breath. Negative for cough, chest tightness and wheezing.    Cardiovascular: Positive for palpitations. Negative for chest pain and leg swelling.   Gastrointestinal: Positive for abdominal pain and constipation. Negative for diarrhea, nausea and vomiting.   Endocrine: Negative for cold intolerance and heat intolerance.   Genitourinary: Negative for dysuria, flank pain, frequency and hematuria.   Musculoskeletal: Negative for arthralgias, back pain, joint swelling and myalgias.   Skin: Negative for color change and rash.   Neurological: Positive for headaches. Negative for dizziness, tremors and numbness.   Psychiatric/Behavioral: Negative for dysphoric mood and sleep disturbance. The patient is not nervous/anxious.        Objective:          A1C:      CBC:  Recent Labs   Lab 02/23/17  1042 03/08/18  0910 08/09/18  0925 05/14/19  0811 09/16/19  1549   WBC 6.40 3.57 L 3.62 L 4.15 6.01   RBC 4.24 3.79 L 3.82 L 3.82 L 4.31   Hemoglobin 13.4 11.8 L 12.0 12.0 13.3   Hematocrit 39.4 34.9 L 36.3 L 36.7 L 40.0   Platelets 246 204 209 221 225   Mean Corpuscular Volume 93 92 95 96 93   Mean Corpuscular Hemoglobin 31.6 H 31.1 H 31.4 H 31.4 H 30.9   Mean Corpuscular Hemoglobin Conc 34.0 33.8 33.1 32.7 33.3     CMP:  Recent Labs   Lab 02/23/17  1042 03/08/18  0910 08/09/18  0925 05/14/19  0811 09/16/19  1549   Glucose 97 95 93 92 101   Calcium 9.9 9.6 9.4 9.5 9.9   Albumin 3.9 3.7 3.6 3.5 4.6   Total Protein 8.1 7.1 7.1 7.0 8.3   Sodium 140 141 138 139 138   Potassium 4.8 4.2 4.1 3.9 3.5   CO2 28 31 H 28 28 30 H   Chloride 102 102 103 102 99   BUN, Bld 15 14 13 14 21 H   Creatinine 0.8 0.8 0.8 0.7 0.52   Alkaline Phosphatase 70 58 57 50 L 70   ALT 23 30 25 20 39   AST 29 31 30 25 41   Total Bilirubin 0.5 0.4 0.5 0.4 0.5     LIPIDS:  Recent Labs   Lab 02/23/17  1042 03/08/18  0910 08/09/18  0925 05/14/19  0811 09/16/19  1549   TSH  --   --   --   --   1.910   HDL 70 65 61 63  --    Cholesterol 205 H 159 177 161  --    Triglycerides 94 60 76 73  --    LDL Cholesterol 116.2 82.0 100.8 83.4  --    Hdl/Cholesterol Ratio 34.1 40.9 34.5 39.1  --    Non-HDL Cholesterol 135 94 116 98  --    Total Cholesterol/HDL Ratio 2.9 2.4 2.9 2.6  --      TSH:  Recent Labs   Lab 09/16/19  1549   TSH 1.910           Physical Exam   Constitutional: She is oriented to person, place, and time. She appears well-developed and well-nourished. She does not have a sickly appearance. No distress.   HENT:   Head: Normocephalic and atraumatic. Hair is normal.   Right Ear: Hearing and external ear normal. Tympanic membrane is not erythematous, not retracted and not bulging. No middle ear effusion.   Left Ear: Hearing and external ear normal. Tympanic membrane is not erythematous, not retracted and not bulging.  No middle ear effusion.   Nose: Nose normal. No rhinorrhea or sinus tenderness. Right sinus exhibits no maxillary sinus tenderness and no frontal sinus tenderness. Left sinus exhibits no frontal sinus tenderness.   Mouth/Throat: Oropharynx is clear and moist and mucous membranes are normal. No oropharyngeal exudate or posterior oropharyngeal erythema. No tonsillar exudate.   Eyes: Pupils are equal, round, and reactive to light. Conjunctivae, EOM and lids are normal. Right eye exhibits no discharge. Left eye exhibits no discharge. Right conjunctiva is not injected. Left conjunctiva is not injected. No scleral icterus.   Neck: Normal range of motion. Neck supple. No JVD present. No tracheal tenderness present. Carotid bruit is not present. No neck rigidity. No tracheal deviation present. No thyroid mass and no thyromegaly present.   Cardiovascular: Normal rate, regular rhythm, normal heart sounds and intact distal pulses. PMI is not displaced. Exam reveals no gallop and no friction rub.   No murmur heard.  Pulmonary/Chest: Effort normal and breath sounds normal. No tachypnea. No respiratory  distress. She has no decreased breath sounds. She has no wheezes. She has no rhonchi. She has no rales. She exhibits no tenderness.   Abdominal: Soft. Bowel sounds are normal. She exhibits no distension and no mass. There is no hepatosplenomegaly. There is tenderness in the epigastric area. There is no rigidity, no rebound, no guarding and no CVA tenderness. No hernia.   Musculoskeletal: Normal range of motion. She exhibits no edema or tenderness.   Lymphadenopathy:     She has no cervical adenopathy.   Neurological: She is alert and oriented to person, place, and time. She displays no atrophy, no tremor and normal reflexes. No cranial nerve deficit or sensory deficit. Gait normal.   Skin: Skin is warm and dry. No bruising and no rash noted. She is not diaphoretic. No cyanosis or erythema. No pallor. Nails show no clubbing.   Psychiatric: She has a normal mood and affect. Her speech is normal and behavior is normal. Judgment normal. Her mood appears not anxious. She is not agitated and not aggressive. She does not exhibit a depressed mood.   Nursing note and vitals reviewed.            Assessment and Plan:     Problem List Items Addressed This Visit        Cardiac/Vascular    Essential hypertension (Chronic) - Bp is too high. We gave her a clonidine in the office and she continued to have high Blood pressure. We will increase her losartan.     Relevant Medications    losartan (COZAAR) 100 MG tablet    Other Relevant Orders    Ambulatory consult to Cardiology    CBC auto differential (Completed)    Comprehensive metabolic panel (Completed)    Hyperlipidemia (Chronic)- she is on pravastatin.        GI    Gastroesophageal reflux disease without esophagitis - Primary - check labs and increase pantoprazole to BID.     Relevant Medications    pantoprazole (PROTONIX) 40 MG tablet    Other Relevant Orders    Ambulatory consult to Gastroenterology       Other    KANWAL (obstructive sleep apnea) - on cpap. Stable.        Psychophysiological insomnia - on trazodone.       Other Visit Diagnoses     Urinary incontinence, unspecified type    - myrbetriq is effective for her.       Palpitations     - refer to cardiology    Relevant Orders    Ambulatory consult to Cardiology    Osteoporosis, unspecified osteoporosis type, unspecified pathological fracture presence    - on calcium      Weight loss     - check tsh    Relevant Orders    TSH (Completed)    Generalized abdominal pain     - increase pantoprazole and check labs. Iron may be upsetting stomach.           Orders Placed This Encounter    CBC auto differential    Comprehensive metabolic panel    TSH    Ambulatory consult to Cardiology    Ambulatory consult to Gastroenterology    losartan (COZAAR) 100 MG tablet    cloNIDine tablet 0.1 mg    pantoprazole (PROTONIX) 40 MG tablet         Follow-up with me in 1 month.   Time spent > 40 minutes      Nancy Mac MD,  FACP  Internal Medicine

## 2019-09-26 ENCOUNTER — TELEPHONE (OUTPATIENT)
Dept: SLEEP MEDICINE | Facility: HOSPITAL | Age: 79
End: 2019-09-26

## 2019-09-26 ENCOUNTER — HOSPITAL ENCOUNTER (OUTPATIENT)
Dept: SLEEP MEDICINE | Facility: HOSPITAL | Age: 79
Discharge: HOME | End: 2019-09-26
Attending: INTERNAL MEDICINE
Payer: MEDICARE

## 2019-09-26 DIAGNOSIS — G47.8 SLEEP AROUSAL DISORDER: ICD-10-CM

## 2019-09-26 DIAGNOSIS — G47.33 OSA (OBSTRUCTIVE SLEEP APNEA): ICD-10-CM

## 2019-09-26 PROCEDURE — 95810 PR POLYSOMNOGRAPHY, 4 OR MORE: ICD-10-PCS | Mod: 26,HCNC,, | Performed by: INTERNAL MEDICINE

## 2019-09-26 PROCEDURE — 95810 POLYSOM 6/> YRS 4/> PARAM: CPT | Mod: HCNC

## 2019-09-26 PROCEDURE — 95810 POLYSOM 6/> YRS 4/> PARAM: CPT | Mod: 26,HCNC,, | Performed by: INTERNAL MEDICINE

## 2019-09-26 NOTE — TELEPHONE ENCOUNTER
Called patient to offer sooner appointment for sleep study, no answer left message for patient to call back to confirm if interested.

## 2019-09-27 NOTE — PROGRESS NOTES
Nereyda Hernandez is a 78 y/o F. She is here to requal. She used CPAP and Dental Device to control KANWAL. The order reads to Split if AHI >10. Patient education performed including how to call out for help. She reported sleeping on her sides only. Snoring present only on supine sleeping position. She did not qualified for Split night study.    EKG: NSR, rare PACs    Lowest oxygen saturation observed: 87 %

## 2019-10-01 ENCOUNTER — OFFICE VISIT (OUTPATIENT)
Dept: FAMILY MEDICINE | Facility: CLINIC | Age: 79
End: 2019-10-01
Payer: MEDICARE

## 2019-10-01 VITALS
TEMPERATURE: 98 F | OXYGEN SATURATION: 96 % | HEIGHT: 63 IN | HEART RATE: 63 BPM | SYSTOLIC BLOOD PRESSURE: 164 MMHG | WEIGHT: 142 LBS | DIASTOLIC BLOOD PRESSURE: 80 MMHG | BODY MASS INDEX: 25.16 KG/M2

## 2019-10-01 DIAGNOSIS — K21.9 GASTROESOPHAGEAL REFLUX DISEASE WITHOUT ESOPHAGITIS: ICD-10-CM

## 2019-10-01 DIAGNOSIS — Z23 NEED FOR INFLUENZA VACCINATION: ICD-10-CM

## 2019-10-01 DIAGNOSIS — G89.29 CHRONIC NONINTRACTABLE HEADACHE, UNSPECIFIED HEADACHE TYPE: ICD-10-CM

## 2019-10-01 DIAGNOSIS — R51.9 CHRONIC NONINTRACTABLE HEADACHE, UNSPECIFIED HEADACHE TYPE: ICD-10-CM

## 2019-10-01 DIAGNOSIS — I10 ESSENTIAL HYPERTENSION: Primary | Chronic | ICD-10-CM

## 2019-10-01 DIAGNOSIS — Z86.2 HISTORY OF IRON DEFICIENCY ANEMIA: ICD-10-CM

## 2019-10-01 PROCEDURE — 99999 PR PBB SHADOW E&M-EST. PATIENT-LVL IV: ICD-10-PCS | Mod: PBBFAC,HCNC,, | Performed by: INTERNAL MEDICINE

## 2019-10-01 PROCEDURE — 3079F PR MOST RECENT DIASTOLIC BLOOD PRESSURE 80-89 MM HG: ICD-10-PCS | Mod: HCNC,CPTII,S$GLB, | Performed by: INTERNAL MEDICINE

## 2019-10-01 PROCEDURE — 3079F DIAST BP 80-89 MM HG: CPT | Mod: HCNC,CPTII,S$GLB, | Performed by: INTERNAL MEDICINE

## 2019-10-01 PROCEDURE — G0008 FLU VACCINE - HIGH DOSE (65+) PRESERVATIVE FREE IM: ICD-10-PCS | Mod: HCNC,S$GLB,, | Performed by: INTERNAL MEDICINE

## 2019-10-01 PROCEDURE — 90662 IIV NO PRSV INCREASED AG IM: CPT | Mod: HCNC,S$GLB,, | Performed by: INTERNAL MEDICINE

## 2019-10-01 PROCEDURE — 99499 RISK ADDL DX/OHS AUDIT: ICD-10-PCS | Mod: HCNC,S$GLB,, | Performed by: INTERNAL MEDICINE

## 2019-10-01 PROCEDURE — 99999 PR PBB SHADOW E&M-EST. PATIENT-LVL IV: CPT | Mod: PBBFAC,HCNC,, | Performed by: INTERNAL MEDICINE

## 2019-10-01 PROCEDURE — 1101F PT FALLS ASSESS-DOCD LE1/YR: CPT | Mod: HCNC,CPTII,S$GLB, | Performed by: INTERNAL MEDICINE

## 2019-10-01 PROCEDURE — 3077F SYST BP >= 140 MM HG: CPT | Mod: HCNC,CPTII,S$GLB, | Performed by: INTERNAL MEDICINE

## 2019-10-01 PROCEDURE — 99214 PR OFFICE/OUTPT VISIT, EST, LEVL IV, 30-39 MIN: ICD-10-PCS | Mod: 25,HCNC,S$GLB, | Performed by: INTERNAL MEDICINE

## 2019-10-01 PROCEDURE — 99499 UNLISTED E&M SERVICE: CPT | Mod: HCNC,S$GLB,, | Performed by: INTERNAL MEDICINE

## 2019-10-01 PROCEDURE — 99214 OFFICE O/P EST MOD 30 MIN: CPT | Mod: 25,HCNC,S$GLB, | Performed by: INTERNAL MEDICINE

## 2019-10-01 PROCEDURE — G0008 ADMIN INFLUENZA VIRUS VAC: HCPCS | Mod: HCNC,S$GLB,, | Performed by: INTERNAL MEDICINE

## 2019-10-01 PROCEDURE — 90662 FLU VACCINE - HIGH DOSE (65+) PRESERVATIVE FREE IM: ICD-10-PCS | Mod: HCNC,S$GLB,, | Performed by: INTERNAL MEDICINE

## 2019-10-01 PROCEDURE — 3077F PR MOST RECENT SYSTOLIC BLOOD PRESSURE >= 140 MM HG: ICD-10-PCS | Mod: HCNC,CPTII,S$GLB, | Performed by: INTERNAL MEDICINE

## 2019-10-01 PROCEDURE — 1101F PR PT FALLS ASSESS DOC 0-1 FALLS W/OUT INJ PAST YR: ICD-10-PCS | Mod: HCNC,CPTII,S$GLB, | Performed by: INTERNAL MEDICINE

## 2019-10-01 RX ORDER — AMOXICILLIN 500 MG/1
CAPSULE ORAL
Refills: 0 | COMMUNITY
Start: 2019-09-27 | End: 2019-11-21 | Stop reason: ALTCHOICE

## 2019-10-01 RX ORDER — TRIAMTERENE AND HYDROCHLOROTHIAZIDE 37.5; 25 MG/1; MG/1
1 CAPSULE ORAL EVERY MORNING
Qty: 30 CAPSULE | Refills: 3 | Status: SHIPPED | OUTPATIENT
Start: 2019-10-01 | End: 2020-01-06 | Stop reason: SDUPTHER

## 2019-10-01 NOTE — PATIENT INSTRUCTIONS
We have reviewed your prescription medications. We will try changing the HCTZ to triamterene/HCTz. Take it once a day. I am not sure if your headaches are due to hypertension or due to TMJ arthritis. Discuss getting a  from dentist. We will update flu shot today. We reviewed your labs and they looked good.

## 2019-10-07 ENCOUNTER — TELEPHONE (OUTPATIENT)
Dept: PULMONOLOGY | Facility: CLINIC | Age: 79
End: 2019-10-07

## 2019-10-07 NOTE — TELEPHONE ENCOUNTER
----- Message from Nubiamicheal Horta sent at 10/7/2019  9:20 AM CDT -----  Contact: SELF   Type:  Test Results    Who Called:  Self     Name of Test (Lab/Mammo/Etc): Sleep Study     Date of Test:  9/26    Ordering Provider: Ilana     [facility] the [facility] performed[facility]    Would the patient rather a call back or a response via [facility]? Call    Best Call Back Number: [phone]  Additional Information:  Patient will be leaving around 10 and wont be back until 1. Pleased call before or after those times.     For Clinical Team:Has the provider reviewed the results?

## 2019-10-07 NOTE — PROCEDURES
"Dear Provider,     You have ordered sleep LAB services to perform the sleep study for Nereyda Hernandez.  The sleep study that you ordered is complete.      Please find Sleep Study result in "Chart Review" under the "Media tab."      As the ordering provider, you are responsible for reviewing the results and implementing a treatment plan with your patient.    If you need a Sleep Medicine provider to explain the sleep study findings and arrange treatment for the patient, please refer patient for consultation to our Sleep Clinic via [facility] with Ambulatory Consult Sleep.    To do that please place an order for an  "Ambulatory Consult Sleep" - it will go to our clinic work queue for our Medical Assistant to contact the patient for an appointment.     For any questions, please contact our clinic staff at [phone] to talk to clinical staff.   "

## 2019-10-08 NOTE — PROGRESS NOTES
Subjective:       Patient ID: Nereyda Hernandez is a 79 y.o. female.    Chief Complaint: Blood Pressure Check     I have reviewed the PMH,  and  for this patient. She is here for follow-up of hypertension. Her BP is still very high. She is still having headaches. She has been to see ENT about sinus problems and they said that her sinuses look ok. She also has pain in her jaws. She is also seeing the GI doctor. She had an EGD earlier this week.The increased pantoprazole has been helping. Her losartan was increased to 100 mg last time because of elevated blood pressure. Her BP is better, but it is still too high. She was seen by cardiology for palpitations, but no cause has been found. We reviewed labs and they were Ok. She needs a flu shot.     Active Ambulatory Problems     Diagnosis Date Noted    Age-related osteoporosis without current pathological fracture 08/08/2012    Anxiety 08/08/2012    Essential hypertension 08/08/2012    KANWAL (obstructive sleep apnea) 08/08/2012    Hyperlipidemia 08/08/2012    Psychophysiological insomnia 08/12/2013    Urge incontinence 10/16/2014    History of iron deficiency anemia 08/26/2015    Sedative hypnotic or anxiolytic dependence 10/25/2016    Osteoarthritis of right knee 09/19/2017    Squamous cell carcinoma 12/05/2017    Gastroesophageal reflux disease without esophagitis 08/20/2019    Palpitations 09/19/2019    HUBBARD (dyspnea on exertion) 09/19/2019    Sleep arousal disorder      Resolved Ambulatory Problems     Diagnosis Date Noted    Chronic disease anemia 08/12/2013     Past Medical History:   Diagnosis Date    Arthritis     Breast cancer 1990    Hypertension     Insomnia     Lobular carcinoma in situ     Osteoporosis     Rosacea     Urinary incontinence     Vertigo          MEDICATIONS:  Current Outpatient Medications:     amoxicillin (AMOXIL) 500 MG capsule, TAKE 2 CAPSULES BY MOUTH NOW THEN TAKE 1 THREE TIMES DAILY UNTIL FINISHED, Disp: , Rfl: 0     aspirin (ECOTRIN) 81 MG EC tablet, Take 81 mg by mouth once daily., Disp: , Rfl:     calcium citrate-vitamin D3 500 mg calcium -400 unit Chew, Take 1 tablet by mouth 2 (two) times daily.  , Disp: , Rfl:     fish oil-omega-3 fatty acids 300-1,000 mg capsule, Take 2 g by mouth once daily.  , Disp: , Rfl:     latanoprost 0.005 % ophthalmic solution, 1 drop every evening., Disp: , Rfl:     losartan (COZAAR) 100 MG tablet, Take 1 tablet (100 mg total) by mouth once daily., Disp: 30 tablet, Rfl: 3    metoprolol tartrate (LOPRESSOR) 25 MG tablet, TAKE 1/2 (ONE-HALF) TABLET BY MOUTH TWICE DAILY, Disp: 90 tablet, Rfl: 2    mirabegron (MYRBETRIQ) 25 mg Tb24 ER tablet, Take 1 tablet (25 mg total) by mouth once daily., Disp: 90 tablet, Rfl: 0    multivit-iron-min-folic acid (MULTIVITAMIN-IRON-MINERALS-FOLIC ACID) 3,500-18-0.4 unit-mg-mg Chew, Take by mouth.  , Disp: , Rfl:     pantoprazole (PROTONIX) 40 MG tablet, Take 1 tablet (40 mg total) by mouth 2 (two) times daily., Disp: 180 tablet, Rfl: 1    pravastatin (PRAVACHOL) 20 MG tablet, Take 1 tablet (20 mg total) by mouth once daily., Disp: 90 tablet, Rfl: 3    sertraline (ZOLOFT) 100 MG tablet, Take 100 mg by mouth once daily., Disp: , Rfl: 3    traZODone (DESYREL) 50 MG tablet, Take 1 tablet (50 mg total) by mouth every evening., Disp: 90 tablet, Rfl: 1    triamterene-hydrochlorothiazide 37.5-25 mg (DYAZIDE) 37.5-25 mg per capsule, Take 1 capsule by mouth every morning., Disp: 30 capsule, Rfl: 3      HEALTH MAINTENANCE:   Health Maintenance   Topic Date Due    Lipid Panel  05/14/2020    DEXA SCAN  08/20/2021    Colonoscopy  04/26/2023    TETANUS VACCINE  06/09/2024    Pneumococcal Vaccine (65+ High/Highest Risk)  Completed       Review of Systems   Constitutional: Negative for chills, fatigue and fever.   HENT: Negative for congestion, ear discharge, ear pain, rhinorrhea and sore throat.    Eyes: Negative for discharge, redness and itching.   Respiratory:  Negative for cough, chest tightness, shortness of breath and wheezing.    Cardiovascular: Negative for chest pain, palpitations and leg swelling.   Gastrointestinal: Negative for abdominal pain, constipation, diarrhea, nausea and vomiting.   Endocrine: Negative for cold intolerance and heat intolerance.   Genitourinary: Negative for dysuria, flank pain, frequency and hematuria.   Musculoskeletal: Negative for arthralgias, back pain, joint swelling and myalgias.   Skin: Negative for color change and rash.   Neurological: Negative for dizziness, tremors, numbness and headaches.   Psychiatric/Behavioral: Negative for dysphoric mood and sleep disturbance. The patient is not nervous/anxious.        Objective:          A1C:      CBC:  Recent Labs   Lab 02/23/17  1042 03/08/18  0910 08/09/18  0925 05/14/19  0811 09/16/19  1549   WBC 6.40 3.57 L 3.62 L 4.15 6.01   RBC 4.24 3.79 L 3.82 L 3.82 L 4.31   Hemoglobin 13.4 11.8 L 12.0 12.0 13.3   Hematocrit 39.4 34.9 L 36.3 L 36.7 L 40.0   Platelets 246 204 209 221 225   Mean Corpuscular Volume 93 92 95 96 93   Mean Corpuscular Hemoglobin 31.6 H 31.1 H 31.4 H 31.4 H 30.9   Mean Corpuscular Hemoglobin Conc 34.0 33.8 33.1 32.7 33.3     CMP:  Recent Labs   Lab 02/23/17  1042 03/08/18  0910 08/09/18  0925 05/14/19  0811 09/16/19  1549   Glucose 97 95 93 92 101   Calcium 9.9 9.6 9.4 9.5 9.9   Albumin 3.9 3.7 3.6 3.5 4.6   Total Protein 8.1 7.1 7.1 7.0 8.3   Sodium 140 141 138 139 138   Potassium 4.8 4.2 4.1 3.9 3.5   CO2 28 31 H 28 28 30 H   Chloride 102 102 103 102 99   BUN, Bld 15 14 13 14 21 H   Creatinine 0.8 0.8 0.8 0.7 0.52   Alkaline Phosphatase 70 58 57 50 L 70   ALT 23 30 25 20 39   AST 29 31 30 25 41   Total Bilirubin 0.5 0.4 0.5 0.4 0.5     LIPIDS:  Recent Labs   Lab 02/23/17  1042 03/08/18  0910 08/09/18  0925 05/14/19  0811 09/16/19  1549   TSH  --   --   --   --  1.910   HDL 70 65 61 63  --    Cholesterol 205 H 159 177 161  --    Triglycerides 94 60 76 73  --    LDL  Cholesterol 116.2 82.0 100.8 83.4  --    Hdl/Cholesterol Ratio 34.1 40.9 34.5 39.1  --    Non-HDL Cholesterol 135 94 116 98  --    Total Cholesterol/HDL Ratio 2.9 2.4 2.9 2.6  --      TSH:  Recent Labs   Lab 09/16/19  1549   TSH 1.910           Physical Exam   Constitutional: She appears well-developed and well-nourished. She does not have a sickly appearance.   HENT:   Head: Normocephalic and atraumatic.   Right Ear: External ear normal. Tympanic membrane is not erythematous. No middle ear effusion.   Left Ear: External ear normal. Tympanic membrane is not erythematous.  No middle ear effusion.   Nose: No rhinorrhea. Right sinus exhibits no maxillary sinus tenderness and no frontal sinus tenderness. Left sinus exhibits no maxillary sinus tenderness and no frontal sinus tenderness.   Mouth/Throat: No posterior oropharyngeal edema or posterior oropharyngeal erythema. No tonsillar exudate.   Eyes: Pupils are equal, round, and reactive to light. Conjunctivae and EOM are normal. Right eye exhibits no discharge. Left eye exhibits no discharge. Right conjunctiva is not injected. Left conjunctiva is not injected.   Neck: No thyromegaly present.   Cardiovascular: Normal rate, regular rhythm, normal heart sounds and intact distal pulses.   No murmur heard.  Pulmonary/Chest: Effort normal and breath sounds normal. She has no wheezes. She has no rhonchi. She has no rales.   Abdominal: Bowel sounds are normal. She exhibits no distension. There is no tenderness.   Musculoskeletal: She exhibits no edema or tenderness.   Lymphadenopathy:     She has no cervical adenopathy.   Neurological: She displays normal reflexes. No cranial nerve deficit.   Skin: Skin is warm and dry. No lesion and no rash noted.   Psychiatric: She has a normal mood and affect. Her behavior is normal. Her mood appears not anxious. Her speech is not rapid and/or pressured. She is not agitated and not aggressive. She does not exhibit a depressed mood.              Assessment and Plan:     Problem List Items Addressed This Visit        Cardiac/Vascular    Essential hypertension - Primary (Chronic) - BP is still too high. Change HCTZ to dyazide.        Oncology    History of iron deficiency anemia - cbc was ok.        GI    Gastroesophageal reflux disease without esophagitis - continue pantoprazole. Follow-up with GI as recommended.       Other Visit Diagnoses     Need for influenza vaccination    - flu shot.     Relevant Orders    Influenza - High Dose (65+) (PF) (IM) (Completed)    Chronic nonintractable headache, unspecified headache type     - uncertain cause. She has seen ENT. I suspect that it may be due to tMJ arthritis. Discuss with dentist. Elevated BP may also be contributing.           Orders Placed This Encounter    Influenza - High Dose (65+) (PF) (IM)    triamterene-hydrochlorothiazide 37.5-25 mg (DYAZIDE) 37.5-25 mg per capsule         Follow-up with me in 1 month       Nancy Mac MD,  FACP  Internal Medicine

## 2019-10-09 ENCOUNTER — TELEPHONE (OUTPATIENT)
Dept: PULMONOLOGY | Facility: CLINIC | Age: 79
End: 2019-10-09

## 2019-10-09 DIAGNOSIS — E78.5 HYPERLIPIDEMIA, UNSPECIFIED HYPERLIPIDEMIA TYPE: Chronic | ICD-10-CM

## 2019-10-09 RX ORDER — PRAVASTATIN SODIUM 20 MG/1
20 TABLET ORAL DAILY
Qty: 90 TABLET | Refills: 1 | Status: SHIPPED | OUTPATIENT
Start: 2019-10-09 | End: 2020-04-07 | Stop reason: SDUPTHER

## 2019-10-09 NOTE — TELEPHONE ENCOUNTER
----- Message from Lacie Andrade sent at 10/9/2019  3:46 PM CDT -----  Contact: Pt   Name of Who is Calling: CHAS LEONE [[id number]]    What is the request in detail: CHAS LEONE [[id number]] is requesting a call back regards to sleep study results ........ Please contact to further discuss and advise      Can the clinic reply by [facility]: no     What Number to Call Back [facility]:  [phone]

## 2019-10-09 NOTE — TELEPHONE ENCOUNTER
----- Message from Ana Tran sent at 10/9/2019  3:57 PM CDT -----  Contact: [phone]/self  Type:  RX Refill Request    Who Called:  self  Refill or New Rx: new  RX Name and Strength: pravastatin (PRAVACHOL) 20 MG tablet  How is the patient currently taking it? (ex. 1XDay): Take 1 tablet (20 mg total) by mouth once daily. - Oral  Is this a 30 day or 90 day RX: 90/3 refills  Preferred Pharmacy with phone number: Can she pick it up at the desk  Local or Mail Order: self  Ordering Provider: Genny Martinez MD  Would the patient rather a call back or a response via [facility]?  call  Best Call Back Number:   Additional Information:

## 2019-10-10 ENCOUNTER — OFFICE VISIT (OUTPATIENT)
Dept: CARDIOLOGY | Facility: CLINIC | Age: 79
End: 2019-10-10
Payer: MEDICARE

## 2019-10-10 ENCOUNTER — OFFICE VISIT (OUTPATIENT)
Dept: GASTROENTEROLOGY | Facility: CLINIC | Age: 79
End: 2019-10-10
Payer: MEDICARE

## 2019-10-10 ENCOUNTER — TELEPHONE (OUTPATIENT)
Dept: GASTROENTEROLOGY | Facility: CLINIC | Age: 79
End: 2019-10-10

## 2019-10-10 VITALS
HEIGHT: 63 IN | DIASTOLIC BLOOD PRESSURE: 74 MMHG | BODY MASS INDEX: 24.98 KG/M2 | SYSTOLIC BLOOD PRESSURE: 122 MMHG | OXYGEN SATURATION: 96 % | HEART RATE: 72 BPM | WEIGHT: 141 LBS

## 2019-10-10 VITALS
HEART RATE: 61 BPM | BODY MASS INDEX: 24.99 KG/M2 | DIASTOLIC BLOOD PRESSURE: 74 MMHG | WEIGHT: 141.13 LBS | SYSTOLIC BLOOD PRESSURE: 122 MMHG

## 2019-10-10 DIAGNOSIS — E78.2 MIXED HYPERLIPIDEMIA: Chronic | ICD-10-CM

## 2019-10-10 DIAGNOSIS — K21.9 GASTROESOPHAGEAL REFLUX DISEASE WITHOUT ESOPHAGITIS: ICD-10-CM

## 2019-10-10 DIAGNOSIS — R00.2 PALPITATIONS: Primary | ICD-10-CM

## 2019-10-10 DIAGNOSIS — I10 ESSENTIAL HYPERTENSION: Chronic | ICD-10-CM

## 2019-10-10 DIAGNOSIS — R10.84 ABDOMINAL PAIN, GENERALIZED: Primary | ICD-10-CM

## 2019-10-10 DIAGNOSIS — R63.4 WEIGHT LOSS: ICD-10-CM

## 2019-10-10 DIAGNOSIS — Z86.2 HISTORY OF IRON DEFICIENCY ANEMIA: ICD-10-CM

## 2019-10-10 DIAGNOSIS — R19.4 CHANGE IN BOWEL HABITS: ICD-10-CM

## 2019-10-10 DIAGNOSIS — R06.09 DOE (DYSPNEA ON EXERTION): ICD-10-CM

## 2019-10-10 PROCEDURE — 99999 PR PBB SHADOW E&M-EST. PATIENT-LVL III: ICD-10-PCS | Mod: PBBFAC,HCNC,, | Performed by: INTERNAL MEDICINE

## 2019-10-10 PROCEDURE — 3078F PR MOST RECENT DIASTOLIC BLOOD PRESSURE < 80 MM HG: ICD-10-PCS | Mod: HCNC,CPTII,S$GLB, | Performed by: INTERNAL MEDICINE

## 2019-10-10 PROCEDURE — 3078F DIAST BP <80 MM HG: CPT | Mod: HCNC,CPTII,S$GLB, | Performed by: INTERNAL MEDICINE

## 2019-10-10 PROCEDURE — 99204 PR OFFICE/OUTPT VISIT, NEW, LEVL IV, 45-59 MIN: ICD-10-PCS | Mod: HCNC,S$GLB,, | Performed by: INTERNAL MEDICINE

## 2019-10-10 PROCEDURE — 1101F PR PT FALLS ASSESS DOC 0-1 FALLS W/OUT INJ PAST YR: ICD-10-PCS | Mod: HCNC,CPTII,S$GLB, | Performed by: INTERNAL MEDICINE

## 2019-10-10 PROCEDURE — 1101F PT FALLS ASSESS-DOCD LE1/YR: CPT | Mod: HCNC,CPTII,S$GLB, | Performed by: INTERNAL MEDICINE

## 2019-10-10 PROCEDURE — 3074F SYST BP LT 130 MM HG: CPT | Mod: HCNC,CPTII,S$GLB, | Performed by: INTERNAL MEDICINE

## 2019-10-10 PROCEDURE — 99999 PR PBB SHADOW E&M-EST. PATIENT-LVL III: CPT | Mod: PBBFAC,HCNC,, | Performed by: INTERNAL MEDICINE

## 2019-10-10 PROCEDURE — 99214 PR OFFICE/OUTPT VISIT, EST, LEVL IV, 30-39 MIN: ICD-10-PCS | Mod: HCNC,S$GLB,, | Performed by: INTERNAL MEDICINE

## 2019-10-10 PROCEDURE — 99204 OFFICE O/P NEW MOD 45 MIN: CPT | Mod: HCNC,S$GLB,, | Performed by: INTERNAL MEDICINE

## 2019-10-10 PROCEDURE — 3074F PR MOST RECENT SYSTOLIC BLOOD PRESSURE < 130 MM HG: ICD-10-PCS | Mod: HCNC,CPTII,S$GLB, | Performed by: INTERNAL MEDICINE

## 2019-10-10 PROCEDURE — 99214 OFFICE O/P EST MOD 30 MIN: CPT | Mod: HCNC,S$GLB,, | Performed by: INTERNAL MEDICINE

## 2019-10-10 RX ORDER — METOPROLOL TARTRATE 25 MG/1
TABLET, FILM COATED ORAL
Qty: 90 TABLET | Refills: 3 | Status: SHIPPED | OUTPATIENT
Start: 2019-10-10 | End: 2019-11-19 | Stop reason: SDUPTHER

## 2019-10-10 NOTE — ASSESSMENT & PLAN NOTE
Controlled.  Goal LDL < 130, LDL 83.  - continue statin therapy  - encouraged lifestyle modifications

## 2019-10-10 NOTE — ASSESSMENT & PLAN NOTE
Echo findings (per report 10/3/19) unchanged essentially from prior 5/11/18.  May be related to deconditioning v. decreased PO intake 2/2 abd pain.  Pt notes she has had a 10lbs weight loss over the past 5-6 months which she states is involuntary.  - pt to f/u w/ GI as scheduled and her PCP for further management

## 2019-10-10 NOTE — LETTER
October 10, 2019      Lara Mac MD  [address] [facility]  [address]   [facility] [address]           [place] [facility]  [address] [place] [address], [address]   [facility] [address]  Phone: [phone]  Fax: [phone]          Patient: Nereyda Hernandez   MR Number: [id number]   YOB: 1940   Date of Visit: 10/10/2019       Dear Dr. Lara Mac:    Thank you for referring Nereyda Hernandez to me for evaluation. Attached you will find relevant portions of my assessment and plan of care.    If you have questions, please do not hesitate to call me. I look forward to following Nereyda Hernandez along with you.    Sincerely,    Bianka Macias MD    Enclosure  CC:  No Recipients    If you would like to receive this communication electronically, please contact [email or web address] or [phone] to request more information on [employer] access.    For providers and/or their staff who would like to refer a patient to [facility], please contact us through our one-stop-shop provider referral line, [facility], at [phone].    If you feel you have received this communication in error or would no longer like to receive these types of communications, please e-mail [email or web address]

## 2019-10-10 NOTE — PROGRESS NOTES
"Subjective:       Patient ID: Nereyda Hernandez is a 79 y.o. female.    Chief Complaint: Abdominal Pain    80 yo F complains of change in bowel habits and abdominal pain.  She saw Dr. Esteves with [facility] for a few years and had EGD/cscope with him within the past 2 years.  She states that over the past year she has developed some issues.  She had shingles 12/2018 and feels that things have not been quite right since that time.  She stopped her Zoloft due to dry mouth, but in 4/2019 she felt like her belly symptoms were from a "nervous stomach" so she restarted the Zoloft.  She feels a "pulsating" and "vibrating" sensation in her belly.  Her PPI was then increased, which did not help.  She then had an aortic u/s which was ok.  She then had stool HP which was also negative.  The sensation persists and can sometimes be described as pain.  She states she has lost 10# unintentionally but is eating well.  Sometimes this pain is a "burning" pain and her back feels "very tired."  She states that she has always suffered from constipation, having 2-3 BM per week.  Now she states that she has a BM 1-2 times per day, sometimes urgent, sometimes with incontinence, mostly loose but without blood.  She used to take meds for constipation but stopped them when the bowel habits changed.  She has excessive flatus.  She has had abx recently for dental issues.  She was started on iron within this time period as well.  She states she has always been close to being anemic.  She has h/o dysphagia, globus, and GERD which have been well managed with PPI.    Review of Systems   Constitutional: Positive for unexpected weight change. Negative for appetite change.   Eyes: Negative for photophobia and visual disturbance.   Respiratory: Negative for chest tightness, shortness of breath and wheezing.    Cardiovascular: Negative for chest pain, palpitations and leg swelling.   Genitourinary: Negative for dysuria, flank pain and hematuria.   Musculoskeletal: " Negative for joint swelling and myalgias.   Skin: Negative for color change and rash.   Neurological: Negative for dizziness and speech difficulty.   Psychiatric/Behavioral: Negative for confusion and hallucinations.       Objective:       /74 (BP Location: Left arm, Patient Position: Sitting, BP Method: Medium (Manual))   Pulse 61   Wt 64 kg (141 lb 1.6 oz)   BMI 24.99 kg/m²     Physical Exam   Constitutional: She is oriented to person, place, and time. She appears well-developed and well-nourished.   Eyes: Pupils are equal, round, and reactive to light. EOM are normal.   Neck: Normal range of motion. Neck supple.   Cardiovascular: Normal rate and regular rhythm.   Pulmonary/Chest: Effort normal and breath sounds normal.   Abdominal: Soft. She exhibits no distension and no mass. There is no tenderness. There is no rebound and no guarding.   Loud active bowel sounds in all quadrants   Musculoskeletal: Normal range of motion. She exhibits no edema.   Neurological: She is alert and oriented to person, place, and time.   Psychiatric: She has a normal mood and affect. Her behavior is normal. Judgment and thought content normal.       Lab Results   Component Value Date    WBC 6.01 09/16/2019    HGB 13.3 09/16/2019    HCT 40.0 09/16/2019    MCV 93 09/16/2019     09/16/2019     CMP  Sodium   Date Value Ref Range Status   09/16/2019 138 136 - 145 mmol/L Final     Potassium   Date Value Ref Range Status   09/16/2019 3.5 3.5 - 5.1 mmol/L Final     Chloride   Date Value Ref Range Status   09/16/2019 99 95 - 110 mmol/L Final     CO2   Date Value Ref Range Status   09/16/2019 30 (H) 23 - 29 mmol/L Final     Glucose   Date Value Ref Range Status   09/16/2019 101 70 - 110 mg/dL Final     BUN, Bld   Date Value Ref Range Status   09/16/2019 21 (H) 7 - 17 mg/dL Final     Creatinine   Date Value Ref Range Status   10/10/2019 0.63 0.50 - 1.40 mg/dL Final     Calcium   Date Value Ref Range Status   09/16/2019 9.9 8.7 -  10.5 mg/dL Final     Total Protein   Date Value Ref Range Status   09/16/2019 8.3 6.0 - 8.4 g/dL Final     Albumin   Date Value Ref Range Status   09/16/2019 4.6 3.5 - 5.2 g/dL Final     Total Bilirubin   Date Value Ref Range Status   09/16/2019 0.5 0.1 - 1.0 mg/dL Final     Comment:     For infants and newborns, interpretation of results should be based  on gestational age, weight and in agreement with clinical  observations.  Premature Infant recommended reference ranges:  Up to 24 hours.............<8.0 mg/dL  Up to 48 hours............<12.0 mg/dL  3-5 days..................<15.0 mg/dL  6-29 days.................<15.0 mg/dL       Alkaline Phosphatase   Date Value Ref Range Status   09/16/2019 70 38 - 126 U/L Final     AST   Date Value Ref Range Status   09/16/2019 41 15 - 46 U/L Final     ALT   Date Value Ref Range Status   09/16/2019 39 10 - 44 U/L Final     Anion Gap   Date Value Ref Range Status   09/16/2019 9 8 - 16 mmol/L Final     eGFR if    Date Value Ref Range Status   10/10/2019 >60.0 >60 mL/min/1.73 m^2 Final     eGFR if non    Date Value Ref Range Status   10/10/2019 >60.0 >60 mL/min/1.73 m^2 Final     Comment:     Calculation used to obtain the estimated glomerular filtration  rate (eGFR) is the CKD-EPI equation.        [facility] records from [facility] reviewed and summarized, significant for:  - EGD 2/2018 with Dr. Esteves:  Schatzki's ring s/p 53 F Savary, gastritis HP (-), normal duodenum  - Cscope 4/2018 with Dr. Esteves:  Small IH    Assessment:       1. Abdominal pain, generalized    2. Weight loss    3. Change in bowel habits    4. History of iron deficiency anemia    5. Gastroesophageal reflux disease without esophagitis        Plan:       Abdominal pain, generalized; Weight loss  -     CT Abdomen Pelvis With Contrast; Future; Expected date: 10/10/2019  -     Creatinine, serum; Future; Expected date: 10/10/2019    Change in bowel habits        -     If CT scan is unrevealing,  will plan for trial of Xifaxan.  If that is not helpful, she will need repeat cscope.    History of iron deficiency anemia        -     Source for anemia is not clear.  She is not anemic when on oral iron, which she seems to tolerate ok.  Will consider VCE in the future if needed.    Gastroesophageal reflux disease without esophagitis        -     Cont PPI        -     Proven risks of long term PPI use, including pneumonia, c.diff infection, and bone loss, as well as theoretical risks including dementia and CKD, were discussed with the patient at length and the patient understands risks and benefits of therapy.  Option of tapering/weaning PPI away was also discussed, including the need for possible long term therapy to treat symptoms if they recur after cessation of medication, as well as to mitigate the risk of developing complications of reflux such as Garcia's esophagus and/or esophageal cancer.  Patient understands the risks and benefits of treatment with drug versus cessation.  Daily supplementation with MVI with calcium and vitamin D were recommended as was follow up with PCP for bone scan when appropriate.  Labs including B12, folate, CMP, CBC, calcium, and magnesium should be checked at least annually.

## 2019-10-10 NOTE — PATIENT INSTRUCTIONS
Abdominal Pain    Abdominal pain is pain in the stomach or belly area. Everyone has this pain from time to time. In many cases it goes away on its own. But abdominal pain can sometimes be due to a serious problem, such as appendicitis. So its important to know when to seek help.  Causes of abdominal pain  There are many possible causes of abdominal pain. Common causes in adults include:  · Constipation, diarrhea, or gas  · Stomach acid flowing back up into the esophagus (acid reflux or heartburn)  · Severe acid reflux, called GERD (gastroesophageal reflux disease)  · A sore in the lining of the stomach or small intestine (peptic ulcer)  · Inflammation of the gallbladder, liver, or pancreas  · Gallstones or kidney stones  · Appendicitis   · Intestinal blockage   · An internal organ pushing through a muscle or other tissue (hernia)  · Urinary tract infections  · In women, menstrual cramps, fibroids, or endometriosis  · Inflammation or infection of the intestines  Diagnosing the cause of abdominal pain  Your healthcare provider will do a physical exam help find the cause of your pain. If needed, tests will be ordered. Belly pain has many possible causes. So it can be hard to find the reason for your pain. Giving details about your pain can help. Tell your provider where and when you feel the pain, and what makes it better or worse. Also let your provider know if you have other symptoms such as:  · Fever  · Tiredness  · Upset stomach (nausea)  · Vomiting  · Changes in bathroom habits  Treating abdominal pain  Some causes of pain need emergency medical treatment right away. These include appendicitis or a bowel blockage. Other problems can be treated with rest, fluids, or medicines. Your healthcare provider can give you specific instructions for treatment or self-care based on what is causing your pain.  If you have vomiting or diarrhea, sip water or other clear fluids. When you are ready to eat solid foods again,  start with small amounts of easy-to-digest, low-fat foods. These include apple sauce, toast, or crackers.   When to seek medical care  Call 911 or go to the hospital right away if you:  · Cant pass stool and are vomiting  · Are vomiting blood or have bloody diarrhea or black, tarry diarrhea  · Have chest, neck, or shoulder pain  · Feel like you might pass out  · Have pain in your shoulder blades with nausea  · Have sudden, severe belly pain  · Have new, severe pain unlike any you have felt before  · Have a belly that is rigid, hard, and tender to touch  Call your healthcare provider if you have:  · Pain for more than 5 days  · Bloating for more than 2 days  · Diarrhea for more than 5 days  · A fever of 100.4°F (38.0°C) or higher, or as directed by your provider  · Pain that gets worse  · Weight loss for no reason  · Continued lack of appetite  · Blood in your stool  How to prevent abdominal pain  Here are some tips to help prevent abdominal pain:  · Eat smaller amounts of food at one time.  · Avoid greasy, fried, or other high-fat foods.  · Avoid foods that give you gas.  · Exercise regularly.  · Drink plenty of fluids.  To help prevent GERD symptoms:  · Quit smoking.  · Reduce alcohol and certain foods that increase stomach acid.  · Avoid aspirin and over-the-counter pain and fever medicines (NSAIDS or nonsteroidal anti-inflammatory drugs), if possible  · Lose extra weight.  · Finish eating at least 2 hours before you go to bed or lie down.  · Raise the head of your bed.  Date Last Reviewed: 7/1/2016  © 1608-4849 [employer]. [address] [place] [address], [place] [address]. All rights reserved. This information is not intended as a substitute for professional medical care. Always follow your healthcare professional's instructions.

## 2019-10-10 NOTE — PROGRESS NOTES
Subjective:    Patient ID:  Nereyda Hernandez is a 79 y.o. female who presents for follow-up of Results (Echo)      PCP: Lara Mac MD     HPI  Pt is a 80 yo F w/ PMH of HTN, HLD, KANWAL not on CPAP, and PVCs/PACs who presents for follow up for palpitations and elevated BP.  She mentions that she has been doing ok since she was last seen.  She notes continued palpitations however notes that they are temporally related to her abdominal pain.  She has been compliant w/ medical therapy and has been monitoring her BP ath home.  She brings in the record and it notes that following the antihypertensive medication that was started by her PCP her BP has decreased to the 130s/80s.  She notes camacho which starts following walking a few blocks.  She denies cp, sob, orthopnea, PND, presyncope, LOC, or claudication.  She admits to BLE edema which has improved w/ the compression stockings and HCTZ.   Her PCP recently increased her losartan to 100 mg daily and added triamterene/HCTZ.  She still goes to the gym a few times a week and has an aerobics class and elliptical machine with fatigue following 45 mins but no chest pain.  Of note, she c/o loss of balance at times but denies presyncope and mentions she will d/w her PCP.      Past Medical History:   Diagnosis Date    Anxiety     Arthritis     Breast cancer 1990    left    Chronic disease anemia     Hyperlipidemia     Hypertension     Insomnia     Lobular carcinoma in situ     KANWAL (obstructive sleep apnea)     Osteoporosis     Rosacea     Squamous cell carcinoma     Urinary incontinence     Vertigo      Past Surgical History:   Procedure Laterality Date    BELT ABDOMINOPLASTY      BREAST BIOPSY Left 1990    ex bx    BREAST LUMPECTOMY      HERNIA REPAIR      Ventral    OOPHORECTOMY      TONSILLECTOMY      TOTAL ABDOMINAL HYSTERECTOMY       Social History     Socioeconomic History    Marital status:      Spouse name: Not on file    Number of children: 6     Years of education: college    Highest education level: Not on file   Occupational History    Occupation: retired  for [employer]   Social Needs    Financial resource strain: Not on file    Food insecurity:     Worry: Not on file     Inability: Not on file    Transportation needs:     Medical: Not on file     Non-medical: Not on file   Tobacco Use    Smoking status: Former Smoker     Packs/day: 0.50     Years: 21.00     Pack years: 10.50     Start date: 1959     Last attempt to quit: 1980     Years since quittin.0    Smokeless tobacco: Never Used   Substance and Sexual Activity    Alcohol use: Yes     Alcohol/week: 5.0 standard drinks     Types: 5 Glasses of wine per week     Comment: Occasionally    Drug use: No    Sexual activity: Not Currently     Partners: Male   Lifestyle    Physical activity:     Days per week: Not on file     Minutes per session: Not on file    Stress: Not on file   Relationships    Social connections:     Talks on phone: Not on file     Gets together: Not on file     Attends Gnosticist service: Not on file     Active member of club or organization: Not on file     Attends meetings of clubs or organizations: Not on file     Relationship status: Not on file   Other Topics Concern    Not on file   Social History Narrative    Not on file     Family History   Problem Relation Age of Onset    Cancer Mother         liver    Pancreatic cancer Mother     Heart disease Mother     Depression Mother     Hypertension Father     Alzheimer's disease Father     Depression Brother     Depression Brother     Diabetes Brother     Arthritis Brother     Hypertension Brother     Heart disease Brother        Review of patient's allergies indicates:  No Known Allergies    Medication List with Changes/Refills   Current Medications    AMOXICILLIN (AMOXIL) 500 MG CAPSULE    TAKE 2 CAPSULES BY MOUTH NOW THEN TAKE 1 THREE TIMES DAILY UNTIL FINISHED    ASPIRIN  (ECOTRIN) 81 MG EC TABLET    Take 81 mg by mouth once daily.    CALCIUM CITRATE-VITAMIN D3 500 MG CALCIUM -400 UNIT CHEW    Take 1 tablet by mouth 2 (two) times daily.      FISH OIL-OMEGA-3 FATTY ACIDS 300-1,000 MG CAPSULE    Take 2 g by mouth once daily.      LATANOPROST 0.005 % OPHTHALMIC SOLUTION    1 drop every evening.    LOSARTAN (COZAAR) 100 MG TABLET    Take 1 tablet (100 mg total) by mouth once daily.    METOPROLOL TARTRATE (LOPRESSOR) 25 MG TABLET    TAKE 1/2 (ONE-HALF) TABLET BY MOUTH TWICE DAILY    MIRABEGRON (MYRBETRIQ) 25 MG TB24 ER TABLET    Take 1 tablet (25 mg total) by mouth once daily.    MULTIVIT-IRON-MIN-FOLIC ACID (MULTIVITAMIN-IRON-MINERALS-FOLIC ACID) 3,500-18-0.4 UNIT-MG-MG CHEW    Take by mouth.      PANTOPRAZOLE (PROTONIX) 40 MG TABLET    Take 1 tablet (40 mg total) by mouth 2 (two) times daily.    PRAVASTATIN (PRAVACHOL) 20 MG TABLET    Take 1 tablet (20 mg total) by mouth once daily.    SERTRALINE (ZOLOFT) 100 MG TABLET    Take 100 mg by mouth once daily.    TRAZODONE (DESYREL) 50 MG TABLET    Take 1 tablet (50 mg total) by mouth every evening.    TRIAMTERENE-HYDROCHLOROTHIAZIDE 37.5-25 MG (DYAZIDE) 37.5-25 MG PER CAPSULE    Take 1 capsule by mouth every morning.       Review of Systems   Constitution: Negative for diaphoresis and fever.   HENT: Negative for congestion and hearing loss.    Eyes: Negative for blurred vision and pain.   Cardiovascular: Positive for dyspnea on exertion, leg swelling and palpitations. Negative for chest pain, claudication, near-syncope, orthopnea, paroxysmal nocturnal dyspnea and syncope.   Respiratory: Negative for shortness of breath and sleep disturbances due to breathing.    Hematologic/Lymphatic: Negative for bleeding problem. Does not bruise/bleed easily.   Skin: Negative for color change and poor wound healing.   Gastrointestinal: Negative for abdominal pain and nausea.   Genitourinary: Negative for bladder incontinence and flank pain.   Neurological:  "Positive for loss of balance. Negative for focal weakness and light-headedness.        Objective:   /74 (BP Location: Left arm, Patient Position: Sitting, BP Method: Medium (Manual))   Pulse 72   Ht 5' 3" (1.6 m)   Wt 64 kg (141 lb)   SpO2 96%   BMI 24.98 kg/m²    Physical Exam   Constitutional: She is oriented to person, place, and time. She appears well-developed and well-nourished.   HENT:   Head: Normocephalic and atraumatic.   Mouth/Throat: Oropharynx is clear and moist.   Eyes: Pupils are equal, round, and reactive to light. EOM are normal. No scleral icterus.   Neck: Normal range of motion. Neck supple. No JVD present.   Cardiovascular: Normal rate, regular rhythm, S1 normal, S2 normal and intact distal pulses. Exam reveals no gallop and no friction rub.   No murmur heard.  Pulmonary/Chest: Effort normal and breath sounds normal. No respiratory distress. She has no wheezes. She has no rales. She exhibits no tenderness.   Abdominal: Soft. Bowel sounds are normal. She exhibits no distension and no mass. There is no tenderness. There is no rebound.   Musculoskeletal: Normal range of motion. She exhibits no edema or tenderness.   Neurological: She is alert and oriented to person, place, and time. She displays normal reflexes. Coordination normal.   Skin: Skin is warm and dry. She is not diaphoretic. No pallor.   Psychiatric: She has a normal mood and affect. Her behavior is normal. Judgment normal.         Assessment:       1. Palpitations    2. Essential hypertension    3. Mixed hyperlipidemia    4. HUBBARD (dyspnea on exertion)         Plan:         Palpitations  Palpitations continue to be unchanged but temporally related to abdominal pain.    - continue metoprolol  - continue to control BP   - pt to f/u w/ GI for abd pain eval     Essential hypertension  Improving.  Pt recently started on triamterene/HCTZ and increased losartan.  Monitoring BP at home and BP has improved to 130s/80s.  - continue " current medical therapy  - encouraged lifestyle modifications  - pt to continue to monitor BP at home and record    Hyperlipidemia  Controlled.  Goal LDL < 130, LDL 83.  - continue statin therapy  - encouraged lifestyle modifications    HUBBARD (dyspnea on exertion)  Echo findings (per report 10/3/19) unchanged essentially from prior 5/11/18.  May be related to deconditioning v. decreased PO intake 2/2 abd pain.  Pt notes she has had a 10lbs weight loss over the past 5-6 months which she states is involuntary.  - pt to f/u w/ GI as scheduled and her PCP for further management       Total duration of face to face visit time 30 minutes.  Total time spent counseling greater than fifty percent of total visit time.  Counseling included discussion regarding imaging findings, diagnosis, possibilities, treatment options, risks and benefits.  The patient had many questions regarding the options and long-term effects      Sujit Dent M.D.  Interventional Cardiology

## 2019-10-10 NOTE — TELEPHONE ENCOUNTER
----- Message from Ana Tran sent at 10/10/2019  1:05 PM CDT -----  Contact: [phone]/unof  Patient is requesting a call back regarding scheduling a CAT scan. thanks

## 2019-10-10 NOTE — ASSESSMENT & PLAN NOTE
Palpitations continue to be unchanged but temporally related to abdominal pain.    - continue metoprolol  - continue to control BP   - pt to f/u w/ GI for abd pain eval

## 2019-10-10 NOTE — ASSESSMENT & PLAN NOTE
Improving.  Pt recently started on triamterene/HCTZ and increased losartan.  Monitoring BP at home and BP has improved to 130s/80s.  - continue current medical therapy  - encouraged lifestyle modifications  - pt to continue to monitor BP at home and record

## 2019-10-23 DIAGNOSIS — N32.81 OVERACTIVE BLADDER: ICD-10-CM

## 2019-10-23 NOTE — TELEPHONE ENCOUNTER
----- Message from Scarlett Ames sent at 10/23/2019  4:01 PM CDT -----  Contact: Marco fairbanks/ [facility]  [phone]  Marco is calling to verify the refill status for mirabegron (MYRBETRIQ) 25 mg Tb24 ER tablet. Please advise.

## 2019-10-24 ENCOUNTER — TELEPHONE (OUTPATIENT)
Dept: GASTROENTEROLOGY | Facility: CLINIC | Age: 79
End: 2019-10-24

## 2019-10-24 DIAGNOSIS — K58.0 IRRITABLE BOWEL SYNDROME WITH DIARRHEA: Primary | ICD-10-CM

## 2019-10-24 RX ORDER — DICYCLOMINE HYDROCHLORIDE 10 MG/1
10 CAPSULE ORAL 3 TIMES DAILY PRN
Qty: 60 CAPSULE | Refills: 2 | Status: SHIPPED | OUTPATIENT
Start: 2019-10-24 | End: 2020-01-08

## 2019-10-24 RX ORDER — AMITRIPTYLINE HYDROCHLORIDE 10 MG/1
10 TABLET, FILM COATED ORAL NIGHTLY PRN
Qty: 30 TABLET | Refills: 2 | Status: SHIPPED | OUTPATIENT
Start: 2019-10-24 | End: 2019-11-21

## 2019-10-24 NOTE — TELEPHONE ENCOUNTER
Informed patient of CT results. Explained all medications to patient and about prior authorizations. Patient wrote down the names of all medications to discuss with her daughter.

## 2019-10-24 NOTE — TELEPHONE ENCOUNTER
"The CT shows a small amount of stool throughout, but not "constipation" and no other changes that would be concerning.    I want to give her Xifaxan, but her insurance requires a PA which means that I have to try Bentyl and Elavil first.  However, it looks like her Elavil needs a PA as well.  So I am sending these 3 meds to the [facility] pharmacy so that they can help figure this out.    Pharmacy:  I WANT XIFAXAN ALONE.  I am only writing for Bentyl and Elavil b/c usually these 2 need to be tried before Xifaxan will be approved.  Both Elavil and Bentyl have issues in the elderly so I would much rather NOT give these meds.  So if you can get a PA for Xifaxan alone, please do so.  I am giving very low doses of Bentyl and Elavil if she must go that route.    Please instruct Ms. Hernandez to leave the pharmacy with max of 2 Rx, not 3.  Ask her to let me know in 2-3 weeks how she is doing.  "

## 2019-10-30 ENCOUNTER — OFFICE VISIT (OUTPATIENT)
Dept: PULMONOLOGY | Facility: CLINIC | Age: 79
End: 2019-10-30
Payer: MEDICARE

## 2019-10-30 VITALS
HEIGHT: 63 IN | SYSTOLIC BLOOD PRESSURE: 120 MMHG | WEIGHT: 142.75 LBS | BODY MASS INDEX: 25.29 KG/M2 | HEART RATE: 67 BPM | OXYGEN SATURATION: 96 % | DIASTOLIC BLOOD PRESSURE: 71 MMHG

## 2019-10-30 DIAGNOSIS — F51.04 PSYCHOPHYSIOLOGICAL INSOMNIA: ICD-10-CM

## 2019-10-30 DIAGNOSIS — R06.83 PRIMARY SNORING: ICD-10-CM

## 2019-10-30 PROCEDURE — 3074F SYST BP LT 130 MM HG: CPT | Mod: HCNC,CPTII,S$GLB, | Performed by: INTERNAL MEDICINE

## 2019-10-30 PROCEDURE — 3074F PR MOST RECENT SYSTOLIC BLOOD PRESSURE < 130 MM HG: ICD-10-PCS | Mod: HCNC,CPTII,S$GLB, | Performed by: INTERNAL MEDICINE

## 2019-10-30 PROCEDURE — 99213 PR OFFICE/OUTPT VISIT, EST, LEVL III, 20-29 MIN: ICD-10-PCS | Mod: HCNC,S$GLB,, | Performed by: INTERNAL MEDICINE

## 2019-10-30 PROCEDURE — 3078F DIAST BP <80 MM HG: CPT | Mod: HCNC,CPTII,S$GLB, | Performed by: INTERNAL MEDICINE

## 2019-10-30 PROCEDURE — 3078F PR MOST RECENT DIASTOLIC BLOOD PRESSURE < 80 MM HG: ICD-10-PCS | Mod: HCNC,CPTII,S$GLB, | Performed by: INTERNAL MEDICINE

## 2019-10-30 PROCEDURE — 1101F PR PT FALLS ASSESS DOC 0-1 FALLS W/OUT INJ PAST YR: ICD-10-PCS | Mod: HCNC,CPTII,S$GLB, | Performed by: INTERNAL MEDICINE

## 2019-10-30 PROCEDURE — 99999 PR PBB SHADOW E&M-EST. PATIENT-LVL III: ICD-10-PCS | Mod: PBBFAC,HCNC,, | Performed by: INTERNAL MEDICINE

## 2019-10-30 PROCEDURE — 1101F PT FALLS ASSESS-DOCD LE1/YR: CPT | Mod: HCNC,CPTII,S$GLB, | Performed by: INTERNAL MEDICINE

## 2019-10-30 PROCEDURE — 99999 PR PBB SHADOW E&M-EST. PATIENT-LVL III: CPT | Mod: PBBFAC,HCNC,, | Performed by: INTERNAL MEDICINE

## 2019-10-30 PROCEDURE — 99213 OFFICE O/P EST LOW 20 MIN: CPT | Mod: HCNC,S$GLB,, | Performed by: INTERNAL MEDICINE

## 2019-10-30 NOTE — ASSESSMENT & PLAN NOTE
Result of sleep study d/w patient.  Normal ahi per recent psg.  Sleep quality does not change with oral appliance or cpap.  In light of normal, ok to sleep without oral appliance.

## 2019-10-30 NOTE — PROGRESS NOTES
Nereyda Hernandez  was seen as a follow up.    CHIEF COMPLAINT:    Chief Complaint   Patient presents with    Apnea    Results     sleep study        HISTORY OF PRESENT ILLNESS: Nereyda Hernandez is a 79 y.o. female is here for sleep evaluation.    Our first encounter was 9/12.  Patient was diagnosed with kaylee in 2004 with ahi of 9.4.  At that time, patient was on cpap of 9 cm H20.  Pt has been using machine nightly until 1 month ago.  +leakage around full face mask.  +snoring without machine.  +fatigue upon awakening.  No rls symptoms.  No parasomnia.  No cataplexy.  No bruxism.  Patient was referred to Dr. Voss for oral appliance in 2012.        On today's Earling Sleepiness Scale the patient scores a 8.    Patient was set up with oral appliance 2012.  +persistent fatigue.  Still with fatigue.  Patient also has issue with teeth shifting.  Patient was set up for cpap during last appointment.  During our last encounter, patient resumed using cpap 5 times per week.  Since our last encounter, patient has stopped cpap and had another oral appliance made by Dr. Pastora Voss.  Patient has been using oral appliance without issue.    With oral appliance, patient do not snore.  Patient was sleeping well until 12/18.  At that time, patient experience shingle across lips and right cheek area.  After shingle, patient has difficulty fatigue and difficulty sleeping.  Currently on trazadone.    Sleep latency improve with stimulus control.  S/p requalification study since last visit.  Sleeping well.      SLEEP ROUTINE:  Activity the hour prior to sleep: read     Bed partner:  none  Time to bed:  10-11 pm  Lights off:  yes  Sleep onset latency:  15 minutes with trazadone.    Disruptions or awakenings:   1 (no difficulty going back to sleep)  Wakeup time:      5:45 am  Perceived sleep quality:  tire  Daytime naps:      Rest x 1 hour (feel rested)  Weekend sleep routine:      10 pm to 7 am  Caffeine use: 2 cups of coffee in am  exercise habit:    Silver sneaker 2 times per week.      PAST MEDICAL HISTORY:    Active Ambulatory Problems     Diagnosis Date Noted    Age-related osteoporosis without current pathological fracture 08/08/2012    Anxiety 08/08/2012    Essential hypertension 08/08/2012    Primary snoring 08/08/2012    Hyperlipidemia 08/08/2012    Psychophysiological insomnia 08/12/2013    Urge incontinence 10/16/2014    History of iron deficiency anemia 08/26/2015    Sedative hypnotic or anxiolytic dependence 10/25/2016    Osteoarthritis of right knee 09/19/2017    Squamous cell carcinoma 12/05/2017    Gastroesophageal reflux disease without esophagitis 08/20/2019    Palpitations 09/19/2019    HUBBARD (dyspnea on exertion) 09/19/2019    Sleep arousal disorder     Abdominal pain, generalized 10/10/2019    Weight loss 10/10/2019    Change in bowel habits 10/10/2019     Resolved Ambulatory Problems     Diagnosis Date Noted    Chronic disease anemia 08/12/2013     Past Medical History:   Diagnosis Date    Arthritis     Breast cancer 1990    Hypertension     Insomnia     Lobular carcinoma in situ     KANWAL (obstructive sleep apnea)     Osteoporosis     Rosacea     Urinary incontinence     Vertigo                 PAST SURGICAL HISTORY:    Past Surgical History:   Procedure Laterality Date    BELT ABDOMINOPLASTY      BREAST BIOPSY Left 1990    ex bx    BREAST LUMPECTOMY      HERNIA REPAIR      Ventral    OOPHORECTOMY      TONSILLECTOMY      TOTAL ABDOMINAL HYSTERECTOMY           FAMILY HISTORY:                Family History   Problem Relation Age of Onset    Cancer Mother         liver    Pancreatic cancer Mother     Heart disease Mother     Depression Mother     Hypertension Father     Alzheimer's disease Father     Depression Brother     Depression Brother     Diabetes Brother     Arthritis Brother     Hypertension Brother     Heart disease Brother        SOCIAL HISTORY:          Tobacco:   Social History      Tobacco Use   Smoking Status Former Smoker    Packs/day: 0.50    Years: 21.00    Pack years: 10.50    Start date: 1959    Last attempt to quit: 1980    Years since quittin.1   Smokeless Tobacco Never Used       alcohol use:    Social History     Substance and Sexual Activity   Alcohol Use Yes    Alcohol/week: 5.0 standard drinks    Types: 5 Glasses of wine per week    Comment: Occasionally                 Occupation: retired housewife, former customer service at [employer].    ALLERGIES:  Review of patient's allergies indicates:  No Known Allergies    CURRENT MEDICATIONS:    Current Outpatient Medications   Medication Sig Dispense Refill    amitriptyline (ELAVIL) 10 MG tablet Take 1 tablet (10 mg total) by mouth nightly as needed for Insomnia. 30 tablet 2    amoxicillin (AMOXIL) 500 MG capsule TAKE 2 CAPSULES BY MOUTH NOW THEN TAKE 1 THREE TIMES DAILY UNTIL FINISHED  0    aspirin (ECOTRIN) 81 MG EC tablet Take 81 mg by mouth once daily.      calcium citrate-vitamin D3 500 mg calcium -400 unit Chew Take 1 tablet by mouth 2 (two) times daily.        dicyclomine (BENTYL) 10 MG capsule Take 1 capsule (10 mg total) by mouth 3 (three) times daily as needed. 60 capsule 2    fish oil-omega-3 fatty acids 300-1,000 mg capsule Take 2 g by mouth once daily.        latanoprost 0.005 % ophthalmic solution 1 drop every evening.      losartan (COZAAR) 100 MG tablet Take 1 tablet (100 mg total) by mouth once daily. 30 tablet 3    metoprolol tartrate (LOPRESSOR) 25 MG tablet TAKE 1/2 (ONE-HALF) TABLET BY MOUTH TWICE DAILY 90 tablet 3    mirabegron (MYRBETRIQ) 25 mg Tb24 ER tablet Take 1 tablet (25 mg total) by mouth once daily. 90 tablet 1    multivit-iron-min-folic acid (MULTIVITAMIN-IRON-MINERALS-FOLIC ACID) 3,500-18-0.4 unit-mg-mg Chew Take by mouth.        pantoprazole (PROTONIX) 40 MG tablet Take 1 tablet (40 mg total) by mouth 2 (two) times daily. 180 tablet 1    pravastatin (PRAVACHOL) 20 MG  "tablet Take 1 tablet (20 mg total) by mouth once daily. 90 tablet 1    rifAXIMin (XIFAXAN) 550 mg Tab Take 1 tablet (550 mg total) by mouth 3 (three) times daily. 42 tablet 2    sertraline (ZOLOFT) 100 MG tablet Take 100 mg by mouth once daily.  3    traZODone (DESYREL) 50 MG tablet Take 1 tablet (50 mg total) by mouth every evening. 90 tablet 1    triamterene-hydrochlorothiazide 37.5-25 mg (DYAZIDE) 37.5-25 mg per capsule Take 1 capsule by mouth every morning. 30 capsule 3     No current facility-administered medications for this visit.                   REVIEW OF SYSTEMS:   No acute changes from previous encounter dated 9/19/2019 with exceptions mentioned in HPI.          PHYSICAL EXAM:  Vitals:    10/30/19 1441   BP: 120/71   Pulse: 67   SpO2: 96%   Weight: 64.8 kg (142 lb 12 oz)   Height: 5' 3" (1.6 m)   PainSc:   3   PainLoc: Jaw     Body mass index is 25.29 kg/m².     GENERAL: Normal development, well groomed  HEENT:  Conjunctivae are non-erythematous; Pupils equal, round, and reactive to light; Nose is symmetrical; Nasal mucosa is pink and moist; Septum is midline; Inferior turbinates are normal; Nasal airflow is normal; Posterior pharynx is pink; Modified Mallampati: 4; Posterior palate is normal; Tonsils +1; Uvula is normal and pink;Tongue is normal; Dentition is fair; No TMJ tenderness; Jaw opening and protrusion without click and without discomfort.  NECK: Supple. Neck circumference is 14 inches. No thyromegaly. No palpable nodes.     SKIN: On face and neck: No abrasions, no rashes, no lesions.  No subcutaneous nodules are palpable.  RESPIRATORY: Chest is clear to auscultation.  Normal chest expansion and non-labored breathing at rest.  CARDIOVASCULAR: Normal S1, S2.  No murmurs, gallops or rubs. No carotid bruits bilaterally.  EXTREMITIES: No edema. No clubbing. No cyanosis. Station normal. Gait normal.        NEURO/PSYCH: Oriented to time, place and person. Normal attention span and concentration. " Affect is full. Mood is normal.                                            DATA 1/20/04 rdi 12.3 with optimal cpap of 9  9/26/19 ahi of 1.1    ASSESSMENT  Problem List Items Addressed This Visit     Primary snoring    Overview     ahi of 1.1           Current Assessment & Plan     Result of sleep study d/w patient.  Normal ahi per recent psg.  Sleep quality does not change with oral appliance or cpap.  In light of normal, ok to sleep without oral appliance.           Psychophysiological insomnia    Overview     difficutly with sleep initiation and maintenance.  Started after shingle.  Suspect psychophysilogic.  currently on trazadone.  Improve with stimulus control.                Education: During our discussion today, we talked about the etiology of obstructive sleep apnea as well as the potential ramifications of untreated sleep apnea, which could include daytime sleepiness, hypertension, heart disease and/or stroke.        Patient will Follow up if symptoms worsen or fail to improve.

## 2019-11-04 ENCOUNTER — TELEPHONE (OUTPATIENT)
Dept: GASTROENTEROLOGY | Facility: CLINIC | Age: 79
End: 2019-11-04

## 2019-11-04 NOTE — TELEPHONE ENCOUNTER
----- Message from Sravan Torres sent at 11/4/2019  9:52 AM CST -----  Contact: self  Pt called to speak with office she stated she need prior authorization for medication rifAXIMin (XIFAXAN) 550 mg Tab.        Pt callback number [phone]

## 2019-11-15 ENCOUNTER — TELEPHONE (OUTPATIENT)
Dept: GASTROENTEROLOGY | Facility: CLINIC | Age: 79
End: 2019-11-15

## 2019-11-15 NOTE — TELEPHONE ENCOUNTER
Spoke to pharmacy about getting prior authorization for Xifaxin. Pharmacy will contact insurance company.

## 2019-11-15 NOTE — TELEPHONE ENCOUNTER
----- Message from Bianka Macias MD sent at 11/14/2019  1:58 PM CST -----  It evidently took [facility] several weeks to get her insurance to approve the Elavil/Bentyl.  She has been taking for over 2 weeks and is not improving and having side effects from these meds.  I wrote for the Xifaxan at the same time; please call the pharmacy and ask them to get the PA for the Xifaxan as she failed Elavil and Bentyl both.

## 2019-11-19 DIAGNOSIS — I10 ESSENTIAL HYPERTENSION: Chronic | ICD-10-CM

## 2019-11-19 RX ORDER — METOPROLOL TARTRATE 25 MG/1
TABLET, FILM COATED ORAL
Qty: 90 TABLET | Refills: 3 | Status: SHIPPED | OUTPATIENT
Start: 2019-11-19 | End: 2020-04-07

## 2019-11-19 NOTE — TELEPHONE ENCOUNTER
----- Message from Garett Grubbs sent at 11/19/2019 10:40 AM CST -----  Contact: [phone] / self  Patient would like a nurse to contact her about instructions for the medication metoprolol tartrate (LOPRESSOR) 25 MG tablet , also needs a new refill for that medication. Please Advise.

## 2019-11-19 NOTE — TELEPHONE ENCOUNTER
Advised pt that she should be taking Metoprolol 25 mg 1/2 po BID. Pt advised and her refill will be sent by the end of the day.

## 2019-11-21 ENCOUNTER — TELEPHONE (OUTPATIENT)
Dept: PHARMACY | Facility: CLINIC | Age: 79
End: 2019-11-21

## 2019-11-21 ENCOUNTER — OFFICE VISIT (OUTPATIENT)
Dept: FAMILY MEDICINE | Facility: CLINIC | Age: 79
End: 2019-11-21
Payer: MEDICARE

## 2019-11-21 VITALS
HEART RATE: 62 BPM | TEMPERATURE: 98 F | OXYGEN SATURATION: 96 % | HEIGHT: 63 IN | SYSTOLIC BLOOD PRESSURE: 128 MMHG | WEIGHT: 144.19 LBS | BODY MASS INDEX: 25.55 KG/M2 | DIASTOLIC BLOOD PRESSURE: 76 MMHG

## 2019-11-21 DIAGNOSIS — R00.1 BRADYCARDIA: ICD-10-CM

## 2019-11-21 DIAGNOSIS — J06.9 UPPER RESPIRATORY TRACT INFECTION, UNSPECIFIED TYPE: ICD-10-CM

## 2019-11-21 DIAGNOSIS — M81.0 AGE-RELATED OSTEOPOROSIS WITHOUT CURRENT PATHOLOGICAL FRACTURE: ICD-10-CM

## 2019-11-21 DIAGNOSIS — R10.84 ABDOMINAL PAIN, GENERALIZED: ICD-10-CM

## 2019-11-21 DIAGNOSIS — I10 ESSENTIAL HYPERTENSION: Primary | Chronic | ICD-10-CM

## 2019-11-21 PROCEDURE — 99214 PR OFFICE/OUTPT VISIT, EST, LEVL IV, 30-39 MIN: ICD-10-PCS | Mod: HCNC,S$GLB,, | Performed by: INTERNAL MEDICINE

## 2019-11-21 PROCEDURE — 3074F PR MOST RECENT SYSTOLIC BLOOD PRESSURE < 130 MM HG: ICD-10-PCS | Mod: HCNC,CPTII,S$GLB, | Performed by: INTERNAL MEDICINE

## 2019-11-21 PROCEDURE — 1101F PT FALLS ASSESS-DOCD LE1/YR: CPT | Mod: HCNC,CPTII,S$GLB, | Performed by: INTERNAL MEDICINE

## 2019-11-21 PROCEDURE — 3078F DIAST BP <80 MM HG: CPT | Mod: HCNC,CPTII,S$GLB, | Performed by: INTERNAL MEDICINE

## 2019-11-21 PROCEDURE — 1126F AMNT PAIN NOTED NONE PRSNT: CPT | Mod: HCNC,S$GLB,, | Performed by: INTERNAL MEDICINE

## 2019-11-21 PROCEDURE — 1126F PR PAIN SEVERITY QUANTIFIED, NO PAIN PRESENT: ICD-10-PCS | Mod: HCNC,S$GLB,, | Performed by: INTERNAL MEDICINE

## 2019-11-21 PROCEDURE — 99214 OFFICE O/P EST MOD 30 MIN: CPT | Mod: HCNC,S$GLB,, | Performed by: INTERNAL MEDICINE

## 2019-11-21 PROCEDURE — 1101F PR PT FALLS ASSESS DOC 0-1 FALLS W/OUT INJ PAST YR: ICD-10-PCS | Mod: HCNC,CPTII,S$GLB, | Performed by: INTERNAL MEDICINE

## 2019-11-21 PROCEDURE — 99999 PR PBB SHADOW E&M-EST. PATIENT-LVL IV: ICD-10-PCS | Mod: PBBFAC,HCNC,, | Performed by: INTERNAL MEDICINE

## 2019-11-21 PROCEDURE — 3074F SYST BP LT 130 MM HG: CPT | Mod: HCNC,CPTII,S$GLB, | Performed by: INTERNAL MEDICINE

## 2019-11-21 PROCEDURE — 3078F PR MOST RECENT DIASTOLIC BLOOD PRESSURE < 80 MM HG: ICD-10-PCS | Mod: HCNC,CPTII,S$GLB, | Performed by: INTERNAL MEDICINE

## 2019-11-21 PROCEDURE — 1159F MED LIST DOCD IN RCRD: CPT | Mod: HCNC,S$GLB,, | Performed by: INTERNAL MEDICINE

## 2019-11-21 PROCEDURE — 1159F PR MEDICATION LIST DOCUMENTED IN MEDICAL RECORD: ICD-10-PCS | Mod: HCNC,S$GLB,, | Performed by: INTERNAL MEDICINE

## 2019-11-21 PROCEDURE — 99999 PR PBB SHADOW E&M-EST. PATIENT-LVL IV: CPT | Mod: PBBFAC,HCNC,, | Performed by: INTERNAL MEDICINE

## 2019-11-21 RX ORDER — FLUTICASONE PROPIONATE 50 MCG
1 SPRAY, SUSPENSION (ML) NASAL DAILY
Qty: 1 BOTTLE | Refills: 2 | COMMUNITY
Start: 2019-11-21 | End: 2020-01-24

## 2019-11-21 NOTE — PATIENT INSTRUCTIONS
We have reviewed your prescription medications.   You already had your flu shot this year.   BP looks good on current medications. Continue them for now. We will check labs next time. Try the medicine that Dr Macias has prescribed for your stomach. Use flonase nasal spray instead of saline for your sinuses and allergies.

## 2019-11-24 NOTE — PROGRESS NOTES
Subjective:       Patient ID: Nereyda Hernandez is a 79 y.o. female.    Chief Complaint: Blood Pressure Check     I have reviewed the PMH,  and  for this patient    She  has a past medical history of Anxiety, Arthritis, Breast cancer (1990), Chronic disease anemia, Hyperlipidemia, Hypertension, Insomnia, Lobular carcinoma in situ, KANWAL (obstructive sleep apnea), Osteoporosis, Rosacea, Squamous cell carcinoma, Urinary incontinence, and Vertigo.    She is here for follow-up on blood pressure. I changed her HCTZ to dyazide and increased her losartan last time. Her blood pressure looks a lot better. She is also on metoprolol. She has had her flu shot already. Dr Macias started her on a medicine called rifaximin for IBS. She has not gotten the medicine yet. She has been having some sinus congestion. She has been using saline spray . We talked about flonase and how to use it appropriately.     Active Ambulatory Problems     Diagnosis Date Noted    Age-related osteoporosis without current pathological fracture 08/08/2012    Anxiety 08/08/2012    Essential hypertension 08/08/2012    Primary snoring 08/08/2012    Hyperlipidemia 08/08/2012    Psychophysiological insomnia 08/12/2013    Urge incontinence 10/16/2014    History of iron deficiency anemia 08/26/2015    Sedative hypnotic or anxiolytic dependence 10/25/2016    Osteoarthritis of right knee 09/19/2017    Squamous cell carcinoma 12/05/2017    Gastroesophageal reflux disease without esophagitis 08/20/2019    Palpitations 09/19/2019    HUBBARD (dyspnea on exertion) 09/19/2019    Sleep arousal disorder     Abdominal pain, generalized 10/10/2019    Weight loss 10/10/2019    Change in bowel habits 10/10/2019     Resolved Ambulatory Problems     Diagnosis Date Noted    Chronic disease anemia 08/12/2013     Past Medical History:   Diagnosis Date    Arthritis     Breast cancer 1990    Hypertension     Insomnia     Lobular carcinoma in situ     KANWAL (obstructive  sleep apnea)     Osteoporosis     Rosacea     Urinary incontinence     Vertigo          MEDICATIONS:  Current Outpatient Medications:     aspirin (ECOTRIN) 81 MG EC tablet, Take 81 mg by mouth once daily., Disp: , Rfl:     calcium citrate-vitamin D3 500 mg calcium -400 unit Chew, Take 1 tablet by mouth 2 (two) times daily.  , Disp: , Rfl:     fish oil-omega-3 fatty acids 300-1,000 mg capsule, Take 2 g by mouth once daily.  , Disp: , Rfl:     latanoprost 0.005 % ophthalmic solution, 1 drop every evening., Disp: , Rfl:     losartan (COZAAR) 100 MG tablet, Take 1 tablet (100 mg total) by mouth once daily., Disp: 30 tablet, Rfl: 3    metoprolol tartrate (LOPRESSOR) 25 MG tablet, TAKE 1/2 (ONE-HALF) TABLET BY MOUTH TWICE DAILY, Disp: 90 tablet, Rfl: 3    mirabegron (MYRBETRIQ) 25 mg Tb24 ER tablet, Take 1 tablet (25 mg total) by mouth once daily., Disp: 90 tablet, Rfl: 1    multivit-iron-min-folic acid (MULTIVITAMIN-IRON-MINERALS-FOLIC ACID) 3,500-18-0.4 unit-mg-mg Chew, Take by mouth.  , Disp: , Rfl:     pantoprazole (PROTONIX) 40 MG tablet, Take 1 tablet (40 mg total) by mouth 2 (two) times daily., Disp: 180 tablet, Rfl: 1    pravastatin (PRAVACHOL) 20 MG tablet, Take 1 tablet (20 mg total) by mouth once daily., Disp: 90 tablet, Rfl: 1    sertraline (ZOLOFT) 100 MG tablet, Take 100 mg by mouth once daily., Disp: , Rfl: 3    traZODone (DESYREL) 50 MG tablet, Take 1 tablet (50 mg total) by mouth every evening., Disp: 90 tablet, Rfl: 1    triamterene-hydrochlorothiazide 37.5-25 mg (DYAZIDE) 37.5-25 mg per capsule, Take 1 capsule by mouth every morning., Disp: 30 capsule, Rfl: 3    dicyclomine (BENTYL) 10 MG capsule, Take 1 capsule (10 mg total) by mouth 3 (three) times daily as needed. (Patient not taking: Reported on 11/21/2019), Disp: 60 capsule, Rfl: 2    fluticasone propionate (FLONASE) 50 mcg/actuation nasal spray, 1 spray (50 mcg total) by Each Nostril route once daily., Disp: 1 Bottle, Rfl: 2     rifAXIMin (XIFAXAN) 550 mg Tab, Take 1 tablet (550 mg total) by mouth 3 (three) times daily. (Patient not taking: Reported on 11/21/2019), Disp: 42 tablet, Rfl: 2      HEALTH MAINTENANCE:   Health Maintenance   Topic Date Due    Lipid Panel  05/14/2020    DEXA SCAN  08/20/2021    Colonoscopy  04/26/2023    TETANUS VACCINE  06/09/2024    Pneumococcal Vaccine (65+ High/Highest Risk)  Completed       Review of Systems   Constitutional: Negative for chills, fatigue and fever.   HENT: Negative for congestion, ear discharge, ear pain, rhinorrhea and sore throat.    Eyes: Negative for discharge, redness and itching.   Respiratory: Negative for cough, chest tightness, shortness of breath and wheezing.    Cardiovascular: Negative for chest pain, palpitations and leg swelling.   Gastrointestinal: Negative for abdominal pain, constipation, diarrhea, nausea and vomiting.   Endocrine: Negative for cold intolerance and heat intolerance.   Genitourinary: Negative for dysuria, flank pain, frequency and hematuria.   Musculoskeletal: Negative for arthralgias, back pain, joint swelling and myalgias.   Skin: Negative for color change and rash.   Neurological: Negative for dizziness, tremors, numbness and headaches.   Psychiatric/Behavioral: Negative for dysphoric mood and sleep disturbance. The patient is not nervous/anxious.        Objective:          A1C:      CBC:  Recent Labs   Lab 02/23/17  1042 03/08/18  0910 08/09/18  0925 05/14/19  0811 09/16/19  1549   WBC 6.40 3.57 L 3.62 L 4.15 6.01   RBC 4.24 3.79 L 3.82 L 3.82 L 4.31   Hemoglobin 13.4 11.8 L 12.0 12.0 13.3   Hematocrit 39.4 34.9 L 36.3 L 36.7 L 40.0   Platelets 246 204 209 221 225   Mean Corpuscular Volume 93 92 95 96 93   Mean Corpuscular Hemoglobin 31.6 H 31.1 H 31.4 H 31.4 H 30.9   Mean Corpuscular Hemoglobin Conc 34.0 33.8 33.1 32.7 33.3     CMP:  Recent Labs   Lab 02/23/17  1042 03/08/18  0910 08/09/18  0925 05/14/19  0811 09/16/19  1549 10/10/19  1136   Glucose  97 95 93 92 101  --    Calcium 9.9 9.6 9.4 9.5 9.9  --    Albumin 3.9 3.7 3.6 3.5 4.6  --    Total Protein 8.1 7.1 7.1 7.0 8.3  --    Sodium 140 141 138 139 138  --    Potassium 4.8 4.2 4.1 3.9 3.5  --    CO2 28 31 H 28 28 30 H  --    Chloride 102 102 103 102 99  --    BUN, Bld 15 14 13 14 21 H  --    Creatinine 0.8 0.8 0.8 0.7 0.52 0.63   Alkaline Phosphatase 70 58 57 50 L 70  --    ALT 23 30 25 20 39  --    AST 29 31 30 25 41  --    Total Bilirubin 0.5 0.4 0.5 0.4 0.5  --      LIPIDS:  Recent Labs   Lab 02/23/17  1042 03/08/18  0910 08/09/18  0925 05/14/19  0811 09/16/19  1549   TSH  --   --   --   --  1.910   HDL 70 65 61 63  --    Cholesterol 205 H 159 177 161  --    Triglycerides 94 60 76 73  --    LDL Cholesterol 116.2 82.0 100.8 83.4  --    Hdl/Cholesterol Ratio 34.1 40.9 34.5 39.1  --    Non-HDL Cholesterol 135 94 116 98  --    Total Cholesterol/HDL Ratio 2.9 2.4 2.9 2.6  --      TSH:  Recent Labs   Lab 09/16/19  1549   TSH 1.910           Physical Exam   Constitutional: She appears well-developed and well-nourished. She does not have a sickly appearance.   HENT:   Head: Normocephalic and atraumatic.   Right Ear: External ear normal. Tympanic membrane is not erythematous. No middle ear effusion.   Left Ear: External ear normal. Tympanic membrane is not erythematous.  No middle ear effusion.   Nose: No rhinorrhea. Right sinus exhibits no maxillary sinus tenderness and no frontal sinus tenderness. Left sinus exhibits no maxillary sinus tenderness and no frontal sinus tenderness.   Mouth/Throat: No posterior oropharyngeal edema or posterior oropharyngeal erythema. No tonsillar exudate.   Eyes: Pupils are equal, round, and reactive to light. Conjunctivae and EOM are normal. Right eye exhibits no discharge. Left eye exhibits no discharge. Right conjunctiva is not injected. Left conjunctiva is not injected.   Neck: No thyromegaly present.   Cardiovascular: Regular rhythm, normal heart sounds and intact distal  pulses. Bradycardia present.   No murmur heard.  Pulmonary/Chest: Effort normal and breath sounds normal. She has no wheezes. She has no rhonchi. She has no rales.   Abdominal: Bowel sounds are normal. She exhibits no distension. There is no tenderness.   Musculoskeletal: She exhibits no edema or tenderness.   Lymphadenopathy:     She has no cervical adenopathy.   Neurological: She displays normal reflexes. No cranial nerve deficit.   Skin: Skin is warm and dry. No lesion and no rash noted.   Psychiatric: She has a normal mood and affect. Her behavior is normal. Her mood appears not anxious. Her speech is not rapid and/or pressured. She is not agitated and not aggressive. She does not exhibit a depressed mood.             Assessment and Plan:     Problem List Items Addressed This Visit        Cardiac/Vascular    Essential hypertension - Primary (Chronic) - BP looks good. Continue current medications. We will check labs. Continue to work on diet to reduce salt and exercise 3 times a week for 30 minutes a day.         GI    Abdominal pain, generalized - try medicine prescribed by Dr Macias. Rifaximin.        Orthopedic    Age-related osteoporosis without current pathological fracture - continue calcium      Other Visit Diagnoses     Bradycardia     - on metoprolol.       Upper respiratory tract infection, unspecified type     - use flonase nasal spray instead of saline.           Orders Placed This Encounter    fluticasone propionate (FLONASE) 50 mcg/actuation nasal spray         Follow-up with me in 3 months.       Nancy Mac MD,  FACP  Internal Medicine

## 2019-11-27 ENCOUNTER — TELEPHONE (OUTPATIENT)
Dept: GASTROENTEROLOGY | Facility: CLINIC | Age: 79
End: 2019-11-27

## 2019-11-27 NOTE — TELEPHONE ENCOUNTER
Pharmacy states that information is not available to them. Only the patient can get that information. Spoke to patient and she will call the insurance company to find out when she will be out of her gap coverage.

## 2019-11-27 NOTE — TELEPHONE ENCOUNTER
Patient spoke to her insurance company and was told that the Xifaxin is a medication that she is responsible to pay 33% of no matter what. Patient will wait for a call about the next course of action.

## 2019-12-05 ENCOUNTER — TELEPHONE (OUTPATIENT)
Dept: GASTROENTEROLOGY | Facility: CLINIC | Age: 79
End: 2019-12-05

## 2019-12-05 NOTE — TELEPHONE ENCOUNTER
----- Message from Yari Sanchez sent at 12/5/2019  9:38 AM CST -----  Contact: self  Pt is requesting a callback concerning medication she needs to take for her stomach issues. Please call [phone].

## 2019-12-05 NOTE — TELEPHONE ENCOUNTER
Explained to patient the only thing that we can try is patient assistance program with the . Patient agreed to try. Patient is contemplating trying the bentyl again. Patient would like to know what Dr. Macias thinks about this first.

## 2019-12-26 ENCOUNTER — TELEPHONE (OUTPATIENT)
Dept: GASTROENTEROLOGY | Facility: CLINIC | Age: 79
End: 2019-12-26

## 2019-12-26 NOTE — TELEPHONE ENCOUNTER
Called patient to find out how she is doing since taking Xifaxin for 14 days. Patient states she still has 1 pill left to take. Patient states she still has a lot of gas. Patient is not feeling well right now so she said it's hard to say if she is really better.

## 2019-12-26 NOTE — TELEPHONE ENCOUNTER
----- Message from Scarlett Ames sent at 12/26/2019  9:23 AM CST -----  Contact: [phone]/kyjk   Patient states she is returning a call to office. Please advise.

## 2020-01-06 RX ORDER — TRIAMTERENE AND HYDROCHLOROTHIAZIDE 37.5; 25 MG/1; MG/1
1 CAPSULE ORAL EVERY MORNING
Qty: 90 CAPSULE | Refills: 1 | Status: SHIPPED | OUTPATIENT
Start: 2020-01-06 | End: 2020-04-07 | Stop reason: SDUPTHER

## 2020-01-08 ENCOUNTER — OFFICE VISIT (OUTPATIENT)
Dept: GASTROENTEROLOGY | Facility: CLINIC | Age: 80
End: 2020-01-08
Payer: MEDICARE

## 2020-01-08 ENCOUNTER — PATIENT MESSAGE (OUTPATIENT)
Dept: GASTROENTEROLOGY | Facility: CLINIC | Age: 80
End: 2020-01-08

## 2020-01-08 VITALS
WEIGHT: 139.5 LBS | SYSTOLIC BLOOD PRESSURE: 126 MMHG | HEART RATE: 62 BPM | DIASTOLIC BLOOD PRESSURE: 78 MMHG | BODY MASS INDEX: 24.71 KG/M2

## 2020-01-08 DIAGNOSIS — R68.81 EARLY SATIETY: Primary | ICD-10-CM

## 2020-01-08 DIAGNOSIS — R19.4 CHANGE IN BOWEL HABITS: ICD-10-CM

## 2020-01-08 DIAGNOSIS — R53.83 FATIGUE, UNSPECIFIED TYPE: ICD-10-CM

## 2020-01-08 PROCEDURE — 1101F PR PT FALLS ASSESS DOC 0-1 FALLS W/OUT INJ PAST YR: ICD-10-PCS | Mod: HCNC,CPTII,S$GLB, | Performed by: INTERNAL MEDICINE

## 2020-01-08 PROCEDURE — 99999 PR PBB SHADOW E&M-EST. PATIENT-LVL III: ICD-10-PCS | Mod: PBBFAC,HCNC,, | Performed by: INTERNAL MEDICINE

## 2020-01-08 PROCEDURE — 3074F PR MOST RECENT SYSTOLIC BLOOD PRESSURE < 130 MM HG: ICD-10-PCS | Mod: HCNC,CPTII,S$GLB, | Performed by: INTERNAL MEDICINE

## 2020-01-08 PROCEDURE — 3078F PR MOST RECENT DIASTOLIC BLOOD PRESSURE < 80 MM HG: ICD-10-PCS | Mod: HCNC,CPTII,S$GLB, | Performed by: INTERNAL MEDICINE

## 2020-01-08 PROCEDURE — 3074F SYST BP LT 130 MM HG: CPT | Mod: HCNC,CPTII,S$GLB, | Performed by: INTERNAL MEDICINE

## 2020-01-08 PROCEDURE — 1159F MED LIST DOCD IN RCRD: CPT | Mod: HCNC,S$GLB,, | Performed by: INTERNAL MEDICINE

## 2020-01-08 PROCEDURE — 99214 OFFICE O/P EST MOD 30 MIN: CPT | Mod: HCNC,S$GLB,, | Performed by: INTERNAL MEDICINE

## 2020-01-08 PROCEDURE — 99999 PR PBB SHADOW E&M-EST. PATIENT-LVL III: CPT | Mod: PBBFAC,HCNC,, | Performed by: INTERNAL MEDICINE

## 2020-01-08 PROCEDURE — 99214 PR OFFICE/OUTPT VISIT, EST, LEVL IV, 30-39 MIN: ICD-10-PCS | Mod: HCNC,S$GLB,, | Performed by: INTERNAL MEDICINE

## 2020-01-08 PROCEDURE — 1126F PR PAIN SEVERITY QUANTIFIED, NO PAIN PRESENT: ICD-10-PCS | Mod: HCNC,S$GLB,, | Performed by: INTERNAL MEDICINE

## 2020-01-08 PROCEDURE — 1101F PT FALLS ASSESS-DOCD LE1/YR: CPT | Mod: HCNC,CPTII,S$GLB, | Performed by: INTERNAL MEDICINE

## 2020-01-08 PROCEDURE — 3078F DIAST BP <80 MM HG: CPT | Mod: HCNC,CPTII,S$GLB, | Performed by: INTERNAL MEDICINE

## 2020-01-08 PROCEDURE — 1126F AMNT PAIN NOTED NONE PRSNT: CPT | Mod: HCNC,S$GLB,, | Performed by: INTERNAL MEDICINE

## 2020-01-08 PROCEDURE — 1159F PR MEDICATION LIST DOCUMENTED IN MEDICAL RECORD: ICD-10-PCS | Mod: HCNC,S$GLB,, | Performed by: INTERNAL MEDICINE

## 2020-01-08 RX ORDER — SODIUM, POTASSIUM,MAG SULFATES 17.5-3.13G
1 SOLUTION, RECONSTITUTED, ORAL ORAL DAILY
Qty: 354 ML | Refills: 0 | Status: SHIPPED | OUTPATIENT
Start: 2020-01-08 | End: 2020-01-10

## 2020-01-08 NOTE — PROGRESS NOTES
"Subjective:       Patient ID: Nereyda Hernandez is a 79 y.o. female.    Chief Complaint: Constipation and Diarrhea    78 yo F here for f/u.  The Elavil and Bentyl did not help at all.  After completing the Xifaxan, she had constipation so she took Miralax for 2-3 days, then had severe diarrhea.  The flatus also continued and she then noted leakage of stool with passage of flatus and is now wearing diapers.  This morning she had a normal "good" BM and feels much better than previous.  She has developed early satiety.  She states she has a good appetite, but gets full very quickly and never finishes a meal.  She feels very fatigued and "just doesn't feel good."  She is having mild nausea, but no vomiting.    Review of Systems   Constitutional: Positive for fatigue. Negative for chills and fever.   Respiratory: Negative for chest tightness, shortness of breath and wheezing.    Cardiovascular: Negative for chest pain, palpitations and leg swelling.       Objective:       /78 (BP Location: Left arm, Patient Position: Sitting, BP Method: Medium (Manual))   Pulse 62   Wt 63.3 kg (139 lb 8 oz)   BMI 24.71 kg/m²     Physical Exam   Constitutional: She is oriented to person, place, and time. She appears well-developed and well-nourished.   Abdominal: Soft. Bowel sounds are normal. She exhibits no distension. There is no tenderness.   Neurological: She is alert and oriented to person, place, and time.   Psychiatric: She has a normal mood and affect. Her behavior is normal.       CT was independently visualized and reviewed by me and showed no acute changes, no significant stool burden.    Assessment:       1. Early satiety    2. Change in bowel habits    3. Fatigue, unspecified type        Plan:       Early satiety; Change in bowel habits; Fatigue, unspecified type  -     Case request GI: EGD (ESOPHAGOGASTRODUODENOSCOPY), COLONOSCOPY  -     SUPREP BOWEL PREP KIT 17.5-3.13-1.6 gram SolR; Take 177 mLs by mouth once daily. for " 2 days as directed  Dispense: 354 mL; Refill: 0  -     CBC auto differential; Future; Expected date: 01/08/2020  -     Comprehensive metabolic panel; Future; Expected date: 01/08/2020  -     Amylase; Future; Expected date: 01/08/2020  -     Lipase; Future; Expected date: 01/08/2020  -     TSH; Future; Expected date: 01/08/2020  -     Prealbumin; Future; Expected date: 01/08/2020  -     Celiac Disease Panel; Future; Expected date: 01/08/2020

## 2020-01-08 NOTE — PATIENT INSTRUCTIONS
Procedures done on Tuesday, Thursday, AND Friday.  I usually need 2 weeks notice.      SUPREP Instructions    You are scheduled for a EGD/colonoscopy with Dr. Macias on TBD at [facility] located at [address] [place] [address] in [place].  Enter through the [facility] Entrance #2 (by the cafeteria).  Go straight back down the sheikh and check-in at Same Day Surgery.      You will receive a call 1-2 days before your colonoscopy to tell you the time to arrive.  If you have not received a call by the day before your procedure, call the Endoscopy Lab at [phone].    To ensure that your test is accurate and complete, you MUST follow these instructions listed below.  If you have any questions, please call our office at [phone].  Plan on being at the hospital for your procedure for 3-4 hours.    1.  Follow a CLEAR LIQUID DIET for the entire day before your scheduled colonoscopy.  This means no solid food the entire day starting when you wake.  You may have as much of the clear liquids as you want throughout the day.   CLEAR LIQUID DIET:   - Avoid Red, Orange, Purple, and/or Blue food coloring   - NO DAIRY   - You can have:  Coffee with sugar (no creamer), tea, water, soda, apple or white grape juice, chicken or beef broth/bouillon (no meat, noodles, or veggies), green/yellow popsicles, green/yellow Jell-O, lemonade.    2.  AT 5 pm the evening before your colonoscopy, POUR ONE (1) BOTTLE OF SUPREP INTO THE MIXING CONTAINER, PROVIDED INSIDE THE BOX.  ADD WATER TO THE LINE ON THE CONTAINER AND MIX IT WELL.  DRINK THE ENTIRE CONTAINER AND THEN DRINK TWO (2) MORE CONTAINERS OF WATER OVER THE NEXT 1 HOUR.  This is sometimes easier to drink if this solution is cold, so you can mix the solution 20 minutes ahead of time and place in the refrigerator prior to drinking.  You have to drink the solution within 30-45 minutes of mixing it.  Do NOT put this solution over ice.  It IS ok to drink with a straw.    3.  The  endoscopy department will call you 1 day before your colonoscopy to tell you the exact time to arrive, AND to tell you the exact time to drink the 2nd portion of your prep (which will be FIVE HOURS BEFORE YOUR ARRIVAL TIME).  At this time given to you, POUR ONE (1) BOTTLE OF SUPREP INTO THE MIXING CONTAINER, PROVIDED INSIDE THE BOX.  ADD WATER TO THE LINE ON THE CONTAINER AND MIX IT WELL.  DRINK THE ENTIRE CONTAINER AND THEN DRINK TWO (2) MORE CONTAINERS OF WATER OVER THE NEXT 1 HOUR.  This is sometimes easier to drink if this solution is cold, so you can mix the solution 20 minutes ahead of time and place in the refrigerator prior to drinking.  You have to drink the solution within 30-45 minutes of mixing it.  Do NOT put this solution over ice.  It IS ok to drink with a straw.  Once this is complete, you may not have ANYTHING else by mouth!    4.  You must have someone with you to DRIVE YOU HOME since you will be receiving IV sedation for the colonoscopy.    5.  It is ok to take your heart, blood pressure, and seizure medications in the morning of your test with a SIP of water.  Hold other medications until after your procedure.  Do NOT have anything else to eat or drink the morning of your colonoscopy.  It is ok to brush your teeth.    6.  If you are on blood thinners THAT YOU HAVE BEEN INSTRUCTED TO HOLD BY YOUR DOCTOR FOR THIS PROCEDURE, then do NOT take this the morning of your colonoscopy.  Do NOT stop these medications on your own, they must be approved to be held by your doctor.  Your colonoscopy can NOT be done if you are on these medications.  Examples of blood thinners include: Coumadin, Aggrenox, Plavix, Pradaxa, Reapro, Pletal, Xarelto, Ticagrelor, Brilinta, Eliquis, and high dose aspirin (325 mg).  You do not have to stop baby aspirin 81 mg.    7.  IF YOU ARE DIABETIC:  NO INSULIN OR ORAL MEDICATIONS THE MORNING OF THE COLONOSCOPY.  TAKE ONLY HALF THE DOSE OF YOUR INSULIN THE DAY BEFORE THE  COLONOSCOPY.  DO NOT TAKE ANY ORAL DIABETIC MEDICATIONS THE DAY BEFORE THE COLONOSCOPY.  IF YOU ARE AN INSULIN DEPENDENT DIABETIC WITH UNSTABLE BLOOD SUGARS, NOTIFY YOUR PRIMARY CARE PHYSICIAN FOR INSTRUCTIONS.          Constipation (Adult)  Constipation means that you have bowel movements that are less frequent than usual. Stools often become very hard and difficult to pass.  Constipation is very common. At some point in life it affects almost everyone. Since everyone's bowel habits are different, what is constipation to one person may not be to another. Your healthcare provider may do tests to diagnose constipation. It depends on what he or she finds when evaluating you.    Symptoms of constipation include:  · Abdominal pain  · Bloating  · Vomiting  · Painful bowel movements  · Itching, swelling, bleeding, or pain around the anus  Causes  Constipation can have many causes. These include:  · Diet low in fiber  · Too much dairy  · Not drinking enough liquids  · Lack of exercise or physical activity. This is especially true for older adults.  · Changes in lifestyle or daily routine, including pregnancy, aging, work, and travel  · Frequent use or misuse of laxatives  · Ignoring the urge to have a bowel movement or delaying it until later  · Medicines, such as certain prescription pain medicines, iron supplements, antacids, certain antidepressants, and calcium supplements  · Diseases like irritable bowel syndrome, bowel obstructions, stroke, diabetes, thyroid disease, Parkinson disease, hemorrhoids, and colon cancer  Complications  Potential complications of constipation can include:  · Hemorrhoids  · Rectal bleeding from hemorrhoids or anal fissures (skin tears)  · Hernias  · Dependency on laxatives  · Chronic constipation  · Fecal impaction  · Bowel obstruction or perforation  Home care  All treatment should be done after talking with your healthcare provider. This is especially true if you have another medical  problems, are taking prescription medicines, or are an older adult. Treatment most often involves lifestyle changes. You may also need medicines. Your healthcare provider will tell you which will work best for you. Follow the advice below to help avoid this problem in the future.  Lifestyle changes  These lifestyle changes can help prevent constipation:  · Diet. Eat a high-fiber diet, with fresh fruit and vegetables, and reduce dairy intake, meats, and processed foods  · Fluids. It's important to get enough fluids each day. Drink plenty of water when you eat more fiber. If you are on diet that limits the amount of fluid you can have, talk about this with your healthcare provider.  · Regular exercise. Check with your healthcare provider first.  Medications  Take any medicines as directed. Some laxatives are safe to use only every now and then. Others can be taken on a regular basis. Talk with your doctor or pharmacist if you have questions.  Prescription pain medicines can cause constipation. If you are taking this kind of medicine, ask your healthcare provider if you should also take a stool softener.  Medicines you may take to treat constipation include:  · Fiber supplements  · Stool softeners  · Laxatives  · Enemas  · Rectal suppositories  Follow-up care  Follow up with your healthcare provider if symptoms don't get better in the next few days. You may need to have more tests or see a specialist.  Call 911  Call 911 if any of these occur:  · Trouble breathing  · Stiff, rigid abdomen that is severely painful to touch  · Confusion  · Fainting or loss of consciousness  · Rapid heart rate  · Chest pain  When to seek medical advice  Call your healthcare provider right away if any of these occur:  · Fever over 100.4°F (38°C)  · Failure to resume normal bowel movements  · Pain in your abdomen or back gets worse  · Nausea or vomiting  · Swelling in your abdomen  · Blood in the stool  · Black, tarry stool  · Involuntary  weight loss  · Weakness  Date Last Reviewed: 12/30/2015  © 1016-3239 [employer]. [address] [place] [address], [place] [address]. All rights reserved. This information is not intended as a substitute for professional medical care. Always follow your healthcare professional's instructions.

## 2020-01-22 DIAGNOSIS — I10 ESSENTIAL HYPERTENSION: Chronic | ICD-10-CM

## 2020-01-23 RX ORDER — LOSARTAN POTASSIUM 100 MG/1
100 TABLET ORAL DAILY
Qty: 90 TABLET | Refills: 0 | Status: SHIPPED | OUTPATIENT
Start: 2020-01-23 | End: 2020-04-07 | Stop reason: SDUPTHER

## 2020-01-29 ENCOUNTER — TELEPHONE (OUTPATIENT)
Dept: FAMILY MEDICINE | Facility: CLINIC | Age: 80
End: 2020-01-29

## 2020-01-29 ENCOUNTER — TELEPHONE (OUTPATIENT)
Dept: INTERNAL MEDICINE | Facility: CLINIC | Age: 80
End: 2020-01-29

## 2020-01-29 NOTE — TELEPHONE ENCOUNTER
----- Message from Brooklynn Melvin sent at 1/29/2020  2:57 PM CST -----  Contact: [phone]/#self  Patient called in returning your call. Please advise.

## 2020-01-29 NOTE — TELEPHONE ENCOUNTER
----- Message from Valentine Moran sent at 1/29/2020  9:20 AM CST -----  Contact: [phone]/self  Type:  Patient Returning Call    Who Called: patient  Who Left Message for Patient: Bianka  Does the patient know what this is regarding?: medication  Would the patient rather a call back or a response via [facility]? call  Best Call Back Number: [phone]  Additional Information: n/a

## 2020-01-29 NOTE — TELEPHONE ENCOUNTER
Spoke with pt she stated that her sleeping medication (traZODone (DESYREL) 50 MG tablet) is not working. She states she has also tried Belsomra 5 mg, which didn't work. The only medication that works for the pt is the ambien. Can we send in a script for ambien for her? Please advise.

## 2020-01-29 NOTE — TELEPHONE ENCOUNTER
----- Message from Bianka Macias MD sent at 1/29/2020  7:32 AM CST -----  Could you please call Ms Hernandez to discuss her sleeping medication?  She states that the one Dr. Mac gave her is not working.  She states that the Ambien worked better, but needs some sort of approval ([place]??) from Dr. Mac.  Thanks!  KD

## 2020-01-30 RX ORDER — MIRTAZAPINE 30 MG/1
30 TABLET, FILM COATED ORAL NIGHTLY
Qty: 30 TABLET | Refills: 2 | Status: SHIPPED | OUTPATIENT
Start: 2020-01-30 | End: 2020-05-15 | Stop reason: SDUPTHER

## 2020-02-28 ENCOUNTER — TELEPHONE (OUTPATIENT)
Dept: GASTROENTEROLOGY | Facility: CLINIC | Age: 80
End: 2020-02-28

## 2020-02-28 NOTE — TELEPHONE ENCOUNTER
----- Message from Bianka Macias MD sent at 2/19/2020  4:02 PM CST -----  GES is normal so workup is all good.  That means that this is functional, I.e. Like IBS.  Choose an OTC fiber supplement such as Benefiber or fiber gummies and take one daily.  RTC as needed.  Please contact pt

## 2020-03-02 ENCOUNTER — TELEPHONE (OUTPATIENT)
Dept: FAMILY MEDICINE | Facility: CLINIC | Age: 80
End: 2020-03-02

## 2020-03-02 NOTE — TELEPHONE ENCOUNTER
----- Message from Angi Butler sent at 3/2/2020  1:02 PM CST -----  Contact: [phone]/self  Type:  Needs Medical Advice    Who Called: PATIENT   Symptoms (please be specific): PATIENT STATES HER BLOOD PRESSURE KEEP GOING UP  How long has patient had these symptoms:  1 MONTH  Pharmacy name and phone #:  [id number] - [id number], [place] - [address] [facility] [phone] (Phone)   Would the patient rather a call back or a response via [facility]? Callback   Best Call Back Number: [phone]  Additional Information: none

## 2020-03-06 ENCOUNTER — TELEPHONE (OUTPATIENT)
Dept: FAMILY MEDICINE | Facility: CLINIC | Age: 80
End: 2020-03-06

## 2020-03-26 ENCOUNTER — TELEPHONE (OUTPATIENT)
Dept: FAMILY MEDICINE | Facility: CLINIC | Age: 80
End: 2020-03-26

## 2020-03-26 NOTE — TELEPHONE ENCOUNTER
Patient stated she has been getting high reading on her blood pressure 3/8/2020 147/96 3/25/2020 151/90. Patient requesting to discuss with Dr. Mac. Patient has virtual visit on 4/7/2020.

## 2020-04-07 ENCOUNTER — OFFICE VISIT (OUTPATIENT)
Dept: FAMILY MEDICINE | Facility: CLINIC | Age: 80
End: 2020-04-07
Payer: MEDICARE

## 2020-04-07 DIAGNOSIS — J30.9 ALLERGIC RHINITIS, UNSPECIFIED SEASONALITY, UNSPECIFIED TRIGGER: ICD-10-CM

## 2020-04-07 DIAGNOSIS — E78.5 HYPERLIPIDEMIA, UNSPECIFIED HYPERLIPIDEMIA TYPE: Chronic | ICD-10-CM

## 2020-04-07 DIAGNOSIS — I10 ESSENTIAL HYPERTENSION: Chronic | ICD-10-CM

## 2020-04-07 DIAGNOSIS — R00.2 PALPITATIONS: Primary | ICD-10-CM

## 2020-04-07 DIAGNOSIS — D64.9 ANEMIA, UNSPECIFIED TYPE: ICD-10-CM

## 2020-04-07 DIAGNOSIS — K21.9 GASTROESOPHAGEAL REFLUX DISEASE WITHOUT ESOPHAGITIS: ICD-10-CM

## 2020-04-07 DIAGNOSIS — R10.84 ABDOMINAL PAIN, GENERALIZED: ICD-10-CM

## 2020-04-07 PROCEDURE — 99499 RISK ADDL DX/OHS AUDIT: ICD-10-PCS | Mod: HCNC,95,, | Performed by: INTERNAL MEDICINE

## 2020-04-07 PROCEDURE — 99214 OFFICE O/P EST MOD 30 MIN: CPT | Mod: 95,HCNC,, | Performed by: INTERNAL MEDICINE

## 2020-04-07 PROCEDURE — 1159F PR MEDICATION LIST DOCUMENTED IN MEDICAL RECORD: ICD-10-PCS | Mod: HCNC,95,, | Performed by: INTERNAL MEDICINE

## 2020-04-07 PROCEDURE — 3077F SYST BP >= 140 MM HG: CPT | Mod: HCNC,CPTII,95, | Performed by: INTERNAL MEDICINE

## 2020-04-07 PROCEDURE — 3077F PR MOST RECENT SYSTOLIC BLOOD PRESSURE >= 140 MM HG: ICD-10-PCS | Mod: HCNC,CPTII,95, | Performed by: INTERNAL MEDICINE

## 2020-04-07 PROCEDURE — 99499 UNLISTED E&M SERVICE: CPT | Mod: HCNC,95,, | Performed by: INTERNAL MEDICINE

## 2020-04-07 PROCEDURE — 1101F PT FALLS ASSESS-DOCD LE1/YR: CPT | Mod: HCNC,CPTII,95, | Performed by: INTERNAL MEDICINE

## 2020-04-07 PROCEDURE — 1159F MED LIST DOCD IN RCRD: CPT | Mod: HCNC,95,, | Performed by: INTERNAL MEDICINE

## 2020-04-07 PROCEDURE — 3080F PR MOST RECENT DIASTOLIC BLOOD PRESSURE >= 90 MM HG: ICD-10-PCS | Mod: HCNC,CPTII,95, | Performed by: INTERNAL MEDICINE

## 2020-04-07 PROCEDURE — 99214 PR OFFICE/OUTPT VISIT, EST, LEVL IV, 30-39 MIN: ICD-10-PCS | Mod: 95,HCNC,, | Performed by: INTERNAL MEDICINE

## 2020-04-07 PROCEDURE — 1101F PR PT FALLS ASSESS DOC 0-1 FALLS W/OUT INJ PAST YR: ICD-10-PCS | Mod: HCNC,CPTII,95, | Performed by: INTERNAL MEDICINE

## 2020-04-07 PROCEDURE — 3080F DIAST BP >= 90 MM HG: CPT | Mod: HCNC,CPTII,95, | Performed by: INTERNAL MEDICINE

## 2020-04-07 RX ORDER — LOSARTAN POTASSIUM 100 MG/1
100 TABLET ORAL DAILY
Qty: 90 TABLET | Refills: 1 | Status: SHIPPED | OUTPATIENT
Start: 2020-04-07 | End: 2020-08-13

## 2020-04-07 RX ORDER — METOPROLOL TARTRATE 25 MG/1
25 TABLET, FILM COATED ORAL 2 TIMES DAILY
Qty: 60 TABLET | Refills: 5 | Status: SHIPPED | OUTPATIENT
Start: 2020-04-07 | End: 2020-08-13

## 2020-04-07 RX ORDER — TRIAMTERENE AND HYDROCHLOROTHIAZIDE 37.5; 25 MG/1; MG/1
1 CAPSULE ORAL EVERY MORNING
Qty: 90 CAPSULE | Refills: 1 | Status: SHIPPED | OUTPATIENT
Start: 2020-04-07 | End: 2020-08-13

## 2020-04-07 RX ORDER — PRAVASTATIN SODIUM 20 MG/1
20 TABLET ORAL DAILY
Qty: 90 TABLET | Refills: 1 | Status: SHIPPED | OUTPATIENT
Start: 2020-04-07 | End: 2020-08-13

## 2020-04-07 NOTE — PATIENT INSTRUCTIONS
We have reviewed your prescription medications.   We will increase the metoprolol to 25 mg, a whole pill, twice a day.  Please let me know what blood pressure is running next week.  I would like to follow-up with you in 4-6 weeks.  I have sent refills of pravastatin, losartan, and Dyazide to [facility].  Try using Claritin and Flonase for allergy symptoms.

## 2020-04-12 VITALS — SYSTOLIC BLOOD PRESSURE: 148 MMHG | DIASTOLIC BLOOD PRESSURE: 91 MMHG

## 2020-04-12 NOTE — PROGRESS NOTES
Subjective:       Patient ID: Nereyda Hernandez is a 79 y.o. female.    Chief Complaint: No chief complaint on file.     I have reviewed the PMH, SH and  for this patient    She  has a past medical history of Anxiety, Arthritis, Breast cancer (1990), Chronic disease anemia, Hyperlipidemia, Hypertension, Insomnia, Lobular carcinoma in situ, KANWAL (obstructive sleep apnea), Osteoporosis, Rosacea, Squamous cell carcinoma, Urinary incontinence, and Vertigo.     The patient presents today for follow-up of chronic conditions.     The patient location is: [place]  The chief complaint leading to consultation is: hypertension and chronic conditions  Visit type: Virtual visit with synchronous audio and video  Total time spent with patient: 14 minutes  Each patient to whom he or she provides medical services by telemedicine is:  (1) informed of the relationship between the physician and patient and the respective role of any other health care provider with respect to management of the patient; and (2) notified that he or she may decline to receive medical services by telemedicine and may withdraw from such care at any time.    Her blood pressure has been high since she was here last.  I had recommended that she increase her metoprolol to 25 mg, a whole pill twice a day.  She reports that somehow, her medication prescription got changed back to taking just a half a pill twice a day.  This may have been a pharmacy error.  Because of this, her blood pressure is still high.  She reports that her blood pressure was initially 151/90 when she awoke this morning.  After taking her medication, the blood pressure came down to 148/91.  States that is been running in this range for the last few weeks.  She requests a refill of her pravastatin.  She states that it has worked well for her in the past.  She has seen gastroenterology about her stomach issues.  She was prescribed Benefiber and it does not seem to help.  She had several tests done  and no pathology was found.  She reports that she has started taking an over-the-counter probiotic and Gas-X and this combination seems to help her.  Overall, she has been doing fairly well.  She is having a slight runny nose and cough.  She is not having fever.  Her drainage is clear.  She is not having headaches or body aches.  She reports that she has been taking Claritin, but it does not seem to resolve her issues.  She had blood work done in January, which was pretty good.  She is slightly anemic with a hematocrit of 31.3.  Her blood chemistries and liver tests were normal.  Her thyroid was also normal.    The patient denies chest pain, shortness of breath, nausea, or dizziness.     Hypertension   This is a recurrent problem. The current episode started more than 1 year ago. The problem has been waxing and waning since onset. The problem is controlled. Associated symptoms include malaise/fatigue, palpitations, peripheral edema and shortness of breath. Pertinent negatives include no anxiety, blurred vision, chest pain, headaches, neck pain, orthopnea, PND or sweats. There are no associated agents to hypertension. Risk factors for coronary artery disease include dyslipidemia, family history and post-menopausal state. Past treatments include nothing. The current treatment provides no improvement. There are no compliance problems.      Active Ambulatory Problems     Diagnosis Date Noted    Age-related osteoporosis without current pathological fracture 08/08/2012    Anxiety 08/08/2012    Essential hypertension 08/08/2012    Primary snoring 08/08/2012    Hyperlipidemia 08/08/2012    Psychophysiological insomnia 08/12/2013    Urge incontinence 10/16/2014    History of iron deficiency anemia 08/26/2015    Sedative hypnotic or anxiolytic dependence 10/25/2016    Osteoarthritis of right knee 09/19/2017    Squamous cell carcinoma 12/05/2017    Gastroesophageal reflux disease without esophagitis 08/20/2019     Palpitations 09/19/2019    HUBBARD (dyspnea on exertion) 09/19/2019    Sleep arousal disorder     Abdominal pain, generalized 10/10/2019    Weight loss 10/10/2019    Change in bowel habits 10/10/2019    Early satiety 01/08/2020    Fatigue 01/08/2020     Resolved Ambulatory Problems     Diagnosis Date Noted    Chronic disease anemia 08/12/2013     Past Medical History:   Diagnosis Date    Arthritis     Breast cancer 1990    Hypertension     Insomnia     Lobular carcinoma in situ     KANWAL (obstructive sleep apnea)     Osteoporosis     Rosacea     Urinary incontinence     Vertigo          MEDICATIONS:  Current Outpatient Medications:     aspirin (ECOTRIN) 81 MG EC tablet, Take 81 mg by mouth once daily., Disp: , Rfl:     calcium citrate-vitamin D3 500 mg calcium -400 unit Chew, Take 1 tablet by mouth 2 (two) times daily.  , Disp: , Rfl:     fish oil-omega-3 fatty acids 300-1,000 mg capsule, Take 2 g by mouth once daily.  , Disp: , Rfl:     latanoprost 0.005 % ophthalmic solution, 1 drop every evening., Disp: , Rfl:     losartan (COZAAR) 100 MG tablet, Take 1 tablet (100 mg total) by mouth once daily., Disp: 90 tablet, Rfl: 1    metoprolol tartrate (LOPRESSOR) 25 MG tablet, Take 1 tablet (25 mg total) by mouth 2 (two) times daily., Disp: 60 tablet, Rfl: 5    mirabegron (MYRBETRIQ) 25 mg Tb24 ER tablet, Take 1 tablet (25 mg total) by mouth once daily., Disp: 90 tablet, Rfl: 1    mirtazapine (REMERON) 30 MG tablet, Take 1 tablet (30 mg total) by mouth every evening., Disp: 30 tablet, Rfl: 2    multivit-iron-min-folic acid (MULTIVITAMIN-IRON-MINERALS-FOLIC ACID) 3,500-18-0.4 unit-mg-mg Chew, Take by mouth.  , Disp: , Rfl:     pantoprazole (PROTONIX) 40 MG tablet, Take 1 tablet (40 mg total) by mouth 2 (two) times daily., Disp: 180 tablet, Rfl: 1    pravastatin (PRAVACHOL) 20 MG tablet, Take 1 tablet (20 mg total) by mouth once daily., Disp: 90 tablet, Rfl: 1    sertraline (ZOLOFT) 100 MG tablet,  Take 100 mg by mouth once daily., Disp: , Rfl: 3    traZODone (DESYREL) 50 MG tablet, Take 1 tablet (50 mg total) by mouth every evening., Disp: 90 tablet, Rfl: 1    triamterene-hydrochlorothiazide 37.5-25 mg (DYAZIDE) 37.5-25 mg per capsule, Take 1 capsule by mouth every morning., Disp: 90 capsule, Rfl: 1      HEALTH MAINTENANCE:   Health Maintenance   Topic Date Due    Lipid Panel  05/14/2020    DEXA SCAN  08/20/2021    TETANUS VACCINE  06/09/2024    Colonoscopy  01/28/2025    Pneumococcal Vaccine (65+ High/Highest Risk)  Completed       Review of Systems   Constitutional: Positive for malaise/fatigue. Negative for chills, fatigue and fever.   HENT: Negative for congestion, ear discharge, ear pain, rhinorrhea and sore throat.    Eyes: Negative for blurred vision, discharge, redness and itching.   Respiratory: Positive for shortness of breath. Negative for cough, chest tightness and wheezing.    Cardiovascular: Positive for palpitations. Negative for chest pain, orthopnea, leg swelling and PND.   Gastrointestinal: Negative for abdominal pain, constipation, diarrhea, nausea and vomiting.   Endocrine: Negative for cold intolerance and heat intolerance.   Genitourinary: Negative for dysuria, flank pain, frequency and hematuria.   Musculoskeletal: Negative for arthralgias, back pain, joint swelling, myalgias and neck pain.   Skin: Negative for color change and rash.   Neurological: Negative for dizziness, tremors, numbness and headaches.   Psychiatric/Behavioral: Negative for dysphoric mood and sleep disturbance. The patient is not nervous/anxious.        Objective:          A1C:      CBC:  Recent Labs   Lab 03/08/18  0910 08/09/18  0925 05/14/19  0811 09/16/19  1549 01/08/20  0922 01/28/20  1017   WBC 3.57 L 3.62 L 4.15 6.01 5.51 3.53 L   RBC 3.79 L 3.82 L 3.82 L 4.31 3.83 L 3.28 L   Hemoglobin 11.8 L 12.0 12.0 13.3 11.8 L 10.4 L   Hematocrit 34.9 L 36.3 L 36.7 L 40.0 36.4 L 31.3 L   Platelets 204 209 221 225  212 165   Mean Corpuscular Volume 92 95 96 93 95 95   Mean Corpuscular Hemoglobin 31.1 H 31.4 H 31.4 H 30.9 30.8 31.7 H   Mean Corpuscular Hemoglobin Conc 33.8 33.1 32.7 33.3 32.4 33.2     CMP:  Recent Labs   Lab 03/08/18  0910 08/09/18  0925 05/14/19  0811 09/16/19  1549 01/08/20  0922   Glucose 95 93 92 101  --  101   Calcium 9.6 9.4 9.5 9.9  --  9.5   Albumin 3.7 3.6 3.5 4.6  --  4.5   Total Protein 7.1 7.1 7.0 8.3  --  7.9   Sodium 141 138 139 138  --  141   Potassium 4.2 4.1 3.9 3.5  --  4.6   CO2 31 H 28 28 30 H  --  32 H   Chloride 102 103 102 99  --  100   BUN, Bld 14 13 14 21 H  --  21 H   Creatinine 0.8 0.8 0.7 0.52   < > 0.82   Alkaline Phosphatase 58 57 50 L 70  --  62   ALT 30 25 20 39  --  38   AST 31 30 25 41  --  44   Total Bilirubin 0.4 0.5 0.4 0.5  --  0.4    < > = values in this interval not displayed.     LIPIDS:  Recent Labs   Lab 03/08/18 0910 08/09/18  0925 05/14/19  0811 09/16/19  1549 01/08/20  0922   TSH  --   --   --  1.910 1.650   HDL 65 61 63  --   --    Cholesterol 159 177 161  --   --    Triglycerides 60 76 73  --   --    LDL Cholesterol 82.0 100.8 83.4  --   --    Hdl/Cholesterol Ratio 40.9 34.5 39.1  --   --    Non-HDL Cholesterol 94 116 98  --   --    Total Cholesterol/HDL Ratio 2.4 2.9 2.6  --   --      TSH:  Recent Labs   Lab 09/16/19  1549 01/08/20  0922   TSH 1.910 1.650           Physical Exam   Constitutional: She appears well-developed and well-nourished.   Eyes: Pupils are equal, round, and reactive to light. Conjunctivae and EOM are normal.   Pulmonary/Chest: Effort normal. No respiratory distress.   Musculoskeletal: Normal range of motion.   Psychiatric: She has a normal mood and affect. Her behavior is normal.             Assessment and Plan:     Problem List Items Addressed This Visit        Cardiac/Vascular    Essential hypertension (Chronic) - BP is a little high still.  Crease metoprolol to 25 mg, a whole pill twice a day.  Continue losartan.  We will recheck this  in about a month.    Relevant Medications    losartan (COZAAR) 100 MG tablet    Hyperlipidemia (Chronic) - continue pravastatin.  It seems to be working well.    Relevant Medications    pravastatin (PRAVACHOL) 20 MG tablet    Palpitations - Primary - increasing metoprolol should help with this.       GI    Gastroesophageal reflux disease without esophagitis - stable.  Continue pantoprazole.      Abdominal pain, generalized - she states that taking probiotics and Gas-X seems to help.  No abnormalities were found on her GI tests.      Other Visit Diagnoses     Allergic rhinitis, unspecified seasonality, unspecified trigger     - continue Claritin.  Also start taking Flonase nasal spray.      Anemia, unspecified type    - her last hematocrit was 31.3.  We need to recheck her blood counts.          Orders Placed This Encounter    metoprolol tartrate (LOPRESSOR) 25 MG tablet    triamterene-hydrochlorothiazide 37.5-25 mg (DYAZIDE) 37.5-25 mg per capsule    pravastatin (PRAVACHOL) 20 MG tablet    losartan (COZAAR) 100 MG tablet         Follow-up with me in  1 month.       Nancy Mac MD,  FACP  Internal Medicine

## 2020-04-16 DIAGNOSIS — N32.81 OVERACTIVE BLADDER: ICD-10-CM

## 2020-04-16 RX ORDER — MIRABEGRON 25 MG/1
TABLET, FILM COATED, EXTENDED RELEASE ORAL
Qty: 90 TABLET | Refills: 1 | Status: SHIPPED | OUTPATIENT
Start: 2020-04-16 | End: 2020-09-18 | Stop reason: SDUPTHER

## 2020-04-17 NOTE — TELEPHONE ENCOUNTER
----- Message from Angi Butler sent at 4/17/2020 11:01 AM CDT -----  Contact: [phone]/self  Patient calling to speak with you concerning her blood pressure readings  Please call back to assist at [phone]

## 2020-04-17 NOTE — TELEPHONE ENCOUNTER
Spoke with pt she gave me her BP readings for these days:     4/9: 153/96  4/10: 154/94 pt took her medication, waited and rechecked BP and it was 128/78  4/11: 143/77 pt took her medication, waited and rechecked BP and it was  111/70  4/12: 145/89 pt took her medication, waited and rechecked BP and it was 122/79 P:58  4/13: 143/85   4/14: 149/91 pt took her medication, waited and rechecked BP and it was 120/75  4/15: 150/92 pt took her medication, waited and rechecked BP and it [id number]  4/16: 148/91 P: 59 pt took her medication, waited and rechecked BP and it [id number]/72 P: 59  4/17: 156/91 P: 59 pt took her medication, waited and rechecked BP and it was 118/75 P: 65    What would you like pt to do, if anything?

## 2020-04-23 ENCOUNTER — PATIENT MESSAGE (OUTPATIENT)
Dept: ADMINISTRATIVE | Facility: OTHER | Age: 80
End: 2020-04-23

## 2020-05-11 ENCOUNTER — OFFICE VISIT (OUTPATIENT)
Dept: FAMILY MEDICINE | Facility: CLINIC | Age: 80
End: 2020-05-11
Payer: MEDICARE

## 2020-05-11 VITALS — SYSTOLIC BLOOD PRESSURE: 134 MMHG | HEART RATE: 58 BPM | DIASTOLIC BLOOD PRESSURE: 81 MMHG

## 2020-05-11 DIAGNOSIS — I10 ESSENTIAL HYPERTENSION: Primary | ICD-10-CM

## 2020-05-11 DIAGNOSIS — D50.9 IRON DEFICIENCY ANEMIA, UNSPECIFIED IRON DEFICIENCY ANEMIA TYPE: ICD-10-CM

## 2020-05-11 DIAGNOSIS — R00.1 BRADYCARDIA: ICD-10-CM

## 2020-05-11 DIAGNOSIS — R10.31 RIGHT LOWER QUADRANT ABDOMINAL PAIN: ICD-10-CM

## 2020-05-11 PROCEDURE — 1101F PR PT FALLS ASSESS DOC 0-1 FALLS W/OUT INJ PAST YR: ICD-10-PCS | Mod: HCNC,CPTII,95, | Performed by: INTERNAL MEDICINE

## 2020-05-11 PROCEDURE — 1159F PR MEDICATION LIST DOCUMENTED IN MEDICAL RECORD: ICD-10-PCS | Mod: HCNC,95,, | Performed by: INTERNAL MEDICINE

## 2020-05-11 PROCEDURE — 99214 PR OFFICE/OUTPT VISIT, EST, LEVL IV, 30-39 MIN: ICD-10-PCS | Mod: 95,HCNC,, | Performed by: INTERNAL MEDICINE

## 2020-05-11 PROCEDURE — 3079F PR MOST RECENT DIASTOLIC BLOOD PRESSURE 80-89 MM HG: ICD-10-PCS | Mod: HCNC,CPTII,95, | Performed by: INTERNAL MEDICINE

## 2020-05-11 PROCEDURE — 3079F DIAST BP 80-89 MM HG: CPT | Mod: HCNC,CPTII,95, | Performed by: INTERNAL MEDICINE

## 2020-05-11 PROCEDURE — 1101F PT FALLS ASSESS-DOCD LE1/YR: CPT | Mod: HCNC,CPTII,95, | Performed by: INTERNAL MEDICINE

## 2020-05-11 PROCEDURE — 99214 OFFICE O/P EST MOD 30 MIN: CPT | Mod: 95,HCNC,, | Performed by: INTERNAL MEDICINE

## 2020-05-11 PROCEDURE — 3075F PR MOST RECENT SYSTOLIC BLOOD PRESS GE 130-139MM HG: ICD-10-PCS | Mod: HCNC,CPTII,95, | Performed by: INTERNAL MEDICINE

## 2020-05-11 PROCEDURE — 1159F MED LIST DOCD IN RCRD: CPT | Mod: HCNC,95,, | Performed by: INTERNAL MEDICINE

## 2020-05-11 PROCEDURE — 3075F SYST BP GE 130 - 139MM HG: CPT | Mod: HCNC,CPTII,95, | Performed by: INTERNAL MEDICINE

## 2020-05-11 NOTE — PATIENT INSTRUCTIONS
We have reviewed your prescription medications.   Continue to watch BP.  Try gas-x. If abdominal pain does not get better, she will need to be seen.   Begin liquid iron again,.

## 2020-05-11 NOTE — PROGRESS NOTES
Subjective:       Patient ID: Nereyda Hernandez is a 79 y.o. female.    Chief Complaint: No chief complaint on file.     I have reviewed the PMH,  and  for this patient    She  has a past medical history of Anxiety, Arthritis, Breast cancer (1990), Chronic disease anemia, Hyperlipidemia, Hypertension, Insomnia, Lobular carcinoma in situ, KANWAL (obstructive sleep apnea), Osteoporosis, Rosacea, Squamous cell carcinoma, Urinary incontinence, and Vertigo.     The patient presents today for follow-up of chronic conditions.     The patient location is: [place]  The chief complaint leading to consultation is: follow-up  Visit type: audiovisual  Total time spent with patient: 12 minutes  Each patient to whom he or she provides medical services by telemedicine is:  (1) informed of the relationship between the physician and patient and the respective role of any other health care provider with respect to management of the patient; and (2) notified that he or she may decline to receive medical services by telemedicine and may withdraw from such care at any time.    The patient presents today for follow-up of high blood pressure.  When she was seen last, I increased her metoprolol to 25 mg a whole pill twice a day.  She had been cutting the 25 mg pills in half prior to that.  I would also prescribed Claritin and Flonase for allergies.  She reports that her blood pressure stayed elevated for a few weeks but became normal around April 2nd.  She reports that it was 134/81 today.  Her heart rate was good at 58.  When she had blood work done in January, she had pretty severe anemia with a hematocrit of 31.3.  However, this had increased from previous.  Her blood chemistries, liver tests, and cholesterol were all good.  Her thyroid test was also normal.  She reports that she has a pain in her side which feels like she used to feel when she had ovarian cyst.  She is not sure what is causing this since she no longer has ovaries.  She  does have a history of a hernia, which may be causing her some pain.  She also has a lot of intestinal gas.  She does take Gas-X on a regular basis.  She reports that she had not been taking her iron recently.  She has a prescription for liquid iron.    The patient denies chest pain, shortness of breath, nausea, or dizziness.     Active Ambulatory Problems     Diagnosis Date Noted    Age-related osteoporosis without current pathological fracture 08/08/2012    Anxiety 08/08/2012    Essential hypertension 08/08/2012    Primary snoring 08/08/2012    Hyperlipidemia 08/08/2012    Psychophysiological insomnia 08/12/2013    Urge incontinence 10/16/2014    History of iron deficiency anemia 08/26/2015    Sedative hypnotic or anxiolytic dependence 10/25/2016    Osteoarthritis of right knee 09/19/2017    Squamous cell carcinoma 12/05/2017    Gastroesophageal reflux disease without esophagitis 08/20/2019    Palpitations 09/19/2019    HUBBARD (dyspnea on exertion) 09/19/2019    Sleep arousal disorder     Abdominal pain, generalized 10/10/2019    Weight loss 10/10/2019    Change in bowel habits 10/10/2019    Early satiety 01/08/2020    Fatigue 01/08/2020    Iron deficiency anemia 05/11/2020     Resolved Ambulatory Problems     Diagnosis Date Noted    Chronic disease anemia 08/12/2013     Past Medical History:   Diagnosis Date    Arthritis     Breast cancer 1990    Hypertension     Insomnia     Lobular carcinoma in situ     KANWAL (obstructive sleep apnea)     Osteoporosis     Rosacea     Urinary incontinence     Vertigo          MEDICATIONS:  Current Outpatient Medications:     aspirin (ECOTRIN) 81 MG EC tablet, Take 81 mg by mouth once daily., Disp: , Rfl:     calcium citrate-vitamin D3 500 mg calcium -400 unit Chew, Take 1 tablet by mouth 2 (two) times daily.  , Disp: , Rfl:     fish oil-omega-3 fatty acids 300-1,000 mg capsule, Take 2 g by mouth once daily.  , Disp: , Rfl:     latanoprost 0.005 %  ophthalmic solution, 1 drop every evening., Disp: , Rfl:     losartan (COZAAR) 100 MG tablet, Take 1 tablet (100 mg total) by mouth once daily., Disp: 90 tablet, Rfl: 1    metoprolol tartrate (LOPRESSOR) 25 MG tablet, Take 1 tablet (25 mg total) by mouth 2 (two) times daily., Disp: 60 tablet, Rfl: 5    mirtazapine (REMERON) 30 MG tablet, Take 1 tablet (30 mg total) by mouth every evening., Disp: 30 tablet, Rfl: 2    multivit-iron-min-folic acid (MULTIVITAMIN-IRON-MINERALS-FOLIC ACID) 3,500-18-0.4 unit-mg-mg Chew, Take by mouth.  , Disp: , Rfl:     MYRBETRIQ 25 mg Tb24 ER tablet, TAKE 1 TABLET EVERY DAY, Disp: 90 tablet, Rfl: 1    pantoprazole (PROTONIX) 40 MG tablet, Take 1 tablet (40 mg total) by mouth 2 (two) times daily., Disp: 180 tablet, Rfl: 1    pravastatin (PRAVACHOL) 20 MG tablet, Take 1 tablet (20 mg total) by mouth once daily., Disp: 90 tablet, Rfl: 1    sertraline (ZOLOFT) 100 MG tablet, Take 100 mg by mouth once daily., Disp: , Rfl: 3    traZODone (DESYREL) 50 MG tablet, Take 1 tablet (50 mg total) by mouth every evening., Disp: 90 tablet, Rfl: 1    triamterene-hydrochlorothiazide 37.5-25 mg (DYAZIDE) 37.5-25 mg per capsule, Take 1 capsule by mouth every morning., Disp: 90 capsule, Rfl: 1      HEALTH MAINTENANCE:   Health Maintenance   Topic Date Due    Lipid Panel  05/14/2020    DEXA SCAN  08/20/2021    TETANUS VACCINE  06/09/2024    Colonoscopy  01/28/2025    Pneumococcal Vaccine (65+ High/Highest Risk)  Completed       Review of Systems   Constitutional: Negative for chills, fatigue and fever.   HENT: Negative for congestion, ear discharge, ear pain, rhinorrhea and sore throat.    Eyes: Negative for discharge, redness and itching.   Respiratory: Negative for cough, chest tightness, shortness of breath and wheezing.    Cardiovascular: Negative for chest pain, palpitations and leg swelling.   Gastrointestinal: Positive for abdominal pain. Negative for constipation, diarrhea, nausea and  vomiting.   Endocrine: Negative for cold intolerance and heat intolerance.   Genitourinary: Negative for dysuria, flank pain, frequency and hematuria.   Musculoskeletal: Negative for arthralgias, back pain, joint swelling and myalgias.   Skin: Negative for color change and rash.   Neurological: Negative for dizziness, tremors, numbness and headaches.   Psychiatric/Behavioral: Negative for dysphoric mood and sleep disturbance. The patient is not nervous/anxious.        Objective:          A1C:      CBC:  Recent Labs   Lab 03/08/18  0910 08/09/18  0925 05/14/19  0811 09/16/19  1549 01/08/20  0922 01/28/20  1017   WBC 3.57 L 3.62 L 4.15 6.01 5.51 3.53 L   RBC 3.79 L 3.82 L 3.82 L 4.31 3.83 L 3.28 L   Hemoglobin 11.8 L 12.0 12.0 13.3 11.8 L 10.4 L   Hematocrit 34.9 L 36.3 L 36.7 L 40.0 36.4 L 31.3 L   Platelets 204 209 221 225 212 165   Mean Corpuscular Volume 92 95 96 93 95 95   Mean Corpuscular Hemoglobin 31.1 H 31.4 H 31.4 H 30.9 30.8 31.7 H   Mean Corpuscular Hemoglobin Conc 33.8 33.1 32.7 33.3 32.4 33.2     CMP:  Recent Labs   Lab 03/08/18  0910 08/09/18  0925 05/14/19  0811 09/16/19  1549  01/08/20  0922   Glucose 95 93 92 101  --  101   Calcium 9.6 9.4 9.5 9.9  --  9.5   Albumin 3.7 3.6 3.5 4.6  --  4.5   Total Protein 7.1 7.1 7.0 8.3  --  7.9   Sodium 141 138 139 138  --  141   Potassium 4.2 4.1 3.9 3.5  --  4.6   CO2 31 H 28 28 30 H  --  32 H   Chloride 102 103 102 99  --  100   BUN, Bld 14 13 14 21 H  --  21 H   Creatinine 0.8 0.8 0.7 0.52   < > 0.82   Alkaline Phosphatase 58 57 50 L 70  --  62   ALT 30 25 20 39  --  38   AST 31 30 25 41  --  44   Total Bilirubin 0.4 0.5 0.4 0.5  --  0.4    < > = values in this interval not displayed.     LIPIDS:  Recent Labs   Lab 03/08/18  0910 08/09/18  0925 05/14/19  0811 09/16/19  1549 01/08/20  0922   TSH  --   --   --  1.910 1.650   HDL 65 61 63  --   --    Cholesterol 159 177 161  --   --    Triglycerides 60 76 73  --   --    LDL Cholesterol 82.0 100.8 83.4  --   --     Hdl/Cholesterol Ratio 40.9 34.5 39.1  --   --    Non-HDL Cholesterol 94 116 98  --   --    Total Cholesterol/HDL Ratio 2.4 2.9 2.6  --   --      TSH:  Recent Labs   Lab 09/16/19  1549 01/08/20  0922   TSH 1.910 1.650           Physical Exam   Constitutional: She appears well-developed and well-nourished. No distress.   HENT:   Head: Normocephalic and atraumatic.   Eyes: Pupils are equal, round, and reactive to light. Conjunctivae and EOM are normal.   Pulmonary/Chest: Effort normal. No respiratory distress.   Musculoskeletal: Normal range of motion.   Skin: She is not diaphoretic.   Psychiatric: She has a normal mood and affect. Her behavior is normal.             Assessment and Plan:     Problem List Items Addressed This Visit        Cardiac/Vascular    Essential hypertension - Primary (Chronic)- blood pressure looks better on metoprolol 25 mg twice a day.  Continue to watch it.       Oncology    Iron deficiency anemia- chronic anemia.  Start taking liquid iron again.  We will check blood counts next time we see here.      Other Visit Diagnoses     Bradycardia    - very mild bradycardia.  Heart rate was 58.  Probably due to metoprolol.      Right lower quadrant abdominal pain    - uncertain cause.  Try Gas-X.  If it does not get better she will probably need to come in to be examined.                 Follow-up with me in 2 months.       Nancy Mac MD,  FACP  Internal Medicine

## 2020-05-15 RX ORDER — MIRTAZAPINE 30 MG/1
30 TABLET, FILM COATED ORAL NIGHTLY
Qty: 14 TABLET | Refills: 0 | Status: SHIPPED | OUTPATIENT
Start: 2020-05-15 | End: 2020-07-24

## 2020-05-15 RX ORDER — MIRTAZAPINE 30 MG/1
30 TABLET, FILM COATED ORAL NIGHTLY
Qty: 90 TABLET | Refills: 0 | Status: SHIPPED | OUTPATIENT
Start: 2020-05-15 | End: 2020-05-26

## 2020-05-15 NOTE — TELEPHONE ENCOUNTER
Patient scheduled for 2 month f/u    ----- Message from Lara Mac MD sent at 5/11/2020 11:23 AM CDT -----  Regarding: f/up  Please schedule her in office in about 2 months.

## 2020-05-15 NOTE — TELEPHONE ENCOUNTER
Patient requesting main refill of Remeron sent to [facility] and wants to have a partial script of Remeron sent to [facility] while waiting on the mail order.

## 2020-05-15 NOTE — TELEPHONE ENCOUNTER
----- Message from Cristina Gonzales sent at 5/15/2020  9:40 AM CDT -----  Contact: patient  Type:  RX Refill Request    Who Called:  patient  Refill or New Rx: refill  RX Name and Strength: mirtazapine (REMERON) 30 MG tablet  How is the patient currently taking it? (ex. 1XDay): as directed  Is this a 30 day or 90 day RX: 90 day  Preferred Pharmacy with phone number: [facility] - [facility] [address]  Local or Mail Order: Mail order  Ordering Provider: Estefania  Would the patient rather a call back or a response via [facility]?  Call back  Best Call Back Number: [phone]  Additional Information:  Wants to have a partial of this medication sent to [facility] while waiting on the mail order

## 2020-05-26 RX ORDER — PAROXETINE HYDROCHLORIDE 20 MG/1
20 TABLET, FILM COATED ORAL EVERY MORNING
Qty: 30 TABLET | Refills: 3 | Status: SHIPPED | OUTPATIENT
Start: 2020-05-26 | End: 2020-07-16 | Stop reason: SDUPTHER

## 2020-05-26 NOTE — TELEPHONE ENCOUNTER
Ok. I sent in a prescription for paxil. She will need to wean off the sertraline first. Reduce the dose to 50 mg a day for a week. Then, take sertraline one day and paxil the next , alternating for a week. At that point, she can just take paxil.     Also, fine to take folic acid.

## 2020-05-26 NOTE — TELEPHONE ENCOUNTER
Spoke to patient, she fills like her zoloft isn't working states her stomach feels jittery. She wanted to know what you thought about her trying paxil and wanted to know if she takes the paxil should she also take folic acid. If you agree it is okay for her to try Paxil please send 30 day supply to [facility] and 90 Day supply to [facility]. Please advise.

## 2020-05-27 RX ORDER — SERTRALINE HYDROCHLORIDE 50 MG/1
50 TABLET, FILM COATED ORAL DAILY
Qty: 14 TABLET | Refills: 0 | Status: SHIPPED | OUTPATIENT
Start: 2020-05-27 | End: 2020-07-16

## 2020-05-27 NOTE — TELEPHONE ENCOUNTER
Notified patient we will call her when it is sent in. Dr. Mac was gone for the day when the request was sent.     ----- Message from Angi Butler sent at 5/27/2020  8:36 AM CDT -----  Contact: [phone]/self  Patient calling to speak with you concerning medication SERTRALINE 50 MG not being called in (medication not listed in patient chart)        Please advise

## 2020-06-08 ENCOUNTER — TELEPHONE (OUTPATIENT)
Dept: FAMILY MEDICINE | Facility: CLINIC | Age: 80
End: 2020-06-08

## 2020-06-08 NOTE — TELEPHONE ENCOUNTER
Spoke with patient, per Dr. Mac patient can buy OTC iron supplement. Patient has been informed. Vf/ma

## 2020-06-08 NOTE — TELEPHONE ENCOUNTER
----- Message from Joby Brewster sent at 6/8/2020 11:17 AM CDT -----  Contact: [phone]/ self   Patient requesting to speak with you regarding needing a refill on her liquid iron. Please call.

## 2020-06-08 NOTE — TELEPHONE ENCOUNTER
Spoke with patient and she needs a refill on her liquid iron. The liquid iron will need Pre auth from her insurance, she said there is no reason why she wouldn't be able to take a pill. Do you want to send in a prescription or do you want her to purchase OTC? Please advise

## 2020-07-06 ENCOUNTER — TELEPHONE (OUTPATIENT)
Dept: FAMILY MEDICINE | Facility: CLINIC | Age: 80
End: 2020-07-06

## 2020-07-06 DIAGNOSIS — Z12.31 ENCOUNTER FOR SCREENING MAMMOGRAM FOR BREAST CANCER: Primary | ICD-10-CM

## 2020-07-06 NOTE — TELEPHONE ENCOUNTER
----- Message from Angi Butler sent at 7/6/2020 12:03 PM CDT -----  Contact: patient//  [phone]  Type:  Mammogram    Caller is requesting to schedule their annual mammogram appointment.  Order is not listed in EPIC.  Please enter order and contact patient to schedule.    Name of Caller:PATIENT   Where would they like the mammogram performed? [facility]   Would the patient rather a call back or a response via [facility]? CALLBACK   Best Call Back Number:[phone]  Additional Information: PATIENT WOULD LIKE TO HAVE MAMMO ON 7-

## 2020-07-16 ENCOUNTER — OFFICE VISIT (OUTPATIENT)
Dept: FAMILY MEDICINE | Facility: CLINIC | Age: 80
End: 2020-07-16
Payer: MEDICARE

## 2020-07-16 VITALS
DIASTOLIC BLOOD PRESSURE: 80 MMHG | OXYGEN SATURATION: 97 % | WEIGHT: 152.13 LBS | HEART RATE: 60 BPM | HEIGHT: 63 IN | BODY MASS INDEX: 26.95 KG/M2 | SYSTOLIC BLOOD PRESSURE: 138 MMHG | TEMPERATURE: 98 F

## 2020-07-16 DIAGNOSIS — R10.84 ABDOMINAL PAIN, GENERALIZED: ICD-10-CM

## 2020-07-16 DIAGNOSIS — N39.41 URGE INCONTINENCE: ICD-10-CM

## 2020-07-16 DIAGNOSIS — I10 ESSENTIAL HYPERTENSION: ICD-10-CM

## 2020-07-16 DIAGNOSIS — E78.2 MIXED HYPERLIPIDEMIA: ICD-10-CM

## 2020-07-16 DIAGNOSIS — D50.9 IRON DEFICIENCY ANEMIA, UNSPECIFIED IRON DEFICIENCY ANEMIA TYPE: Primary | ICD-10-CM

## 2020-07-16 DIAGNOSIS — R06.09 DOE (DYSPNEA ON EXERTION): ICD-10-CM

## 2020-07-16 DIAGNOSIS — F41.9 ANXIETY: ICD-10-CM

## 2020-07-16 PROCEDURE — 3288F FALL RISK ASSESSMENT DOCD: CPT | Mod: HCNC,CPTII,S$GLB, | Performed by: INTERNAL MEDICINE

## 2020-07-16 PROCEDURE — 99499 UNLISTED E&M SERVICE: CPT | Mod: HCNC,S$GLB,, | Performed by: INTERNAL MEDICINE

## 2020-07-16 PROCEDURE — 1100F PR PT FALLS ASSESS DOC 2+ FALLS/FALL W/INJURY/YR: ICD-10-PCS | Mod: HCNC,CPTII,S$GLB, | Performed by: INTERNAL MEDICINE

## 2020-07-16 PROCEDURE — 3075F SYST BP GE 130 - 139MM HG: CPT | Mod: HCNC,CPTII,S$GLB, | Performed by: INTERNAL MEDICINE

## 2020-07-16 PROCEDURE — 99499 RISK ADDL DX/OHS AUDIT: ICD-10-PCS | Mod: HCNC,S$GLB,, | Performed by: INTERNAL MEDICINE

## 2020-07-16 PROCEDURE — 99999 PR PBB SHADOW E&M-EST. PATIENT-LVL V: ICD-10-PCS | Mod: PBBFAC,HCNC,, | Performed by: INTERNAL MEDICINE

## 2020-07-16 PROCEDURE — 3075F PR MOST RECENT SYSTOLIC BLOOD PRESS GE 130-139MM HG: ICD-10-PCS | Mod: HCNC,CPTII,S$GLB, | Performed by: INTERNAL MEDICINE

## 2020-07-16 PROCEDURE — 99214 PR OFFICE/OUTPT VISIT, EST, LEVL IV, 30-39 MIN: ICD-10-PCS | Mod: HCNC,S$GLB,, | Performed by: INTERNAL MEDICINE

## 2020-07-16 PROCEDURE — 3288F PR FALLS RISK ASSESSMENT DOCUMENTED: ICD-10-PCS | Mod: HCNC,CPTII,S$GLB, | Performed by: INTERNAL MEDICINE

## 2020-07-16 PROCEDURE — 1100F PTFALLS ASSESS-DOCD GE2>/YR: CPT | Mod: HCNC,CPTII,S$GLB, | Performed by: INTERNAL MEDICINE

## 2020-07-16 PROCEDURE — 1126F AMNT PAIN NOTED NONE PRSNT: CPT | Mod: HCNC,S$GLB,, | Performed by: INTERNAL MEDICINE

## 2020-07-16 PROCEDURE — 1159F MED LIST DOCD IN RCRD: CPT | Mod: HCNC,S$GLB,, | Performed by: INTERNAL MEDICINE

## 2020-07-16 PROCEDURE — 99214 OFFICE O/P EST MOD 30 MIN: CPT | Mod: HCNC,S$GLB,, | Performed by: INTERNAL MEDICINE

## 2020-07-16 PROCEDURE — 3079F DIAST BP 80-89 MM HG: CPT | Mod: HCNC,CPTII,S$GLB, | Performed by: INTERNAL MEDICINE

## 2020-07-16 PROCEDURE — 99999 PR PBB SHADOW E&M-EST. PATIENT-LVL V: CPT | Mod: PBBFAC,HCNC,, | Performed by: INTERNAL MEDICINE

## 2020-07-16 PROCEDURE — 1126F PR PAIN SEVERITY QUANTIFIED, NO PAIN PRESENT: ICD-10-PCS | Mod: HCNC,S$GLB,, | Performed by: INTERNAL MEDICINE

## 2020-07-16 PROCEDURE — 3079F PR MOST RECENT DIASTOLIC BLOOD PRESSURE 80-89 MM HG: ICD-10-PCS | Mod: HCNC,CPTII,S$GLB, | Performed by: INTERNAL MEDICINE

## 2020-07-16 PROCEDURE — 1159F PR MEDICATION LIST DOCUMENTED IN MEDICAL RECORD: ICD-10-PCS | Mod: HCNC,S$GLB,, | Performed by: INTERNAL MEDICINE

## 2020-07-16 RX ORDER — PAROXETINE HYDROCHLORIDE 20 MG/1
20 TABLET, FILM COATED ORAL EVERY MORNING
Qty: 90 TABLET | Refills: 1 | Status: SHIPPED | OUTPATIENT
Start: 2020-07-16 | End: 2020-07-30 | Stop reason: SDUPTHER

## 2020-07-16 NOTE — PATIENT INSTRUCTIONS
We have reviewed your prescription medications.   We will order routine labs.   It sounds like you are feeling better.  BP looks good.   Continue current medicines and checking BP.

## 2020-07-16 NOTE — PROGRESS NOTES
Subjective:       Patient ID: Nereyda Hernandez is a 80 y.o. female.    Chief Complaint: Follow-up and Hypertension     I have reviewed the PMH,  and  for this patient    She  has a past medical history of Anxiety, Arthritis, Breast cancer (1990), Chronic disease anemia, Hyperlipidemia, Hypertension, Insomnia, Lobular carcinoma in situ, KANWAL (obstructive sleep apnea), Osteoporosis, Rosacea, Squamous cell carcinoma, Urinary incontinence, and Vertigo.     The patient presents today for follow-up of chronic conditions.  The patient is here for recheck on her blood pressure.  She had elevated blood pressure when she was here last.  Today, her blood pressure is good at 138/80.  Her blood pressure has been good at home.  She has had a few high blood pressure values but overall it is better after she takes her medication.  Usually, the blood pressure is between 120 and 140 over 70-80.  She reports that her stomach pain is also feeling better.  She is taking iron supplements.  She does occasionally have diarrhea.  She has been taking Myrbetriq for her bladder.  She does have some leakage but she reports that the side effects are much better than on Detrol.  She does not have as much dry mouth that she did and she is happy with the results.  She needs her mammogram scheduled and her blood work.  She reports that Paxil is been working well for her.    The patient denies chest pain, shortness of breath, nausea, or dizziness.     Active Ambulatory Problems     Diagnosis Date Noted    Age-related osteoporosis without current pathological fracture 08/08/2012    Anxiety 08/08/2012    Essential hypertension 08/08/2012    Primary snoring 08/08/2012    Hyperlipidemia 08/08/2012    Psychophysiological insomnia 08/12/2013    Urge incontinence 10/16/2014    History of iron deficiency anemia 08/26/2015    Osteoarthritis of right knee 09/19/2017    Squamous cell carcinoma 12/05/2017    Gastroesophageal reflux disease without  esophagitis 08/20/2019    Palpitations 09/19/2019    HUBBARD (dyspnea on exertion) 09/19/2019    Sleep arousal disorder     Abdominal pain, generalized 10/10/2019    Weight loss 10/10/2019    Change in bowel habits 10/10/2019    Early satiety 01/08/2020    Fatigue 01/08/2020    Iron deficiency anemia 05/11/2020     Resolved Ambulatory Problems     Diagnosis Date Noted    Chronic disease anemia 08/12/2013    Sedative hypnotic or anxiolytic dependence 10/25/2016     Past Medical History:   Diagnosis Date    Arthritis     Breast cancer 1990    Hypertension     Insomnia     Lobular carcinoma in situ     KANWAL (obstructive sleep apnea)     Osteoporosis     Rosacea     Urinary incontinence     Vertigo          MEDICATIONS:  Current Outpatient Medications:     aspirin (ECOTRIN) 81 MG EC tablet, Take 81 mg by mouth once daily., Disp: , Rfl:     calcium citrate-vitamin D3 500 mg calcium -400 unit Chew, Take 1 tablet by mouth 2 (two) times daily.  , Disp: , Rfl:     fish oil-omega-3 fatty acids 300-1,000 mg capsule, Take 2 g by mouth once daily.  , Disp: , Rfl:     latanoprost 0.005 % ophthalmic solution, 1 drop every evening., Disp: , Rfl:     losartan (COZAAR) 100 MG tablet, Take 1 tablet (100 mg total) by mouth once daily., Disp: 90 tablet, Rfl: 1    metoprolol tartrate (LOPRESSOR) 25 MG tablet, Take 1 tablet (25 mg total) by mouth 2 (two) times daily., Disp: 60 tablet, Rfl: 5    mirtazapine (REMERON) 30 MG tablet, Take 1 tablet (30 mg total) by mouth every evening., Disp: 14 tablet, Rfl: 0    multivit-iron-min-folic acid (MULTIVITAMIN-IRON-MINERALS-FOLIC ACID) 3,500-18-0.4 unit-mg-mg Chew, Take by mouth.  , Disp: , Rfl:     MYRBETRIQ 25 mg Tb24 ER tablet, TAKE 1 TABLET EVERY DAY, Disp: 90 tablet, Rfl: 1    paroxetine (PAXIL) 20 MG tablet, Take 1 tablet (20 mg total) by mouth every morning., Disp: 90 tablet, Rfl: 1    pravastatin (PRAVACHOL) 20 MG tablet, Take 1 tablet (20 mg total) by mouth once  daily., Disp: 90 tablet, Rfl: 1    traZODone (DESYREL) 50 MG tablet, Take 1 tablet (50 mg total) by mouth every evening., Disp: 90 tablet, Rfl: 1    triamterene-hydrochlorothiazide 37.5-25 mg (DYAZIDE) 37.5-25 mg per capsule, Take 1 capsule by mouth every morning., Disp: 90 capsule, Rfl: 1      HEALTH MAINTENANCE:   Health Maintenance   Topic Date Due    Lipid Panel  05/14/2020    DEXA SCAN  08/20/2021    TETANUS VACCINE  06/09/2024    Colonoscopy  01/28/2025    Pneumococcal Vaccine (65+ High/Highest Risk)  Completed       Review of Systems   Constitutional: Negative for chills, fatigue and fever.   HENT: Negative for congestion, ear discharge, ear pain, rhinorrhea and sore throat.    Eyes: Negative for discharge, redness and itching.   Respiratory: Negative for cough, chest tightness, shortness of breath and wheezing.    Cardiovascular: Negative for chest pain, palpitations and leg swelling.   Gastrointestinal: Negative for abdominal pain, constipation, diarrhea, nausea and vomiting.   Endocrine: Negative for cold intolerance and heat intolerance.   Genitourinary: Negative for dysuria, flank pain, frequency and hematuria.   Musculoskeletal: Negative for arthralgias, back pain, joint swelling and myalgias.   Skin: Negative for color change and rash.   Neurological: Negative for dizziness, tremors, numbness and headaches.   Psychiatric/Behavioral: Negative for dysphoric mood and sleep disturbance. The patient is not nervous/anxious.        Objective:          A1C:      CBC:  Recent Labs   Lab 03/08/18  0910 08/09/18  0925 05/14/19  0811 09/16/19  1549 01/08/20  0922 01/28/20  1017 07/16/20  1049   WBC 3.57 L 3.62 L 4.15 6.01 5.51 3.53 L 4.38   RBC 3.79 L 3.82 L 3.82 L 4.31 3.83 L 3.28 L 3.76 L   Hemoglobin 11.8 L 12.0 12.0 13.3 11.8 L 10.4 L 11.8 L   Hematocrit 34.9 L 36.3 L 36.7 L 40.0 36.4 L 31.3 L 35.7 L   Platelets 204 209 221 225 212 165 207   Mean Corpuscular Volume 92 95 96 93 95 95 95   Mean  Corpuscular Hemoglobin 31.1 H 31.4 H 31.4 H 30.9 30.8 31.7 H 31.4 H   Mean Corpuscular Hemoglobin Conc 33.8 33.1 32.7 33.3 32.4 33.2 33.1     CMP:  Recent Labs   Lab 03/08/18  0910 08/09/18  0925 05/14/19  0811 09/16/19  1549 01/08/20 0922 07/16/20  1049   Glucose 95 93 92 101  --  101 99   Calcium 9.6 9.4 9.5 9.9  --  9.5 9.6   Albumin 3.7 3.6 3.5 4.6  --  4.5 4.4   Total Protein 7.1 7.1 7.0 8.3  --  7.9 7.6   Sodium 141 138 139 138  --  141 139   Potassium 4.2 4.1 3.9 3.5  --  4.6 4.2   CO2 31 H 28 28 30 H  --  32 H 35 H   Chloride 102 103 102 99  --  100 100   BUN, Bld 14 13 14 21 H  --  21 H 19 H   Creatinine 0.8 0.8 0.7 0.52   < > 0.82 0.90   Alkaline Phosphatase 58 57 50 L 70  --  62 73   ALT 30 25 20 39  --  38 29   AST 31 30 25 41  --  44 39   Total Bilirubin 0.4 0.5 0.4 0.5  --  0.4 0.5    < > = values in this interval not displayed.     LIPIDS:  Recent Labs   Lab 03/08/18 0910 08/09/18  0925 05/14/19  0811 09/16/19  1549 01/08/20  0922   TSH  --   --   --  1.910 1.650   HDL 65 61 63  --   --    Cholesterol 159 177 161  --   --    Triglycerides 60 76 73  --   --    LDL Cholesterol 82.0 100.8 83.4  --   --    Hdl/Cholesterol Ratio 40.9 34.5 39.1  --   --    Non-HDL Cholesterol 94 116 98  --   --    Total Cholesterol/HDL Ratio 2.4 2.9 2.6  --   --      TSH:  Recent Labs   Lab 09/16/19  1549 01/08/20  0922   TSH 1.910 1.650           Physical Exam  Constitutional:       Appearance: She is well-developed.   HENT:      Head: Normocephalic and atraumatic.      Right Ear: External ear normal. No middle ear effusion. Tympanic membrane is not erythematous.      Left Ear: External ear normal.  No middle ear effusion. Tympanic membrane is not erythematous.      Nose: No rhinorrhea.      Right Sinus: No maxillary sinus tenderness or frontal sinus tenderness.      Left Sinus: No maxillary sinus tenderness or frontal sinus tenderness.      Mouth/Throat:      Pharynx: No posterior oropharyngeal erythema.      Tonsils:  No tonsillar exudate.   Eyes:      General:         Right eye: No discharge.         Left eye: No discharge.      Conjunctiva/sclera: Conjunctivae normal.      Right eye: Right conjunctiva is not injected.      Left eye: Left conjunctiva is not injected.      Pupils: Pupils are equal, round, and reactive to light.   Neck:      Thyroid: No thyromegaly.   Cardiovascular:      Rate and Rhythm: Normal rate and regular rhythm.      Heart sounds: Normal heart sounds. No murmur.   Pulmonary:      Effort: Pulmonary effort is normal.      Breath sounds: Normal breath sounds. No wheezing, rhonchi or rales.   Abdominal:      General: Bowel sounds are normal. There is no distension.      Tenderness: There is no abdominal tenderness.   Musculoskeletal:         General: No tenderness.   Lymphadenopathy:      Cervical: No cervical adenopathy.   Skin:     General: Skin is warm and dry.      Findings: No lesion or rash.   Neurological:      Cranial Nerves: No cranial nerve deficit.      Deep Tendon Reflexes: Reflexes normal.   Psychiatric:         Mood and Affect: Mood is not anxious or depressed.         Speech: Speech is not rapid and pressured.         Behavior: Behavior normal. Behavior is not agitated or aggressive.               Assessment and Plan:     Problem List Items Addressed This Visit        Psychiatric    Anxiety- continue Paxil.  It was more effective than sertraline.       Cardiac/Vascular    Essential hypertension (Chronic)- blood pressure looks good today and at home.  Continue current medicines.      Hyperlipidemia (Chronic)- she is currently on pravastatin for cholesterol.  We will refill it.    Relevant Orders    Comprehensive metabolic panel (Completed)    Lipid Panel       Renal/    Urge incontinence- she has been taking Myrbetriq.  She has some bladder leakage.  She has fewer side effects with this then Detrol.  She would like to continue it.       Oncology    Iron deficiency anemia - Primary- she is  taking iron pills.  We will recheck her blood counts.    Relevant Orders    CBC auto differential (Completed)       GI    Abdominal pain, generalized- she had elevated amylase in the past.  She has had an EGD and a gastric emptying study.  No abnormalities have been found.  Repeat amylase    Relevant Orders    Amylase (Completed)       Other    HUBBARD (dyspnea on exertion)- chronic.  She reports that this is a long-term problem.  It is not getting worse.  Watch for now          Orders Placed This Encounter    CBC auto differential    Comprehensive metabolic panel    Lipid Panel    Amylase    paroxetine (PAXIL) 20 MG tablet         Follow-up in 4 months.       Nancy Mac MD,  FACP  Internal Medicine

## 2020-07-24 RX ORDER — MIRTAZAPINE 30 MG/1
30 TABLET, FILM COATED ORAL NIGHTLY
Qty: 90 TABLET | Refills: 1 | Status: SHIPPED | OUTPATIENT
Start: 2020-07-24 | End: 2021-01-25 | Stop reason: SDUPTHER

## 2020-07-24 NOTE — PROGRESS NOTES
Refill Routing Note   Medication(s) are not appropriate for processing by [facility]:       - Non-participating provider           Medication reconciliation completed: No      Automatic Epic Generated Protocol Data:        Requested Prescriptions   Pending Prescriptions Disp Refills    mirtazapine (REMERON) 30 MG tablet [Pharmacy Med Name: MIRTAZAPINE 30 MG Tablet] 90 tablet      Sig: TAKE 1 TABLET (30 MG TOTAL) BY MOUTH EVERY EVENING.       There is no refill protocol information for this order           Appointments  past 12m or future 3m with PCP    Date Provider   Last Visit   7/16/2020 Lara Mac MD   Next Visit   Visit date not found Lara Mac MD   ED visits in past 90 days: 0     Note composed:11:44 AM 07/24/2020

## 2020-07-30 NOTE — TELEPHONE ENCOUNTER
----- Message from Garett Grubbs sent at 7/30/2020  4:14 PM CDT -----  Contact: [phone] / self  Patient would like to speak with your office regarding a prior authorization for the medication paroxetine (PAXIL) 20 MG tablet. Please Advise.

## 2020-07-31 RX ORDER — PAROXETINE HYDROCHLORIDE 20 MG/1
20 TABLET, FILM COATED ORAL EVERY MORNING
Qty: 90 TABLET | Refills: 1 | Status: SHIPPED | OUTPATIENT
Start: 2020-07-31 | End: 2021-02-25 | Stop reason: SDUPTHER

## 2020-08-27 ENCOUNTER — OFFICE VISIT (OUTPATIENT)
Dept: CARDIOLOGY | Facility: CLINIC | Age: 80
End: 2020-08-27
Payer: MEDICARE

## 2020-08-27 VITALS
HEART RATE: 68 BPM | BODY MASS INDEX: 27.46 KG/M2 | WEIGHT: 155 LBS | SYSTOLIC BLOOD PRESSURE: 120 MMHG | HEIGHT: 63 IN | OXYGEN SATURATION: 97 % | DIASTOLIC BLOOD PRESSURE: 72 MMHG

## 2020-08-27 DIAGNOSIS — E78.2 MIXED HYPERLIPIDEMIA: Chronic | ICD-10-CM

## 2020-08-27 DIAGNOSIS — I10 ESSENTIAL HYPERTENSION: Chronic | ICD-10-CM

## 2020-08-27 DIAGNOSIS — R00.2 PALPITATIONS: ICD-10-CM

## 2020-08-27 DIAGNOSIS — I20.89 STABLE ANGINA PECTORIS: ICD-10-CM

## 2020-08-27 DIAGNOSIS — R06.09 DOE (DYSPNEA ON EXERTION): ICD-10-CM

## 2020-08-27 DIAGNOSIS — Z86.2 HISTORY OF IRON DEFICIENCY ANEMIA: ICD-10-CM

## 2020-08-27 PROCEDURE — 1101F PR PT FALLS ASSESS DOC 0-1 FALLS W/OUT INJ PAST YR: ICD-10-PCS | Mod: HCNC,CPTII,S$GLB, | Performed by: INTERNAL MEDICINE

## 2020-08-27 PROCEDURE — 1159F MED LIST DOCD IN RCRD: CPT | Mod: HCNC,S$GLB,, | Performed by: INTERNAL MEDICINE

## 2020-08-27 PROCEDURE — 1126F PR PAIN SEVERITY QUANTIFIED, NO PAIN PRESENT: ICD-10-PCS | Mod: HCNC,S$GLB,, | Performed by: INTERNAL MEDICINE

## 2020-08-27 PROCEDURE — 3078F PR MOST RECENT DIASTOLIC BLOOD PRESSURE < 80 MM HG: ICD-10-PCS | Mod: HCNC,CPTII,S$GLB, | Performed by: INTERNAL MEDICINE

## 2020-08-27 PROCEDURE — 99214 OFFICE O/P EST MOD 30 MIN: CPT | Mod: HCNC,S$GLB,, | Performed by: INTERNAL MEDICINE

## 2020-08-27 PROCEDURE — 99999 PR PBB SHADOW E&M-EST. PATIENT-LVL IV: ICD-10-PCS | Mod: PBBFAC,HCNC,, | Performed by: INTERNAL MEDICINE

## 2020-08-27 PROCEDURE — 3074F PR MOST RECENT SYSTOLIC BLOOD PRESSURE < 130 MM HG: ICD-10-PCS | Mod: HCNC,CPTII,S$GLB, | Performed by: INTERNAL MEDICINE

## 2020-08-27 PROCEDURE — 99499 RISK ADDL DX/OHS AUDIT: ICD-10-PCS | Mod: HCNC,S$GLB,, | Performed by: INTERNAL MEDICINE

## 2020-08-27 PROCEDURE — 1159F PR MEDICATION LIST DOCUMENTED IN MEDICAL RECORD: ICD-10-PCS | Mod: HCNC,S$GLB,, | Performed by: INTERNAL MEDICINE

## 2020-08-27 PROCEDURE — 1126F AMNT PAIN NOTED NONE PRSNT: CPT | Mod: HCNC,S$GLB,, | Performed by: INTERNAL MEDICINE

## 2020-08-27 PROCEDURE — 3078F DIAST BP <80 MM HG: CPT | Mod: HCNC,CPTII,S$GLB, | Performed by: INTERNAL MEDICINE

## 2020-08-27 PROCEDURE — 99499 UNLISTED E&M SERVICE: CPT | Mod: HCNC,S$GLB,, | Performed by: INTERNAL MEDICINE

## 2020-08-27 PROCEDURE — 1101F PT FALLS ASSESS-DOCD LE1/YR: CPT | Mod: HCNC,CPTII,S$GLB, | Performed by: INTERNAL MEDICINE

## 2020-08-27 PROCEDURE — 3074F SYST BP LT 130 MM HG: CPT | Mod: HCNC,CPTII,S$GLB, | Performed by: INTERNAL MEDICINE

## 2020-08-27 PROCEDURE — 99214 PR OFFICE/OUTPT VISIT, EST, LEVL IV, 30-39 MIN: ICD-10-PCS | Mod: HCNC,S$GLB,, | Performed by: INTERNAL MEDICINE

## 2020-08-27 PROCEDURE — 99999 PR PBB SHADOW E&M-EST. PATIENT-LVL IV: CPT | Mod: PBBFAC,HCNC,, | Performed by: INTERNAL MEDICINE

## 2020-08-27 NOTE — ASSESSMENT & PLAN NOTE
Controlled.  Goal BP < 140/90.  Compliant w/ meds.    - continue current medical therapy  - encouraged lifestyle modifications  - pt to continue to monitor BP at home and record

## 2020-08-27 NOTE — ASSESSMENT & PLAN NOTE
Controlled.  Goal LDL < 130, LDL 99.6 7/2020.  - continue statin therapy  - encouraged lifestyle modifications

## 2020-08-27 NOTE — PROGRESS NOTES
Subjective:    Patient ID:  Nereyda Hernandez is a 80 y.o. female who presents for follow-up of Follow-up, Hypertension, Hyperlipidemia, and Shortness of Breath (on exertion)      PCP: Lara Mac MD     Pt is a 79 yo F w/ PMH of HTN, HLD, and PVCs/PACs who presents for follow up.  She mentions that she has been having increasing chronic camacho w/ 1 block of ambulation over the last week.  She mentions that she has been working out at the gym and being more active and noted a few episodes of L chest pain.  She has decreased episodes of palpitations.  She has been compliant w/ medical therapy and has been monitoring her BP at home and has been running 130-150/60-90 which is before she takes her meds. She notes camacho which starts following walking a few blocks.  She denies orthopnea, PND, presyncope, LOC, or claudication.  She admits to BLE edema which has improved w/ the compression stockings and HCTZ.  She still goes to the gym a few times a week and has an aerobics class and elliptical machine with fatigue following 30 mins.      Past Medical History:   Diagnosis Date    Anxiety     Arthritis     Breast cancer 1990    left    Chronic disease anemia     Hyperlipidemia     Hypertension     Insomnia     Lobular carcinoma in situ     KANWAL (obstructive sleep apnea)     Osteoporosis     Rosacea     Squamous cell carcinoma     Stable angina pectoris 8/27/2020    Urinary incontinence     Vertigo      Past Surgical History:   Procedure Laterality Date    BELT ABDOMINOPLASTY      BREAST BIOPSY Left 1990    ex bx    BREAST LUMPECTOMY      COLONOSCOPY N/A 1/28/2020    Procedure: COLONOSCOPY;  Surgeon: Bianka Macias MD;  Location: [facility];  Service: Endoscopy;  Laterality: N/A;    ESOPHAGOGASTRODUODENOSCOPY N/A 1/28/2020    Procedure: EGD (ESOPHAGOGASTRODUODENOSCOPY);  Surgeon: Bianka Macias MD;  [facility];  Service: Endoscopy;  Laterality: N/A;    HERNIA REPAIR      [facility]    OOPHORECTOMY       TONSILLECTOMY      TOTAL ABDOMINAL HYSTERECTOMY       Social History     Socioeconomic History    Marital status:      Spouse name: Not on file    Number of children: 6    Years of education: college    Highest education level: Not on file   Occupational History    Occupation: retired  for [employer]   Social Needs    Financial resource strain: Not hard at all    Food insecurity     Worry: Never true     Inability: Never true    Transportation needs     Medical: No     Non-medical: No   Tobacco Use    Smoking status: Former Smoker     Packs/day: 0.50     Years: 21.00     Pack years: 10.50     Start date: 1959     Quit date: 1980     Years since quittin.9    Smokeless tobacco: Never Used   Substance and Sexual Activity    Alcohol use: Yes     Alcohol/week: 5.0 standard drinks     Types: 5 Glasses of wine per week     Frequency: Monthly or less     Drinks per session: 1 or 2     Binge frequency: Never     Comment: Occasionally    Drug use: No    Sexual activity: Not Currently     Partners: Male   Lifestyle    Physical activity     Days per week: Patient refused     Minutes per session: 0 min    Stress: Not at all   Relationships    Social connections     Talks on phone: More than three times a week     Gets together: Twice a week     Attends Voodoo service: Not on file     Active member of club or organization: Yes     Attends meetings of clubs or organizations: More than 4 times per year     Relationship status:    Other Topics Concern    Not on file   Social History Narrative    Not on file     Family History   Problem Relation Age of Onset    Cancer Mother         liver    Pancreatic cancer Mother     Heart disease Mother     Depression Mother     Hypertension Father     Alzheimer's disease Father     Depression Brother     Depression Brother     Diabetes Brother     Arthritis Brother     Hypertension Brother     Heart disease  Brother        Review of patient's allergies indicates:   Allergen Reactions    Sulfa (sulfonamide antibiotics)        Medication List with Changes/Refills   Current Medications    ASPIRIN (ECOTRIN) 81 MG EC TABLET    Take 81 mg by mouth once daily.    CALCIUM CITRATE-VITAMIN D3 500 MG CALCIUM -400 UNIT CHEW    Take 1 tablet by mouth 2 (two) times daily.      FISH OIL-OMEGA-3 FATTY ACIDS 300-1,000 MG CAPSULE    Take 2 g by mouth once daily.      LATANOPROST 0.005 % OPHTHALMIC SOLUTION    1 drop every evening.    LOSARTAN (COZAAR) 100 MG TABLET    TAKE 1 TABLET ONCE DAILY    METOPROLOL TARTRATE (LOPRESSOR) 25 MG TABLET    TAKE 1 TABLET TWICE DAILY    MIRTAZAPINE (REMERON) 30 MG TABLET    TAKE 1 TABLET (30 MG TOTAL) BY MOUTH EVERY EVENING.    MULTIVIT-IRON-MIN-FOLIC ACID (MULTIVITAMIN-IRON-MINERALS-FOLIC ACID) 3,500-18-0.4 UNIT-MG-MG CHEW    Take by mouth.      MYRBETRIQ 25 MG TB24 ER TABLET    TAKE 1 TABLET EVERY DAY    PAROXETINE (PAXIL) 20 MG TABLET    Take 1 tablet (20 mg total) by mouth every morning.    PRAVASTATIN (PRAVACHOL) 20 MG TABLET    TAKE 1 TABLET ONCE DAILY.    TRAZODONE (DESYREL) 50 MG TABLET    Take 1 tablet (50 mg total) by mouth every evening.    TRIAMTERENE-HYDROCHLOROTHIAZIDE 37.5-25 MG (DYAZIDE) 37.5-25 MG PER CAPSULE    TAKE 1 CAPSULE EVERY MORNING       Review of Systems   Constitution: Negative for diaphoresis and fever.   HENT: Negative for congestion and hearing loss.    Eyes: Negative for blurred vision and pain.   Cardiovascular: Positive for chest pain, dyspnea on exertion, leg swelling and palpitations. Negative for claudication, near-syncope, orthopnea, paroxysmal nocturnal dyspnea and syncope.   Respiratory: Negative for shortness of breath and sleep disturbances due to breathing.    Hematologic/Lymphatic: Negative for bleeding problem. Does not bruise/bleed easily.   Skin: Negative for color change and poor wound healing.   Gastrointestinal: Negative for abdominal pain and nausea.  "  Genitourinary: Negative for bladder incontinence and flank pain.   Neurological: Positive for loss of balance. Negative for focal weakness and light-headedness.        Objective:   /72 (BP Location: Left arm, Patient Position: Sitting, BP Method: Large (Manual))   Pulse 68   Ht 5' 3" (1.6 m)   Wt 70.3 kg (155 lb)   SpO2 97%   BMI 27.46 kg/m²    Physical Exam   Constitutional: She is oriented to person, place, and time. She appears well-developed and well-nourished.   HENT:   Head: Normocephalic and atraumatic.   Mouth/Throat: Oropharynx is clear and moist.   Eyes: Pupils are equal, round, and reactive to light. EOM are normal. No scleral icterus.   Neck: Normal range of motion. Neck supple. No JVD present.   Cardiovascular: Normal rate, regular rhythm, S1 normal, S2 normal and intact distal pulses. Exam reveals no gallop and no friction rub.   No murmur heard.  Pulmonary/Chest: Effort normal and breath sounds normal. No respiratory distress. She has no wheezes. She has no rales. She exhibits no tenderness.   Abdominal: Soft. Bowel sounds are normal. She exhibits no distension and no mass. There is no abdominal tenderness. There is no rebound.   Musculoskeletal: Normal range of motion.         General: No tenderness or edema.   Neurological: She is alert and oriented to person, place, and time. She displays normal reflexes. Coordination normal.   Skin: Skin is warm and dry. She is not diaphoretic. No pallor.   Psychiatric: She has a normal mood and affect. Her behavior is normal. Judgment normal.         Assessment:       1. Stable angina pectoris    2. Essential hypertension    3. Mixed hyperlipidemia    4. Palpitations    5. HUBBARD (dyspnea on exertion)    6. History of iron deficiency anemia         Plan:         Essential hypertension  Controlled.  Goal BP < 140/90.  Compliant w/ meds.    - continue current medical therapy  - encouraged lifestyle modifications  - pt to continue to monitor BP at home and " record    Hyperlipidemia  Controlled.  Goal LDL < 130, LDL 99.6 7/2020.  - continue statin therapy  - encouraged lifestyle modifications    Palpitations  Improving.    - continue metoprolol  - continue to control BP     HUBBARD (dyspnea on exertion)  Chronic however increasing.  Pt noted to have an acceptable echo at last appt.  Pt w/ risk factors for CAD and c/o episodes of exertional cp.    - check Lexiscan MPI    History of iron deficiency anemia  Management per PCP.     Stable angina pectoris  See hubbard.      Total duration of face to face visit time 30 minutes.  Total time spent counseling greater than fifty percent of total visit time.  Counseling included discussion regarding imaging findings, diagnosis, possibilities, treatment options, risks and benefits.  The patient had many questions regarding the options and long-term effects      Sujit Dent M.D.  Interventional Cardiology

## 2020-08-27 NOTE — ASSESSMENT & PLAN NOTE
Chronic however increasing.  Pt noted to have an acceptable echo at last appt.  Pt w/ risk factors for CAD and c/o episodes of exertional cp.    - check Lexiscan MPI

## 2020-09-18 DIAGNOSIS — N32.81 OVERACTIVE BLADDER: ICD-10-CM

## 2020-09-18 RX ORDER — MIRABEGRON 25 MG/1
25 TABLET, FILM COATED, EXTENDED RELEASE ORAL DAILY
Qty: 90 TABLET | Refills: 1 | Status: SHIPPED | OUTPATIENT
Start: 2020-09-18 | End: 2021-04-15 | Stop reason: SDUPTHER

## 2020-11-09 ENCOUNTER — OFFICE VISIT (OUTPATIENT)
Dept: CARDIOLOGY | Facility: CLINIC | Age: 80
End: 2020-11-09
Payer: MEDICARE

## 2020-11-09 VITALS
BODY MASS INDEX: 28 KG/M2 | DIASTOLIC BLOOD PRESSURE: 76 MMHG | HEART RATE: 61 BPM | WEIGHT: 158 LBS | SYSTOLIC BLOOD PRESSURE: 130 MMHG | HEIGHT: 63 IN | OXYGEN SATURATION: 98 %

## 2020-11-09 DIAGNOSIS — I10 ESSENTIAL HYPERTENSION: Chronic | ICD-10-CM

## 2020-11-09 DIAGNOSIS — D50.9 IRON DEFICIENCY ANEMIA, UNSPECIFIED IRON DEFICIENCY ANEMIA TYPE: ICD-10-CM

## 2020-11-09 DIAGNOSIS — Z86.2 HISTORY OF IRON DEFICIENCY ANEMIA: ICD-10-CM

## 2020-11-09 DIAGNOSIS — R06.09 DOE (DYSPNEA ON EXERTION): ICD-10-CM

## 2020-11-09 DIAGNOSIS — K21.9 GASTROESOPHAGEAL REFLUX DISEASE WITHOUT ESOPHAGITIS: ICD-10-CM

## 2020-11-09 DIAGNOSIS — F41.9 ANXIETY: ICD-10-CM

## 2020-11-09 DIAGNOSIS — R00.2 PALPITATIONS: ICD-10-CM

## 2020-11-09 DIAGNOSIS — E78.2 MIXED HYPERLIPIDEMIA: Chronic | ICD-10-CM

## 2020-11-09 PROBLEM — I20.89 STABLE ANGINA PECTORIS: Status: RESOLVED | Noted: 2020-08-27 | Resolved: 2020-11-09

## 2020-11-09 PROCEDURE — 1159F MED LIST DOCD IN RCRD: CPT | Mod: HCNC,S$GLB,, | Performed by: INTERNAL MEDICINE

## 2020-11-09 PROCEDURE — 99999 PR PBB SHADOW E&M-EST. PATIENT-LVL IV: CPT | Mod: PBBFAC,HCNC,, | Performed by: INTERNAL MEDICINE

## 2020-11-09 PROCEDURE — 1126F PR PAIN SEVERITY QUANTIFIED, NO PAIN PRESENT: ICD-10-PCS | Mod: HCNC,S$GLB,, | Performed by: INTERNAL MEDICINE

## 2020-11-09 PROCEDURE — 3075F SYST BP GE 130 - 139MM HG: CPT | Mod: HCNC,CPTII,S$GLB, | Performed by: INTERNAL MEDICINE

## 2020-11-09 PROCEDURE — 3078F PR MOST RECENT DIASTOLIC BLOOD PRESSURE < 80 MM HG: ICD-10-PCS | Mod: HCNC,CPTII,S$GLB, | Performed by: INTERNAL MEDICINE

## 2020-11-09 PROCEDURE — 1100F PR PT FALLS ASSESS DOC 2+ FALLS/FALL W/INJURY/YR: ICD-10-PCS | Mod: HCNC,CPTII,S$GLB, | Performed by: INTERNAL MEDICINE

## 2020-11-09 PROCEDURE — 3078F DIAST BP <80 MM HG: CPT | Mod: HCNC,CPTII,S$GLB, | Performed by: INTERNAL MEDICINE

## 2020-11-09 PROCEDURE — 99999 PR PBB SHADOW E&M-EST. PATIENT-LVL IV: ICD-10-PCS | Mod: PBBFAC,HCNC,, | Performed by: INTERNAL MEDICINE

## 2020-11-09 PROCEDURE — 1159F PR MEDICATION LIST DOCUMENTED IN MEDICAL RECORD: ICD-10-PCS | Mod: HCNC,S$GLB,, | Performed by: INTERNAL MEDICINE

## 2020-11-09 PROCEDURE — 3288F PR FALLS RISK ASSESSMENT DOCUMENTED: ICD-10-PCS | Mod: HCNC,CPTII,S$GLB, | Performed by: INTERNAL MEDICINE

## 2020-11-09 PROCEDURE — 1126F AMNT PAIN NOTED NONE PRSNT: CPT | Mod: HCNC,S$GLB,, | Performed by: INTERNAL MEDICINE

## 2020-11-09 PROCEDURE — 1100F PTFALLS ASSESS-DOCD GE2>/YR: CPT | Mod: HCNC,CPTII,S$GLB, | Performed by: INTERNAL MEDICINE

## 2020-11-09 PROCEDURE — 3075F PR MOST RECENT SYSTOLIC BLOOD PRESS GE 130-139MM HG: ICD-10-PCS | Mod: HCNC,CPTII,S$GLB, | Performed by: INTERNAL MEDICINE

## 2020-11-09 PROCEDURE — 99214 PR OFFICE/OUTPT VISIT, EST, LEVL IV, 30-39 MIN: ICD-10-PCS | Mod: HCNC,S$GLB,, | Performed by: INTERNAL MEDICINE

## 2020-11-09 PROCEDURE — 99214 OFFICE O/P EST MOD 30 MIN: CPT | Mod: HCNC,S$GLB,, | Performed by: INTERNAL MEDICINE

## 2020-11-09 PROCEDURE — 3288F FALL RISK ASSESSMENT DOCD: CPT | Mod: HCNC,CPTII,S$GLB, | Performed by: INTERNAL MEDICINE

## 2020-11-09 RX ORDER — A/SINGAPORE/GP1908/2015 IVR-180 (AN A/MICHIGAN/45/2015 (H1N1)PDM09-LIKE VIRUS, A/HONG KONG/4801/2014, NYMC X-263B (H3N2) (AN A/HONG KONG/4801/2014-LIKE VIRUS), AND B/BRISBANE/60/2008, WILD TYPE (A B/BRISBANE/60/2008-LIKE VIRUS) 15; 15; 15 UG/.5ML; UG/.5ML; UG/.5ML
INJECTION, SUSPENSION INTRAMUSCULAR
COMMUNITY
Start: 2020-10-31 | End: 2021-05-17 | Stop reason: ALTCHOICE

## 2020-11-09 NOTE — ASSESSMENT & PLAN NOTE
Chronic however increasing.  Pt noted to have an acceptable echo at last appt.  Lexiscan MPI negative for ischemia.  Pt has h/o tobacco abuse.    - cardiac w/u complete  - pt to f/u w/ PCP for further evaluation (would consider pulm w/u)

## 2020-11-09 NOTE — PROGRESS NOTES
Subjective:    Patient ID:  Nereyda Hernandez is a 80 y.o. female who presents for follow-up of Results (stress test)      PCP: Lara Mac MD     Pt is a 81 yo F w/ PMH of HTN, HLD, and PVCs/PACs who presents for follow up.  She was last seen 8/27/2020 and mentioned that she had been having increasing chronic camacho w/ 1 block of ambulation.  She had a Lexiscan MPI which was negative for ischemia.  She mentions that she continues to have episodes of sob/camacho however denies episodes of chest pain.  She has decreased episodes of palpitations.  She has been compliant w/ medical therapy and has been monitoring her BP at home and has been running 130-150/60-90 which is before she takes her meds. She notes camacho which starts following walking a few blocks.  She denies orthopnea, PND, presyncope, LOC, or claudication.  She admits to BLE edema which has improved w/ the compression stockings and HCTZ.  She still goes to the gym a few times a week and has an aerobics class and elliptical machine with fatigue following 30 mins.      Past Medical History:   Diagnosis Date    Anxiety     Arthritis     Breast cancer 1990    left    Chronic disease anemia     Hyperlipidemia     Hypertension     Insomnia     Lobular carcinoma in situ     KANWAL (obstructive sleep apnea)     Osteoporosis     Rosacea     Squamous cell carcinoma     Stable angina pectoris 8/27/2020    Urinary incontinence     Vertigo      Past Surgical History:   Procedure Laterality Date    BELT ABDOMINOPLASTY      BREAST BIOPSY Left 1990    ex bx    BREAST LUMPECTOMY      COLONOSCOPY N/A 1/28/2020    Procedure: COLONOSCOPY;  Surgeon: Bianka Macias MD;  Location: [facility];  Service: Endoscopy;  Laterality: N/A;    ESOPHAGOGASTRODUODENOSCOPY N/A 1/28/2020    Procedure: EGD (ESOPHAGOGASTRODUODENOSCOPY);  Surgeon: Bianka Macias MD;  Location: [facility];  Service: Endoscopy;  Laterality: N/A;    HERNIA REPAIR      Ventral    OOPHORECTOMY       TONSILLECTOMY      TOTAL ABDOMINAL HYSTERECTOMY       Social History     Socioeconomic History    Marital status:      Spouse name: Not on file    Number of children: 6    Years of education: college    Highest education level: Not on file   Occupational History    Occupation: retired  for [employer]   Social Needs    Financial resource strain: Not hard at all    Food insecurity     Worry: Never true     Inability: Never true    Transportation needs     Medical: No     Non-medical: No   Tobacco Use    Smoking status: Former Smoker     Packs/day: 0.50     Years: 21.00     Pack years: 10.50     Start date: 1959     Quit date: 1980     Years since quittin.1    Smokeless tobacco: Never Used   Substance and Sexual Activity    Alcohol use: Yes     Alcohol/week: 5.0 standard drinks     Types: 5 Glasses of wine per week     Frequency: Monthly or less     Drinks per session: 1 or 2     Binge frequency: Never     Comment: Occasionally    Drug use: No    Sexual activity: Not Currently     Partners: Male   Lifestyle    Physical activity     Days per week: Patient refused     Minutes per session: 0 min    Stress: Not at all   Relationships    Social connections     Talks on phone: More than three times a week     Gets together: Twice a week     Attends Congregation service: Not on file     Active member of club or organization: Yes     Attends meetings of clubs or organizations: More than 4 times per year     Relationship status:    Other Topics Concern    Not on file   Social History Narrative    Not on file     Family History   Problem Relation Age of Onset    Cancer Mother         liver    Pancreatic cancer Mother     Heart disease Mother     Depression Mother     Hypertension Father     Alzheimer's disease Father     Depression Brother     Depression Brother     Diabetes Brother     Arthritis Brother     Hypertension Brother     Heart disease Brother         Review of patient's allergies indicates:   Allergen Reactions    Sulfa (sulfonamide antibiotics)        Medication List with Changes/Refills   Current Medications    ASPIRIN (ECOTRIN) 81 MG EC TABLET    Take 81 mg by mouth once daily.    CALCIUM CITRATE-VITAMIN D3 500 MG CALCIUM -400 UNIT CHEW    Take 1 tablet by mouth 2 (two) times daily.      FISH OIL-OMEGA-3 FATTY ACIDS 300-1,000 MG CAPSULE    Take 2 g by mouth once daily.      FLUAD QUAD 2020-21,65Y UP,,PF, 60 MCG (15 MCG X 4)/0.5 ML SYRG        LATANOPROST 0.005 % OPHTHALMIC SOLUTION    1 drop every evening.    LOSARTAN (COZAAR) 100 MG TABLET    TAKE 1 TABLET ONCE DAILY    METOPROLOL TARTRATE (LOPRESSOR) 25 MG TABLET    TAKE 1 TABLET TWICE DAILY    MIRABEGRON (MYRBETRIQ) 25 MG TB24 ER TABLET    Take 1 tablet (25 mg total) by mouth once daily.    MIRTAZAPINE (REMERON) 30 MG TABLET    TAKE 1 TABLET (30 MG TOTAL) BY MOUTH EVERY EVENING.    MULTIVIT-IRON-MIN-FOLIC ACID (MULTIVITAMIN-IRON-MINERALS-FOLIC ACID) 3,500-18-0.4 UNIT-MG-MG CHEW    Take by mouth.      PAROXETINE (PAXIL) 20 MG TABLET    Take 1 tablet (20 mg total) by mouth every morning.    PRAVASTATIN (PRAVACHOL) 20 MG TABLET    TAKE 1 TABLET ONCE DAILY.    TRAZODONE (DESYREL) 50 MG TABLET    Take 1 tablet (50 mg total) by mouth every evening.    TRIAMTERENE-HYDROCHLOROTHIAZIDE 37.5-25 MG (DYAZIDE) 37.5-25 MG PER CAPSULE    TAKE 1 CAPSULE EVERY MORNING       Review of Systems   Constitution: Negative for diaphoresis and fever.   HENT: Negative for congestion and hearing loss.    Eyes: Negative for blurred vision and pain.   Cardiovascular: Positive for dyspnea on exertion and palpitations. Negative for chest pain, claudication, leg swelling, near-syncope, orthopnea, paroxysmal nocturnal dyspnea and syncope.   Respiratory: Negative for shortness of breath and sleep disturbances due to breathing.    Hematologic/Lymphatic: Negative for bleeding problem. Does not bruise/bleed easily.   Skin: Negative for  "color change and poor wound healing.   Gastrointestinal: Negative for abdominal pain and nausea.   Genitourinary: Negative for bladder incontinence and flank pain.   Neurological: Positive for loss of balance. Negative for focal weakness and light-headedness.        Objective:   /76 (BP Location: Left arm, Patient Position: Sitting, BP Method: Large (Manual))   Pulse 61   Ht 5' 3" (1.6 m)   Wt 71.7 kg (158 lb)   SpO2 98%   BMI 27.99 kg/m²    Physical Exam   Constitutional: She is oriented to person, place, and time. She appears well-developed and well-nourished.   HENT:   Head: Normocephalic and atraumatic.   Mouth/Throat: Oropharynx is clear and moist.   Eyes: Pupils are equal, round, and reactive to light. EOM are normal. No scleral icterus.   Neck: Normal range of motion. Neck supple. No JVD present.   Cardiovascular: Normal rate, regular rhythm, S1 normal, S2 normal and intact distal pulses. Exam reveals no gallop and no friction rub.   No murmur heard.  Pulmonary/Chest: Effort normal and breath sounds normal. No respiratory distress. She has no wheezes. She has no rales. She exhibits no tenderness.   Abdominal: Soft. Bowel sounds are normal. She exhibits no distension and no mass. There is no abdominal tenderness. There is no rebound.   Musculoskeletal: Normal range of motion.         General: No tenderness or edema.   Neurological: She is alert and oriented to person, place, and time. She displays normal reflexes. Coordination normal.   Skin: Skin is warm and dry. She is not diaphoretic. No pallor.   Psychiatric: She has a normal mood and affect. Her behavior is normal. Judgment normal.         Lexiscan MPI: 9/28/2020- reviewed.    Conclusion         The EKG portion of this study is negative for ischemia.    The patient reported chest pain during the stress test.    There were no arrhythmias during stress.    ECG Stress Nuclear portion of this study will be reported separately. "     FINDINGS:  There is appropriate wall motion.  There is no significant fixed or reversible perfusion defect.  The stress and rest end-diastolic volumes each measure 51 mL.  The stress and rest end systolic volumes measure 9 and 12 mL respectively.  The stress and rest ejection fraction measure 82 and 76% respectively.     Impression:     No acute findings.      Echo: 10/3/2019- reviewed.    Conclusion    · Normal left ventricular systolic function. The estimated ejection fraction is 55%  · Concentric left ventricular hypertrophy.  · No wall motion abnormalities.  · Indeterminate left ventricular diastolic function.  · Mild aortic regurgitation.  · Mild tricuspid regurgitation.  · Mild pulmonic regurgitation.  · Normal right ventricular systolic function.  · Normal central venous pressure (3 mm Hg).  · The estimated PA systolic pressure is 26 mm Hg       Assessment:       1. Anxiety    2. Essential hypertension    3. Mixed hyperlipidemia    4. Palpitations    5. History of iron deficiency anemia    6. Iron deficiency anemia, unspecified iron deficiency anemia type    7. Gastroesophageal reflux disease without esophagitis    8. HUBBARD (dyspnea on exertion)         Plan:         Anxiety  Followed by PCP.    Essential hypertension  Controlled.  Goal BP < 140/90.  Compliant w/ meds.    - continue current medical therapy  - encouraged lifestyle modifications  - pt to continue to monitor BP at home and record    Hyperlipidemia  Controlled.  Goal LDL < 130, LDL 99.6 7/2020.  - continue statin therapy  - encouraged lifestyle modifications    Palpitations  Improving.    - continue metoprolol  - continue to control BP     Iron deficiency anemia  Management per PCP.     Gastroesophageal reflux disease without esophagitis  Followed by PCP.      HUBBARD (dyspnea on exertion)  Chronic however increasing.  Pt noted to have an acceptable echo at last appt.  Lexiscan MPI negative for ischemia.  Pt has h/o tobacco abuse.    - cardiac w/u  complete  - pt to f/u w/ PCP for further evaluation (would consider pulm w/u)         Total duration of face to face visit time 30 minutes.  Total time spent counseling greater than fifty percent of total visit time.  Counseling included discussion regarding imaging findings, diagnosis, possibilities, treatment options, risks and benefits.  The patient had many questions regarding the options and long-term effects      Sujit Dent M.D.  Interventional Cardiology

## 2020-11-17 ENCOUNTER — OFFICE VISIT (OUTPATIENT)
Dept: FAMILY MEDICINE | Facility: CLINIC | Age: 80
End: 2020-11-17
Payer: MEDICARE

## 2020-11-17 ENCOUNTER — TELEPHONE (OUTPATIENT)
Dept: FAMILY MEDICINE | Facility: CLINIC | Age: 80
End: 2020-11-17

## 2020-11-17 VITALS
BODY MASS INDEX: 28.24 KG/M2 | OXYGEN SATURATION: 96 % | WEIGHT: 159.38 LBS | SYSTOLIC BLOOD PRESSURE: 122 MMHG | RESPIRATION RATE: 16 BRPM | HEIGHT: 63 IN | TEMPERATURE: 98 F | DIASTOLIC BLOOD PRESSURE: 80 MMHG | HEART RATE: 61 BPM

## 2020-11-17 DIAGNOSIS — R06.00 DYSPNEA, UNSPECIFIED TYPE: Primary | ICD-10-CM

## 2020-11-17 PROCEDURE — 3288F PR FALLS RISK ASSESSMENT DOCUMENTED: ICD-10-PCS | Mod: HCNC,CPTII,S$GLB, | Performed by: FAMILY MEDICINE

## 2020-11-17 PROCEDURE — 1157F PR ADVANCE CARE PLAN OR EQUIV PRESENT IN MEDICAL RECORD: ICD-10-PCS | Mod: HCNC,S$GLB,, | Performed by: FAMILY MEDICINE

## 2020-11-17 PROCEDURE — 3074F SYST BP LT 130 MM HG: CPT | Mod: HCNC,CPTII,S$GLB, | Performed by: FAMILY MEDICINE

## 2020-11-17 PROCEDURE — 99999 PR PBB SHADOW E&M-EST. PATIENT-LVL V: ICD-10-PCS | Mod: PBBFAC,HCNC,, | Performed by: FAMILY MEDICINE

## 2020-11-17 PROCEDURE — 1159F MED LIST DOCD IN RCRD: CPT | Mod: HCNC,S$GLB,, | Performed by: FAMILY MEDICINE

## 2020-11-17 PROCEDURE — 3074F PR MOST RECENT SYSTOLIC BLOOD PRESSURE < 130 MM HG: ICD-10-PCS | Mod: HCNC,CPTII,S$GLB, | Performed by: FAMILY MEDICINE

## 2020-11-17 PROCEDURE — 1101F PT FALLS ASSESS-DOCD LE1/YR: CPT | Mod: HCNC,CPTII,S$GLB, | Performed by: FAMILY MEDICINE

## 2020-11-17 PROCEDURE — 1159F PR MEDICATION LIST DOCUMENTED IN MEDICAL RECORD: ICD-10-PCS | Mod: HCNC,S$GLB,, | Performed by: FAMILY MEDICINE

## 2020-11-17 PROCEDURE — 1101F PR PT FALLS ASSESS DOC 0-1 FALLS W/OUT INJ PAST YR: ICD-10-PCS | Mod: HCNC,CPTII,S$GLB, | Performed by: FAMILY MEDICINE

## 2020-11-17 PROCEDURE — 3079F PR MOST RECENT DIASTOLIC BLOOD PRESSURE 80-89 MM HG: ICD-10-PCS | Mod: HCNC,CPTII,S$GLB, | Performed by: FAMILY MEDICINE

## 2020-11-17 PROCEDURE — 99214 OFFICE O/P EST MOD 30 MIN: CPT | Mod: HCNC,S$GLB,, | Performed by: FAMILY MEDICINE

## 2020-11-17 PROCEDURE — 99999 PR PBB SHADOW E&M-EST. PATIENT-LVL V: CPT | Mod: PBBFAC,HCNC,, | Performed by: FAMILY MEDICINE

## 2020-11-17 PROCEDURE — 99214 PR OFFICE/OUTPT VISIT, EST, LEVL IV, 30-39 MIN: ICD-10-PCS | Mod: HCNC,S$GLB,, | Performed by: FAMILY MEDICINE

## 2020-11-17 PROCEDURE — 3079F DIAST BP 80-89 MM HG: CPT | Mod: HCNC,CPTII,S$GLB, | Performed by: FAMILY MEDICINE

## 2020-11-17 PROCEDURE — 1157F ADVNC CARE PLAN IN RCRD: CPT | Mod: HCNC,S$GLB,, | Performed by: FAMILY MEDICINE

## 2020-11-17 PROCEDURE — 3288F FALL RISK ASSESSMENT DOCD: CPT | Mod: HCNC,CPTII,S$GLB, | Performed by: FAMILY MEDICINE

## 2020-11-17 RX ORDER — TIOTROPIUM BROMIDE 18 UG/1
18 CAPSULE ORAL; RESPIRATORY (INHALATION) DAILY
Qty: 30 CAPSULE | Refills: 0 | Status: SHIPPED | OUTPATIENT
Start: 2020-11-17 | End: 2021-07-01

## 2020-11-17 NOTE — PROGRESS NOTES
EST PATIENT VISIT FAMILY MEDICINE    CC:   Chief Complaint   Patient presents with    Shortness of Breath     few months now    Follow-up     4 month F/U    Fatigue       HPI: Nereyda Hernandez  is a 80 y.o. female:    Her dyspnea is gradually working  Cardiac work up unremarkable  She also had GI work up  It's worsening her fatigue  Labs have not revealed cause   Had sleep study 10/2019 which did not show kaylee    ROS: Review of Systems   Constitutional: Negative for fever.   Respiratory: Positive for shortness of breath. Negative for hemoptysis, sputum production and wheezing.    Cardiovascular: Negative for chest pain.       PMHX:   Past Medical History:   Diagnosis Date    Anxiety     Arthritis     Breast cancer 1990    left    Chronic disease anemia     Hyperlipidemia     Hypertension     Insomnia     Lobular carcinoma in situ     KAYLEE (obstructive sleep apnea)     Osteoporosis     Rosacea     Squamous cell carcinoma     Stable angina pectoris 8/27/2020    Urinary incontinence     Vertigo        PSHX:   Past Surgical History:   Procedure Laterality Date    BELT ABDOMINOPLASTY      BREAST BIOPSY Left 1990    ex bx    BREAST LUMPECTOMY      COLONOSCOPY N/A 1/28/2020    Procedure: COLONOSCOPY;  Surgeon: Bianka Macias MD;  Location: [facility];  Service: Endoscopy;  Laterality: N/A;    ESOPHAGOGASTRODUODENOSCOPY N/A 1/28/2020    Procedure: EGD (ESOPHAGOGASTRODUODENOSCOPY);  Surgeon: Bianka Macias MD;  Location: [facility];  Service: Endoscopy;  Laterality: N/A;    HERNIA REPAIR      Ventral    OOPHORECTOMY      TONSILLECTOMY      TOTAL ABDOMINAL HYSTERECTOMY         FAMHX:   Family History   Problem Relation Age of Onset    Cancer Mother         liver    Pancreatic cancer Mother     Heart disease Mother     Depression Mother     Hypertension Father     Alzheimer's disease Father     Depression Brother     Depression Brother     Diabetes Brother     Arthritis Brother      Hypertension Brother     Heart disease Brother        SOCHX:   Social History     Socioeconomic History    Marital status:      Spouse name: Not on file    Number of children: 6    Years of education: college    Highest education level: Not on file   Occupational History    Occupation: retired  for [employer] Social Needs    Financial resource strain: Not hard at all    Food insecurity     Worry: Never true     Inability: Never true    Transportation needs     Medical: No     Non-medical: No   Tobacco Use    Smoking status: Former Smoker     Packs/day: 0.50     Years: 21.00     Pack years: 10.50     Start date: 1959     Quit date: 1980     Years since quittin.2    Smokeless tobacco: Never Used   Substance and Sexual Activity    Alcohol use: Yes     Alcohol/week: 5.0 standard drinks     Types: 5 Glasses of wine per week     Frequency: Monthly or less     Drinks per session: 1 or 2     Binge frequency: Never     Comment: Occasionally    Drug use: No    Sexual activity: Not Currently     Partners: Male   Lifestyle    Physical activity     Days per week: Patient refused     Minutes per session: 0 min    Stress: Not at all   Relationships    Social connections     Talks on phone: More than three times a week     Gets together: Twice a week     Attends Hindu service: Not on file     Active member of club or organization: Yes     Attends meetings of clubs or organizations: More than 4 times per year     Relationship status:    Other Topics Concern    Not on file   Social History Narrative    Not on file       ALLERGIES:   Review of patient's allergies indicates:   Allergen Reactions    Sulfa (sulfonamide antibiotics)        MEDS:   Current Outpatient Medications:     aspirin (ECOTRIN) 81 MG EC tablet, Take 81 mg by mouth once daily., Disp: , Rfl:     calcium citrate-vitamin D3 500 mg calcium -400 unit Chew, Take 1 tablet by mouth 2 (two) times  "daily.  , Disp: , Rfl:     fish oil-omega-3 fatty acids 300-1,000 mg capsule, Take 2 g by mouth once daily.  , Disp: , Rfl:     FLUAD QUAD 2020-21,65Y UP,,PF, 60 mcg (15 mcg x 4)/0.5 mL Syrg, , Disp: , Rfl:     latanoprost 0.005 % ophthalmic solution, 1 drop every evening., Disp: , Rfl:     losartan (COZAAR) 100 MG tablet, TAKE 1 TABLET ONCE DAILY, Disp: 90 tablet, Rfl: 1    metoprolol tartrate (LOPRESSOR) 25 MG tablet, TAKE 1 TABLET TWICE DAILY, Disp: 180 tablet, Rfl: 1    mirabegron (MYRBETRIQ) 25 mg Tb24 ER tablet, Take 1 tablet (25 mg total) by mouth once daily., Disp: 90 tablet, Rfl: 1    mirtazapine (REMERON) 30 MG tablet, TAKE 1 TABLET (30 MG TOTAL) BY MOUTH EVERY EVENING., Disp: 90 tablet, Rfl: 1    multivit-iron-min-folic acid (MULTIVITAMIN-IRON-MINERALS-FOLIC ACID) 3,500-18-0.4 unit-mg-mg Chew, Take by mouth.  , Disp: , Rfl:     paroxetine (PAXIL) 20 MG tablet, Take 1 tablet (20 mg total) by mouth every morning., Disp: 90 tablet, Rfl: 1    pravastatin (PRAVACHOL) 20 MG tablet, TAKE 1 TABLET ONCE DAILY., Disp: 90 tablet, Rfl: 1    triamterene-hydrochlorothiazide 37.5-25 mg (DYAZIDE) 37.5-25 mg per capsule, TAKE 1 CAPSULE EVERY MORNING, Disp: 90 capsule, Rfl: 1    tiotropium (SPIRIVA) 18 mcg inhalation capsule, Inhale 1 capsule (18 mcg total) into the lungs once daily. Controller, Disp: 30 capsule, Rfl: 0    traZODone (DESYREL) 50 MG tablet, Take 1 tablet (50 mg total) by mouth every evening., Disp: 90 tablet, Rfl: 1    OBJECTIVE:   Vitals:    11/17/20 1006   BP: 122/80   BP Location: Left arm   Patient Position: Sitting   BP Method: Large (Automatic)   Pulse: 61   Resp: 16   Temp: 98 °F (36.7 °C)   TempSrc: Oral   SpO2: 96%   Weight: 72.3 kg (159 lb 6.4 oz)   Height: 5' 3" (1.6 m)     Body mass index is 28.24 kg/m².      Physical Exam  Vitals signs and nursing note reviewed.   Constitutional:       Appearance: Normal appearance.   HENT:      Head: Normocephalic and atraumatic.   Eyes:      " General: No scleral icterus.     Extraocular Movements: Extraocular movements intact.   Pulmonary:      Effort: Pulmonary effort is normal. No respiratory distress.      Breath sounds: No wheezing or rales.   Neurological:      Mental Status: She is alert.           LABS:   A1C:      CBC:  Recent Labs   Lab 07/16/20  1049   WBC 4.38   RBC 3.76 L   Hemoglobin 11.8 L   Hematocrit 35.7 L   Platelets 207   MCV 95   MCH 31.4 H   MCHC 33.1     CMP:  Recent Labs   Lab 07/16/20  1049   Glucose 99   Calcium 9.6   Albumin 4.4   Total Protein 7.6   Sodium 139   Potassium 4.2   CO2 35 H   Chloride 100   BUN 19 H   Creatinine 0.90   Alkaline Phosphatase 73   ALT 29   AST 39   Total Bilirubin 0.5     LIPIDS:  Recent Labs   Lab 01/08/20  0922 07/16/20  1049   TSH 1.650  --    HDL  --  66   Cholesterol  --  190   Triglycerides  --  122   LDL Cholesterol  --  99.6   HDL/Cholesterol Ratio  --  34.7   Non-HDL Cholesterol  --  124   Total Cholesterol/HDL Ratio  --  2.9     TSH:  Recent Labs   Lab 01/08/20  0922   TSH 1.650         ASSESSMENT & PLAN:  Encounter Diagnosis   Name Primary?    Dyspnea, unspecified type Yes     -we have reviewed your medications and refilled them as needed  -try Spiriva daily for your breathing  -get lung testing and chest x-ray  -we can check blood work after your next follow up appointment    Will consider repeat sleep study pending above    Orders Placed This Encounter    X-Ray Chest PA And Lateral    Complete PFT with bronchodilator    PULSE OXIMETRY WITH REST - PULM    tiotropium (SPIRIVA) 18 mcg inhalation capsule       Follow up in about 1 month (around 12/17/2020) for breathing.    RTC/ED precautions discussed where applicable.   Encouraged patient to let me know if there are any further questions/concerns.     Advise patient/caretaker to check with insurance regarding orders to avoid unexpected fees/costs.     The patient/caretaker indicates understanding of these issues and agrees with the  plan.    Dr. Sharona Weaver MD  Family Medicine

## 2020-11-17 NOTE — TELEPHONE ENCOUNTER
Spoke with pt, stated message. Pt stated she had already made an appointment to have the lung test done.

## 2020-11-17 NOTE — PATIENT INSTRUCTIONS
It was nice to see you, Nereyda!    -we have reviewed your medications and refilled them as needed  -try Spiriva daily for your breathing  -get lung testing and chest x-ray  -we can check blood work after your next follow up appointment    Let me know if you have any questions/concerns.     Sincerely,     Dr Weaver

## 2020-11-17 NOTE — TELEPHONE ENCOUNTER
----- Message from Sharona Weaver MD sent at 11/17/2020 12:35 PM CST -----  Patient preferred phone call. Please let her know her chest X ray did not show any concerning findings; I think she should get the lung function testing as we discussed.     Let me know if you have any questions/concerns.     Sincerely,     Dr Weaver

## 2020-12-17 ENCOUNTER — OFFICE VISIT (OUTPATIENT)
Dept: URGENT CARE | Facility: CLINIC | Age: 80
End: 2020-12-17
Payer: MEDICARE

## 2020-12-17 VITALS
RESPIRATION RATE: 18 BRPM | TEMPERATURE: 98 F | HEART RATE: 64 BPM | BODY MASS INDEX: 28.17 KG/M2 | WEIGHT: 159 LBS | SYSTOLIC BLOOD PRESSURE: 138 MMHG | HEIGHT: 63 IN | DIASTOLIC BLOOD PRESSURE: 75 MMHG | OXYGEN SATURATION: 96 %

## 2020-12-17 DIAGNOSIS — Z20.822 EXPOSURE TO COVID-19 VIRUS: Primary | ICD-10-CM

## 2020-12-17 DIAGNOSIS — J02.9 SORE THROAT: ICD-10-CM

## 2020-12-17 DIAGNOSIS — R05.9 COUGH: ICD-10-CM

## 2020-12-17 LAB
CTP QC/QA: YES
SARS-COV-2 RDRP RESP QL NAA+PROBE: NEGATIVE

## 2020-12-17 PROCEDURE — 99214 OFFICE O/P EST MOD 30 MIN: CPT | Mod: S$GLB,CS,, | Performed by: PHYSICIAN ASSISTANT

## 2020-12-17 PROCEDURE — U0002: ICD-10-PCS | Mod: QW,S$GLB,, | Performed by: PHYSICIAN ASSISTANT

## 2020-12-17 PROCEDURE — 1157F ADVNC CARE PLAN IN RCRD: CPT | Mod: S$GLB,,, | Performed by: PHYSICIAN ASSISTANT

## 2020-12-17 PROCEDURE — 1126F PR PAIN SEVERITY QUANTIFIED, NO PAIN PRESENT: ICD-10-PCS | Mod: S$GLB,,, | Performed by: PHYSICIAN ASSISTANT

## 2020-12-17 PROCEDURE — 1157F PR ADVANCE CARE PLAN OR EQUIV PRESENT IN MEDICAL RECORD: ICD-10-PCS | Mod: S$GLB,,, | Performed by: PHYSICIAN ASSISTANT

## 2020-12-17 PROCEDURE — U0002 COVID-19 LAB TEST NON-CDC: HCPCS | Mod: QW,S$GLB,, | Performed by: PHYSICIAN ASSISTANT

## 2020-12-17 PROCEDURE — 99214 PR OFFICE/OUTPT VISIT, EST, LEVL IV, 30-39 MIN: ICD-10-PCS | Mod: S$GLB,CS,, | Performed by: PHYSICIAN ASSISTANT

## 2020-12-17 PROCEDURE — 1126F AMNT PAIN NOTED NONE PRSNT: CPT | Mod: S$GLB,,, | Performed by: PHYSICIAN ASSISTANT

## 2020-12-17 NOTE — PATIENT INSTRUCTIONS
"  NEGATIVE COVID TEST  You have tested negative for COVID-19 today.  If you did not have a close exposure (as defined below) you can return to your normal daily activities to include social distancing, wearing a mask and frequent handwashing.    A "close exposure" is defined as anyone who has had an exposure (masked or unmasked) to a known COVID -19 positive person within 6 ft for longer than 15 minutes. If your exposure meets this definition, you are required by CDC guidelines to quarantine for at least 7-10 days from time of exposure.    The CDC states that a test can be performed for an asymptomatic patient (someone who does not have any symptoms) after a close exposure, and that a test should be done if you develop symptoms after a close exposure as described above.    Specifically, you can test at day 5 or later if asymptomatic in order to get released from quarantine on day 7 or later.  If you develop symptoms sooner, you should test when your symptoms start.    If you developed symptoms since the exposure, and your test was negative today and less than 5 days from your exposure, you still have to quarantine for 7-10 days from the date of the exposure.  The 7-10 day quarantine begins from the day you were exposed, not the day of your test.  For example, if your exposure was on a Monday, and you waited until Friday of the same week to get tested and it was negative, your 7-10 day quarantine begins from that Monday, not the Friday you tested negative.    Please note, if you decide to test as an asymptomatic during your quarantine and you are positive, you will be restarting your quarantine and moving from a possible 10 day quarantine (if you do not test), to a 11 day or greater quarantine.            Viral Upper Respiratory Illness (Adult)  You have a viral upper respiratory illness (URI), which is another term for the common cold. This illness is contagious during the first few days. It is spread through the " air by coughing and sneezing. It may also be spread by direct contact (touching the sick person and then touching your own eyes, nose, or mouth). Frequent handwashing will decrease risk of spread. Most viral illnesses go away within 7 to 10 days with rest and simple home remedies. Sometimes the illness may last for several weeks. Antibiotics will not kill a virus, and they are generally not prescribed for this condition.    Home care  · If symptoms are severe, rest at home for the first 2 to 3 days. When you resume activity, don't let yourself get too tired.  · Avoid being exposed to cigarette smoke (yours or others).  · You may use acetaminophen or ibuprofen to control pain and fever, unless another medicine was prescribed. (Note: If you have chronic liver or kidney disease, have ever had a stomach ulcer or gastrointestinal bleeding, or are taking blood-thinning medicines, talk with your healthcare provider before using these medicines.) Aspirin should never be given to anyone under 18 years of age who is ill with a viral infection or fever. It may cause severe liver or brain damage.  · Your appetite may be poor, so a light diet is fine. Avoid dehydration by drinking 6 to 8 glasses of fluids per day (water, soft drinks, juices, tea, or soup). Extra fluids will help loosen secretions in the nose and lungs.  · Over-the-counter cold medicines will not shorten the length of time youre sick, but they may be helpful for the following symptoms: cough, sore throat, and nasal and sinus congestion. (Note: Do not use decongestants if you have high blood pressure.)  Follow-up care  Follow up with your healthcare provider, or as advised.  When to seek medical advice  Call your healthcare provider right away if any of these occur:  · Cough with lots of colored sputum (mucus)  · Severe headache; face, neck, or ear pain  · Difficulty swallowing due to throat pain  · Fever of 100.4°F (38°C)  Call 911, or get immediate medical  care  Call emergency services right away if any of these occur:  · Chest pain, shortness of breath, wheezing, or difficulty breathing  · Coughing up blood  · Inability to swallow due to throat pain  Date Last Reviewed: 9/13/2015 © 2000-2017 [employer]. [address] [place] [id number]. All rights reserved. This information is not intended as a substitute for professional medical care. Always follow your healthcare professional's instructions.        When You Have a Sore Throat    A sore throat can be painful. There are many reasons why you may have a sore throat. Your healthcare provider will work with you to find the cause of your sore throat. He or she will also find the best treatment for you.  What causes a sore throat?  Sore throats can be caused or worsened by:  · Cold or flu viruses  · Bacteria  · Irritants such as tobacco smoke or air pollution  · Acid reflux  A healthy throat  The tonsils are on the sides of the throat near the base of the tongue. They collect viruses and bacteria and help fight infection. The throat (pharynx) is the passage for air. Mucus from the nasal cavity also moves down the passage.  An inflamed throat  The tonsils and pharynx can become inflamed due to a cold or flu virus. Postnasal drip (excess mucus draining from the nasal cavity) can irritate the throat. It can also make the throat or tonsils more likely to be infected by bacteria. Severe, untreated tonsillitis in children or adults can cause a pocket of pus (abscess) to form near the tonsil.  Your evaluation  A medical evaluation can help find the cause of your sore throat. It can also help your healthcare provider choose the best treatment for you. The evaluation may include a health history, physical exam, and diagnostic tests.  Health history  Your healthcare provider may ask you the following:  · How long has the sore throat lasted and how have you been treating it?  · Do you have any other symptoms,  such as body aches, fever, or cough?  · Does your sore throat recur? If so, how often? How many days of school or work have you missed because of a sore throat?  · Do you have trouble eating or swallowing?  · Have you been told that you snore or have other sleep problems?  · Do you have bad breath?  · Do you cough up bad-tasting mucus?  Physical exam  During the exam, your healthcare provider checks your ears, nose, and throat for problems. He or she also checks for swelling in the neck, and may listen to your chest.  Possible tests  Other tests your healthcare provider may perform include:  · A throat swab to check for bacteria such as streptococcus (the bacteria that causes strep throat)  · A blood test to check for mononucleosis (a viral infection)  · A chest X-ray to rule out pneumonia, especially if you have a cough  Treating a sore throat  Treatment depends on many factors. What is the likely cause? Is the problem recent? Does it keep coming back? In many cases, the best thing to do is to treat the symptoms, rest, and let the problem heal itself. Antibiotics may help clear up some bacterial infections. For cases of severe or recurring tonsillitis, the tonsils may need to be removed.  Relieving your symptoms  · Dont smoke, and avoid secondhand smoke.  · For children, try throat sprays or Popsicles. Adults and older children may try lozenges.  · Drink warm liquids to soothe the throat and help thin mucus. Avoid alcohol, spicy foods, and acidic drinks such as orange juice. These can irritate the throat.  · Gargle with warm saltwater (1 teaspoon of salt to 8 ounces of warm water).  · Use a humidifier to keep air moist and relieve throat dryness.  · Try over-the-counter pain relievers such as acetaminophen or ibuprofen. Use as directed, and dont exceed the recommended dose. Dont give aspirin to children.   Are antibiotics needed?  If your sore throat is due to a bacterial infection, antibiotics may speed healing  "and prevent complications. Although group A streptococcus ("strep throat" or GAS) is the major treatable infection for a sore throat, GAS causes only 5% to 15% of sore throats in adults who seek medical care. Most sore throats are caused by cold or flu viruses. And antibiotics dont treat viral illness. In fact, using antibiotics when theyre not needed may produce bacteria that are harder to kill. Your healthcare provider will prescribe antibiotics only if he or she thinks they are likely to help.  If antibiotics are prescribed  Take the medicine exactly as directed. Be sure to finish your prescription even if youre feeling better. And be sure to ask your healthcare provider or pharmacist what side effects are common and what to do about them.  Is surgery needed?  In some cases, tonsils need to be removed. This is often done as outpatient (same-day) surgery. Your healthcare provider may advise removing the tonsils in cases of:  · Several severe bouts of tonsillitis in a year. Severe episodes include those that lead to missed days of school or work, or that need to be treated with antibiotics.  · Tonsillitis that causes breathing problems during sleep  · Tonsillitis caused by food particles collecting in pouches in the tonsils (cryptic tonsillitis)  Call your healthcare provider if any of the following occur:  · Symptoms worsen, or new symptoms develop.  · Swollen tonsils make breathing difficult.  · The pain is severe enough to keep you from drinking liquids.  · A skin rash, hives, or wheezing develops. Any of these could signal an allergic reaction to antibiotics.  · Symptoms dont improve within a week.  · Symptoms dont improve within 2 to 3 days of starting antibiotics.   Date Last Reviewed: 10/1/2016  © 1384-9076 [employer]. [address] [place] [id number]. All rights reserved. This information is not intended as a substitute for professional medical care. Always follow your " healthcare professional's instructions.        Self-Care for Sore Throats    Sore throats happen for many reasons, such as colds, allergies, and infections caused by viruses or bacteria. In any case, your throat becomes red and sore. Your goal for self-care is to reduce your discomfort while giving your throat a chance to heal.  Moisten and soothe your throat  Tips include the following:  · Try a sip of water first thing after waking up.  · Keep your throat moist by drinking 6 or more glasses of clear liquids every day.  · Run a cool-air humidifier in your room overnight.  · Avoid cigarette smoke.   · Suck on throat lozenges, cough drops, hard candy, ice chips, or frozen fruit-juice bars. Use the sugar-free versions if your diet or medical condition requires them.  Gargle to ease irritation  Gargling every hour or 2 can ease irritation. Try gargling with 1 of these solutions:  · 1/4 teaspoon of salt in 1/2 cup of warm water  · An over-the-counter anesthetic gargle  Use medicine for more relief  Over-the-counter medicine can reduce sore throat symptoms. Ask your pharmacist if you have questions about which medicine to use:  · Ease pain with anesthetic sprays. Aspirin or an aspirin substitute also helps. Remember, never give aspirin to anyone 18 or younger, or if you are already taking blood thinners.   · For sore throats caused by allergies, try antihistamines to block the allergic reaction.  · Remember: unless a sore throat is caused by a bacterial infection, antibiotics wont help you.  Prevent future sore throats  Prevention tips include the following:  · Stop smoking or reduce contact with secondhand smoke. Smoke irritates the tender throat lining.  · Limit contact with pets and with allergy-causing substances, such as pollen and mold.  · When youre around someone with a sore throat or cold, wash your hands often to keep viruses or bacteria from spreading.  · Dont strain your vocal cords.  Call your healthcare  provider  Contact your healthcare provider if you have:  · A temperature over 101°F (38.3°C)  · White spots on the throat  · Great difficulty swallowing  · Trouble breathing  · A skin rash  · Recent exposure to someone else with strep bacteria  · Severe hoarseness and swollen glands in the neck or jaw   Date Last Reviewed: 8/1/2016  © 4914-3602 [employer]. [address] [place] [address]. All rights reserved. This information is not intended as a substitute for professional medical care. Always follow your healthcare professional's instructions.

## 2020-12-17 NOTE — PROGRESS NOTES
"Subjective:       Patient ID: Nereyda Hernandez is a 80 y.o. female.    Vitals:  height is 5' 3" (1.6 m) and weight is 72.1 kg (159 lb). Her temporal temperature is 97.6 °F (36.4 °C). Her blood pressure is 138/75 and her pulse is 64. Her respiration is 18 and oxygen saturation is 96%.     Chief Complaint: Sinus Problem    Patient c/o sinus congestion "since the beginning of this year" but reports she was recently exposed to COVID on 12/12.    Sinus Problem  This is a new problem. The current episode started more than 1 month ago (5 days ago). The problem has been gradually worsening since onset. There has been no fever. She is experiencing no pain. Associated symptoms include coughing, neck pain and a sore throat. Pertinent negatives include no chills, congestion, diaphoresis, ear pain, headaches, hoarse voice, shortness of breath, sinus pressure, sneezing or swollen glands. Past treatments include nothing. The treatment provided no relief.       Constitution: Negative for chills, sweating, fatigue and fever.   HENT: Positive for sore throat. Negative for ear pain, congestion, sinus pain, sinus pressure and voice change.    Neck: Positive for neck pain. Negative for painful lymph nodes.   Eyes: Negative for eye redness.   Respiratory: Positive for cough. Negative for chest tightness, sputum production, bloody sputum, COPD, shortness of breath, stridor, wheezing and asthma.    Gastrointestinal: Negative for nausea and vomiting.   Musculoskeletal: Negative for muscle ache.   Skin: Negative for rash.   Allergic/Immunologic: Negative for seasonal allergies, asthma and sneezing.   Neurological: Negative for headaches.   Hematologic/Lymphatic: Negative for swollen lymph nodes.       Objective:      Physical Exam   Constitutional: She is oriented to person, place, and time. She appears well-developed. She is cooperative.  Non-toxic appearance. She does not appear ill. No distress.   HENT:   Head: Normocephalic and atraumatic. "   Ears:   Right Ear: Hearing, tympanic membrane, external ear and ear canal normal.   Left Ear: Hearing, tympanic membrane, external ear and ear canal normal.   Nose: Nose normal. No mucosal edema, rhinorrhea or nasal deformity. No epistaxis. Right sinus exhibits no maxillary sinus tenderness and no frontal sinus tenderness. Left sinus exhibits no maxillary sinus tenderness and no frontal sinus tenderness.   Mouth/Throat: Uvula is midline and mucous membranes are normal. No trismus in the jaw. Normal dentition. No uvula swelling. Posterior oropharyngeal erythema present. No oropharyngeal exudate or posterior oropharyngeal edema. No tonsillar exudate.   Eyes: Conjunctivae and lids are normal. No scleral icterus.   Neck: Trachea normal, full passive range of motion without pain and phonation normal. Neck supple. No neck rigidity. No edema and no erythema present.   Cardiovascular: Normal rate, regular rhythm, normal heart sounds and normal pulses.   Pulmonary/Chest: Effort normal and breath sounds normal. No stridor. No respiratory distress. She has no decreased breath sounds. She has no wheezes. She has no rhonchi. She has no rales.   Abdominal: Normal appearance.   Musculoskeletal: Normal range of motion.         General: No deformity.   Lymphadenopathy:     She has no cervical adenopathy.        Right cervical: No superficial cervical adenopathy present.       Left cervical: No superficial cervical adenopathy present.   Neurological: She is alert and oriented to person, place, and time. She has intact cranial nerves. She exhibits normal muscle tone. Coordination normal.      Comments: CN's grossly intact   Skin: Skin is warm, dry, intact, not diaphoretic and not pale. Psychiatric: Her speech is normal and behavior is normal. Judgment and thought content normal.   Nursing note and vitals reviewed.          Results for orders placed or performed in visit on 12/17/20   POCT COVID-19 Rapid Screening   Result Value Ref  "Range    POC Rapid COVID Negative Negative     Acceptable Yes        Results reviewed with pt  Assessment:       1. Exposure to COVID-19 virus    2. Cough    3. Sore throat        Plan:         Exposure to COVID-19 virus    Cough  -     POCT COVID-19 Rapid Screening    Sore throat         Patient Instructions       NEGATIVE COVID TEST  You have tested negative for COVID-19 today.  If you did not have a close exposure (as defined below) you can return to your normal daily activities to include social distancing, wearing a mask and frequent handwashing.    A "close exposure" is defined as anyone who has had an exposure (masked or unmasked) to a known COVID -19 positive person within 6 ft for longer than 15 minutes. If your exposure meets this definition, you are required by CDC guidelines to quarantine for at least 7-10 days from time of exposure.    The CDC states that a test can be performed for an asymptomatic patient (someone who does not have any symptoms) after a close exposure, and that a test should be done if you develop symptoms after a close exposure as described above.    Specifically, you can test at day 5 or later if asymptomatic in order to get released from quarantine on day 7 or later.  If you develop symptoms sooner, you should test when your symptoms start.    If you developed symptoms since the exposure, and your test was negative today and less than 5 days from your exposure, you still have to quarantine for 7-10 days from the date of the exposure.  The 7-10 day quarantine begins from the day you were exposed, not the day of your test.  For example, if your exposure was on a Monday, and you waited until Friday of the same week to get tested and it was negative, your 7-10 day quarantine begins from that Monday, not the Friday you tested negative.    Please note, if you decide to test as an asymptomatic during your quarantine and you are positive, you will be restarting your " quarantine and moving from a possible 10 day quarantine (if you do not test), to a 11 day or greater quarantine.            Viral Upper Respiratory Illness (Adult)  You have a viral upper respiratory illness (URI), which is another term for the common cold. This illness is contagious during the first few days. It is spread through the air by coughing and sneezing. It may also be spread by direct contact (touching the sick person and then touching your own eyes, nose, or mouth). Frequent handwashing will decrease risk of spread. Most viral illnesses go away within 7 to 10 days with rest and simple home remedies. Sometimes the illness may last for several weeks. Antibiotics will not kill a virus, and they are generally not prescribed for this condition.    Home care  · If symptoms are severe, rest at home for the first 2 to 3 days. When you resume activity, don't let yourself get too tired.  · Avoid being exposed to cigarette smoke (yours or others).  · You may use acetaminophen or ibuprofen to control pain and fever, unless another medicine was prescribed. (Note: If you have chronic liver or kidney disease, have ever had a stomach ulcer or gastrointestinal bleeding, or are taking blood-thinning medicines, talk with your healthcare provider before using these medicines.) Aspirin should never be given to anyone under 18 years of age who is ill with a viral infection or fever. It may cause severe liver or brain damage.  · Your appetite may be poor, so a light diet is fine. Avoid dehydration by drinking 6 to 8 glasses of fluids per day (water, soft drinks, juices, tea, or soup). Extra fluids will help loosen secretions in the nose and lungs.  · Over-the-counter cold medicines will not shorten the length of time youre sick, but they may be helpful for the following symptoms: cough, sore throat, and nasal and sinus congestion. (Note: Do not use decongestants if you have high blood pressure.)  Follow-up care  Follow up  with your healthcare provider, or as advised.  When to seek medical advice  Call your healthcare provider right away if any of these occur:  · Cough with lots of colored sputum (mucus)  · Severe headache; face, neck, or ear pain  · Difficulty swallowing due to throat pain  · Fever of 100.4°F (38°C)  Call 911, or get immediate medical care  Call emergency services right away if any of these occur:  · Chest pain, shortness of breath, wheezing, or difficulty breathing  · Coughing up blood  · Inability to swallow due to throat pain  Date Last Reviewed: 9/13/2015 © 2000-2017 [employer]. [address] [place] [id number]. All rights reserved. This information is not intended as a substitute for professional medical care. Always follow your healthcare professional's instructions.        When You Have a Sore Throat    A sore throat can be painful. There are many reasons why you may have a sore throat. Your healthcare provider will work with you to find the cause of your sore throat. He or she will also find the best treatment for you.  What causes a sore throat?  Sore throats can be caused or worsened by:  · Cold or flu viruses  · Bacteria  · Irritants such as tobacco smoke or air pollution  · Acid reflux  A healthy throat  The tonsils are on the sides of the throat near the base of the tongue. They collect viruses and bacteria and help fight infection. The throat (pharynx) is the passage for air. Mucus from the nasal cavity also moves down the passage.  An inflamed throat  The tonsils and pharynx can become inflamed due to a cold or flu virus. Postnasal drip (excess mucus draining from the nasal cavity) can irritate the throat. It can also make the throat or tonsils more likely to be infected by bacteria. Severe, untreated tonsillitis in children or adults can cause a pocket of pus (abscess) to form near the tonsil.  Your evaluation  A medical evaluation can help find the cause of your sore throat.  It can also help your healthcare provider choose the best treatment for you. The evaluation may include a health history, physical exam, and diagnostic tests.  Health history  Your healthcare provider may ask you the following:  · How long has the sore throat lasted and how have you been treating it?  · Do you have any other symptoms, such as body aches, fever, or cough?  · Does your sore throat recur? If so, how often? How many days of school or work have you missed because of a sore throat?  · Do you have trouble eating or swallowing?  · Have you been told that you snore or have other sleep problems?  · Do you have bad breath?  · Do you cough up bad-tasting mucus?  Physical exam  During the exam, your healthcare provider checks your ears, nose, and throat for problems. He or she also checks for swelling in the neck, and may listen to your chest.  Possible tests  Other tests your healthcare provider may perform include:  · A throat swab to check for bacteria such as streptococcus (the bacteria that causes strep throat)  · A blood test to check for mononucleosis (a viral infection)  · A chest X-ray to rule out pneumonia, especially if you have a cough  Treating a sore throat  Treatment depends on many factors. What is the likely cause? Is the problem recent? Does it keep coming back? In many cases, the best thing to do is to treat the symptoms, rest, and let the problem heal itself. Antibiotics may help clear up some bacterial infections. For cases of severe or recurring tonsillitis, the tonsils may need to be removed.  Relieving your symptoms  · Dont smoke, and avoid secondhand smoke.  · For children, try throat sprays or Popsicles. Adults and older children may try lozenges.  · Drink warm liquids to soothe the throat and help thin mucus. Avoid alcohol, spicy foods, and acidic drinks such as orange juice. These can irritate the throat.  · Gargle with warm saltwater (1 teaspoon of salt to 8 ounces of warm  "water).  · Use a humidifier to keep air moist and relieve throat dryness.  · Try over-the-counter pain relievers such as acetaminophen or ibuprofen. Use as directed, and dont exceed the recommended dose. Dont give aspirin to children.   Are antibiotics needed?  If your sore throat is due to a bacterial infection, antibiotics may speed healing and prevent complications. Although group A streptococcus ("strep throat" or GAS) is the major treatable infection for a sore throat, GAS causes only 5% to 15% of sore throats in adults who seek medical care. Most sore throats are caused by cold or flu viruses. And antibiotics dont treat viral illness. In fact, using antibiotics when theyre not needed may produce bacteria that are harder to kill. Your healthcare provider will prescribe antibiotics only if he or she thinks they are likely to help.  If antibiotics are prescribed  Take the medicine exactly as directed. Be sure to finish your prescription even if youre feeling better. And be sure to ask your healthcare provider or pharmacist what side effects are common and what to do about them.  Is surgery needed?  In some cases, tonsils need to be removed. This is often done as outpatient (same-day) surgery. Your healthcare provider may advise removing the tonsils in cases of:  · Several severe bouts of tonsillitis in a year. Severe episodes include those that lead to missed days of school or work, or that need to be treated with antibiotics.  · Tonsillitis that causes breathing problems during sleep  · Tonsillitis caused by food particles collecting in pouches in the tonsils (cryptic tonsillitis)  Call your healthcare provider if any of the following occur:  · Symptoms worsen, or new symptoms develop.  · Swollen tonsils make breathing difficult.  · The pain is severe enough to keep you from drinking liquids.  · A skin rash, hives, or wheezing develops. Any of these could signal an allergic reaction to " antibiotics.  · Symptoms dont improve within a week.  · Symptoms dont improve within 2 to 3 days of starting antibiotics.   Date Last Reviewed: 10/1/2016  © 3814-4466 [employer]. [address] [place] [address], [place] [address]. All rights reserved. This information is not intended as a substitute for professional medical care. Always follow your healthcare professional's instructions.        Self-Care for Sore Throats    Sore throats happen for many reasons, such as colds, allergies, and infections caused by viruses or bacteria. In any case, your throat becomes red and sore. Your goal for self-care is to reduce your discomfort while giving your throat a chance to heal.  Moisten and soothe your throat  Tips include the following:  · Try a sip of water first thing after waking up.  · Keep your throat moist by drinking 6 or more glasses of clear liquids every day.  · Run a cool-air humidifier in your room overnight.  · Avoid cigarette smoke.   · Suck on throat lozenges, cough drops, hard candy, ice chips, or frozen fruit-juice bars. Use the sugar-free versions if your diet or medical condition requires them.  Gargle to ease irritation  Gargling every hour or 2 can ease irritation. Try gargling with 1 of these solutions:  · 1/4 teaspoon of salt in 1/2 cup of warm water  · An over-the-counter anesthetic gargle  Use medicine for more relief  Over-the-counter medicine can reduce sore throat symptoms. Ask your pharmacist if you have questions about which medicine to use:  · Ease pain with anesthetic sprays. Aspirin or an aspirin substitute also helps. Remember, never give aspirin to anyone 18 or younger, or if you are already taking blood thinners.   · For sore throats caused by allergies, try antihistamines to block the allergic reaction.  · Remember: unless a sore throat is caused by a bacterial infection, antibiotics wont help you.  Prevent future sore throats  Prevention tips include the following:  · Stop  smoking or reduce contact with secondhand smoke. Smoke irritates the tender throat lining.  · Limit contact with pets and with allergy-causing substances, such as pollen and mold.  · When youre around someone with a sore throat or cold, wash your hands often to keep viruses or bacteria from spreading.  · Dont strain your vocal cords.  Call your healthcare provider  Contact your healthcare provider if you have:  · A temperature over 101°F (38.3°C)  · White spots on the throat  · Great difficulty swallowing  · Trouble breathing  · A skin rash  · Recent exposure to someone else with strep bacteria  · Severe hoarseness and swollen glands in the neck or jaw   Date Last Reviewed: 8/1/2016  © 4041-9106 [employer]. [address] [place] [address], [place] [address]. All rights reserved. This information is not intended as a substitute for professional medical care. Always follow your healthcare professional's instructions.

## 2020-12-29 ENCOUNTER — OFFICE VISIT (OUTPATIENT)
Dept: FAMILY MEDICINE | Facility: CLINIC | Age: 80
End: 2020-12-29
Payer: MEDICARE

## 2020-12-29 VITALS
SYSTOLIC BLOOD PRESSURE: 110 MMHG | WEIGHT: 161.25 LBS | DIASTOLIC BLOOD PRESSURE: 60 MMHG | TEMPERATURE: 98 F | HEART RATE: 65 BPM | RESPIRATION RATE: 18 BRPM | OXYGEN SATURATION: 97 % | HEIGHT: 63 IN | BODY MASS INDEX: 28.57 KG/M2

## 2020-12-29 DIAGNOSIS — I10 ESSENTIAL HYPERTENSION: ICD-10-CM

## 2020-12-29 DIAGNOSIS — R06.00 DYSPNEA, UNSPECIFIED TYPE: Primary | ICD-10-CM

## 2020-12-29 PROCEDURE — 3078F DIAST BP <80 MM HG: CPT | Mod: HCNC,CPTII,S$GLB, | Performed by: FAMILY MEDICINE

## 2020-12-29 PROCEDURE — 1126F PR PAIN SEVERITY QUANTIFIED, NO PAIN PRESENT: ICD-10-PCS | Mod: HCNC,S$GLB,, | Performed by: FAMILY MEDICINE

## 2020-12-29 PROCEDURE — 3288F PR FALLS RISK ASSESSMENT DOCUMENTED: ICD-10-PCS | Mod: HCNC,CPTII,S$GLB, | Performed by: FAMILY MEDICINE

## 2020-12-29 PROCEDURE — 1159F MED LIST DOCD IN RCRD: CPT | Mod: HCNC,S$GLB,, | Performed by: FAMILY MEDICINE

## 2020-12-29 PROCEDURE — 3074F SYST BP LT 130 MM HG: CPT | Mod: HCNC,CPTII,S$GLB, | Performed by: FAMILY MEDICINE

## 2020-12-29 PROCEDURE — 99214 OFFICE O/P EST MOD 30 MIN: CPT | Mod: HCNC,S$GLB,, | Performed by: FAMILY MEDICINE

## 2020-12-29 PROCEDURE — 1159F PR MEDICATION LIST DOCUMENTED IN MEDICAL RECORD: ICD-10-PCS | Mod: HCNC,S$GLB,, | Performed by: FAMILY MEDICINE

## 2020-12-29 PROCEDURE — 99214 PR OFFICE/OUTPT VISIT, EST, LEVL IV, 30-39 MIN: ICD-10-PCS | Mod: HCNC,S$GLB,, | Performed by: FAMILY MEDICINE

## 2020-12-29 PROCEDURE — 3074F PR MOST RECENT SYSTOLIC BLOOD PRESSURE < 130 MM HG: ICD-10-PCS | Mod: HCNC,CPTII,S$GLB, | Performed by: FAMILY MEDICINE

## 2020-12-29 PROCEDURE — 1126F AMNT PAIN NOTED NONE PRSNT: CPT | Mod: HCNC,S$GLB,, | Performed by: FAMILY MEDICINE

## 2020-12-29 PROCEDURE — 1157F PR ADVANCE CARE PLAN OR EQUIV PRESENT IN MEDICAL RECORD: ICD-10-PCS | Mod: HCNC,S$GLB,, | Performed by: FAMILY MEDICINE

## 2020-12-29 PROCEDURE — 3288F FALL RISK ASSESSMENT DOCD: CPT | Mod: HCNC,CPTII,S$GLB, | Performed by: FAMILY MEDICINE

## 2020-12-29 PROCEDURE — 1101F PR PT FALLS ASSESS DOC 0-1 FALLS W/OUT INJ PAST YR: ICD-10-PCS | Mod: HCNC,CPTII,S$GLB, | Performed by: FAMILY MEDICINE

## 2020-12-29 PROCEDURE — 3078F PR MOST RECENT DIASTOLIC BLOOD PRESSURE < 80 MM HG: ICD-10-PCS | Mod: HCNC,CPTII,S$GLB, | Performed by: FAMILY MEDICINE

## 2020-12-29 PROCEDURE — 99999 PR PBB SHADOW E&M-EST. PATIENT-LVL V: CPT | Mod: PBBFAC,HCNC,, | Performed by: FAMILY MEDICINE

## 2020-12-29 PROCEDURE — 99999 PR PBB SHADOW E&M-EST. PATIENT-LVL V: ICD-10-PCS | Mod: PBBFAC,HCNC,, | Performed by: FAMILY MEDICINE

## 2020-12-29 PROCEDURE — 1157F ADVNC CARE PLAN IN RCRD: CPT | Mod: HCNC,S$GLB,, | Performed by: FAMILY MEDICINE

## 2020-12-29 PROCEDURE — 1101F PT FALLS ASSESS-DOCD LE1/YR: CPT | Mod: HCNC,CPTII,S$GLB, | Performed by: FAMILY MEDICINE

## 2020-12-29 NOTE — PATIENT INSTRUCTIONS
It was nice to see you, Nereyda!    -your lung testing was normal  -start a regular exercise program gradually; you can start by biking or doing your elliptical for 5-10 minutes daily and increasing by 5 minutes each week  -let me know if you'd like another referral to Sleep Medicine or Neurology in the future  -you can stop the Spiriva    Let me know if you have any questions/concerns.     Sincerely,     Dr Weaver

## 2020-12-29 NOTE — PROGRESS NOTES
EST PATIENT VISIT FAMILY MEDICINE    CC:   Chief Complaint   Patient presents with    Follow-up     1 month follow up        HPI: Nereyda Hernandez  is a 80 y.o. female:    Dyspnea  -first started about 1 year or less  -tobacco use from 20s-40s  -cardiac work up unremarkable  -chest x ray unremarkable   -feels short of breath when she's talking, gets worse with exertion  -has been less active over past year; unsure if dyspnea preceded this or came after  -PFTs normal    No s/s of low BP   ROS: Review of Systems   Constitutional: Negative for fever.   Respiratory: Negative for shortness of breath.    Cardiovascular: Negative for chest pain.       PMHX:   Past Medical History:   Diagnosis Date    Anxiety     Arthritis     Breast cancer 1990    left    Chronic disease anemia     Hyperlipidemia     Hypertension     Insomnia     Lobular carcinoma in situ     KANWAL (obstructive sleep apnea)     Osteoporosis     Rosacea     Squamous cell carcinoma     Stable angina pectoris 8/27/2020    Urinary incontinence     Vertigo        PSHX:   Past Surgical History:   Procedure Laterality Date    ADENOIDECTOMY      BELT ABDOMINOPLASTY      BREAST BIOPSY Left 1990    ex bx    BREAST LUMPECTOMY      COLONOSCOPY N/A 1/28/2020    Procedure: COLONOSCOPY;  Surgeon: Bianka Macias MD;  Location: [facility];  Service: Endoscopy;  Laterality: N/A;    ESOPHAGOGASTRODUODENOSCOPY N/A 1/28/2020    Procedure: EGD (ESOPHAGOGASTRODUODENOSCOPY);  Surgeon: Bianka Macias MD;  Location: [facility];  Service: Endoscopy;  Laterality: N/A;    EYE SURGERY      HERNIA REPAIR      [facility]entral    LIVER TRANSPLANT      OOPHORECTOMY      TONSILLECTOMY      TOTAL ABDOMINAL HYSTERECTOMY         FAMHX:   Family History   Problem Relation Age of Onset    Cancer Mother         liver    Pancreatic cancer Mother     Heart disease Mother     Depression Mother     Miscarriages / Stillbirths Mother     Hypertension Father     Alzheimer's  disease Father     Depression Brother     Depression Brother     Diabetes Brother     Arthritis Brother     Hypertension Brother     Heart disease Brother        SOCHX:   Social History     Socioeconomic History    Marital status:      Spouse name: Not on file    Number of children: 6    Years of education: college    Highest education level: Not on file   Occupational History    Occupation: retired  for [employer]   Social Needs    Financial resource strain: Not hard at all    Food insecurity     Worry: Never true     Inability: Never true    Transportation needs     Medical: No     Non-medical: No   Tobacco Use    Smoking status: Former Smoker     Packs/day: 0.50     Years: 21.00     Pack years: 10.50     Types: Cigarettes     Start date: 1959     Quit date: 1980     Years since quittin.3    Smokeless tobacco: Never Used   Substance and Sexual Activity    Alcohol use: Not Currently     Alcohol/week: 1.0 standard drinks     Types: 1 Glasses of wine per week     Frequency: Monthly or less     Drinks per session: 1 or 2     Binge frequency: Never     Comment: Occasionally    Drug use: Never    Sexual activity: Not Currently     Partners: Male     Birth control/protection: Condom, Other-see comments     Comment: Pill   Lifestyle    Physical activity     Days per week: Patient refused     Minutes per session: 0 min    Stress: Not at all   Relationships    Social connections     Talks on phone: More than three times a week     Gets together: Twice a week     Attends [employer] service: Not on file     Active member of club or organization: Yes     Attends meetings of clubs or organizations: More than 4 times per year     Relationship status:    Other Topics Concern    Not on file   Social History Narrative    Not on file       ALLERGIES:   Review of patient's allergies indicates:   Allergen Reactions    Sulfa (sulfonamide antibiotics)        MEDS:  "  Current Outpatient Medications:     aspirin (ECOTRIN) 81 MG EC tablet, Take 81 mg by mouth once daily., Disp: , Rfl:     calcium citrate-vitamin D3 500 mg calcium -400 unit Chew, Take 1 tablet by mouth 2 (two) times daily.  , Disp: , Rfl:     fish oil-omega-3 fatty acids 300-1,000 mg capsule, Take 2 g by mouth once daily.  , Disp: , Rfl:     FLUAD QUAD 2020-21,65Y UP,,PF, 60 mcg (15 mcg x 4)/0.5 mL Syrg, , Disp: , Rfl:     latanoprost 0.005 % ophthalmic solution, 1 drop every evening., Disp: , Rfl:     losartan (COZAAR) 100 MG tablet, TAKE 1 TABLET ONCE DAILY, Disp: 90 tablet, Rfl: 1    metoprolol tartrate (LOPRESSOR) 25 MG tablet, TAKE 1 TABLET TWICE DAILY, Disp: 180 tablet, Rfl: 1    mirabegron (MYRBETRIQ) 25 mg Tb24 ER tablet, Take 1 tablet (25 mg total) by mouth once daily., Disp: 90 tablet, Rfl: 1    mirtazapine (REMERON) 30 MG tablet, TAKE 1 TABLET (30 MG TOTAL) BY MOUTH EVERY EVENING., Disp: 90 tablet, Rfl: 1    multivit-iron-min-folic acid (MULTIVITAMIN-IRON-MINERALS-FOLIC ACID) 3,500-18-0.4 unit-mg-mg Chew, Take by mouth.  , Disp: , Rfl:     paroxetine (PAXIL) 20 MG tablet, Take 1 tablet (20 mg total) by mouth every morning., Disp: 90 tablet, Rfl: 1    pravastatin (PRAVACHOL) 20 MG tablet, TAKE 1 TABLET ONCE DAILY., Disp: 90 tablet, Rfl: 1    tiotropium (SPIRIVA) 18 mcg inhalation capsule, Inhale 1 capsule (18 mcg total) into the lungs once daily. Controller, Disp: 30 capsule, Rfl: 0    traZODone (DESYREL) 50 MG tablet, Take 1 tablet (50 mg total) by mouth every evening., Disp: 90 tablet, Rfl: 1    triamterene-hydrochlorothiazide 37.5-25 mg (DYAZIDE) 37.5-25 mg per capsule, TAKE 1 CAPSULE EVERY MORNING, Disp: 90 capsule, Rfl: 1    OBJECTIVE:   Vitals:    12/29/20 1411   BP: 110/60   BP Location: Right arm   Patient Position: Sitting   BP Method: Large (Manual)   Pulse: 65   Resp: 18   Temp: 98 °F (36.7 °C)   TempSrc: Oral   SpO2: 97%   Weight: 73.1 kg (161 lb 4.3 oz)   Height: 5' 3" (1.6 m) "     Body mass index is 28.57 kg/m².      Physical Exam  Vitals signs and nursing note reviewed.   Constitutional:       Appearance: Normal appearance.   HENT:      Head: Normocephalic and atraumatic.   Eyes:      General: No scleral icterus.     Extraocular Movements: Extraocular movements intact.   Pulmonary:      Effort: Pulmonary effort is normal.   Neurological:      Mental Status: She is alert.           LABS:   A1C:      CBC:  Recent Labs   Lab 07/16/20  1049   WBC 4.38   RBC 3.76 L   Hemoglobin 11.8 L   Hematocrit 35.7 L   Platelets 207   MCV 95   MCH 31.4 H   MCHC 33.1     CMP:  Recent Labs   Lab 07/16/20  1049   Glucose 99   Calcium 9.6   Albumin 4.4   Total Protein 7.6   Sodium 139   Potassium 4.2   CO2 35 H   Chloride 100   BUN 19 H   Creatinine 0.90   Alkaline Phosphatase 73   ALT 29   AST 39   Total Bilirubin 0.5     LIPIDS:  Recent Labs   Lab 01/08/20  0922 07/16/20  1049   TSH 1.650  --    HDL  --  66   Cholesterol  --  190   Triglycerides  --  122   LDL Cholesterol  --  99.6   HDL/Cholesterol Ratio  --  34.7   Non-HDL Cholesterol  --  124   Total Cholesterol/HDL Ratio  --  2.9     TSH:  Recent Labs   Lab 01/08/20  0922   TSH 1.650         ASSESSMENT & PLAN:  Encounter Diagnoses   Name Primary?    Dyspnea, unspecified type Yes    Essential hypertension      -your lung testing was normal  -start a regular exercise program gradually; you can start by biking or doing your elliptical for 5-10 minutes daily and increasing by 5 minutes each week  -let me know if you'd like another referral to Sleep Medicine or Neurology in the future  -[facility]       Follow up in about 6 months (around 6/29/2021).    RTC/ED precautions discussed where applicable.   Encouraged patient to let me know if there are any further questions/concerns.     Advise patient/caretaker to check with insurance regarding orders to avoid unexpected fees/costs.     The patient/caretaker indicates understanding of these  issues and agrees with the plan.    Dr. Sharona Weaver MD  Family Medicine

## 2021-01-04 ENCOUNTER — IMMUNIZATION (OUTPATIENT)
Dept: FAMILY MEDICINE | Facility: CLINIC | Age: 81
End: 2021-01-04
Payer: MEDICARE

## 2021-01-04 DIAGNOSIS — Z23 NEED FOR VACCINATION: ICD-10-CM

## 2021-01-04 PROCEDURE — 91300 COVID-19, MRNA, LNP-S, PF, 30 MCG/0.3 ML DOSE VACCINE: CPT | Mod: PBBFAC | Performed by: FAMILY MEDICINE

## 2021-01-25 ENCOUNTER — IMMUNIZATION (OUTPATIENT)
Dept: FAMILY MEDICINE | Facility: CLINIC | Age: 81
End: 2021-01-25
Payer: MEDICARE

## 2021-01-25 DIAGNOSIS — R06.00 DYSPNEA, UNSPECIFIED TYPE: Primary | ICD-10-CM

## 2021-01-25 DIAGNOSIS — Z23 NEED FOR VACCINATION: Primary | ICD-10-CM

## 2021-01-25 PROCEDURE — 0002A COVID-19, MRNA, LNP-S, PF, 30 MCG/0.3 ML DOSE VACCINE: CPT | Mod: PBBFAC | Performed by: FAMILY MEDICINE

## 2021-01-25 PROCEDURE — 91300 COVID-19, MRNA, LNP-S, PF, 30 MCG/0.3 ML DOSE VACCINE: CPT | Mod: PBBFAC | Performed by: FAMILY MEDICINE

## 2021-01-25 RX ORDER — MIRTAZAPINE 30 MG/1
30 TABLET, FILM COATED ORAL NIGHTLY
Qty: 90 TABLET | Refills: 0 | Status: SHIPPED | OUTPATIENT
Start: 2021-01-25 | End: 2021-04-13

## 2021-02-01 ENCOUNTER — TELEPHONE (OUTPATIENT)
Dept: NEUROLOGY | Facility: CLINIC | Age: 81
End: 2021-02-01

## 2021-02-05 ENCOUNTER — TELEPHONE (OUTPATIENT)
Dept: NEUROLOGY | Facility: CLINIC | Age: 81
End: 2021-02-05

## 2021-02-26 ENCOUNTER — OFFICE VISIT (OUTPATIENT)
Dept: PULMONOLOGY | Facility: CLINIC | Age: 81
End: 2021-02-26
Payer: MEDICARE

## 2021-02-26 VITALS
SYSTOLIC BLOOD PRESSURE: 135 MMHG | BODY MASS INDEX: 28.32 KG/M2 | OXYGEN SATURATION: 98 % | WEIGHT: 159.81 LBS | DIASTOLIC BLOOD PRESSURE: 84 MMHG | HEART RATE: 73 BPM | HEIGHT: 63 IN

## 2021-02-26 DIAGNOSIS — R06.83 PRIMARY SNORING: ICD-10-CM

## 2021-02-26 DIAGNOSIS — R06.09 DOE (DYSPNEA ON EXERTION): ICD-10-CM

## 2021-02-26 PROCEDURE — 1101F PT FALLS ASSESS-DOCD LE1/YR: CPT | Mod: CPTII,S$GLB,, | Performed by: INTERNAL MEDICINE

## 2021-02-26 PROCEDURE — 1125F PR PAIN SEVERITY QUANTIFIED, PAIN PRESENT: ICD-10-PCS | Mod: S$GLB,,, | Performed by: INTERNAL MEDICINE

## 2021-02-26 PROCEDURE — 1125F AMNT PAIN NOTED PAIN PRSNT: CPT | Mod: S$GLB,,, | Performed by: INTERNAL MEDICINE

## 2021-02-26 PROCEDURE — 3079F DIAST BP 80-89 MM HG: CPT | Mod: CPTII,S$GLB,, | Performed by: INTERNAL MEDICINE

## 2021-02-26 PROCEDURE — 3075F SYST BP GE 130 - 139MM HG: CPT | Mod: CPTII,S$GLB,, | Performed by: INTERNAL MEDICINE

## 2021-02-26 PROCEDURE — 3288F FALL RISK ASSESSMENT DOCD: CPT | Mod: CPTII,S$GLB,, | Performed by: INTERNAL MEDICINE

## 2021-02-26 PROCEDURE — 3075F PR MOST RECENT SYSTOLIC BLOOD PRESS GE 130-139MM HG: ICD-10-PCS | Mod: CPTII,S$GLB,, | Performed by: INTERNAL MEDICINE

## 2021-02-26 PROCEDURE — 99214 PR OFFICE/OUTPT VISIT, EST, LEVL IV, 30-39 MIN: ICD-10-PCS | Mod: S$GLB,,, | Performed by: INTERNAL MEDICINE

## 2021-02-26 PROCEDURE — 1157F ADVNC CARE PLAN IN RCRD: CPT | Mod: S$GLB,,, | Performed by: INTERNAL MEDICINE

## 2021-02-26 PROCEDURE — 1159F MED LIST DOCD IN RCRD: CPT | Mod: S$GLB,,, | Performed by: INTERNAL MEDICINE

## 2021-02-26 PROCEDURE — 99214 OFFICE O/P EST MOD 30 MIN: CPT | Mod: S$GLB,,, | Performed by: INTERNAL MEDICINE

## 2021-02-26 PROCEDURE — 1159F PR MEDICATION LIST DOCUMENTED IN MEDICAL RECORD: ICD-10-PCS | Mod: S$GLB,,, | Performed by: INTERNAL MEDICINE

## 2021-02-26 PROCEDURE — 99999 PR PBB SHADOW E&M-EST. PATIENT-LVL IV: CPT | Mod: PBBFAC,,, | Performed by: INTERNAL MEDICINE

## 2021-02-26 PROCEDURE — 3079F PR MOST RECENT DIASTOLIC BLOOD PRESSURE 80-89 MM HG: ICD-10-PCS | Mod: CPTII,S$GLB,, | Performed by: INTERNAL MEDICINE

## 2021-02-26 PROCEDURE — 1101F PR PT FALLS ASSESS DOC 0-1 FALLS W/OUT INJ PAST YR: ICD-10-PCS | Mod: CPTII,S$GLB,, | Performed by: INTERNAL MEDICINE

## 2021-02-26 PROCEDURE — 3288F PR FALLS RISK ASSESSMENT DOCUMENTED: ICD-10-PCS | Mod: CPTII,S$GLB,, | Performed by: INTERNAL MEDICINE

## 2021-02-26 PROCEDURE — 99999 PR PBB SHADOW E&M-EST. PATIENT-LVL IV: ICD-10-PCS | Mod: PBBFAC,,, | Performed by: INTERNAL MEDICINE

## 2021-02-26 PROCEDURE — 1157F PR ADVANCE CARE PLAN OR EQUIV PRESENT IN MEDICAL RECORD: ICD-10-PCS | Mod: S$GLB,,, | Performed by: INTERNAL MEDICINE

## 2021-02-26 RX ORDER — PAROXETINE HYDROCHLORIDE 20 MG/1
20 TABLET, FILM COATED ORAL EVERY MORNING
Qty: 90 TABLET | Refills: 1 | Status: SHIPPED | OUTPATIENT
Start: 2021-02-26 | End: 2021-04-16 | Stop reason: SDUPTHER

## 2021-03-02 ENCOUNTER — OFFICE VISIT (OUTPATIENT)
Dept: FAMILY MEDICINE | Facility: CLINIC | Age: 81
End: 2021-03-02
Payer: MEDICARE

## 2021-03-02 VITALS
BODY MASS INDEX: 28.53 KG/M2 | OXYGEN SATURATION: 96 % | SYSTOLIC BLOOD PRESSURE: 126 MMHG | HEIGHT: 63 IN | WEIGHT: 161 LBS | DIASTOLIC BLOOD PRESSURE: 72 MMHG | RESPIRATION RATE: 18 BRPM | TEMPERATURE: 98 F | HEART RATE: 66 BPM

## 2021-03-02 DIAGNOSIS — M79.605 BILATERAL LEG PAIN: Primary | ICD-10-CM

## 2021-03-02 DIAGNOSIS — M54.9 DORSALGIA, UNSPECIFIED: ICD-10-CM

## 2021-03-02 DIAGNOSIS — M79.604 BILATERAL LEG PAIN: Primary | ICD-10-CM

## 2021-03-02 PROCEDURE — 1159F MED LIST DOCD IN RCRD: CPT | Mod: S$GLB,,, | Performed by: FAMILY MEDICINE

## 2021-03-02 PROCEDURE — 99999 PR PBB SHADOW E&M-EST. PATIENT-LVL V: ICD-10-PCS | Mod: PBBFAC,,, | Performed by: FAMILY MEDICINE

## 2021-03-02 PROCEDURE — 99999 PR PBB SHADOW E&M-EST. PATIENT-LVL V: CPT | Mod: PBBFAC,,, | Performed by: FAMILY MEDICINE

## 2021-03-02 PROCEDURE — 1159F PR MEDICATION LIST DOCUMENTED IN MEDICAL RECORD: ICD-10-PCS | Mod: S$GLB,,, | Performed by: FAMILY MEDICINE

## 2021-03-02 PROCEDURE — 3074F SYST BP LT 130 MM HG: CPT | Mod: CPTII,S$GLB,, | Performed by: FAMILY MEDICINE

## 2021-03-02 PROCEDURE — 99214 PR OFFICE/OUTPT VISIT, EST, LEVL IV, 30-39 MIN: ICD-10-PCS | Mod: S$GLB,,, | Performed by: FAMILY MEDICINE

## 2021-03-02 PROCEDURE — 3078F DIAST BP <80 MM HG: CPT | Mod: CPTII,S$GLB,, | Performed by: FAMILY MEDICINE

## 2021-03-02 PROCEDURE — 1125F AMNT PAIN NOTED PAIN PRSNT: CPT | Mod: S$GLB,,, | Performed by: FAMILY MEDICINE

## 2021-03-02 PROCEDURE — 1157F ADVNC CARE PLAN IN RCRD: CPT | Mod: S$GLB,,, | Performed by: FAMILY MEDICINE

## 2021-03-02 PROCEDURE — 3078F PR MOST RECENT DIASTOLIC BLOOD PRESSURE < 80 MM HG: ICD-10-PCS | Mod: CPTII,S$GLB,, | Performed by: FAMILY MEDICINE

## 2021-03-02 PROCEDURE — 1101F PT FALLS ASSESS-DOCD LE1/YR: CPT | Mod: CPTII,S$GLB,, | Performed by: FAMILY MEDICINE

## 2021-03-02 PROCEDURE — 1157F PR ADVANCE CARE PLAN OR EQUIV PRESENT IN MEDICAL RECORD: ICD-10-PCS | Mod: S$GLB,,, | Performed by: FAMILY MEDICINE

## 2021-03-02 PROCEDURE — 3074F PR MOST RECENT SYSTOLIC BLOOD PRESSURE < 130 MM HG: ICD-10-PCS | Mod: CPTII,S$GLB,, | Performed by: FAMILY MEDICINE

## 2021-03-02 PROCEDURE — 3288F FALL RISK ASSESSMENT DOCD: CPT | Mod: CPTII,S$GLB,, | Performed by: FAMILY MEDICINE

## 2021-03-02 PROCEDURE — 1125F PR PAIN SEVERITY QUANTIFIED, PAIN PRESENT: ICD-10-PCS | Mod: S$GLB,,, | Performed by: FAMILY MEDICINE

## 2021-03-02 PROCEDURE — 1101F PR PT FALLS ASSESS DOC 0-1 FALLS W/OUT INJ PAST YR: ICD-10-PCS | Mod: CPTII,S$GLB,, | Performed by: FAMILY MEDICINE

## 2021-03-02 PROCEDURE — 3288F PR FALLS RISK ASSESSMENT DOCUMENTED: ICD-10-PCS | Mod: CPTII,S$GLB,, | Performed by: FAMILY MEDICINE

## 2021-03-02 PROCEDURE — 99214 OFFICE O/P EST MOD 30 MIN: CPT | Mod: S$GLB,,, | Performed by: FAMILY MEDICINE

## 2021-03-02 RX ORDER — GABAPENTIN 100 MG/1
100 CAPSULE ORAL NIGHTLY
Qty: 30 CAPSULE | Refills: 0 | Status: SHIPPED | OUTPATIENT
Start: 2021-03-02 | End: 2021-03-15 | Stop reason: SDUPTHER

## 2021-03-05 ENCOUNTER — TELEPHONE (OUTPATIENT)
Dept: FAMILY MEDICINE | Facility: CLINIC | Age: 81
End: 2021-03-05

## 2021-03-09 ENCOUNTER — PATIENT MESSAGE (OUTPATIENT)
Dept: FAMILY MEDICINE | Facility: CLINIC | Age: 81
End: 2021-03-09

## 2021-03-12 PROBLEM — R29.898 LOWER EXTREMITY WEAKNESS: Status: ACTIVE | Noted: 2021-03-12

## 2021-03-12 PROBLEM — R26.89 POOR BALANCE: Status: ACTIVE | Noted: 2021-03-12

## 2021-03-15 ENCOUNTER — PATIENT MESSAGE (OUTPATIENT)
Dept: FAMILY MEDICINE | Facility: CLINIC | Age: 81
End: 2021-03-15

## 2021-03-15 RX ORDER — GABAPENTIN 100 MG/1
200 CAPSULE ORAL 2 TIMES DAILY
Qty: 120 CAPSULE | Refills: 0 | Status: SHIPPED | OUTPATIENT
Start: 2021-03-15 | End: 2021-04-16 | Stop reason: ALTCHOICE

## 2021-04-15 DIAGNOSIS — N32.81 OVERACTIVE BLADDER: ICD-10-CM

## 2021-04-15 RX ORDER — MIRABEGRON 25 MG/1
25 TABLET, FILM COATED, EXTENDED RELEASE ORAL DAILY
Qty: 90 TABLET | Refills: 1 | Status: SHIPPED | OUTPATIENT
Start: 2021-04-15 | End: 2021-04-16 | Stop reason: SDUPTHER

## 2021-04-16 DIAGNOSIS — E78.5 HYPERLIPIDEMIA, UNSPECIFIED HYPERLIPIDEMIA TYPE: Chronic | ICD-10-CM

## 2021-04-16 DIAGNOSIS — I10 ESSENTIAL HYPERTENSION: Chronic | ICD-10-CM

## 2021-04-16 DIAGNOSIS — N32.81 OVERACTIVE BLADDER: ICD-10-CM

## 2021-04-16 RX ORDER — METOPROLOL TARTRATE 25 MG/1
25 TABLET, FILM COATED ORAL 2 TIMES DAILY
Qty: 180 TABLET | Refills: 1 | Status: SHIPPED | OUTPATIENT
Start: 2021-04-16 | End: 2021-10-04 | Stop reason: SDUPTHER

## 2021-04-16 RX ORDER — PAROXETINE HYDROCHLORIDE 20 MG/1
20 TABLET, FILM COATED ORAL EVERY MORNING
Qty: 90 TABLET | Refills: 1 | Status: SHIPPED | OUTPATIENT
Start: 2021-04-16 | End: 2021-11-10 | Stop reason: SDUPTHER

## 2021-04-16 RX ORDER — MIRTAZAPINE 30 MG/1
30 TABLET, FILM COATED ORAL NIGHTLY
Qty: 90 TABLET | Refills: 3 | Status: SHIPPED | OUTPATIENT
Start: 2021-04-16 | End: 2021-11-24

## 2021-04-16 RX ORDER — LOSARTAN POTASSIUM 100 MG/1
100 TABLET ORAL DAILY
Qty: 90 TABLET | Refills: 1 | Status: SHIPPED | OUTPATIENT
Start: 2021-04-16 | End: 2021-10-04 | Stop reason: SDUPTHER

## 2021-04-16 RX ORDER — TRIAMTERENE AND HYDROCHLOROTHIAZIDE 37.5; 25 MG/1; MG/1
1 CAPSULE ORAL EVERY MORNING
Qty: 90 CAPSULE | Refills: 1 | Status: SHIPPED | OUTPATIENT
Start: 2021-04-16 | End: 2021-10-04 | Stop reason: SDUPTHER

## 2021-04-16 RX ORDER — MIRABEGRON 25 MG/1
25 TABLET, FILM COATED, EXTENDED RELEASE ORAL DAILY
Qty: 90 TABLET | Refills: 1 | Status: SHIPPED | OUTPATIENT
Start: 2021-04-16 | End: 2021-10-04 | Stop reason: SDUPTHER

## 2021-04-16 RX ORDER — PRAVASTATIN SODIUM 20 MG/1
20 TABLET ORAL DAILY
Qty: 90 TABLET | Refills: 1 | Status: SHIPPED | OUTPATIENT
Start: 2021-04-16 | End: 2021-10-04 | Stop reason: SDUPTHER

## 2021-04-16 RX ORDER — PREGABALIN 25 MG/1
25 CAPSULE ORAL 2 TIMES DAILY
Qty: 60 CAPSULE | Refills: 0 | Status: SHIPPED | OUTPATIENT
Start: 2021-04-16 | End: 2021-05-17

## 2021-04-21 ENCOUNTER — TELEPHONE (OUTPATIENT)
Dept: NEUROLOGY | Facility: CLINIC | Age: 81
End: 2021-04-21

## 2021-04-21 ENCOUNTER — OFFICE VISIT (OUTPATIENT)
Dept: NEUROLOGY | Facility: CLINIC | Age: 81
End: 2021-04-21
Payer: MEDICARE

## 2021-04-21 ENCOUNTER — LAB VISIT (OUTPATIENT)
Dept: LAB | Facility: HOSPITAL | Age: 81
End: 2021-04-21
Payer: MEDICARE

## 2021-04-21 VITALS
DIASTOLIC BLOOD PRESSURE: 75 MMHG | HEART RATE: 62 BPM | HEIGHT: 63 IN | WEIGHT: 155.31 LBS | BODY MASS INDEX: 27.52 KG/M2 | SYSTOLIC BLOOD PRESSURE: 115 MMHG

## 2021-04-21 DIAGNOSIS — R20.8 DECREASED SENSE OF VIBRATION: ICD-10-CM

## 2021-04-21 DIAGNOSIS — R06.00 DYSPNEA, UNSPECIFIED TYPE: ICD-10-CM

## 2021-04-21 DIAGNOSIS — R53.83 FATIGUE, UNSPECIFIED TYPE: Primary | ICD-10-CM

## 2021-04-21 DIAGNOSIS — R26.89 OTHER ABNORMALITIES OF GAIT AND MOBILITY: ICD-10-CM

## 2021-04-21 DIAGNOSIS — R26.81 UNSTEADINESS ON FEET: ICD-10-CM

## 2021-04-21 DIAGNOSIS — R53.83 FATIGUE, UNSPECIFIED TYPE: ICD-10-CM

## 2021-04-21 DIAGNOSIS — E55.9 VITAMIN D DEFICIENCY, UNSPECIFIED: ICD-10-CM

## 2021-04-21 LAB
25(OH)D3+25(OH)D2 SERPL-MCNC: 50 NG/ML (ref 30–96)
CRP SERPL-MCNC: 1.5 MG/L (ref 0–8.2)
ERYTHROCYTE [SEDIMENTATION RATE] IN BLOOD BY WESTERGREN METHOD: 19 MM/HR (ref 0–36)
VIT B12 SERPL-MCNC: 804 PG/ML (ref 210–950)

## 2021-04-21 PROCEDURE — 86140 C-REACTIVE PROTEIN: CPT | Performed by: STUDENT IN AN ORGANIZED HEALTH CARE EDUCATION/TRAINING PROGRAM

## 2021-04-21 PROCEDURE — 82746 ASSAY OF FOLIC ACID SERUM: CPT | Performed by: STUDENT IN AN ORGANIZED HEALTH CARE EDUCATION/TRAINING PROGRAM

## 2021-04-21 PROCEDURE — 85652 RBC SED RATE AUTOMATED: CPT | Performed by: STUDENT IN AN ORGANIZED HEALTH CARE EDUCATION/TRAINING PROGRAM

## 2021-04-21 PROCEDURE — 36415 COLL VENOUS BLD VENIPUNCTURE: CPT | Performed by: STUDENT IN AN ORGANIZED HEALTH CARE EDUCATION/TRAINING PROGRAM

## 2021-04-21 PROCEDURE — 82607 VITAMIN B-12: CPT | Performed by: STUDENT IN AN ORGANIZED HEALTH CARE EDUCATION/TRAINING PROGRAM

## 2021-04-21 PROCEDURE — 84591 ASSAY OF NOS VITAMIN: CPT | Mod: 59 | Performed by: STUDENT IN AN ORGANIZED HEALTH CARE EDUCATION/TRAINING PROGRAM

## 2021-04-21 PROCEDURE — 99204 PR OFFICE/OUTPT VISIT, NEW, LEVL IV, 45-59 MIN: ICD-10-PCS | Mod: HCNC,GC,S$GLB, | Performed by: PSYCHIATRY & NEUROLOGY

## 2021-04-21 PROCEDURE — 99999 PR PBB SHADOW E&M-EST. PATIENT-LVL IV: ICD-10-PCS | Mod: PBBFAC,GC,, | Performed by: STUDENT IN AN ORGANIZED HEALTH CARE EDUCATION/TRAINING PROGRAM

## 2021-04-21 PROCEDURE — 82306 VITAMIN D 25 HYDROXY: CPT | Performed by: STUDENT IN AN ORGANIZED HEALTH CARE EDUCATION/TRAINING PROGRAM

## 2021-04-21 PROCEDURE — 86592 SYPHILIS TEST NON-TREP QUAL: CPT | Performed by: STUDENT IN AN ORGANIZED HEALTH CARE EDUCATION/TRAINING PROGRAM

## 2021-04-21 PROCEDURE — 99999 PR PBB SHADOW E&M-EST. PATIENT-LVL IV: CPT | Mod: PBBFAC,GC,, | Performed by: STUDENT IN AN ORGANIZED HEALTH CARE EDUCATION/TRAINING PROGRAM

## 2021-04-21 PROCEDURE — 83519 RIA NONANTIBODY: CPT | Mod: 59 | Performed by: STUDENT IN AN ORGANIZED HEALTH CARE EDUCATION/TRAINING PROGRAM

## 2021-04-21 PROCEDURE — 99204 OFFICE O/P NEW MOD 45 MIN: CPT | Mod: HCNC,GC,S$GLB, | Performed by: PSYCHIATRY & NEUROLOGY

## 2021-04-21 PROCEDURE — 83520 IMMUNOASSAY QUANT NOS NONAB: CPT | Performed by: STUDENT IN AN ORGANIZED HEALTH CARE EDUCATION/TRAINING PROGRAM

## 2021-04-22 LAB
FOLATE SERPL-MCNC: 28.7 NG/ML (ref 4–24)
RPR SER QL: NORMAL

## 2021-04-23 PROBLEM — R06.00 DYSPNEA: Status: ACTIVE | Noted: 2019-09-19

## 2021-04-23 PROBLEM — R20.8 DECREASED SENSE OF VIBRATION: Status: ACTIVE | Noted: 2021-04-23

## 2021-04-30 LAB
ACHR BIND AB SER-SCNC: 0 NMOL/L
ACHR MOD AB/ACHR TOTAL SFR SER: 0 %
MG INTERPRETIVE COMMENTS: NORMAL
MUSK ANTIBODY TEST: 0 NMOL/L (ref 0–0.02)
STRIA MUS AB TITR SER: NEGATIVE TITER

## 2021-05-01 LAB — NIACIN SERPL-MCNC: 2.52 UG/ML

## 2021-05-17 ENCOUNTER — OFFICE VISIT (OUTPATIENT)
Dept: GASTROENTEROLOGY | Facility: CLINIC | Age: 81
End: 2021-05-17
Payer: MEDICARE

## 2021-05-17 VITALS
SYSTOLIC BLOOD PRESSURE: 118 MMHG | WEIGHT: 155.63 LBS | DIASTOLIC BLOOD PRESSURE: 74 MMHG | HEART RATE: 62 BPM | BODY MASS INDEX: 27.56 KG/M2

## 2021-05-17 DIAGNOSIS — D50.8 OTHER IRON DEFICIENCY ANEMIA: ICD-10-CM

## 2021-05-17 DIAGNOSIS — K58.0 IRRITABLE BOWEL SYNDROME WITH DIARRHEA: Primary | ICD-10-CM

## 2021-05-17 PROCEDURE — 99214 PR OFFICE/OUTPT VISIT, EST, LEVL IV, 30-39 MIN: ICD-10-PCS | Mod: S$GLB,,, | Performed by: INTERNAL MEDICINE

## 2021-05-17 PROCEDURE — 1159F PR MEDICATION LIST DOCUMENTED IN MEDICAL RECORD: ICD-10-PCS | Mod: S$GLB,,, | Performed by: INTERNAL MEDICINE

## 2021-05-17 PROCEDURE — 1101F PT FALLS ASSESS-DOCD LE1/YR: CPT | Mod: CPTII,S$GLB,, | Performed by: INTERNAL MEDICINE

## 2021-05-17 PROCEDURE — 99214 OFFICE O/P EST MOD 30 MIN: CPT | Mod: S$GLB,,, | Performed by: INTERNAL MEDICINE

## 2021-05-17 PROCEDURE — 1157F PR ADVANCE CARE PLAN OR EQUIV PRESENT IN MEDICAL RECORD: ICD-10-PCS | Mod: S$GLB,,, | Performed by: INTERNAL MEDICINE

## 2021-05-17 PROCEDURE — 99999 PR PBB SHADOW E&M-EST. PATIENT-LVL IV: ICD-10-PCS | Mod: PBBFAC,,, | Performed by: INTERNAL MEDICINE

## 2021-05-17 PROCEDURE — 99999 PR PBB SHADOW E&M-EST. PATIENT-LVL IV: CPT | Mod: PBBFAC,,, | Performed by: INTERNAL MEDICINE

## 2021-05-17 PROCEDURE — 3288F PR FALLS RISK ASSESSMENT DOCUMENTED: ICD-10-PCS | Mod: CPTII,S$GLB,, | Performed by: INTERNAL MEDICINE

## 2021-05-17 PROCEDURE — 1159F MED LIST DOCD IN RCRD: CPT | Mod: S$GLB,,, | Performed by: INTERNAL MEDICINE

## 2021-05-17 PROCEDURE — 1125F PR PAIN SEVERITY QUANTIFIED, PAIN PRESENT: ICD-10-PCS | Mod: S$GLB,,, | Performed by: INTERNAL MEDICINE

## 2021-05-17 PROCEDURE — 3288F FALL RISK ASSESSMENT DOCD: CPT | Mod: CPTII,S$GLB,, | Performed by: INTERNAL MEDICINE

## 2021-05-17 PROCEDURE — 1157F ADVNC CARE PLAN IN RCRD: CPT | Mod: S$GLB,,, | Performed by: INTERNAL MEDICINE

## 2021-05-17 PROCEDURE — 1125F AMNT PAIN NOTED PAIN PRSNT: CPT | Mod: S$GLB,,, | Performed by: INTERNAL MEDICINE

## 2021-05-17 PROCEDURE — 1101F PR PT FALLS ASSESS DOC 0-1 FALLS W/OUT INJ PAST YR: ICD-10-PCS | Mod: CPTII,S$GLB,, | Performed by: INTERNAL MEDICINE

## 2021-05-26 ENCOUNTER — TELEPHONE (OUTPATIENT)
Dept: GASTROENTEROLOGY | Facility: CLINIC | Age: 81
End: 2021-05-26

## 2021-06-01 ENCOUNTER — OFFICE VISIT (OUTPATIENT)
Dept: NEUROLOGY | Facility: CLINIC | Age: 81
End: 2021-06-01
Payer: MEDICARE

## 2021-06-01 DIAGNOSIS — R06.00 DYSPNEA, UNSPECIFIED TYPE: ICD-10-CM

## 2021-06-01 DIAGNOSIS — R20.8 DECREASED SENSE OF VIBRATION: ICD-10-CM

## 2021-06-01 PROCEDURE — 99213 PR OFFICE/OUTPT VISIT, EST, LEVL III, 20-29 MIN: ICD-10-PCS | Mod: HCNC,95,GC, | Performed by: STUDENT IN AN ORGANIZED HEALTH CARE EDUCATION/TRAINING PROGRAM

## 2021-06-01 PROCEDURE — 99213 OFFICE O/P EST LOW 20 MIN: CPT | Mod: HCNC,95,GC, | Performed by: STUDENT IN AN ORGANIZED HEALTH CARE EDUCATION/TRAINING PROGRAM

## 2021-06-10 ENCOUNTER — PATIENT MESSAGE (OUTPATIENT)
Dept: FAMILY MEDICINE | Facility: CLINIC | Age: 81
End: 2021-06-10

## 2021-06-25 ENCOUNTER — TELEPHONE (OUTPATIENT)
Dept: FAMILY MEDICINE | Facility: CLINIC | Age: 81
End: 2021-06-25

## 2021-07-01 ENCOUNTER — OFFICE VISIT (OUTPATIENT)
Dept: FAMILY MEDICINE | Facility: CLINIC | Age: 81
End: 2021-07-01
Payer: MEDICARE

## 2021-07-01 VITALS
OXYGEN SATURATION: 98 % | DIASTOLIC BLOOD PRESSURE: 68 MMHG | HEART RATE: 65 BPM | TEMPERATURE: 98 F | BODY MASS INDEX: 27.29 KG/M2 | RESPIRATION RATE: 18 BRPM | SYSTOLIC BLOOD PRESSURE: 114 MMHG | HEIGHT: 63 IN | WEIGHT: 154 LBS

## 2021-07-01 DIAGNOSIS — R53.82 CHRONIC FATIGUE: Primary | ICD-10-CM

## 2021-07-01 PROCEDURE — 1159F MED LIST DOCD IN RCRD: CPT | Mod: S$GLB,,, | Performed by: FAMILY MEDICINE

## 2021-07-01 PROCEDURE — 1126F PR PAIN SEVERITY QUANTIFIED, NO PAIN PRESENT: ICD-10-PCS | Mod: S$GLB,,, | Performed by: FAMILY MEDICINE

## 2021-07-01 PROCEDURE — 1101F PT FALLS ASSESS-DOCD LE1/YR: CPT | Mod: CPTII,S$GLB,, | Performed by: FAMILY MEDICINE

## 2021-07-01 PROCEDURE — 99214 OFFICE O/P EST MOD 30 MIN: CPT | Mod: S$GLB,,, | Performed by: FAMILY MEDICINE

## 2021-07-01 PROCEDURE — 99999 PR PBB SHADOW E&M-EST. PATIENT-LVL V: CPT | Mod: PBBFAC,,, | Performed by: FAMILY MEDICINE

## 2021-07-01 PROCEDURE — 1159F PR MEDICATION LIST DOCUMENTED IN MEDICAL RECORD: ICD-10-PCS | Mod: S$GLB,,, | Performed by: FAMILY MEDICINE

## 2021-07-01 PROCEDURE — 1157F PR ADVANCE CARE PLAN OR EQUIV PRESENT IN MEDICAL RECORD: ICD-10-PCS | Mod: S$GLB,,, | Performed by: FAMILY MEDICINE

## 2021-07-01 PROCEDURE — 1101F PR PT FALLS ASSESS DOC 0-1 FALLS W/OUT INJ PAST YR: ICD-10-PCS | Mod: CPTII,S$GLB,, | Performed by: FAMILY MEDICINE

## 2021-07-01 PROCEDURE — 3288F PR FALLS RISK ASSESSMENT DOCUMENTED: ICD-10-PCS | Mod: CPTII,S$GLB,, | Performed by: FAMILY MEDICINE

## 2021-07-01 PROCEDURE — 99999 PR PBB SHADOW E&M-EST. PATIENT-LVL V: ICD-10-PCS | Mod: PBBFAC,,, | Performed by: FAMILY MEDICINE

## 2021-07-01 PROCEDURE — 3288F FALL RISK ASSESSMENT DOCD: CPT | Mod: CPTII,S$GLB,, | Performed by: FAMILY MEDICINE

## 2021-07-01 PROCEDURE — 1157F ADVNC CARE PLAN IN RCRD: CPT | Mod: S$GLB,,, | Performed by: FAMILY MEDICINE

## 2021-07-01 PROCEDURE — 1126F AMNT PAIN NOTED NONE PRSNT: CPT | Mod: S$GLB,,, | Performed by: FAMILY MEDICINE

## 2021-07-01 PROCEDURE — 99214 PR OFFICE/OUTPT VISIT, EST, LEVL IV, 30-39 MIN: ICD-10-PCS | Mod: S$GLB,,, | Performed by: FAMILY MEDICINE

## 2021-07-07 ENCOUNTER — TELEPHONE (OUTPATIENT)
Dept: FAMILY MEDICINE | Facility: CLINIC | Age: 81
End: 2021-07-07

## 2021-07-07 DIAGNOSIS — Z12.31 ENCOUNTER FOR SCREENING MAMMOGRAM FOR BREAST CANCER: Primary | ICD-10-CM

## 2021-07-08 DIAGNOSIS — Z12.31 SCREENING MAMMOGRAM, ENCOUNTER FOR: Primary | ICD-10-CM

## 2021-07-09 ENCOUNTER — PES CALL (OUTPATIENT)
Dept: ADMINISTRATIVE | Facility: CLINIC | Age: 81
End: 2021-07-09

## 2021-07-26 ENCOUNTER — PATIENT MESSAGE (OUTPATIENT)
Dept: FAMILY MEDICINE | Facility: CLINIC | Age: 81
End: 2021-07-26

## 2021-08-13 ENCOUNTER — TELEPHONE (OUTPATIENT)
Dept: FAMILY MEDICINE | Facility: CLINIC | Age: 81
End: 2021-08-13

## 2021-08-13 ENCOUNTER — LAB VISIT (OUTPATIENT)
Dept: PRIMARY CARE CLINIC | Facility: OTHER | Age: 81
End: 2021-08-13
Payer: MEDICARE

## 2021-08-13 DIAGNOSIS — R05.9 COUGH: ICD-10-CM

## 2021-08-13 PROCEDURE — U0003 INFECTIOUS AGENT DETECTION BY NUCLEIC ACID (DNA OR RNA); SEVERE ACUTE RESPIRATORY SYNDROME CORONAVIRUS 2 (SARS-COV-2) (CORONAVIRUS DISEASE [COVID-19]), AMPLIFIED PROBE TECHNIQUE, MAKING USE OF HIGH THROUGHPUT TECHNOLOGIES AS DESCRIBED BY CMS-2020-01-R: HCPCS | Performed by: NURSE PRACTITIONER

## 2021-08-15 LAB
SARS-COV-2 RNA RESP QL NAA+PROBE: NOT DETECTED
SARS-COV-2- CYCLE NUMBER: -1

## 2021-09-29 ENCOUNTER — OFFICE VISIT (OUTPATIENT)
Dept: FAMILY MEDICINE | Facility: CLINIC | Age: 81
End: 2021-09-29
Payer: MEDICARE

## 2021-09-29 VITALS
HEART RATE: 66 BPM | SYSTOLIC BLOOD PRESSURE: 118 MMHG | HEIGHT: 63 IN | WEIGHT: 154.13 LBS | DIASTOLIC BLOOD PRESSURE: 76 MMHG | BODY MASS INDEX: 27.31 KG/M2 | OXYGEN SATURATION: 97 %

## 2021-09-29 DIAGNOSIS — R53.82 CHRONIC FATIGUE: ICD-10-CM

## 2021-09-29 DIAGNOSIS — I10 ESSENTIAL HYPERTENSION: Primary | ICD-10-CM

## 2021-09-29 DIAGNOSIS — E78.5 HYPERLIPIDEMIA, UNSPECIFIED HYPERLIPIDEMIA TYPE: ICD-10-CM

## 2021-09-29 DIAGNOSIS — J34.89 RHINORRHEA: ICD-10-CM

## 2021-09-29 PROCEDURE — 1160F PR REVIEW ALL MEDS BY PRESCRIBER/CLIN PHARMACIST DOCUMENTED: ICD-10-PCS | Mod: HCNC,CPTII,S$GLB, | Performed by: FAMILY MEDICINE

## 2021-09-29 PROCEDURE — 1101F PR PT FALLS ASSESS DOC 0-1 FALLS W/OUT INJ PAST YR: ICD-10-PCS | Mod: HCNC,CPTII,S$GLB, | Performed by: FAMILY MEDICINE

## 2021-09-29 PROCEDURE — 1159F MED LIST DOCD IN RCRD: CPT | Mod: HCNC,CPTII,S$GLB, | Performed by: FAMILY MEDICINE

## 2021-09-29 PROCEDURE — 3074F SYST BP LT 130 MM HG: CPT | Mod: HCNC,CPTII,S$GLB, | Performed by: FAMILY MEDICINE

## 2021-09-29 PROCEDURE — 1126F AMNT PAIN NOTED NONE PRSNT: CPT | Mod: HCNC,CPTII,S$GLB, | Performed by: FAMILY MEDICINE

## 2021-09-29 PROCEDURE — 99999 PR PBB SHADOW E&M-EST. PATIENT-LVL IV: CPT | Mod: PBBFAC,HCNC,, | Performed by: FAMILY MEDICINE

## 2021-09-29 PROCEDURE — 1126F PR PAIN SEVERITY QUANTIFIED, NO PAIN PRESENT: ICD-10-PCS | Mod: HCNC,CPTII,S$GLB, | Performed by: FAMILY MEDICINE

## 2021-09-29 PROCEDURE — 1157F PR ADVANCE CARE PLAN OR EQUIV PRESENT IN MEDICAL RECORD: ICD-10-PCS | Mod: HCNC,CPTII,S$GLB, | Performed by: FAMILY MEDICINE

## 2021-09-29 PROCEDURE — 99214 PR OFFICE/OUTPT VISIT, EST, LEVL IV, 30-39 MIN: ICD-10-PCS | Mod: HCNC,S$GLB,, | Performed by: FAMILY MEDICINE

## 2021-09-29 PROCEDURE — 1157F ADVNC CARE PLAN IN RCRD: CPT | Mod: HCNC,CPTII,S$GLB, | Performed by: FAMILY MEDICINE

## 2021-09-29 PROCEDURE — 1159F PR MEDICATION LIST DOCUMENTED IN MEDICAL RECORD: ICD-10-PCS | Mod: HCNC,CPTII,S$GLB, | Performed by: FAMILY MEDICINE

## 2021-09-29 PROCEDURE — 99999 PR PBB SHADOW E&M-EST. PATIENT-LVL IV: ICD-10-PCS | Mod: PBBFAC,HCNC,, | Performed by: FAMILY MEDICINE

## 2021-09-29 PROCEDURE — 3288F FALL RISK ASSESSMENT DOCD: CPT | Mod: HCNC,CPTII,S$GLB, | Performed by: FAMILY MEDICINE

## 2021-09-29 PROCEDURE — 3288F PR FALLS RISK ASSESSMENT DOCUMENTED: ICD-10-PCS | Mod: HCNC,CPTII,S$GLB, | Performed by: FAMILY MEDICINE

## 2021-09-29 PROCEDURE — 99214 OFFICE O/P EST MOD 30 MIN: CPT | Mod: HCNC,S$GLB,, | Performed by: FAMILY MEDICINE

## 2021-09-29 PROCEDURE — 3074F PR MOST RECENT SYSTOLIC BLOOD PRESSURE < 130 MM HG: ICD-10-PCS | Mod: HCNC,CPTII,S$GLB, | Performed by: FAMILY MEDICINE

## 2021-09-29 PROCEDURE — 3078F DIAST BP <80 MM HG: CPT | Mod: HCNC,CPTII,S$GLB, | Performed by: FAMILY MEDICINE

## 2021-09-29 PROCEDURE — 3078F PR MOST RECENT DIASTOLIC BLOOD PRESSURE < 80 MM HG: ICD-10-PCS | Mod: HCNC,CPTII,S$GLB, | Performed by: FAMILY MEDICINE

## 2021-09-29 PROCEDURE — 1101F PT FALLS ASSESS-DOCD LE1/YR: CPT | Mod: HCNC,CPTII,S$GLB, | Performed by: FAMILY MEDICINE

## 2021-09-29 PROCEDURE — 1160F RVW MEDS BY RX/DR IN RCRD: CPT | Mod: HCNC,CPTII,S$GLB, | Performed by: FAMILY MEDICINE

## 2021-09-29 RX ORDER — AZELASTINE 1 MG/ML
1 SPRAY, METERED NASAL 2 TIMES DAILY
Qty: 30 ML | Refills: 0 | Status: SHIPPED | OUTPATIENT
Start: 2021-09-29 | End: 2021-11-24

## 2021-10-04 DIAGNOSIS — N32.81 OVERACTIVE BLADDER: ICD-10-CM

## 2021-10-04 DIAGNOSIS — E78.5 HYPERLIPIDEMIA, UNSPECIFIED HYPERLIPIDEMIA TYPE: Chronic | ICD-10-CM

## 2021-10-04 DIAGNOSIS — I10 ESSENTIAL HYPERTENSION: Chronic | ICD-10-CM

## 2021-10-04 RX ORDER — TRIAMTERENE AND HYDROCHLOROTHIAZIDE 37.5; 25 MG/1; MG/1
1 CAPSULE ORAL EVERY MORNING
Qty: 90 CAPSULE | Refills: 0 | Status: SHIPPED | OUTPATIENT
Start: 2021-10-04 | End: 2021-12-30 | Stop reason: SDUPTHER

## 2021-10-04 RX ORDER — METOPROLOL TARTRATE 25 MG/1
25 TABLET, FILM COATED ORAL 2 TIMES DAILY
Qty: 180 TABLET | Refills: 0 | Status: SHIPPED | OUTPATIENT
Start: 2021-10-04 | End: 2021-10-12 | Stop reason: SDUPTHER

## 2021-10-04 RX ORDER — MIRABEGRON 25 MG/1
25 TABLET, FILM COATED, EXTENDED RELEASE ORAL DAILY
Qty: 90 TABLET | Refills: 0 | Status: SHIPPED | OUTPATIENT
Start: 2021-10-04 | End: 2021-11-10 | Stop reason: SDUPTHER

## 2021-10-04 RX ORDER — PRAVASTATIN SODIUM 20 MG/1
20 TABLET ORAL DAILY
Qty: 90 TABLET | Refills: 0 | Status: SHIPPED | OUTPATIENT
Start: 2021-10-04 | End: 2021-12-30 | Stop reason: SDUPTHER

## 2021-10-04 RX ORDER — LOSARTAN POTASSIUM 100 MG/1
100 TABLET ORAL DAILY
Qty: 90 TABLET | Refills: 0 | Status: SHIPPED | OUTPATIENT
Start: 2021-10-04 | End: 2021-11-24

## 2021-10-04 RX ORDER — MIRTAZAPINE 30 MG/1
30 TABLET, FILM COATED ORAL NIGHTLY
Qty: 90 TABLET | Refills: 3 | Status: CANCELLED | OUTPATIENT
Start: 2021-10-04

## 2021-10-12 ENCOUNTER — OFFICE VISIT (OUTPATIENT)
Dept: CARDIOLOGY | Facility: CLINIC | Age: 81
End: 2021-10-12
Payer: MEDICARE

## 2021-10-12 VITALS
OXYGEN SATURATION: 98 % | DIASTOLIC BLOOD PRESSURE: 80 MMHG | BODY MASS INDEX: 26.72 KG/M2 | SYSTOLIC BLOOD PRESSURE: 124 MMHG | HEIGHT: 63 IN | WEIGHT: 150.81 LBS | HEART RATE: 61 BPM

## 2021-10-12 DIAGNOSIS — K21.9 GASTROESOPHAGEAL REFLUX DISEASE WITHOUT ESOPHAGITIS: ICD-10-CM

## 2021-10-12 DIAGNOSIS — F41.9 ANXIETY: ICD-10-CM

## 2021-10-12 DIAGNOSIS — E78.2 MIXED HYPERLIPIDEMIA: Chronic | ICD-10-CM

## 2021-10-12 DIAGNOSIS — R00.2 PALPITATIONS: ICD-10-CM

## 2021-10-12 DIAGNOSIS — I10 ESSENTIAL HYPERTENSION: Chronic | ICD-10-CM

## 2021-10-12 DIAGNOSIS — D50.8 OTHER IRON DEFICIENCY ANEMIA: ICD-10-CM

## 2021-10-12 PROCEDURE — 1101F PR PT FALLS ASSESS DOC 0-1 FALLS W/OUT INJ PAST YR: ICD-10-PCS | Mod: HCNC,CPTII,S$GLB, | Performed by: INTERNAL MEDICINE

## 2021-10-12 PROCEDURE — 99999 PR PBB SHADOW E&M-EST. PATIENT-LVL III: ICD-10-PCS | Mod: PBBFAC,HCNC,, | Performed by: INTERNAL MEDICINE

## 2021-10-12 PROCEDURE — 1157F PR ADVANCE CARE PLAN OR EQUIV PRESENT IN MEDICAL RECORD: ICD-10-PCS | Mod: HCNC,CPTII,S$GLB, | Performed by: INTERNAL MEDICINE

## 2021-10-12 PROCEDURE — 1159F PR MEDICATION LIST DOCUMENTED IN MEDICAL RECORD: ICD-10-PCS | Mod: HCNC,CPTII,S$GLB, | Performed by: INTERNAL MEDICINE

## 2021-10-12 PROCEDURE — 3079F DIAST BP 80-89 MM HG: CPT | Mod: HCNC,CPTII,S$GLB, | Performed by: INTERNAL MEDICINE

## 2021-10-12 PROCEDURE — 3288F FALL RISK ASSESSMENT DOCD: CPT | Mod: HCNC,CPTII,S$GLB, | Performed by: INTERNAL MEDICINE

## 2021-10-12 PROCEDURE — 3079F PR MOST RECENT DIASTOLIC BLOOD PRESSURE 80-89 MM HG: ICD-10-PCS | Mod: HCNC,CPTII,S$GLB, | Performed by: INTERNAL MEDICINE

## 2021-10-12 PROCEDURE — 3074F SYST BP LT 130 MM HG: CPT | Mod: HCNC,CPTII,S$GLB, | Performed by: INTERNAL MEDICINE

## 2021-10-12 PROCEDURE — 1126F PR PAIN SEVERITY QUANTIFIED, NO PAIN PRESENT: ICD-10-PCS | Mod: HCNC,CPTII,S$GLB, | Performed by: INTERNAL MEDICINE

## 2021-10-12 PROCEDURE — 99214 PR OFFICE/OUTPT VISIT, EST, LEVL IV, 30-39 MIN: ICD-10-PCS | Mod: HCNC,S$GLB,, | Performed by: INTERNAL MEDICINE

## 2021-10-12 PROCEDURE — 1159F MED LIST DOCD IN RCRD: CPT | Mod: HCNC,CPTII,S$GLB, | Performed by: INTERNAL MEDICINE

## 2021-10-12 PROCEDURE — 1101F PT FALLS ASSESS-DOCD LE1/YR: CPT | Mod: HCNC,CPTII,S$GLB, | Performed by: INTERNAL MEDICINE

## 2021-10-12 PROCEDURE — 1126F AMNT PAIN NOTED NONE PRSNT: CPT | Mod: HCNC,CPTII,S$GLB, | Performed by: INTERNAL MEDICINE

## 2021-10-12 PROCEDURE — 99214 OFFICE O/P EST MOD 30 MIN: CPT | Mod: HCNC,S$GLB,, | Performed by: INTERNAL MEDICINE

## 2021-10-12 PROCEDURE — 99999 PR PBB SHADOW E&M-EST. PATIENT-LVL III: CPT | Mod: PBBFAC,HCNC,, | Performed by: INTERNAL MEDICINE

## 2021-10-12 PROCEDURE — 1160F RVW MEDS BY RX/DR IN RCRD: CPT | Mod: HCNC,CPTII,S$GLB, | Performed by: INTERNAL MEDICINE

## 2021-10-12 PROCEDURE — 1157F ADVNC CARE PLAN IN RCRD: CPT | Mod: HCNC,CPTII,S$GLB, | Performed by: INTERNAL MEDICINE

## 2021-10-12 PROCEDURE — 3074F PR MOST RECENT SYSTOLIC BLOOD PRESSURE < 130 MM HG: ICD-10-PCS | Mod: HCNC,CPTII,S$GLB, | Performed by: INTERNAL MEDICINE

## 2021-10-12 PROCEDURE — 1160F PR REVIEW ALL MEDS BY PRESCRIBER/CLIN PHARMACIST DOCUMENTED: ICD-10-PCS | Mod: HCNC,CPTII,S$GLB, | Performed by: INTERNAL MEDICINE

## 2021-10-12 PROCEDURE — 3288F PR FALLS RISK ASSESSMENT DOCUMENTED: ICD-10-PCS | Mod: HCNC,CPTII,S$GLB, | Performed by: INTERNAL MEDICINE

## 2021-10-12 RX ORDER — METOPROLOL TARTRATE 25 MG/1
12.5 TABLET, FILM COATED ORAL 2 TIMES DAILY
Qty: 180 TABLET | Refills: 3
Start: 2021-10-12 | End: 2021-12-30 | Stop reason: SDUPTHER

## 2021-10-14 ENCOUNTER — OFFICE VISIT (OUTPATIENT)
Dept: OTOLARYNGOLOGY | Facility: CLINIC | Age: 81
End: 2021-10-14
Payer: MEDICARE

## 2021-10-14 VITALS
HEIGHT: 63 IN | DIASTOLIC BLOOD PRESSURE: 78 MMHG | BODY MASS INDEX: 26.58 KG/M2 | WEIGHT: 150 LBS | TEMPERATURE: 98 F | SYSTOLIC BLOOD PRESSURE: 116 MMHG

## 2021-10-14 DIAGNOSIS — H92.01 OTALGIA, RIGHT: Primary | ICD-10-CM

## 2021-10-14 PROCEDURE — 3078F PR MOST RECENT DIASTOLIC BLOOD PRESSURE < 80 MM HG: ICD-10-PCS | Mod: CPTII,S$GLB,, | Performed by: OTOLARYNGOLOGY

## 2021-10-14 PROCEDURE — 99203 PR OFFICE/OUTPT VISIT, NEW, LEVL III, 30-44 MIN: ICD-10-PCS | Mod: S$GLB,,, | Performed by: OTOLARYNGOLOGY

## 2021-10-14 PROCEDURE — 1101F PT FALLS ASSESS-DOCD LE1/YR: CPT | Mod: CPTII,S$GLB,, | Performed by: OTOLARYNGOLOGY

## 2021-10-14 PROCEDURE — 3288F FALL RISK ASSESSMENT DOCD: CPT | Mod: CPTII,S$GLB,, | Performed by: OTOLARYNGOLOGY

## 2021-10-14 PROCEDURE — 1126F PR PAIN SEVERITY QUANTIFIED, NO PAIN PRESENT: ICD-10-PCS | Mod: CPTII,S$GLB,, | Performed by: OTOLARYNGOLOGY

## 2021-10-14 PROCEDURE — 1101F PR PT FALLS ASSESS DOC 0-1 FALLS W/OUT INJ PAST YR: ICD-10-PCS | Mod: CPTII,S$GLB,, | Performed by: OTOLARYNGOLOGY

## 2021-10-14 PROCEDURE — 3074F SYST BP LT 130 MM HG: CPT | Mod: CPTII,S$GLB,, | Performed by: OTOLARYNGOLOGY

## 2021-10-14 PROCEDURE — 1159F PR MEDICATION LIST DOCUMENTED IN MEDICAL RECORD: ICD-10-PCS | Mod: CPTII,S$GLB,, | Performed by: OTOLARYNGOLOGY

## 2021-10-14 PROCEDURE — 3074F PR MOST RECENT SYSTOLIC BLOOD PRESSURE < 130 MM HG: ICD-10-PCS | Mod: CPTII,S$GLB,, | Performed by: OTOLARYNGOLOGY

## 2021-10-14 PROCEDURE — 99203 OFFICE O/P NEW LOW 30 MIN: CPT | Mod: S$GLB,,, | Performed by: OTOLARYNGOLOGY

## 2021-10-14 PROCEDURE — 3288F PR FALLS RISK ASSESSMENT DOCUMENTED: ICD-10-PCS | Mod: CPTII,S$GLB,, | Performed by: OTOLARYNGOLOGY

## 2021-10-14 PROCEDURE — 3078F DIAST BP <80 MM HG: CPT | Mod: CPTII,S$GLB,, | Performed by: OTOLARYNGOLOGY

## 2021-10-14 PROCEDURE — 1126F AMNT PAIN NOTED NONE PRSNT: CPT | Mod: CPTII,S$GLB,, | Performed by: OTOLARYNGOLOGY

## 2021-10-14 PROCEDURE — 1159F MED LIST DOCD IN RCRD: CPT | Mod: CPTII,S$GLB,, | Performed by: OTOLARYNGOLOGY

## 2021-10-14 PROCEDURE — 1160F RVW MEDS BY RX/DR IN RCRD: CPT | Mod: CPTII,S$GLB,, | Performed by: OTOLARYNGOLOGY

## 2021-10-14 PROCEDURE — 1157F ADVNC CARE PLAN IN RCRD: CPT | Mod: CPTII,S$GLB,, | Performed by: OTOLARYNGOLOGY

## 2021-10-14 PROCEDURE — 1157F PR ADVANCE CARE PLAN OR EQUIV PRESENT IN MEDICAL RECORD: ICD-10-PCS | Mod: CPTII,S$GLB,, | Performed by: OTOLARYNGOLOGY

## 2021-10-14 PROCEDURE — 1160F PR REVIEW ALL MEDS BY PRESCRIBER/CLIN PHARMACIST DOCUMENTED: ICD-10-PCS | Mod: CPTII,S$GLB,, | Performed by: OTOLARYNGOLOGY

## 2021-10-22 ENCOUNTER — OFFICE VISIT (OUTPATIENT)
Dept: PODIATRY | Facility: CLINIC | Age: 81
End: 2021-10-22
Payer: MEDICARE

## 2021-10-22 VITALS
HEART RATE: 66 BPM | BODY MASS INDEX: 26.57 KG/M2 | WEIGHT: 149.94 LBS | OXYGEN SATURATION: 96 % | SYSTOLIC BLOOD PRESSURE: 112 MMHG | DIASTOLIC BLOOD PRESSURE: 68 MMHG | HEIGHT: 63 IN

## 2021-10-22 DIAGNOSIS — L60.0 INGROWN NAIL: Primary | ICD-10-CM

## 2021-10-22 DIAGNOSIS — L84 CORN OR CALLUS: ICD-10-CM

## 2021-10-22 DIAGNOSIS — B35.1 TINEA UNGUIUM: ICD-10-CM

## 2021-10-22 PROCEDURE — 1101F PT FALLS ASSESS-DOCD LE1/YR: CPT | Mod: HCNC,CPTII,S$GLB, | Performed by: PODIATRIST

## 2021-10-22 PROCEDURE — 99203 OFFICE O/P NEW LOW 30 MIN: CPT | Mod: HCNC,S$GLB,, | Performed by: PODIATRIST

## 2021-10-22 PROCEDURE — 3078F PR MOST RECENT DIASTOLIC BLOOD PRESSURE < 80 MM HG: ICD-10-PCS | Mod: HCNC,CPTII,S$GLB, | Performed by: PODIATRIST

## 2021-10-22 PROCEDURE — 3074F PR MOST RECENT SYSTOLIC BLOOD PRESSURE < 130 MM HG: ICD-10-PCS | Mod: HCNC,CPTII,S$GLB, | Performed by: PODIATRIST

## 2021-10-22 PROCEDURE — 1159F PR MEDICATION LIST DOCUMENTED IN MEDICAL RECORD: ICD-10-PCS | Mod: HCNC,CPTII,S$GLB, | Performed by: PODIATRIST

## 2021-10-22 PROCEDURE — 3288F FALL RISK ASSESSMENT DOCD: CPT | Mod: HCNC,CPTII,S$GLB, | Performed by: PODIATRIST

## 2021-10-22 PROCEDURE — 1157F PR ADVANCE CARE PLAN OR EQUIV PRESENT IN MEDICAL RECORD: ICD-10-PCS | Mod: HCNC,CPTII,S$GLB, | Performed by: PODIATRIST

## 2021-10-22 PROCEDURE — 1125F AMNT PAIN NOTED PAIN PRSNT: CPT | Mod: HCNC,CPTII,S$GLB, | Performed by: PODIATRIST

## 2021-10-22 PROCEDURE — 3078F DIAST BP <80 MM HG: CPT | Mod: HCNC,CPTII,S$GLB, | Performed by: PODIATRIST

## 2021-10-22 PROCEDURE — 99203 PR OFFICE/OUTPT VISIT, NEW, LEVL III, 30-44 MIN: ICD-10-PCS | Mod: HCNC,S$GLB,, | Performed by: PODIATRIST

## 2021-10-22 PROCEDURE — 1160F PR REVIEW ALL MEDS BY PRESCRIBER/CLIN PHARMACIST DOCUMENTED: ICD-10-PCS | Mod: HCNC,CPTII,S$GLB, | Performed by: PODIATRIST

## 2021-10-22 PROCEDURE — 1160F RVW MEDS BY RX/DR IN RCRD: CPT | Mod: HCNC,CPTII,S$GLB, | Performed by: PODIATRIST

## 2021-10-22 PROCEDURE — 99999 PR PBB SHADOW E&M-EST. PATIENT-LVL IV: ICD-10-PCS | Mod: PBBFAC,HCNC,, | Performed by: PODIATRIST

## 2021-10-22 PROCEDURE — 3288F PR FALLS RISK ASSESSMENT DOCUMENTED: ICD-10-PCS | Mod: HCNC,CPTII,S$GLB, | Performed by: PODIATRIST

## 2021-10-22 PROCEDURE — 1157F ADVNC CARE PLAN IN RCRD: CPT | Mod: HCNC,CPTII,S$GLB, | Performed by: PODIATRIST

## 2021-10-22 PROCEDURE — 1159F MED LIST DOCD IN RCRD: CPT | Mod: HCNC,CPTII,S$GLB, | Performed by: PODIATRIST

## 2021-10-22 PROCEDURE — 1101F PR PT FALLS ASSESS DOC 0-1 FALLS W/OUT INJ PAST YR: ICD-10-PCS | Mod: HCNC,CPTII,S$GLB, | Performed by: PODIATRIST

## 2021-10-22 PROCEDURE — 1125F PR PAIN SEVERITY QUANTIFIED, PAIN PRESENT: ICD-10-PCS | Mod: HCNC,CPTII,S$GLB, | Performed by: PODIATRIST

## 2021-10-22 PROCEDURE — 99999 PR PBB SHADOW E&M-EST. PATIENT-LVL IV: CPT | Mod: PBBFAC,HCNC,, | Performed by: PODIATRIST

## 2021-10-22 PROCEDURE — 3074F SYST BP LT 130 MM HG: CPT | Mod: HCNC,CPTII,S$GLB, | Performed by: PODIATRIST

## 2021-10-25 ENCOUNTER — TELEPHONE (OUTPATIENT)
Dept: FAMILY MEDICINE | Facility: CLINIC | Age: 81
End: 2021-10-25
Payer: MEDICARE

## 2021-10-26 ENCOUNTER — TELEPHONE (OUTPATIENT)
Dept: FAMILY MEDICINE | Facility: CLINIC | Age: 81
End: 2021-10-26
Payer: MEDICARE

## 2021-10-26 RX ORDER — TRAZODONE HYDROCHLORIDE 50 MG/1
TABLET ORAL
Qty: 60 TABLET | Refills: 1 | Status: SHIPPED | OUTPATIENT
Start: 2021-10-26 | End: 2021-11-24

## 2021-11-10 DIAGNOSIS — N32.81 OVERACTIVE BLADDER: ICD-10-CM

## 2021-11-10 RX ORDER — MIRABEGRON 25 MG/1
25 TABLET, FILM COATED, EXTENDED RELEASE ORAL DAILY
Qty: 90 TABLET | Refills: 3 | Status: SHIPPED | OUTPATIENT
Start: 2021-11-10 | End: 2021-12-10

## 2021-11-10 RX ORDER — PAROXETINE HYDROCHLORIDE 20 MG/1
20 TABLET, FILM COATED ORAL EVERY MORNING
Qty: 90 TABLET | Refills: 1 | Status: SHIPPED | OUTPATIENT
Start: 2021-11-10 | End: 2022-01-31 | Stop reason: SDUPTHER

## 2021-11-17 ENCOUNTER — TELEPHONE (OUTPATIENT)
Dept: FAMILY MEDICINE | Facility: CLINIC | Age: 81
End: 2021-11-17
Payer: MEDICARE

## 2021-11-24 ENCOUNTER — OFFICE VISIT (OUTPATIENT)
Dept: FAMILY MEDICINE | Facility: CLINIC | Age: 81
End: 2021-11-24
Payer: MEDICARE

## 2021-11-24 VITALS
WEIGHT: 148.5 LBS | OXYGEN SATURATION: 97 % | DIASTOLIC BLOOD PRESSURE: 62 MMHG | SYSTOLIC BLOOD PRESSURE: 118 MMHG | HEIGHT: 63 IN | HEART RATE: 52 BPM | BODY MASS INDEX: 26.31 KG/M2

## 2021-11-24 DIAGNOSIS — I10 ESSENTIAL HYPERTENSION: Chronic | ICD-10-CM

## 2021-11-24 DIAGNOSIS — F41.9 ANXIETY: ICD-10-CM

## 2021-11-24 DIAGNOSIS — Z86.2 HISTORY OF IRON DEFICIENCY ANEMIA: ICD-10-CM

## 2021-11-24 DIAGNOSIS — D50.9 IRON DEFICIENCY ANEMIA, UNSPECIFIED IRON DEFICIENCY ANEMIA TYPE: ICD-10-CM

## 2021-11-24 DIAGNOSIS — E78.2 MIXED HYPERLIPIDEMIA: Chronic | ICD-10-CM

## 2021-11-24 DIAGNOSIS — R00.2 PALPITATIONS: Chronic | ICD-10-CM

## 2021-11-24 DIAGNOSIS — G47.8 SLEEP AROUSAL DISORDER: Chronic | ICD-10-CM

## 2021-11-24 DIAGNOSIS — E78.5 HYPERLIPIDEMIA, UNSPECIFIED HYPERLIPIDEMIA TYPE: ICD-10-CM

## 2021-11-24 DIAGNOSIS — R53.82 CHRONIC FATIGUE: Primary | ICD-10-CM

## 2021-11-24 DIAGNOSIS — I95.2 HYPOTENSION DUE TO DRUGS: ICD-10-CM

## 2021-11-24 PROBLEM — R53.83 FATIGUE: Chronic | Status: ACTIVE | Noted: 2020-01-08

## 2021-11-24 PROCEDURE — 99214 OFFICE O/P EST MOD 30 MIN: CPT | Mod: HCNC,S$GLB,, | Performed by: FAMILY MEDICINE

## 2021-11-24 PROCEDURE — 1157F PR ADVANCE CARE PLAN OR EQUIV PRESENT IN MEDICAL RECORD: ICD-10-PCS | Mod: HCNC,CPTII,S$GLB, | Performed by: FAMILY MEDICINE

## 2021-11-24 PROCEDURE — 1157F ADVNC CARE PLAN IN RCRD: CPT | Mod: HCNC,CPTII,S$GLB, | Performed by: FAMILY MEDICINE

## 2021-11-24 PROCEDURE — 99214 PR OFFICE/OUTPT VISIT, EST, LEVL IV, 30-39 MIN: ICD-10-PCS | Mod: HCNC,S$GLB,, | Performed by: FAMILY MEDICINE

## 2021-11-24 PROCEDURE — 99999 PR PBB SHADOW E&M-EST. PATIENT-LVL IV: ICD-10-PCS | Mod: PBBFAC,HCNC,, | Performed by: FAMILY MEDICINE

## 2021-11-24 PROCEDURE — 99999 PR PBB SHADOW E&M-EST. PATIENT-LVL IV: CPT | Mod: PBBFAC,HCNC,, | Performed by: FAMILY MEDICINE

## 2021-11-24 RX ORDER — AMITRIPTYLINE HYDROCHLORIDE 10 MG/1
10 TABLET, FILM COATED ORAL NIGHTLY PRN
Qty: 30 TABLET | Refills: 1 | Status: SHIPPED | OUTPATIENT
Start: 2021-11-24 | End: 2021-12-10 | Stop reason: SDUPTHER

## 2021-11-24 RX ORDER — CYCLOSPORINE 0.5 MG/ML
EMULSION OPHTHALMIC
COMMUNITY
Start: 2021-08-16 | End: 2023-02-28

## 2021-11-24 RX ORDER — LOSARTAN POTASSIUM 50 MG/1
50 TABLET ORAL DAILY
COMMUNITY
End: 2021-12-10 | Stop reason: DRUGHIGH

## 2021-12-10 ENCOUNTER — OFFICE VISIT (OUTPATIENT)
Dept: FAMILY MEDICINE | Facility: CLINIC | Age: 81
End: 2021-12-10
Payer: MEDICARE

## 2021-12-10 VITALS
BODY MASS INDEX: 25.9 KG/M2 | HEIGHT: 63 IN | HEART RATE: 63 BPM | RESPIRATION RATE: 18 BRPM | OXYGEN SATURATION: 95 % | SYSTOLIC BLOOD PRESSURE: 110 MMHG | DIASTOLIC BLOOD PRESSURE: 62 MMHG | WEIGHT: 146.19 LBS

## 2021-12-10 DIAGNOSIS — I95.2 HYPOTENSION DUE TO DRUGS: Chronic | ICD-10-CM

## 2021-12-10 DIAGNOSIS — K59.00 CONSTIPATION, UNSPECIFIED CONSTIPATION TYPE: ICD-10-CM

## 2021-12-10 DIAGNOSIS — F41.9 ANXIETY: Chronic | ICD-10-CM

## 2021-12-10 DIAGNOSIS — N17.9 AKI (ACUTE KIDNEY INJURY): Primary | ICD-10-CM

## 2021-12-10 DIAGNOSIS — R53.82 CHRONIC FATIGUE: Chronic | ICD-10-CM

## 2021-12-10 DIAGNOSIS — I10 ESSENTIAL HYPERTENSION: Chronic | ICD-10-CM

## 2021-12-10 DIAGNOSIS — G47.8 SLEEP AROUSAL DISORDER: Chronic | ICD-10-CM

## 2021-12-10 PROCEDURE — 1157F ADVNC CARE PLAN IN RCRD: CPT | Mod: HCNC,CPTII,S$GLB, | Performed by: FAMILY MEDICINE

## 2021-12-10 PROCEDURE — 99499 UNLISTED E&M SERVICE: CPT | Mod: S$GLB,,, | Performed by: FAMILY MEDICINE

## 2021-12-10 PROCEDURE — 99214 OFFICE O/P EST MOD 30 MIN: CPT | Mod: HCNC,S$GLB,, | Performed by: FAMILY MEDICINE

## 2021-12-10 PROCEDURE — 99499 RISK ADDL DX/OHS AUDIT: ICD-10-PCS | Mod: S$GLB,,, | Performed by: FAMILY MEDICINE

## 2021-12-10 PROCEDURE — 99214 PR OFFICE/OUTPT VISIT, EST, LEVL IV, 30-39 MIN: ICD-10-PCS | Mod: HCNC,S$GLB,, | Performed by: FAMILY MEDICINE

## 2021-12-10 PROCEDURE — 99999 PR PBB SHADOW E&M-EST. PATIENT-LVL V: CPT | Mod: PBBFAC,HCNC,, | Performed by: FAMILY MEDICINE

## 2021-12-10 PROCEDURE — 99999 PR PBB SHADOW E&M-EST. PATIENT-LVL V: ICD-10-PCS | Mod: PBBFAC,HCNC,, | Performed by: FAMILY MEDICINE

## 2021-12-10 PROCEDURE — 1157F PR ADVANCE CARE PLAN OR EQUIV PRESENT IN MEDICAL RECORD: ICD-10-PCS | Mod: HCNC,CPTII,S$GLB, | Performed by: FAMILY MEDICINE

## 2021-12-10 RX ORDER — AMITRIPTYLINE HYDROCHLORIDE 10 MG/1
10 TABLET, FILM COATED ORAL NIGHTLY PRN
Qty: 90 TABLET | Refills: 1 | Status: SHIPPED | OUTPATIENT
Start: 2021-12-10 | End: 2022-03-03 | Stop reason: SDUPTHER

## 2021-12-10 RX ORDER — LOSARTAN POTASSIUM 25 MG/1
25 TABLET ORAL DAILY
Qty: 90 TABLET | Refills: 0 | Status: SHIPPED | OUTPATIENT
Start: 2021-12-10 | End: 2021-12-30 | Stop reason: SDUPTHER

## 2021-12-10 RX ORDER — POLYETHYLENE GLYCOL 3350 17 G/17G
17 POWDER, FOR SOLUTION ORAL 3 TIMES DAILY PRN
Qty: 30 EACH | Refills: 0 | Status: SHIPPED | OUTPATIENT
Start: 2021-12-10 | End: 2023-04-17

## 2021-12-20 PROBLEM — Z91.81 HISTORY OF FALL: Status: ACTIVE | Noted: 2021-12-20

## 2021-12-21 ENCOUNTER — PATIENT MESSAGE (OUTPATIENT)
Dept: ADMINISTRATIVE | Facility: OTHER | Age: 81
End: 2021-12-21
Payer: MEDICARE

## 2021-12-30 ENCOUNTER — OFFICE VISIT (OUTPATIENT)
Dept: FAMILY MEDICINE | Facility: CLINIC | Age: 81
End: 2021-12-30
Payer: MEDICARE

## 2021-12-30 VITALS
WEIGHT: 145.5 LBS | OXYGEN SATURATION: 96 % | DIASTOLIC BLOOD PRESSURE: 64 MMHG | SYSTOLIC BLOOD PRESSURE: 102 MMHG | BODY MASS INDEX: 25.78 KG/M2 | HEIGHT: 63 IN | HEART RATE: 68 BPM

## 2021-12-30 DIAGNOSIS — I10 ESSENTIAL HYPERTENSION: Chronic | ICD-10-CM

## 2021-12-30 DIAGNOSIS — K14.8 TONGUE LESION: Primary | ICD-10-CM

## 2021-12-30 DIAGNOSIS — E78.5 HYPERLIPIDEMIA, UNSPECIFIED HYPERLIPIDEMIA TYPE: Chronic | ICD-10-CM

## 2021-12-30 PROCEDURE — 3288F FALL RISK ASSESSMENT DOCD: CPT | Mod: HCNC,CPTII,S$GLB, | Performed by: FAMILY MEDICINE

## 2021-12-30 PROCEDURE — 99214 PR OFFICE/OUTPT VISIT, EST, LEVL IV, 30-39 MIN: ICD-10-PCS | Mod: HCNC,S$GLB,, | Performed by: FAMILY MEDICINE

## 2021-12-30 PROCEDURE — 1157F PR ADVANCE CARE PLAN OR EQUIV PRESENT IN MEDICAL RECORD: ICD-10-PCS | Mod: HCNC,CPTII,S$GLB, | Performed by: FAMILY MEDICINE

## 2021-12-30 PROCEDURE — 1126F PR PAIN SEVERITY QUANTIFIED, NO PAIN PRESENT: ICD-10-PCS | Mod: HCNC,CPTII,S$GLB, | Performed by: FAMILY MEDICINE

## 2021-12-30 PROCEDURE — 1160F RVW MEDS BY RX/DR IN RCRD: CPT | Mod: HCNC,CPTII,S$GLB, | Performed by: FAMILY MEDICINE

## 2021-12-30 PROCEDURE — 99214 OFFICE O/P EST MOD 30 MIN: CPT | Mod: HCNC,S$GLB,, | Performed by: FAMILY MEDICINE

## 2021-12-30 PROCEDURE — 1101F PT FALLS ASSESS-DOCD LE1/YR: CPT | Mod: HCNC,CPTII,S$GLB, | Performed by: FAMILY MEDICINE

## 2021-12-30 PROCEDURE — 1157F ADVNC CARE PLAN IN RCRD: CPT | Mod: HCNC,CPTII,S$GLB, | Performed by: FAMILY MEDICINE

## 2021-12-30 PROCEDURE — 1101F PR PT FALLS ASSESS DOC 0-1 FALLS W/OUT INJ PAST YR: ICD-10-PCS | Mod: HCNC,CPTII,S$GLB, | Performed by: FAMILY MEDICINE

## 2021-12-30 PROCEDURE — 3078F DIAST BP <80 MM HG: CPT | Mod: HCNC,CPTII,S$GLB, | Performed by: FAMILY MEDICINE

## 2021-12-30 PROCEDURE — 1159F PR MEDICATION LIST DOCUMENTED IN MEDICAL RECORD: ICD-10-PCS | Mod: HCNC,CPTII,S$GLB, | Performed by: FAMILY MEDICINE

## 2021-12-30 PROCEDURE — 99999 PR PBB SHADOW E&M-EST. PATIENT-LVL IV: ICD-10-PCS | Mod: PBBFAC,HCNC,, | Performed by: FAMILY MEDICINE

## 2021-12-30 PROCEDURE — 3288F PR FALLS RISK ASSESSMENT DOCUMENTED: ICD-10-PCS | Mod: HCNC,CPTII,S$GLB, | Performed by: FAMILY MEDICINE

## 2021-12-30 PROCEDURE — 3074F PR MOST RECENT SYSTOLIC BLOOD PRESSURE < 130 MM HG: ICD-10-PCS | Mod: HCNC,CPTII,S$GLB, | Performed by: FAMILY MEDICINE

## 2021-12-30 PROCEDURE — 1159F MED LIST DOCD IN RCRD: CPT | Mod: HCNC,CPTII,S$GLB, | Performed by: FAMILY MEDICINE

## 2021-12-30 PROCEDURE — 99999 PR PBB SHADOW E&M-EST. PATIENT-LVL IV: CPT | Mod: PBBFAC,HCNC,, | Performed by: FAMILY MEDICINE

## 2021-12-30 PROCEDURE — 3074F SYST BP LT 130 MM HG: CPT | Mod: HCNC,CPTII,S$GLB, | Performed by: FAMILY MEDICINE

## 2021-12-30 PROCEDURE — 1126F AMNT PAIN NOTED NONE PRSNT: CPT | Mod: HCNC,CPTII,S$GLB, | Performed by: FAMILY MEDICINE

## 2021-12-30 PROCEDURE — 1160F PR REVIEW ALL MEDS BY PRESCRIBER/CLIN PHARMACIST DOCUMENTED: ICD-10-PCS | Mod: HCNC,CPTII,S$GLB, | Performed by: FAMILY MEDICINE

## 2021-12-30 PROCEDURE — 3078F PR MOST RECENT DIASTOLIC BLOOD PRESSURE < 80 MM HG: ICD-10-PCS | Mod: HCNC,CPTII,S$GLB, | Performed by: FAMILY MEDICINE

## 2021-12-30 RX ORDER — TRIAMTERENE AND HYDROCHLOROTHIAZIDE 37.5; 25 MG/1; MG/1
1 CAPSULE ORAL EVERY MORNING
Qty: 90 CAPSULE | Refills: 3 | Status: SHIPPED | OUTPATIENT
Start: 2021-12-30 | End: 2022-09-27 | Stop reason: SDUPTHER

## 2021-12-30 RX ORDER — LOSARTAN POTASSIUM 25 MG/1
25 TABLET ORAL DAILY
Qty: 90 TABLET | Refills: 1 | Status: SHIPPED | OUTPATIENT
Start: 2022-03-07 | End: 2022-08-05 | Stop reason: SDUPTHER

## 2021-12-30 RX ORDER — PRAVASTATIN SODIUM 20 MG/1
20 TABLET ORAL DAILY
Qty: 90 TABLET | Refills: 3 | Status: SHIPPED | OUTPATIENT
Start: 2021-12-30 | End: 2022-09-27 | Stop reason: SDUPTHER

## 2021-12-30 RX ORDER — METOPROLOL TARTRATE 25 MG/1
12.5 TABLET, FILM COATED ORAL 2 TIMES DAILY
Qty: 180 TABLET | Refills: 3
Start: 2021-12-30 | End: 2022-02-08 | Stop reason: SDUPTHER

## 2022-01-11 ENCOUNTER — OFFICE VISIT (OUTPATIENT)
Dept: OTOLARYNGOLOGY | Facility: CLINIC | Age: 82
End: 2022-01-11
Payer: MEDICARE

## 2022-01-11 VITALS — WEIGHT: 144 LBS | BODY MASS INDEX: 25.51 KG/M2

## 2022-01-11 DIAGNOSIS — K14.8 TONGUE LESION: Primary | ICD-10-CM

## 2022-01-11 PROCEDURE — 88305 TISSUE EXAM BY PATHOLOGIST: CPT | Mod: HCNC | Performed by: PATHOLOGY

## 2022-01-11 PROCEDURE — 3288F FALL RISK ASSESSMENT DOCD: CPT | Mod: HCNC,CPTII,S$GLB, | Performed by: OTOLARYNGOLOGY

## 2022-01-11 PROCEDURE — 41110 EXCISION OF TONGUE LESION: CPT | Mod: HCNC,S$GLB,, | Performed by: OTOLARYNGOLOGY

## 2022-01-11 PROCEDURE — 1101F PT FALLS ASSESS-DOCD LE1/YR: CPT | Mod: HCNC,CPTII,S$GLB, | Performed by: OTOLARYNGOLOGY

## 2022-01-11 PROCEDURE — 1157F PR ADVANCE CARE PLAN OR EQUIV PRESENT IN MEDICAL RECORD: ICD-10-PCS | Mod: HCNC,CPTII,S$GLB, | Performed by: OTOLARYNGOLOGY

## 2022-01-11 PROCEDURE — 1157F ADVNC CARE PLAN IN RCRD: CPT | Mod: HCNC,CPTII,S$GLB, | Performed by: OTOLARYNGOLOGY

## 2022-01-11 PROCEDURE — 99214 PR OFFICE/OUTPT VISIT, EST, LEVL IV, 30-39 MIN: ICD-10-PCS | Mod: 25,HCNC,S$GLB, | Performed by: OTOLARYNGOLOGY

## 2022-01-11 PROCEDURE — 99214 OFFICE O/P EST MOD 30 MIN: CPT | Mod: 25,HCNC,S$GLB, | Performed by: OTOLARYNGOLOGY

## 2022-01-11 PROCEDURE — 88305 TISSUE EXAM BY PATHOLOGIST: ICD-10-PCS | Mod: 26,HCNC,, | Performed by: PATHOLOGY

## 2022-01-11 PROCEDURE — 99999 PR PBB SHADOW E&M-EST. PATIENT-LVL III: ICD-10-PCS | Mod: PBBFAC,HCNC,, | Performed by: OTOLARYNGOLOGY

## 2022-01-11 PROCEDURE — 1126F AMNT PAIN NOTED NONE PRSNT: CPT | Mod: HCNC,CPTII,S$GLB, | Performed by: OTOLARYNGOLOGY

## 2022-01-11 PROCEDURE — 88305 TISSUE EXAM BY PATHOLOGIST: CPT | Mod: 26,HCNC,, | Performed by: PATHOLOGY

## 2022-01-11 PROCEDURE — 99999 PR PBB SHADOW E&M-EST. PATIENT-LVL III: CPT | Mod: PBBFAC,HCNC,, | Performed by: OTOLARYNGOLOGY

## 2022-01-11 PROCEDURE — 1101F PR PT FALLS ASSESS DOC 0-1 FALLS W/OUT INJ PAST YR: ICD-10-PCS | Mod: HCNC,CPTII,S$GLB, | Performed by: OTOLARYNGOLOGY

## 2022-01-11 PROCEDURE — 1159F PR MEDICATION LIST DOCUMENTED IN MEDICAL RECORD: ICD-10-PCS | Mod: HCNC,CPTII,S$GLB, | Performed by: OTOLARYNGOLOGY

## 2022-01-11 PROCEDURE — 1159F MED LIST DOCD IN RCRD: CPT | Mod: HCNC,CPTII,S$GLB, | Performed by: OTOLARYNGOLOGY

## 2022-01-11 PROCEDURE — 3288F PR FALLS RISK ASSESSMENT DOCUMENTED: ICD-10-PCS | Mod: HCNC,CPTII,S$GLB, | Performed by: OTOLARYNGOLOGY

## 2022-01-11 PROCEDURE — 1126F PR PAIN SEVERITY QUANTIFIED, NO PAIN PRESENT: ICD-10-PCS | Mod: HCNC,CPTII,S$GLB, | Performed by: OTOLARYNGOLOGY

## 2022-01-11 PROCEDURE — 41110 PR EXCIS TONGUE LESN: ICD-10-PCS | Mod: HCNC,S$GLB,, | Performed by: OTOLARYNGOLOGY

## 2022-01-11 NOTE — PROGRESS NOTES
Chief Complaint   Patient presents with    tongue lesion         81 y.o. female presents with one month history of anterior tongue mass. No associated pain or bleeding. She feels that she rubbed this area of her tongue against her teeth which led to the growth. Never has a lesion like this before. Also with some noted hoarseness, feels that she always has a frog in her throat.      Review of Systems     Constitutional: Negative for fatigue and unexpected weight change.   HENT: per HPI.  Eyes: Negative for visual disturbance.   Respiratory: Negative for shortness of breath, hemoptysis  Cardiovascular: Negative for chest pain and palpitations.   Genitourinary: Negative for dysuria and difficulty urinating.   Musculoskeletal: Negative for decreased ROM, back pain.   Skin: Negative for rash, sunburn, itching.   Neurological: Negative for dizziness and seizures.   Hematological: Negative for adenopathy. Does not bruise/bleed easily.   Psychiatric/Behavioral: Negative for agitation. The patient is not nervous/anxious.   Endocrine: Negative for rapid weight loss/weight gain, heat/cold intolerance.     Past Medical History:   Diagnosis Date    HATTIE (acute kidney injury) 12/10/2021    Anxiety     Arthritis     Breast cancer 1990    left    Chronic disease anemia     Hyperlipidemia     Hypertension     Insomnia     Lobular carcinoma in situ     KANWAL (obstructive sleep apnea)     Osteoporosis     Rosacea     Squamous cell carcinoma     Stable angina pectoris 8/27/2020    Urinary incontinence     Vertigo        Past Surgical History:   Procedure Laterality Date    ADENOIDECTOMY      BELT ABDOMINOPLASTY      BREAST BIOPSY Left 1990    ex bx    BREAST LUMPECTOMY      COLONOSCOPY N/A 1/28/2020    Procedure: COLONOSCOPY;  Surgeon: Bianka Macias MD;  [facility];  Service: Endoscopy;  Laterality: N/A;    ESOPHAGOGASTRODUODENOSCOPY N/A 1/28/2020    Procedure: EGD (ESOPHAGOGASTRODUODENOSCOPY);   Surgeon: Bianka Macias MD;  [facility];  Service: Endoscopy;  Laterality: N/A;    EYE SURGERY      HERNIA REPAIR      [facility]    LIVER TRANSPLANT      OOPHORECTOMY      TONSILLECTOMY      TOTAL ABDOMINAL HYSTERECTOMY         family history includes Alzheimer's disease in her father; Arthritis in her brother; Cancer in her mother; Depression in her brother, brother, and mother; Diabetes in her brother; Heart disease in her brother and mother; Hypertension in her brother and father; Miscarriages / Stillbirths in her mother; Pancreatic cancer in her mother.    Pt  reports that she quit smoking about 41 years ago. Her smoking use included cigarettes. She started smoking about 63 years ago. She has a 10.50 pack-year smoking history. She has never used smokeless tobacco. She reports previous alcohol use of about 1.0 standard drink of alcohol per week. She reports that she does not use drugs.    Review of patient's allergies indicates:   Allergen Reactions    Sulfa (sulfonamide antibiotics)         Physical Exam    There were no vitals filed for this visit.  Body mass index is 25.51 kg/m².    Physical Exam  Vitals and nursing note reviewed.   Constitutional:       General: She is not in acute distress.     Appearance: She is well-developed and well-nourished. She is not diaphoretic.   HENT:      Head: Normocephalic and atraumatic.      Right Ear: Hearing and external ear normal. No decreased hearing noted.      Left Ear: Hearing and external ear normal. No decreased hearing noted.      Nose: Nose normal.      Mouth/Throat:      Mouth: Oropharynx is clear and moist.      Tongue: Lesions present.     Eyes:      General: Lids are normal.         Right eye: No discharge.         Left eye: No discharge.      Extraocular Movements: EOM normal.      Conjunctiva/sclera: Conjunctivae normal.      Pupils: Pupils are equal, round, and reactive to light.   Neck:      Thyroid: No thyroid mass or thyromegaly.       Trachea: Trachea and phonation normal. No tracheal tenderness or tracheal deviation.   Cardiovascular:      Heart sounds: Normal heart sounds.   Pulmonary:      Breath sounds: Normal breath sounds. No stridor.   Abdominal:      Palpations: Abdomen is soft.   Musculoskeletal:      Cervical back: Normal range of motion and neck supple. No edema or erythema.   Lymphadenopathy:      Cervical: No cervical adenopathy.   Skin:     General: Skin is warm and dry.      Coloration: Skin is not pale.      Findings: No erythema or rash.   Neurological:      Mental Status: She is alert and oriented to person, place, and time.   Psychiatric:         Mood and Affect: Mood and affect normal.         Procedure Note:  After informed consent was obtained, 1 cc of 1% lidocaine with epinephrine 1:100,000 was injected submucosally adjacent to the lesion. A 15 blade scalpel was used to excise the mass at the base. Hemostasis was achieved with pressure and silver nitrate cautery. She tolerated the procedure well. Specimen sent for permanent pathology.      Assessment     1. Tongue lesion          Plan  In summary, Ms. Hernandez is an 81 year old female with anterior tongue mass. Excisional biopsy performed in the clinic today. She will follow up in 2-3 weeks to discuss pathology and wound check. Will further evaluate her hoarseness at this visit as well. All questions were answered and she is in agreement with the plan.

## 2022-01-21 LAB
COMMENT: NORMAL
FINAL PATHOLOGIC DIAGNOSIS: NORMAL
GROSS: NORMAL
Lab: NORMAL

## 2022-01-31 RX ORDER — PAROXETINE HYDROCHLORIDE 20 MG/1
20 TABLET, FILM COATED ORAL EVERY MORNING
Qty: 90 TABLET | Refills: 0 | Status: SHIPPED | OUTPATIENT
Start: 2022-01-31 | End: 2022-05-04

## 2022-01-31 NOTE — TELEPHONE ENCOUNTER
----- Message from Helen Richardson sent at 1/31/2022 11:43 AM CST -----  Contact: pt  Type:  RX Refill Request    Who Called: pt  Refill or New Rx:refill  RX Name and Strength:metoprolol tartrate (LOPRESSOR) 25 MG tablet and paroxetine (PAXIL) 20 MG tablet  How is the patient currently taking it? (ex. 1XDay):  Is this a 30 day or 90 day RX:  Preferred Pharmacy with phone number:[facility] - [facility] [address]  Local or Mail Order:Mail  Ordering Provider:Dr Weaver  Would the patient rather a call back or a response via [facility]? call  Best Call Back Number:[phone]  Additional Information:

## 2022-01-31 NOTE — TELEPHONE ENCOUNTER
No new care gaps identified.  Powered by [employer]. Reference number: [id number].   1/31/2022 12:10:33 PM CST

## 2022-02-01 ENCOUNTER — OFFICE VISIT (OUTPATIENT)
Dept: OTOLARYNGOLOGY | Facility: CLINIC | Age: 82
End: 2022-02-01
Payer: MEDICARE

## 2022-02-01 VITALS — SYSTOLIC BLOOD PRESSURE: 133 MMHG | DIASTOLIC BLOOD PRESSURE: 82 MMHG | HEART RATE: 64 BPM

## 2022-02-01 DIAGNOSIS — K21.9 LARYNGOPHARYNGEAL REFLUX: ICD-10-CM

## 2022-02-01 DIAGNOSIS — R09.82 POST-NASAL DRAINAGE: ICD-10-CM

## 2022-02-01 DIAGNOSIS — R49.0 HOARSENESS: Primary | ICD-10-CM

## 2022-02-01 DIAGNOSIS — K14.8 TONGUE LESION: ICD-10-CM

## 2022-02-01 PROCEDURE — 3075F SYST BP GE 130 - 139MM HG: CPT | Mod: HCNC,CPTII,S$GLB, | Performed by: OTOLARYNGOLOGY

## 2022-02-01 PROCEDURE — 99999 PR PBB SHADOW E&M-EST. PATIENT-LVL III: ICD-10-PCS | Mod: PBBFAC,HCNC,, | Performed by: OTOLARYNGOLOGY

## 2022-02-01 PROCEDURE — 31575 DIAGNOSTIC LARYNGOSCOPY: CPT | Mod: HCNC,S$GLB,, | Performed by: OTOLARYNGOLOGY

## 2022-02-01 PROCEDURE — 99214 OFFICE O/P EST MOD 30 MIN: CPT | Mod: 25,HCNC,S$GLB, | Performed by: OTOLARYNGOLOGY

## 2022-02-01 PROCEDURE — 3075F PR MOST RECENT SYSTOLIC BLOOD PRESS GE 130-139MM HG: ICD-10-PCS | Mod: HCNC,CPTII,S$GLB, | Performed by: OTOLARYNGOLOGY

## 2022-02-01 PROCEDURE — 1126F AMNT PAIN NOTED NONE PRSNT: CPT | Mod: HCNC,CPTII,S$GLB, | Performed by: OTOLARYNGOLOGY

## 2022-02-01 PROCEDURE — 3079F DIAST BP 80-89 MM HG: CPT | Mod: HCNC,CPTII,S$GLB, | Performed by: OTOLARYNGOLOGY

## 2022-02-01 PROCEDURE — 1157F ADVNC CARE PLAN IN RCRD: CPT | Mod: HCNC,CPTII,S$GLB, | Performed by: OTOLARYNGOLOGY

## 2022-02-01 PROCEDURE — 1126F PR PAIN SEVERITY QUANTIFIED, NO PAIN PRESENT: ICD-10-PCS | Mod: HCNC,CPTII,S$GLB, | Performed by: OTOLARYNGOLOGY

## 2022-02-01 PROCEDURE — 1157F PR ADVANCE CARE PLAN OR EQUIV PRESENT IN MEDICAL RECORD: ICD-10-PCS | Mod: HCNC,CPTII,S$GLB, | Performed by: OTOLARYNGOLOGY

## 2022-02-01 PROCEDURE — 99214 PR OFFICE/OUTPT VISIT, EST, LEVL IV, 30-39 MIN: ICD-10-PCS | Mod: 25,HCNC,S$GLB, | Performed by: OTOLARYNGOLOGY

## 2022-02-01 PROCEDURE — 1159F MED LIST DOCD IN RCRD: CPT | Mod: HCNC,CPTII,S$GLB, | Performed by: OTOLARYNGOLOGY

## 2022-02-01 PROCEDURE — 99999 PR PBB SHADOW E&M-EST. PATIENT-LVL III: CPT | Mod: PBBFAC,HCNC,, | Performed by: OTOLARYNGOLOGY

## 2022-02-01 PROCEDURE — 1159F PR MEDICATION LIST DOCUMENTED IN MEDICAL RECORD: ICD-10-PCS | Mod: HCNC,CPTII,S$GLB, | Performed by: OTOLARYNGOLOGY

## 2022-02-01 PROCEDURE — 31575 PR LARYNGOSCOPY, FLEXIBLE; DIAGNOSTIC: ICD-10-PCS | Mod: HCNC,S$GLB,, | Performed by: OTOLARYNGOLOGY

## 2022-02-01 PROCEDURE — 3079F PR MOST RECENT DIASTOLIC BLOOD PRESSURE 80-89 MM HG: ICD-10-PCS | Mod: HCNC,CPTII,S$GLB, | Performed by: OTOLARYNGOLOGY

## 2022-02-01 NOTE — PROGRESS NOTES
Chief Complaint   Patient presents with    Follow-up         81 y.o. female presents with one month history of anterior tongue mass. No associated pain or bleeding. She feels that she rubbed this area of her tongue against her teeth which led to the growth. Never has a lesion like this before. Also with some noted hoarseness, feels that she always has a frog in her throat.    Here for follow up, discussed tongue biopsy pathology-Benign. Reports that the site has healed well. Continues to have hoarseness, stable. No associated cough or dysphagia. Occasional heartburn as well as post nasal drainage.      Review of Systems     Constitutional: Negative for fatigue and unexpected weight change.   HENT: per HPI.  Eyes: Negative for visual disturbance.   Respiratory: Negative for shortness of breath, hemoptysis  Cardiovascular: Negative for chest pain and palpitations.   Genitourinary: Negative for dysuria and difficulty urinating.   Musculoskeletal: Negative for decreased ROM, back pain.   Skin: Negative for rash, sunburn, itching.   Neurological: Negative for dizziness and seizures.   Hematological: Negative for adenopathy. Does not bruise/bleed easily.   Psychiatric/Behavioral: Negative for agitation. The patient is not nervous/anxious.   Endocrine: Negative for rapid weight loss/weight gain, heat/cold intolerance.     Past Medical History:   Diagnosis Date    HATTIE (acute kidney injury) 12/10/2021    Anxiety     Arthritis     Breast cancer 1990    left    Chronic disease anemia     Hyperlipidemia     Hypertension     Insomnia     Lobular carcinoma in situ     KANWAL (obstructive sleep apnea)     Osteoporosis     Rosacea     Squamous cell carcinoma     Stable angina pectoris 8/27/2020    Urinary incontinence     Vertigo        Past Surgical History:   Procedure Laterality Date    ADENOIDECTOMY      BELT ABDOMINOPLASTY      BREAST BIOPSY Left 1990    ex bx    BREAST LUMPECTOMY      COLONOSCOPY N/A  1/28/2020    Procedure: COLONOSCOPY;  Surgeon: Bianka Macias MD;  Location: [facility];  Service: Endoscopy;  Laterality: N/A;    ESOPHAGOGASTRODUODENOSCOPY N/A 1/28/2020    Procedure: EGD (ESOPHAGOGASTRODUODENOSCOPY);  Surgeon: Bianka Macias MD;  Location: [facility];  Service: Endoscopy;  Laterality: N/A;    EYE SURGERY      HERNIA REPAIR      [facility]ral    LIVER TRANSPLANT      OOPHORECTOMY      TONSILLECTOMY      TOTAL ABDOMINAL HYSTERECTOMY         family history includes Alzheimer's disease in her father; Arthritis in her brother; Cancer in her mother; Depression in her brother, brother, and mother; Diabetes in her brother; Heart disease in her brother and mother; Hypertension in her brother and father; Miscarriages / Stillbirths in her mother; Pancreatic cancer in her mother.    Pt  reports that she quit smoking about 41 years ago. Her smoking use included cigarettes. She started smoking about 63 years ago. She has a 10.50 pack-year smoking history. She has never used smokeless tobacco. She reports previous alcohol use of about 1.0 standard drink of alcohol per week. She reports that she does not use drugs.    Review of patient's allergies indicates:   Allergen Reactions    Sulfa (sulfonamide antibiotics)         Physical Exam    Vitals:    02/01/22 1109   BP: 133/82   Pulse: 64     There is no height or weight on file to calculate BMI.    Physical Exam  Vitals and nursing note reviewed.   Constitutional:       General: She is not in acute distress.     Appearance: She is well-developed. She is not diaphoretic.   HENT:      Head: Normocephalic and atraumatic.      Right Ear: Hearing and external ear normal. No decreased hearing noted.      Left Ear: Hearing and external ear normal. No decreased hearing noted.      Nose: Nose normal.      Mouth/Throat:      Tongue: No lesions.     Eyes:      General: Lids are normal.         Right eye: No discharge.         Left eye: No discharge.       Conjunctiva/sclera: Conjunctivae normal.      Pupils: Pupils are equal, round, and reactive to light.   Neck:      Thyroid: No thyroid mass or thyromegaly.      Trachea: Trachea and phonation normal. No tracheal tenderness or tracheal deviation.   Cardiovascular:      Heart sounds: Normal heart sounds.   Pulmonary:      Breath sounds: Normal breath sounds. No stridor.   Abdominal:      Palpations: Abdomen is soft.   Musculoskeletal:      Cervical back: Normal range of motion and neck supple. No edema or erythema.   Lymphadenopathy:      Cervical: No cervical adenopathy.   Skin:     General: Skin is warm and dry.      Coloration: Skin is not pale.      Findings: No erythema or rash.   Neurological:      Mental Status: She is alert and oriented to person, place, and time.       Procedure: Flexible laryngoscopy  In order to fully examine the upper aerodigestive tract, including the larynx, in a patient with a hyperactive gag reflex, flexible endoscopy is required.  After explaining the procedure and obtaining verbal consent, a timeout was performed with the patient's participation according to the universal protocol. Both nasal cavities were anesthetized with 4% Xylocaine spray mixed with Nadeem-Synephrine. The flexible laryngoscope was inserted into the nasal cavity and advanced to visualize the nasal cavity, nasopharynx, the posterior oropharynx, hypopharynx, and the endolarynx with the above findings noted. The scope was removed and the procedure terminated. The patient tolerated this procedure well without apparent complication.     NP: No lesions of torus or posterior wall  OP: No lesions of posterior wall or BOT. BOT is soft to palpation  Larynx: No lesions of glottic or supraglottic larynx. Normal vocal fold mobility. There is posterior commissure erythema and edema.  HP: No lesions of pyriform sinuses or postcricoid region    Pathology 1/11/22:  - Epithelial hyperplasia with underlying fibrous stromal  changes      Assessment     1. Hoarseness    2. Laryngopharyngeal reflux    3. Post-nasal drainage    4. Tongue lesion          Plan  In summary, Ms. Hernandez is an 81 year old female with chronic hoarseness and post nasal drainage. Flexible laryngoscopy with findings consistent with LPR. Discussed LPR treatment with diet changes and lifestyle modifications, handout given. For PND, recommend consistent nasal regimen with nasal saline for improved mucociliary clearance. Previous tongue biopsy site well healed, biopsy benign. All questions were answered and she is in agreement with the plan. Return to clinic if symptoms fail to improve or worsen.

## 2022-02-01 NOTE — PATIENT INSTRUCTIONS
ACID REFLUX   What is acid reflux?    When we eat, food passes from the throat and into the stomach through a tube called the esophagus. At the bottom of the esophagus is a ring of muscles that acts as a valve between the esophagus and stomach, called the lower esophageal sphincter. Smoking, alcohol, and certain types of food may weaken the sphincter, so it may stop closing properly. The contents in the stomach then may leak back, or reflux, into the esophagus. This problem is called gastroesophageal reflux disease (GERD). Symptoms of GERD include heartburn, belching, regurgitation of stomach contents, and swallowing difficulties.    Sometimes, the stomach acid travels up through the esophagus and spills into the larynx or pharynx (voice box). This is called laryngopharyngeal reflux (LPR) and is irritating to the vocal folds and surrounding tissues. Often, patients with LPR do not experience heartburn as a symptom. More commonly, symptoms of LPR include hoarseness, excessive mucous resulting in frequent throat clearing, post-nasal drip, coughing, throat soreness or burning, choking episodes, difficulty swallowing, and sensation of a lump in the throat.     How is acid reflux treated?   Treatment for acid reflux can involve any combination of medication, lifestyle modifications, and surgery.   · Medications. Your doctor may prescribe a proton pump inhibitor (PPI) or an H2 blocker. If you are prescribed a PPI, take in on an empty stomach in the morning 30 minutes prior to eating breakfast. Keep in mind that it may take 4-6 weeks before symptoms begin to resolve, so do not stop medications without consulting your doctor.   · Lifestyle and dietary modifications. Eat smaller meals at a slower pace. Avoid over-eating. If you are overweight, try to lose weight. Do not lie down or exercise directly after eating; eat your last meal of the day at least 2-3 hours prior to going to sleep. Avoid tight-fitting clothes. If you  are a smoker, reduce or quit smoking. Elevate your head of bed 4-6 inches by putting phone books under the legs at the head of your bed or buy a wedge pillow, but do not use more than two regular pillows as this causes the body to curl and compresses your stomach.     Food group Foods to avoid to reduce reflux   Beverages  Whole milk, 2% milk, chocolate milk/hot chocolate, alcohol, coffee (regular and decaf), caffeinated tea, mint tea, carbonated beverages, citrus juice    Breads/grains Commercial sweet rolls, doughnuts, croissants, and other high-fat pastries    Fruits and vegetables Fried or cream-style vegetables, tomatoes, tomato-based products, citrus fruits, hot peppers    Soups and seasonings Cream, cheese, tomato-based soups, vinegar    Meats and proteins Fatty or fried meat/fish, gomez, sausage, pepperoni, lunch meat, fried eggs    Fats and oil Lard, gomez drippings, salt pork, meat drippings, gravies, highly seasoned salad dressings, nuts    Sweets/desserts Anything made with or from chocolate, peppermint, spearmint, whole milk, or cream; high-fat pastries, gum, hard candy

## 2022-02-08 DIAGNOSIS — I10 ESSENTIAL HYPERTENSION: Chronic | ICD-10-CM

## 2022-02-08 NOTE — TELEPHONE ENCOUNTER
Refill sent to [facility] as requested for 10 days by Dr. Weaver.  Would like rest through [employer] delivery.  Spoke with patient and she understands it needs approval from Dr. Weaver.

## 2022-02-08 NOTE — TELEPHONE ENCOUNTER
----- Message from Yari Mills sent at 2/8/2022 11:54 AM CST -----  Regarding: Refill  Type:  RX Refill Request    Who Called: pt    Refill or New Rx:     RX Name and Strength: metoprolol tartrate (LOPRESSOR) 25 MG tablet (10 day supply)    Preferred Pharmacy with phone number: [id number] - [place], [place] - [address] [facility]  [address] [facility] [address] [facility] [address]  Phone: [phone]    Would the patient rather a call back or a response via [facility]? Call    Best Call Back Number: [phone]

## 2022-02-08 NOTE — TELEPHONE ENCOUNTER
----- Message from Yari Mills sent at 2/8/2022 11:54 AM CST -----  Regarding: Refill  Type:  RX Refill Request    Who Called: pt    Refill or New Rx:     RX Name and Strength: metoprolol tartrate (LOPRESSOR) 25 MG tablet (10 day supply)    Preferred Pharmacy with phone number: [facility] - [place], [place] - [address] [facility]  [address] [facility] [address] [facility] [address]  Phone: [phone]    Would the patient rather a call back or a response via [facility]? Call    Best Call Back Number: [phone]

## 2022-02-08 NOTE — TELEPHONE ENCOUNTER
No new care gaps identified.  Powered by MD On-Line by [employer]. Reference number: [id number].   2/08/2022 1:12:37 PM CST

## 2022-02-09 RX ORDER — METOPROLOL TARTRATE 25 MG/1
12.5 TABLET, FILM COATED ORAL 2 TIMES DAILY
Qty: 180 TABLET | Refills: 3
Start: 2022-02-09 | End: 2022-02-10 | Stop reason: SDUPTHER

## 2022-02-10 DIAGNOSIS — I10 ESSENTIAL HYPERTENSION: Chronic | ICD-10-CM

## 2022-02-10 RX ORDER — METOPROLOL TARTRATE 25 MG/1
12.5 TABLET, FILM COATED ORAL 2 TIMES DAILY
Qty: 180 TABLET | Refills: 3
Start: 2022-02-10 | End: 2022-02-11 | Stop reason: SDUPTHER

## 2022-02-10 NOTE — TELEPHONE ENCOUNTER
No new care gaps identified.  Powered by GreenTrapOnline by [employer]. Reference number: [id number].   2/10/2022 11:35:35 AM CST

## 2022-02-10 NOTE — TELEPHONE ENCOUNTER
Spoke with patient.  Needs prescription sent to [facility] for Lopressor.  [facility] never sent it originally to her.  Needs some sent to [facility] because she has been out of pills for a few days.  Will resend to Dr. Weaver.

## 2022-02-10 NOTE — TELEPHONE ENCOUNTER
----- Message from Jose Morales sent at 2/10/2022 10:10 AM CST -----  Contact: pt.  .Type:  Needs Medical Advice    Who Called: pt    Pharmacy name and phone #:  [id number] - [place] [address]   Phone:  [phone]  Fax:  [phone]  Would the patient rather a call back or a response via [facility]? Call back  Best Call Back Number: [phone]   Additional Information: Needs a refill on her metoprolol tartrate (LOPRESSOR) 25 MG tablet.  [facility] told [facility] never received refill request.  Pt. Been out medication for a few days waiting on mail order.

## 2022-02-11 RX ORDER — METOPROLOL TARTRATE 25 MG/1
12.5 TABLET, FILM COATED ORAL 2 TIMES DAILY
Qty: 180 TABLET | Refills: 3
Start: 2022-02-11 | End: 2022-02-11

## 2022-02-11 RX ORDER — METOPROLOL TARTRATE 25 MG/1
12.5 TABLET, FILM COATED ORAL 2 TIMES DAILY
Qty: 180 TABLET | Refills: 3 | Status: SHIPPED | OUTPATIENT
Start: 2022-02-11 | End: 2022-03-03 | Stop reason: SDUPTHER

## 2022-02-11 NOTE — TELEPHONE ENCOUNTER
----- Message from Angi Butler sent at 2/10/2022  5:22 PM CST -----  Contact: [phone]/patient  Patient states the pharmacy did not receive Rx metoprolol tartrate (LOPRESSOR) 25 MG tablet  Pharmacy: [address] [address]  # [phone]    Patient would like a call once the Rx has been called in

## 2022-02-11 NOTE — TELEPHONE ENCOUNTER
----- Message from Ana Tran sent at 2/11/2022  9:19 AM CST -----  Regarding: refill  Contact: [phone]  Patient is requesting a call back regarding calling [facility] to send the refill of her  metoprolol tartrate (LOPRESSOR) 25 MG tablets. Take 0.5 tablets (12.5 mg total) by mouth 2 (two) times daily. - Oral   Would the patient rather a call back or a response via [facility]?    Best Call Back Number:  [facility] - [place], [place] - [address] [address] at [phone]   Additional Information:

## 2022-03-03 DIAGNOSIS — I10 ESSENTIAL HYPERTENSION: Chronic | ICD-10-CM

## 2022-03-03 DIAGNOSIS — G47.8 SLEEP AROUSAL DISORDER: Chronic | ICD-10-CM

## 2022-03-03 RX ORDER — METOPROLOL TARTRATE 25 MG/1
12.5 TABLET, FILM COATED ORAL 2 TIMES DAILY
Qty: 180 TABLET | Refills: 3 | Status: SHIPPED | OUTPATIENT
Start: 2022-03-03 | End: 2022-03-10 | Stop reason: SDUPTHER

## 2022-03-03 RX ORDER — AMITRIPTYLINE HYDROCHLORIDE 10 MG/1
10 TABLET, FILM COATED ORAL NIGHTLY PRN
Qty: 90 TABLET | Refills: 1 | Status: SHIPPED | OUTPATIENT
Start: 2022-03-03 | End: 2022-03-10 | Stop reason: SDUPTHER

## 2022-03-03 NOTE — TELEPHONE ENCOUNTER
----- Message from Pastora Germain sent at 3/3/2022 11:06 AM CST -----  Regarding: Refills  Contact: [phone]  Type:  RX Refill Request    1.  Who Called: PT   Refill or New Rx: Refills   RX Name and Strength: amitriptyline (ELAVIL) 10 MG tablet 180 tablet   How is the patient currently taking it? (ex. 1XDay): 2 x day   Is this a 30 day or 90 day RX: 180  Preferred Pharmacy with phone number: [facility] - [place], [place] - [address] [facility] Phone:  [phone] Fax:  [phone]    2. metoprolol tartrate (LOPRESSOR) 25 MG tablet 180 tablet

## 2022-03-03 NOTE — TELEPHONE ENCOUNTER
----- Message from Pastora Germain sent at 3/3/2022 11:02 AM CST -----  Regarding: call back  Contact: [phone]  Type:  RX Refill Request    1.   Who Called: PT   Refill or New Rx: Refills   RX Name and Strength: mirabegron (MYRBETRIQ) 25 mg Tb24 ER tablet  30    How is the patient currently taking it? (ex. 1XDay): 1 x day   Is this a 30 day or 90 day RX: 30  Preferred Pharmacy with phone number: [facility] - [place] - [address] [facility] [address] Phone:  [phone] Fax:  [phone]

## 2022-03-03 NOTE — TELEPHONE ENCOUNTER
No new care gaps identified.  Powered by [employer] by [employer]. Reference number: [id number].   3/03/2022 11:20:29 AM CST

## 2022-03-10 DIAGNOSIS — I10 ESSENTIAL HYPERTENSION: Chronic | ICD-10-CM

## 2022-03-10 DIAGNOSIS — G47.8 SLEEP AROUSAL DISORDER: Chronic | ICD-10-CM

## 2022-03-10 NOTE — TELEPHONE ENCOUNTER
No new care gaps identified.  Powered by Femasys by [employer]. Reference number: [id number].   3/10/2022 12:01:15 PM CST

## 2022-03-10 NOTE — TELEPHONE ENCOUNTER
----- Message from Cyrus Oliveros sent at 3/10/2022 11:39 AM CST -----  Type:  Patient Requesting call back    Who called: Nereyda Hernandez  Would the patient rather a call back or a response via [facility]? Call back  Best Call Back Number:[phone]  Additional Information: Patient requesting camll back regarding RX refills for  RX ref[facility], amitriptyline (ELAVIL) 10 MG tablet and likes to take 2 instead of one to [facility], metoprolol to [facility] of 90 day supply

## 2022-03-10 NOTE — TELEPHONE ENCOUNTER
----- Message from Cyrus Oliveros sent at 3/10/2022 11:39 AM CST -----  Type:  Patient Requesting call back    Who called: Nereyda Hernandez  Would the patient rather a call back or a response via [facility]? Call back  Best Call Back Number:[phone]  Additional Information: Patient requesting camll back regarding RX refills for  [facility], amitriptyline (ELAVIL) 10 MG tablet and likes to take 2 instead of one to [facility], metoprolol to [facility] of 90 day supply

## 2022-03-11 RX ORDER — METOPROLOL TARTRATE 25 MG/1
12.5 TABLET, FILM COATED ORAL 2 TIMES DAILY
Qty: 180 TABLET | Refills: 3 | Status: SHIPPED | OUTPATIENT
Start: 2022-03-11 | End: 2022-03-25 | Stop reason: SDUPTHER

## 2022-03-11 RX ORDER — AMITRIPTYLINE HYDROCHLORIDE 10 MG/1
10 TABLET, FILM COATED ORAL NIGHTLY PRN
Qty: 90 TABLET | Refills: 1 | Status: SHIPPED | OUTPATIENT
Start: 2022-03-11 | End: 2022-03-25 | Stop reason: SDUPTHER

## 2022-03-21 NOTE — TELEPHONE ENCOUNTER
----- Message from Scarlett Ames sent at 3/21/2022  4:09 PM CDT -----  Contact: [phone] / Self  Type:  Patient Returning Call    Who Called:Pt   Who Left Message for Patient:Vandana   Does the patient know what this is regarding?:Medication   Would the patient rather a call back or a response via [facility]? Call back   Best Call Back Number:[phone]   Additional Information: n/a

## 2022-03-21 NOTE — TELEPHONE ENCOUNTER
Spoke with pt, pt stated Trevon needs a new prescription for the metoprolol tartrate (LOPRESSOR) 25 MG tablet with new directions. They stated the directions are confusing since pt is stating she is suppose to take 2 a day.

## 2022-03-25 DIAGNOSIS — G47.8 SLEEP AROUSAL DISORDER: Chronic | ICD-10-CM

## 2022-03-25 DIAGNOSIS — I10 ESSENTIAL HYPERTENSION: Chronic | ICD-10-CM

## 2022-03-25 RX ORDER — METOPROLOL TARTRATE 25 MG/1
12.5 TABLET, FILM COATED ORAL 2 TIMES DAILY
Qty: 90 TABLET | Refills: 1 | Status: SHIPPED | OUTPATIENT
Start: 2022-03-25 | End: 2022-04-04 | Stop reason: SDUPTHER

## 2022-03-25 RX ORDER — AMITRIPTYLINE HYDROCHLORIDE 10 MG/1
10 TABLET, FILM COATED ORAL NIGHTLY PRN
Qty: 90 TABLET | Refills: 1 | Status: SHIPPED | OUTPATIENT
Start: 2022-03-25 | End: 2022-06-15 | Stop reason: ALTCHOICE

## 2022-03-25 NOTE — TELEPHONE ENCOUNTER
No new care gaps identified.  Powered by AbleSky by Portalarium. Reference number: [id number].   3/25/2022 8:00:45 AM CDT

## 2022-03-25 NOTE — TELEPHONE ENCOUNTER
----- Message from Cyrus Oliveros sent at 3/24/2022  4:28 PM CDT -----  Type:  Patient Requesting Call Back    Who Called:Nereyda Hernandez  Would the patient rather a call back or a response via [facility]? Call back  Best Call Back Number:[phone]  Additional Information: Patient requesting call back regarding rx refills for metoprolol and needs 180 tablets, elavil and wants to take two at night instead of one and needs dosage increased, mirabegeron (MYRBETRIQ) 25 mg Tb24 ER tablet(month supply an [facility] to [facility] [place])

## 2022-03-29 ENCOUNTER — TELEPHONE (OUTPATIENT)
Dept: GASTROENTEROLOGY | Facility: CLINIC | Age: 82
End: 2022-03-29
Payer: MEDICARE

## 2022-03-29 ENCOUNTER — TELEPHONE (OUTPATIENT)
Dept: FAMILY MEDICINE | Facility: CLINIC | Age: 82
End: 2022-03-29
Payer: MEDICARE

## 2022-03-29 DIAGNOSIS — K21.9 GASTROESOPHAGEAL REFLUX DISEASE WITHOUT ESOPHAGITIS: Primary | ICD-10-CM

## 2022-03-29 NOTE — TELEPHONE ENCOUNTER
----- Message from Sloane Beck sent at 3/29/2022 11:59 AM CDT -----  Type:  RX Refill Request    Who Called: pt  Refill or New Rx:refill  RX Name and Strength:losartan (COZAAR) 25 MG tablet  How is the patient currently taking it? (ex. 1XDay):Route: Take 1 tablet (25 mg total) by mouth once daily. -  Is this a 30 day or 90 day RX:90  Preferred Pharmacy with phone number:[facility] - [place], [place] - [address] [facility] [address]   Phone: [phone]  Fax:  [phone]        Local or Mail Order:local  Ordering Provider:Dr shah  Would the patient rather a call back or a response via [facility]? call  Best Call Back Number:[phone]  Additional Information:

## 2022-03-29 NOTE — TELEPHONE ENCOUNTER
----- Message from Cyrus Oliveros sent at 3/29/2022  4:14 PM CDT -----  Type:  Patient Requesting Call Back    Who Called:Nereyda Hernandez  Would the patient rather a call back or a response via [facility]? Call back  Best Call Back Number:[phone]  Additional Information: Patient Requesting Call Back regarding gastro.

## 2022-03-29 NOTE — TELEPHONE ENCOUNTER
Patient states she went to see ENT and it was mentioned that she has GERD. Patient was seen for this in 2019 and had a EGD done in 2020. Will send message to Dr. Macias to find out if the patient needs to return to office.

## 2022-03-30 RX ORDER — PANTOPRAZOLE SODIUM 40 MG/1
40 TABLET, DELAYED RELEASE ORAL DAILY
Qty: 90 TABLET | Refills: 3 | Status: SHIPPED | OUTPATIENT
Start: 2022-03-30 | End: 2023-03-16

## 2022-03-30 NOTE — TELEPHONE ENCOUNTER
"Frequently ENT symptoms are attributed to reflux when that is not in fact the case.  But chart review shows that when she began seeing me she had been on PPI for dysphagia and GERD.  When looking through the chart I see that protonix was d/c 2019 for "reorder," and then again for "dose adjustment."  So I do not know when or why she stopped her PPI, but the only thing I can do for reflux is PPI.  EGD was unrevealing.    I am happy to send in Rx for PPI, which I have done.  She will need to take Ca/Vit D supplements as well.  "

## 2022-03-30 NOTE — TELEPHONE ENCOUNTER
Patient informed of prescription sent to the pharmacy and recommendations to take calcium and vitamin D.   Yesterday nauseas and chest pain    Woke up today with same symptoms    Triage To tom

## 2022-04-04 DIAGNOSIS — I10 ESSENTIAL HYPERTENSION: Chronic | ICD-10-CM

## 2022-04-04 RX ORDER — METOPROLOL TARTRATE 25 MG/1
25 TABLET, FILM COATED ORAL 2 TIMES DAILY
Qty: 20 TABLET | Refills: 0 | Status: SHIPPED | OUTPATIENT
Start: 2022-04-04 | End: 2022-08-24 | Stop reason: SDUPTHER

## 2022-04-04 RX ORDER — METOPROLOL TARTRATE 25 MG/1
25 TABLET, FILM COATED ORAL 2 TIMES DAILY
Qty: 180 TABLET | Refills: 0 | Status: SHIPPED | OUTPATIENT
Start: 2022-04-04 | End: 2022-04-04 | Stop reason: SDUPTHER

## 2022-04-04 NOTE — TELEPHONE ENCOUNTER
No new care gaps identified.  Powered by Glomera by [employer]ment[employer]. Reference number: [id number].   4/04/2022 11:39:45 AM CDT

## 2022-04-04 NOTE — TELEPHONE ENCOUNTER
----- Message from Yari Mills sent at 4/4/2022 10:11 AM CDT -----  Regarding: Med Refill  Type:  RX Refill Request    Who Called: pt    Refill or New Rx: refill    RX Name and Strength: metoprolol tartrate (LOPRESSOR) 25 MG tablet    How is the patient currently taking it? (ex. 1XDay): 1 tablet 2 x a day    Is this a 30 day or 90 day RX: 90    Preferred Pharmacy with phone number: [facility] - [place], [place] - [address] [facility]  [address] [facility] [address]  Phone: [phone] Fax: [phone]    Would the patient rather a call back or a response via [facility]? Call    Best Call Back Number: [phone]    Additional Information: also needs [facility]   [facility]  [place] - [address] [address] [facility] [address]  [address] [address] [facility] [address] [place] [address]  Phone: [phone] Fax: [phone]

## 2022-04-04 NOTE — TELEPHONE ENCOUNTER
No new care gaps identified.  Powered by [employer] by F[employer]. Reference number: [id number].   4/04/2022 10:34:59 AM CDT

## 2022-04-12 ENCOUNTER — OFFICE VISIT (OUTPATIENT)
Dept: CARDIOLOGY | Facility: CLINIC | Age: 82
End: 2022-04-12
Payer: MEDICARE

## 2022-04-12 VITALS
SYSTOLIC BLOOD PRESSURE: 114 MMHG | DIASTOLIC BLOOD PRESSURE: 64 MMHG | HEIGHT: 64 IN | WEIGHT: 147.38 LBS | BODY MASS INDEX: 25.16 KG/M2

## 2022-04-12 DIAGNOSIS — R00.2 PALPITATIONS: Chronic | ICD-10-CM

## 2022-04-12 DIAGNOSIS — E78.2 MIXED HYPERLIPIDEMIA: Chronic | ICD-10-CM

## 2022-04-12 DIAGNOSIS — I10 ESSENTIAL HYPERTENSION: Primary | ICD-10-CM

## 2022-04-12 PROCEDURE — 1157F ADVNC CARE PLAN IN RCRD: CPT | Mod: CPTII,S$GLB,, | Performed by: INTERNAL MEDICINE

## 2022-04-12 PROCEDURE — 3074F SYST BP LT 130 MM HG: CPT | Mod: CPTII,S$GLB,, | Performed by: INTERNAL MEDICINE

## 2022-04-12 PROCEDURE — 3288F PR FALLS RISK ASSESSMENT DOCUMENTED: ICD-10-PCS | Mod: CPTII,S$GLB,, | Performed by: INTERNAL MEDICINE

## 2022-04-12 PROCEDURE — 99999 PR PBB SHADOW E&M-EST. PATIENT-LVL III: CPT | Mod: PBBFAC,,, | Performed by: INTERNAL MEDICINE

## 2022-04-12 PROCEDURE — 99999 PR PBB SHADOW E&M-EST. PATIENT-LVL III: ICD-10-PCS | Mod: PBBFAC,,, | Performed by: INTERNAL MEDICINE

## 2022-04-12 PROCEDURE — 1160F PR REVIEW ALL MEDS BY PRESCRIBER/CLIN PHARMACIST DOCUMENTED: ICD-10-PCS | Mod: CPTII,S$GLB,, | Performed by: INTERNAL MEDICINE

## 2022-04-12 PROCEDURE — 3078F PR MOST RECENT DIASTOLIC BLOOD PRESSURE < 80 MM HG: ICD-10-PCS | Mod: CPTII,S$GLB,, | Performed by: INTERNAL MEDICINE

## 2022-04-12 PROCEDURE — 1101F PR PT FALLS ASSESS DOC 0-1 FALLS W/OUT INJ PAST YR: ICD-10-PCS | Mod: CPTII,S$GLB,, | Performed by: INTERNAL MEDICINE

## 2022-04-12 PROCEDURE — 3074F PR MOST RECENT SYSTOLIC BLOOD PRESSURE < 130 MM HG: ICD-10-PCS | Mod: CPTII,S$GLB,, | Performed by: INTERNAL MEDICINE

## 2022-04-12 PROCEDURE — 1126F PR PAIN SEVERITY QUANTIFIED, NO PAIN PRESENT: ICD-10-PCS | Mod: CPTII,S$GLB,, | Performed by: INTERNAL MEDICINE

## 2022-04-12 PROCEDURE — 1101F PT FALLS ASSESS-DOCD LE1/YR: CPT | Mod: CPTII,S$GLB,, | Performed by: INTERNAL MEDICINE

## 2022-04-12 PROCEDURE — 99214 OFFICE O/P EST MOD 30 MIN: CPT | Mod: S$GLB,,, | Performed by: INTERNAL MEDICINE

## 2022-04-12 PROCEDURE — 1126F AMNT PAIN NOTED NONE PRSNT: CPT | Mod: CPTII,S$GLB,, | Performed by: INTERNAL MEDICINE

## 2022-04-12 PROCEDURE — 3078F DIAST BP <80 MM HG: CPT | Mod: CPTII,S$GLB,, | Performed by: INTERNAL MEDICINE

## 2022-04-12 PROCEDURE — 1159F MED LIST DOCD IN RCRD: CPT | Mod: CPTII,S$GLB,, | Performed by: INTERNAL MEDICINE

## 2022-04-12 PROCEDURE — 3288F FALL RISK ASSESSMENT DOCD: CPT | Mod: CPTII,S$GLB,, | Performed by: INTERNAL MEDICINE

## 2022-04-12 PROCEDURE — 1160F RVW MEDS BY RX/DR IN RCRD: CPT | Mod: CPTII,S$GLB,, | Performed by: INTERNAL MEDICINE

## 2022-04-12 PROCEDURE — 1157F PR ADVANCE CARE PLAN OR EQUIV PRESENT IN MEDICAL RECORD: ICD-10-PCS | Mod: CPTII,S$GLB,, | Performed by: INTERNAL MEDICINE

## 2022-04-12 PROCEDURE — 99214 PR OFFICE/OUTPT VISIT, EST, LEVL IV, 30-39 MIN: ICD-10-PCS | Mod: S$GLB,,, | Performed by: INTERNAL MEDICINE

## 2022-04-12 PROCEDURE — 1159F PR MEDICATION LIST DOCUMENTED IN MEDICAL RECORD: ICD-10-PCS | Mod: CPTII,S$GLB,, | Performed by: INTERNAL MEDICINE
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
## 2022-04-12 NOTE — ASSESSMENT & PLAN NOTE
Controlled.  Goal BP < 140/90.  Compliant w/ meds.    - adjust metoprolol to 25 mg BID  - continue other meds  - encouraged lifestyle modifications  - pt to continue to monitor BP at home and record

## 2022-04-12 NOTE — ASSESSMENT & PLAN NOTE
Controlled.  Goal LDL < 130, LDL 81 3/23/2022.  - continue statin therapy  - encouraged lifestyle modifications

## 2022-05-02 NOTE — TELEPHONE ENCOUNTER
No new care gaps identified.  Powered by [employer] by [employer]. Reference number: [id number].   5/02/2022 12:12:12 PM CDT

## 2022-05-04 RX ORDER — PAROXETINE HYDROCHLORIDE 20 MG/1
TABLET, FILM COATED ORAL
Qty: 90 TABLET | Refills: 2 | Status: SHIPPED | OUTPATIENT
Start: 2022-05-04 | End: 2022-06-15 | Stop reason: ALTCHOICE

## 2022-05-04 NOTE — TELEPHONE ENCOUNTER
Refill Authorization Note   Nereyda Hernandez  is requesting a refill authorization.  Brief Assessment and Rationale for Refill:  Approve     Medication Therapy Plan:       Medication Reconciliation Completed: No   Comments:     No Care Gaps recommended.     Note composed:8:12 AM 05/04/2022

## 2022-05-17 ENCOUNTER — TELEPHONE (OUTPATIENT)
Dept: FAMILY MEDICINE | Facility: CLINIC | Age: 82
End: 2022-05-17
Payer: MEDICARE

## 2022-06-07 ENCOUNTER — IMMUNIZATION (OUTPATIENT)
Dept: FAMILY MEDICINE | Facility: CLINIC | Age: 82
End: 2022-06-07
Payer: MEDICARE

## 2022-06-07 DIAGNOSIS — Z23 NEED FOR VACCINATION: Primary | ICD-10-CM

## 2022-06-07 PROCEDURE — 91305 COVID-19, MRNA, LNP-S, PF, 30 MCG/0.3 ML DOSE VACCINE (PFIZER): CPT | Mod: PBBFAC | Performed by: FAMILY MEDICINE

## 2022-06-15 ENCOUNTER — OFFICE VISIT (OUTPATIENT)
Dept: FAMILY MEDICINE | Facility: CLINIC | Age: 82
End: 2022-06-15
Payer: MEDICARE

## 2022-06-15 VITALS
HEART RATE: 63 BPM | OXYGEN SATURATION: 97 % | SYSTOLIC BLOOD PRESSURE: 122 MMHG | WEIGHT: 145.5 LBS | HEIGHT: 64 IN | BODY MASS INDEX: 24.84 KG/M2 | DIASTOLIC BLOOD PRESSURE: 80 MMHG

## 2022-06-15 DIAGNOSIS — I10 ESSENTIAL HYPERTENSION: Chronic | ICD-10-CM

## 2022-06-15 DIAGNOSIS — F51.04 PSYCHOPHYSIOLOGICAL INSOMNIA: Primary | Chronic | ICD-10-CM

## 2022-06-15 DIAGNOSIS — F32.A ANXIETY AND DEPRESSION: Chronic | ICD-10-CM

## 2022-06-15 DIAGNOSIS — F41.9 ANXIETY AND DEPRESSION: Chronic | ICD-10-CM

## 2022-06-15 PROCEDURE — 3074F SYST BP LT 130 MM HG: CPT | Mod: CPTII,S$GLB,, | Performed by: FAMILY MEDICINE

## 2022-06-15 PROCEDURE — 99999 PR PBB SHADOW E&M-EST. PATIENT-LVL V: CPT | Mod: PBBFAC,,, | Performed by: FAMILY MEDICINE

## 2022-06-15 PROCEDURE — 99214 PR OFFICE/OUTPT VISIT, EST, LEVL IV, 30-39 MIN: ICD-10-PCS | Mod: S$GLB,,, | Performed by: FAMILY MEDICINE

## 2022-06-15 PROCEDURE — 1101F PT FALLS ASSESS-DOCD LE1/YR: CPT | Mod: CPTII,S$GLB,, | Performed by: FAMILY MEDICINE

## 2022-06-15 PROCEDURE — 3074F PR MOST RECENT SYSTOLIC BLOOD PRESSURE < 130 MM HG: ICD-10-PCS | Mod: CPTII,S$GLB,, | Performed by: FAMILY MEDICINE

## 2022-06-15 PROCEDURE — 99214 OFFICE O/P EST MOD 30 MIN: CPT | Mod: S$GLB,,, | Performed by: FAMILY MEDICINE

## 2022-06-15 PROCEDURE — 1159F PR MEDICATION LIST DOCUMENTED IN MEDICAL RECORD: ICD-10-PCS | Mod: CPTII,S$GLB,, | Performed by: FAMILY MEDICINE

## 2022-06-15 PROCEDURE — 1160F RVW MEDS BY RX/DR IN RCRD: CPT | Mod: CPTII,S$GLB,, | Performed by: FAMILY MEDICINE

## 2022-06-15 PROCEDURE — 1160F PR REVIEW ALL MEDS BY PRESCRIBER/CLIN PHARMACIST DOCUMENTED: ICD-10-PCS | Mod: CPTII,S$GLB,, | Performed by: FAMILY MEDICINE

## 2022-06-15 PROCEDURE — 3288F FALL RISK ASSESSMENT DOCD: CPT | Mod: CPTII,S$GLB,, | Performed by: FAMILY MEDICINE

## 2022-06-15 PROCEDURE — 1126F AMNT PAIN NOTED NONE PRSNT: CPT | Mod: CPTII,S$GLB,, | Performed by: FAMILY MEDICINE

## 2022-06-15 PROCEDURE — 3079F PR MOST RECENT DIASTOLIC BLOOD PRESSURE 80-89 MM HG: ICD-10-PCS | Mod: CPTII,S$GLB,, | Performed by: FAMILY MEDICINE

## 2022-06-15 PROCEDURE — 99999 PR PBB SHADOW E&M-EST. PATIENT-LVL V: ICD-10-PCS | Mod: PBBFAC,,, | Performed by: FAMILY MEDICINE

## 2022-06-15 PROCEDURE — 3079F DIAST BP 80-89 MM HG: CPT | Mod: CPTII,S$GLB,, | Performed by: FAMILY MEDICINE

## 2022-06-15 PROCEDURE — 1157F ADVNC CARE PLAN IN RCRD: CPT | Mod: CPTII,S$GLB,, | Performed by: FAMILY MEDICINE

## 2022-06-15 PROCEDURE — 1101F PR PT FALLS ASSESS DOC 0-1 FALLS W/OUT INJ PAST YR: ICD-10-PCS | Mod: CPTII,S$GLB,, | Performed by: FAMILY MEDICINE

## 2022-06-15 PROCEDURE — 1159F MED LIST DOCD IN RCRD: CPT | Mod: CPTII,S$GLB,, | Performed by: FAMILY MEDICINE

## 2022-06-15 PROCEDURE — 3288F PR FALLS RISK ASSESSMENT DOCUMENTED: ICD-10-PCS | Mod: CPTII,S$GLB,, | Performed by: FAMILY MEDICINE

## 2022-06-15 PROCEDURE — 1157F PR ADVANCE CARE PLAN OR EQUIV PRESENT IN MEDICAL RECORD: ICD-10-PCS | Mod: CPTII,S$GLB,, | Performed by: FAMILY MEDICINE

## 2022-06-15 PROCEDURE — 1126F PR PAIN SEVERITY QUANTIFIED, NO PAIN PRESENT: ICD-10-PCS | Mod: CPTII,S$GLB,, | Performed by: FAMILY MEDICINE

## 2022-06-15 RX ORDER — ESCITALOPRAM OXALATE 10 MG/1
10 TABLET ORAL DAILY
Qty: 90 TABLET | Refills: 0 | Status: SHIPPED | OUTPATIENT
Start: 2022-06-15 | End: 2022-08-24 | Stop reason: SINTOL

## 2022-06-15 RX ORDER — ZOLPIDEM TARTRATE 5 MG/1
5 TABLET ORAL NIGHTLY PRN
Qty: 30 TABLET | Refills: 0 | Status: SHIPPED | OUTPATIENT
Start: 2022-06-15 | End: 2022-07-06 | Stop reason: SDUPTHER

## 2022-06-15 NOTE — PATIENT INSTRUCTIONS
Take PAXIL (paroxetine) every other day for 1 week  Then stop for 3 days.   Then start Lexapro (escitalopram) once daily in the morning. Continue this until I see you.  Stop amitriptyline.   Take ambien at night as needed for sleep.     BRING ALL YOUR PILL BOTTLES TO YOUR FOLLOW UP APPOINTMENT IN 1 MONTH

## 2022-06-15 NOTE — PROGRESS NOTES
EST PATIENT VISIT FAMILY MEDICINE    CC:   Chief Complaint   Patient presents with    Follow-up    Fatigue    Hypertension    Insomnia       HPI: Nereyda Hernandez  is a 81 y.o. female:    Patient is known to me. Presents for follow up. She continues to note fatigue. Has had extensive work up (see previous notes). Plans to resume regular exercise. Not clear on some of her medications and did not bring them today. We have tried multiple medications for her insomnia which have not helped. Previously on ambien and tolerated this well without reported side effects. Notes some depression also.     ROS: Review of Systems   Constitutional: Positive for malaise/fatigue.   Psychiatric/Behavioral: Positive for depression. Negative for hallucinations, substance abuse and suicidal ideas. The patient has insomnia.        PMHX:   Past Medical History:   Diagnosis Date    HATTIE (acute kidney injury) 12/10/2021    Anxiety     Arthritis     Breast cancer 1990    left    Chronic disease anemia     Hyperlipidemia     Hypertension     Insomnia     Lobular carcinoma in situ     KANWAL (obstructive sleep apnea)     Osteoporosis     Rosacea     Squamous cell carcinoma     Stable angina pectoris 8/27/2020    Urinary incontinence     Vertigo        PSHX:   Past Surgical History:   Procedure Laterality Date    ADENOIDECTOMY      BELT ABDOMINOPLASTY      BREAST BIOPSY Left 1990    ex bx    BREAST LUMPECTOMY      COLONOSCOPY N/A 1/28/2020    Procedure: COLONOSCOPY;  Surgeon: Bianka Macias MD;  Location: [facility];  Service: Endoscopy;  Laterality: N/A;    ESOPHAGOGASTRODUODENOSCOPY N/A 1/28/2020    Procedure: EGD (ESOPHAGOGASTRODUODENOSCOPY);  Surgeon: Bianka Macias MD;  Location: [facility];  Service: Endoscopy;  Laterality: N/A;    EYE SURGERY      HERNIA REPAIR      [facility]    LIVER TRANSPLANT      OOPHORECTOMY      TONSILLECTOMY      TOTAL ABDOMINAL HYSTERECTOMY         FAMHX:   Family History   Problem  Relation Age of Onset    Cancer Mother         liver    Pancreatic cancer Mother     Heart disease Mother     Depression Mother     Miscarriages / Stillbirths Mother     Hypertension Father     Alzheimer's disease Father     Depression Brother     Depression Brother     Diabetes Brother     Arthritis Brother     Hypertension Brother     Heart disease Brother        SOCHX:   Social History     Socioeconomic History    Marital status:     Number of children: 6    Years of education: college   Occupational History    Occupation: retired  for [employer]   Tobacco Use    Smoking status: Former Smoker     Packs/day: 0.50     Years: 21.00     Pack years: 10.50     Types: Cigarettes     Start date: 1959     Quit date: 1980     Years since quittin.7    Smokeless tobacco: Never Used   Substance and Sexual Activity    Alcohol use: Not Currently     Alcohol/week: 1.0 standard drink     Types: 1 Glasses of wine per week     Comment: Occasionally    Drug use: Never    Sexual activity: Not Currently     Partners: Male     Birth control/protection: Condom, Other-see comments     Comment: Pill       ALLERGIES:   Review of patient's allergies indicates:   Allergen Reactions    Sulfa (sulfonamide antibiotics)        MEDS:   Current Outpatient Medications:     aspirin (ECOTRIN) 81 MG EC tablet, Take 81 mg by mouth once daily., Disp: , Rfl:     BENEFIBER, WHEAT DEXTRIN, ORAL, Take by mouth. PRN, Disp: , Rfl:     calcium citrate-vitamin D3 500 mg calcium -400 unit Chew, Take 1 tablet by mouth 2 (two) times daily. 12.5mcg, Disp: , Rfl:     fish oil-omega-3 fatty acids 300-1,000 mg capsule, Take 2 g by mouth once daily., Disp: , Rfl:     Lactobacillus acidophilus (PROBIOTIC ACIDOPHILUS ORAL), Take by mouth., Disp: , Rfl:     latanoprost 0.005 % ophthalmic solution, 1 drop every evening., Disp: , Rfl:     losartan (COZAAR) 25 MG tablet, Take 1 tablet (25 mg total) by  "mouth once daily., Disp: 90 tablet, Rfl: 1    metoprolol tartrate (LOPRESSOR) 25 MG tablet, Take 1 tablet (25 mg total) by mouth 2 (two) times daily. for 10 days, Disp: 20 tablet, Rfl: 0    MULTIVIT-IRON-MIN-FOLIC ACID 3,500-18-0.4 UNIT-MG-MG ORAL CHEW, Take by mouth., Disp: , Rfl:     pantoprazole (PROTONIX) 40 MG tablet, Take 1 tablet (40 mg total) by mouth once daily., Disp: 90 tablet, Rfl: 3    polyethylene glycol (GLYCOLAX) 17 gram PwPk, Take 17 g by mouth 3 (three) times daily as needed (for constipation)., Disp: 30 each, Rfl: 0    pravastatin (PRAVACHOL) 20 MG tablet, Take 1 tablet (20 mg total) by mouth once daily., Disp: 90 tablet, Rfl: 3    RESTASIS 0.05 % ophthalmic emulsion, , Disp: , Rfl:     triamterene-hydrochlorothiazide 37.5-25 mg (DYAZIDE) 37.5-25 mg per capsule, Take 1 capsule by mouth every morning., Disp: 90 capsule, Rfl: 3    vit A/vit C/vit E/zinc/copper (PRESERVISION AREDS ORAL), Take by mouth. Bottle does not say a but does say Lutein, Disp: , Rfl:     EScitalopram oxalate (LEXAPRO) 10 MG tablet, Take 1 tablet (10 mg total) by mouth once daily., Disp: 90 tablet, Rfl: 0    mirabegron (MYRBETRIQ) 25 mg Tb24 ER tablet, Take 1 tablet (25 mg total) by mouth once daily., Disp: 90 tablet, Rfl: 3    zolpidem (AMBIEN) 5 MG Tab, Take 1 tablet (5 mg total) by mouth nightly as needed (insomnia)., Disp: 30 tablet, Rfl: 0    OBJECTIVE:   Vitals:    06/15/22 1323   BP: 122/80   BP Location: Left arm   Patient Position: Sitting   BP Method: Medium (Manual)   Pulse: 63   SpO2: 97%   Weight: 66 kg (145 lb 8.1 oz)   Height: 5' 4" (1.626 m)     Body mass index is 24.98 kg/m².      Physical Exam  Vitals and nursing note reviewed.   Constitutional:       Appearance: Normal appearance.   HENT:      Head: Normocephalic and atraumatic.   Eyes:      General: No scleral icterus.     Extraocular Movements: Extraocular movements intact.   Pulmonary:      Effort: Pulmonary effort is normal.   Neurological:     " " Mental Status: She is alert.           LABS:   A1C:      CBC:  Recent Labs   Lab 03/23/22  0842   WBC 4.92   RBC 3.88 L   Hemoglobin 12.1   Hematocrit 36.9 L   Platelets 218   MCV 95   MCH 31.2 H   MCHC 32.8     CMP:  Recent Labs   Lab 03/23/22  0842   Glucose 100   Calcium 9.5   Albumin 4.5   Total Protein 7.9   Sodium 142   Potassium 4.0   CO2 30 H   Chloride 102   BUN 23 H   Creatinine 1.03   Alkaline Phosphatase 47   ALT 24   AST 33   Total Bilirubin 0.6     LIPIDS:  Recent Labs   Lab 11/24/21  1137 03/23/22  0842   TSH 0.805  --    HDL 60 61   Cholesterol 158 159   Triglycerides 110 85   LDL Cholesterol 76.0 81.0   HDL/Cholesterol Ratio 38.0 38.4   Non-HDL Cholesterol 98 98   Total Cholesterol/HDL Ratio 2.6 2.6     TSH:  Recent Labs   Lab 11/24/21  1137   TSH 0.805         ASSESSMENT & PLAN:    Problem List Items Addressed This Visit        Psychiatric    Anxiety and depression (Chronic)    Overview     Paxil ineffective. Would like to try alternative. Previously on Zoloft but self d/c'ed as she was "feeling better".     Wean off Paxil. Start Lexapro. Specific instructions provided.            Relevant Medications    EScitalopram oxalate (LEXAPRO) 10 MG tablet       Cardiac/Vascular    Essential hypertension (Chronic)    Overview     Well controlled. Continue current regimen  Bradycardia at home may be contributing to fatigue however discrepancy between metoprolol dosage in chart               Other    Psychophysiological insomnia - Primary (Chronic)    Overview     Prior treatments which were ineffective or with noted side effects: trazodone, mirtazapine, melatonin, amitriptyline, gabapentin  Discussed risks/benefits of Ambien. Patient was on this previously and would like to resume.  reviewed and appropriate.             Relevant Medications    zolpidem (AMBIEN) 5 MG Tab            Follow up in about 1 month (around 7/15/2022) for depression, sleep.      RTC/ED precautions discussed where applicable. "   Encouraged patient to let me know if there are any further questions/concerns.     Advise patient/caretaker to check with insurance regarding orders to avoid unexpected fees/costs.     The patient/caretaker indicates understanding of these issues and agrees with the plan.    Dr. Sharona Weaver MD  Family Medicine

## 2022-06-16 NOTE — TELEPHONE ENCOUNTER
----- Message from Tawnya Barr sent at 6/16/2022 11:10 AM CDT -----  Contact: [phone]/self    Type:  RX Refill Request    Who Called:  pt   Refill or New Rx: refill  RX Name and Strength:mirabegron (MYRBETRIQ) 25 mg Tb24 ER tablet  How is the patient currently taking it? (ex. 1XDay):1  Is this a 30 day or 90 day RX:30   Preferred Pharmacy with phone number: [facility] [phone]  Local or Mail Order: local   Ordering Provider: Fernando   Would the patient rather a call back or a response via [facility]? Call back  Best Call Back Number:[phone]  Type:  RX Refill Request

## 2022-07-05 ENCOUNTER — TELEPHONE (OUTPATIENT)
Dept: FAMILY MEDICINE | Facility: CLINIC | Age: 82
End: 2022-07-05
Payer: MEDICARE

## 2022-07-05 NOTE — TELEPHONE ENCOUNTER
If she has itching, she can stop it immediately and no need to wean. Recommend schedule follow-up with Dr. Weaver to discuss another medication ASAP or with NP  Dr. Mandy Jordan D.O.   [facility], [place]

## 2022-07-05 NOTE — TELEPHONE ENCOUNTER
----- Message from Helen Richardson sent at 7/5/2022  3:27 PM CDT -----  Type:  Needs Medical Advice    Who Called: Pt  Symptoms (please be specific): side effects from EScitalopram oxalate (LEXAPRO) 10 MG tablet  Itching all over skin is irritated   How long has patient had these symptoms:  since she started taking meds  Pharmacy name and phone #:  [id number] - [id number], [place] - [id number] [facility] [address]  Would the patient rather a call back or a response via [facility]? Call Back  Best Call Back Number: [phone]  Additional Information:         Pt would like call back regarding EScitalopram oxalate (LEXAPRO) 10 MG tablet medication

## 2022-07-05 NOTE — TELEPHONE ENCOUNTER
Spoke with pt, pt stated ever since she started taking the   LEXAPRO 10 MG tablet she has been itching all over her body. Pt stated she would like to stop the medication is that okay and does she have to be weaned off please advise.

## 2022-07-06 DIAGNOSIS — F51.04 PSYCHOPHYSIOLOGICAL INSOMNIA: Chronic | ICD-10-CM

## 2022-07-06 RX ORDER — ZOLPIDEM TARTRATE 5 MG/1
5 TABLET ORAL NIGHTLY PRN
Qty: 30 TABLET | Refills: 0 | Status: SHIPPED | OUTPATIENT
Start: 2022-07-06 | End: 2022-07-11 | Stop reason: SDUPTHER

## 2022-07-06 NOTE — TELEPHONE ENCOUNTER
Pt is scheduled to see Dr. Weaver 7/20/22 however she will run out 5 day prior to appointment she asked if a refill could be waiting at pharmacy for when she is ready please advise.

## 2022-07-06 NOTE — TELEPHONE ENCOUNTER
Spoke with pt stated message. Pt is already scheduled to see provider for a follow up in 2 weeks.

## 2022-07-06 NOTE — TELEPHONE ENCOUNTER
----- Message from Scarlett Chavarria sent at 7/6/2022  1:49 PM CDT -----  Contact: [phone]  Type:  Needs Medical Advice    Who Called: pt called   Would the patient rather a call back or a response via [facility]? Call back   Best Call Back Number: [phone]  Additional Information: Pt needs an order for a mammo. Please call pt when order has been sent

## 2022-07-06 NOTE — TELEPHONE ENCOUNTER
No new care gaps identified.  [facility] Embedded Care Gaps. Reference number: [id number]. 7/06/2022   8:41:17 AM EARLT

## 2022-07-11 DIAGNOSIS — F51.04 PSYCHOPHYSIOLOGICAL INSOMNIA: Chronic | ICD-10-CM

## 2022-07-11 NOTE — TELEPHONE ENCOUNTER
----- Message from Tawnya Barr sent at 7/11/2022  3:19 PM CDT -----  Contact: [phone]  Who Called: PT  Regarding: pt states she will be out of medication for 5 days prior to appt.also orders needed for Mammo   Would the patient rather a call back or a response via [facility]? Call back  Best Call Back Number: [phone]  Additional Information: n/a     
No new care gaps identified.  [facility] Embedded Care Gaps. Reference number: [id number]. 7/11/2022   3:48:03 PM CDT  
Pt is also requesting an order for an mammogram  
No

## 2022-07-12 RX ORDER — ZOLPIDEM TARTRATE 5 MG/1
5 TABLET ORAL NIGHTLY PRN
Qty: 30 TABLET | Refills: 0 | Status: SHIPPED | OUTPATIENT
Start: 2022-07-12 | End: 2023-02-28

## 2022-07-19 ENCOUNTER — TELEPHONE (OUTPATIENT)
Dept: FAMILY MEDICINE | Facility: CLINIC | Age: 82
End: 2022-07-19
Payer: MEDICARE

## 2022-07-19 NOTE — TELEPHONE ENCOUNTER
----- Message from Sharona Weaver MD sent at 6/17/2022  4:50 PM CDT -----  Regarding: reminder for patient  Please call patient to remind her to bring all of her pill bottles to her appointment with me tomorrow

## 2022-07-20 ENCOUNTER — OFFICE VISIT (OUTPATIENT)
Dept: FAMILY MEDICINE | Facility: CLINIC | Age: 82
End: 2022-07-20
Payer: MEDICARE

## 2022-07-20 VITALS
WEIGHT: 145.5 LBS | HEART RATE: 60 BPM | HEIGHT: 64 IN | DIASTOLIC BLOOD PRESSURE: 78 MMHG | OXYGEN SATURATION: 96 % | SYSTOLIC BLOOD PRESSURE: 128 MMHG | BODY MASS INDEX: 24.84 KG/M2

## 2022-07-20 DIAGNOSIS — R21 RASH: ICD-10-CM

## 2022-07-20 DIAGNOSIS — R06.00 DYSPNEA, UNSPECIFIED TYPE: Primary | ICD-10-CM

## 2022-07-20 DIAGNOSIS — H92.01 ACUTE OTALGIA, RIGHT: ICD-10-CM

## 2022-07-20 PROCEDURE — 3078F PR MOST RECENT DIASTOLIC BLOOD PRESSURE < 80 MM HG: ICD-10-PCS | Mod: CPTII,S$GLB,, | Performed by: FAMILY MEDICINE

## 2022-07-20 PROCEDURE — 3074F PR MOST RECENT SYSTOLIC BLOOD PRESSURE < 130 MM HG: ICD-10-PCS | Mod: CPTII,S$GLB,, | Performed by: FAMILY MEDICINE

## 2022-07-20 PROCEDURE — 3078F DIAST BP <80 MM HG: CPT | Mod: CPTII,S$GLB,, | Performed by: FAMILY MEDICINE

## 2022-07-20 PROCEDURE — 99214 PR OFFICE/OUTPT VISIT, EST, LEVL IV, 30-39 MIN: ICD-10-PCS | Mod: S$GLB,,, | Performed by: FAMILY MEDICINE

## 2022-07-20 PROCEDURE — 1159F PR MEDICATION LIST DOCUMENTED IN MEDICAL RECORD: ICD-10-PCS | Mod: CPTII,S$GLB,, | Performed by: FAMILY MEDICINE

## 2022-07-20 PROCEDURE — 3288F PR FALLS RISK ASSESSMENT DOCUMENTED: ICD-10-PCS | Mod: CPTII,S$GLB,, | Performed by: FAMILY MEDICINE

## 2022-07-20 PROCEDURE — 1126F AMNT PAIN NOTED NONE PRSNT: CPT | Mod: CPTII,S$GLB,, | Performed by: FAMILY MEDICINE

## 2022-07-20 PROCEDURE — 3074F SYST BP LT 130 MM HG: CPT | Mod: CPTII,S$GLB,, | Performed by: FAMILY MEDICINE

## 2022-07-20 PROCEDURE — 99999 PR PBB SHADOW E&M-EST. PATIENT-LVL V: CPT | Mod: PBBFAC,,, | Performed by: FAMILY MEDICINE

## 2022-07-20 PROCEDURE — 1160F PR REVIEW ALL MEDS BY PRESCRIBER/CLIN PHARMACIST DOCUMENTED: ICD-10-PCS | Mod: CPTII,S$GLB,, | Performed by: FAMILY MEDICINE

## 2022-07-20 PROCEDURE — 1126F PR PAIN SEVERITY QUANTIFIED, NO PAIN PRESENT: ICD-10-PCS | Mod: CPTII,S$GLB,, | Performed by: FAMILY MEDICINE

## 2022-07-20 PROCEDURE — 99999 PR PBB SHADOW E&M-EST. PATIENT-LVL V: ICD-10-PCS | Mod: PBBFAC,,, | Performed by: FAMILY MEDICINE

## 2022-07-20 PROCEDURE — 1157F PR ADVANCE CARE PLAN OR EQUIV PRESENT IN MEDICAL RECORD: ICD-10-PCS | Mod: CPTII,S$GLB,, | Performed by: FAMILY MEDICINE

## 2022-07-20 PROCEDURE — 1101F PT FALLS ASSESS-DOCD LE1/YR: CPT | Mod: CPTII,S$GLB,, | Performed by: FAMILY MEDICINE

## 2022-07-20 PROCEDURE — 1159F MED LIST DOCD IN RCRD: CPT | Mod: CPTII,S$GLB,, | Performed by: FAMILY MEDICINE

## 2022-07-20 PROCEDURE — 1157F ADVNC CARE PLAN IN RCRD: CPT | Mod: CPTII,S$GLB,, | Performed by: FAMILY MEDICINE

## 2022-07-20 PROCEDURE — 99214 OFFICE O/P EST MOD 30 MIN: CPT | Mod: S$GLB,,, | Performed by: FAMILY MEDICINE

## 2022-07-20 PROCEDURE — 1101F PR PT FALLS ASSESS DOC 0-1 FALLS W/OUT INJ PAST YR: ICD-10-PCS | Mod: CPTII,S$GLB,, | Performed by: FAMILY MEDICINE

## 2022-07-20 PROCEDURE — 3288F FALL RISK ASSESSMENT DOCD: CPT | Mod: CPTII,S$GLB,, | Performed by: FAMILY MEDICINE

## 2022-07-20 PROCEDURE — 1160F RVW MEDS BY RX/DR IN RCRD: CPT | Mod: CPTII,S$GLB,, | Performed by: FAMILY MEDICINE

## 2022-07-20 RX ORDER — FLUTICASONE PROPIONATE 50 MCG
1 SPRAY, SUSPENSION (ML) NASAL DAILY
Qty: 16 ML | Refills: 3 | Status: SHIPPED | OUTPATIENT
Start: 2022-07-20 | End: 2022-11-03 | Stop reason: SDUPTHER

## 2022-08-05 DIAGNOSIS — I10 ESSENTIAL HYPERTENSION: Chronic | ICD-10-CM

## 2022-08-05 RX ORDER — LOSARTAN POTASSIUM 25 MG/1
25 TABLET ORAL DAILY
Qty: 90 TABLET | Refills: 1 | Status: SHIPPED | OUTPATIENT
Start: 2022-08-05 | End: 2022-09-27 | Stop reason: ALTCHOICE

## 2022-08-05 NOTE — TELEPHONE ENCOUNTER
No new care gaps identified.  [facility] Embedded Care Gaps. Reference number: [id number]. 8/05/2022   7:26:42 AM EARLT

## 2022-08-12 ENCOUNTER — TELEPHONE (OUTPATIENT)
Dept: NEUROLOGY | Facility: CLINIC | Age: 82
End: 2022-08-12
Payer: MEDICARE

## 2022-08-12 NOTE — TELEPHONE ENCOUNTER
----- Message from Teri Fair sent at 8/11/2022  4:17 PM CDT -----  Contact: [phone]  the patient is calling to get scheduled for a appt.   trigeminal neuralgia  Pt access tried but no appts are available.  the patient can be reached at. [phone]

## 2022-08-23 ENCOUNTER — OFFICE VISIT (OUTPATIENT)
Dept: OTOLARYNGOLOGY | Facility: CLINIC | Age: 82
End: 2022-08-23
Payer: MEDICARE

## 2022-08-23 VITALS — WEIGHT: 145.5 LBS | BODY MASS INDEX: 24.98 KG/M2

## 2022-08-23 DIAGNOSIS — H61.22 IMPACTED CERUMEN OF LEFT EAR: Primary | ICD-10-CM

## 2022-08-23 DIAGNOSIS — M54.2 CERVICALGIA: ICD-10-CM

## 2022-08-23 DIAGNOSIS — R09.89 RUNNY NOSE: ICD-10-CM

## 2022-08-23 PROCEDURE — 99213 PR OFFICE/OUTPT VISIT, EST, LEVL III, 20-29 MIN: ICD-10-PCS | Mod: 25,S$GLB,, | Performed by: NURSE PRACTITIONER

## 2022-08-23 PROCEDURE — 1126F AMNT PAIN NOTED NONE PRSNT: CPT | Mod: CPTII,S$GLB,, | Performed by: NURSE PRACTITIONER

## 2022-08-23 PROCEDURE — 69210 EAR CERUMEN REMOVAL: ICD-10-PCS | Mod: S$GLB,,, | Performed by: NURSE PRACTITIONER

## 2022-08-23 PROCEDURE — 1157F ADVNC CARE PLAN IN RCRD: CPT | Mod: CPTII,S$GLB,, | Performed by: NURSE PRACTITIONER

## 2022-08-23 PROCEDURE — 99999 PR PBB SHADOW E&M-EST. PATIENT-LVL III: ICD-10-PCS | Mod: PBBFAC,,, | Performed by: NURSE PRACTITIONER

## 2022-08-23 PROCEDURE — 1126F PR PAIN SEVERITY QUANTIFIED, NO PAIN PRESENT: ICD-10-PCS | Mod: CPTII,S$GLB,, | Performed by: NURSE PRACTITIONER

## 2022-08-23 PROCEDURE — 99213 OFFICE O/P EST LOW 20 MIN: CPT | Mod: 25,S$GLB,, | Performed by: NURSE PRACTITIONER

## 2022-08-23 PROCEDURE — 3288F FALL RISK ASSESSMENT DOCD: CPT | Mod: CPTII,S$GLB,, | Performed by: NURSE PRACTITIONER

## 2022-08-23 PROCEDURE — 1101F PT FALLS ASSESS-DOCD LE1/YR: CPT | Mod: CPTII,S$GLB,, | Performed by: NURSE PRACTITIONER

## 2022-08-23 PROCEDURE — 1159F PR MEDICATION LIST DOCUMENTED IN MEDICAL RECORD: ICD-10-PCS | Mod: CPTII,S$GLB,, | Performed by: NURSE PRACTITIONER

## 2022-08-23 PROCEDURE — 1160F PR REVIEW ALL MEDS BY PRESCRIBER/CLIN PHARMACIST DOCUMENTED: ICD-10-PCS | Mod: CPTII,S$GLB,, | Performed by: NURSE PRACTITIONER

## 2022-08-23 PROCEDURE — 3288F PR FALLS RISK ASSESSMENT DOCUMENTED: ICD-10-PCS | Mod: CPTII,S$GLB,, | Performed by: NURSE PRACTITIONER

## 2022-08-23 PROCEDURE — 1160F RVW MEDS BY RX/DR IN RCRD: CPT | Mod: CPTII,S$GLB,, | Performed by: NURSE PRACTITIONER

## 2022-08-23 PROCEDURE — 1157F PR ADVANCE CARE PLAN OR EQUIV PRESENT IN MEDICAL RECORD: ICD-10-PCS | Mod: CPTII,S$GLB,, | Performed by: NURSE PRACTITIONER

## 2022-08-23 PROCEDURE — 1101F PR PT FALLS ASSESS DOC 0-1 FALLS W/OUT INJ PAST YR: ICD-10-PCS | Mod: CPTII,S$GLB,, | Performed by: NURSE PRACTITIONER

## 2022-08-23 PROCEDURE — 69210 REMOVE IMPACTED EAR WAX UNI: CPT | Mod: S$GLB,,, | Performed by: NURSE PRACTITIONER

## 2022-08-23 PROCEDURE — 1159F MED LIST DOCD IN RCRD: CPT | Mod: CPTII,S$GLB,, | Performed by: NURSE PRACTITIONER

## 2022-08-23 PROCEDURE — 99999 PR PBB SHADOW E&M-EST. PATIENT-LVL III: CPT | Mod: PBBFAC,,, | Performed by: NURSE PRACTITIONER

## 2022-08-23 NOTE — PROGRESS NOTES
"  Subjective:      Nereyda Hernandez is a 82 y.o. female who was self-referred for ear pain.    Ms. Hernandez reports having right ear pain for many years. She denies ear drainage, change in hearing, or tinnitus. She does reports having dizziness about 6 weeks ago that resolved spontaneously after 1-2 days.  She reports a previous provider has noted "fluid" behind right ear drum. When describing her ear pain she points to back of head and down right sided of neck. She does not have a history of ear surgery.     Past Medical History  She has a past medical history of HATTIE (acute kidney injury), Anxiety, Arthritis, Breast cancer, Chronic disease anemia, Hyperlipidemia, Hypertension, Insomnia, Lobular carcinoma in situ, KANWAL (obstructive sleep apnea), Osteoporosis, Rosacea, Squamous cell carcinoma, Stable angina pectoris, Urinary incontinence, and Vertigo.    Past Surgical History  She has a past surgical history that includes Tonsillectomy; Belt abdominoplasty; Total abdominal hysterectomy; Hernia repair; Oophorectomy; Breast lumpectomy; Breast biopsy (Left, 1990); Esophagogastroduodenoscopy (N/A, 1/28/2020); Colonoscopy (N/A, 1/28/2020); Adenoidectomy; Eye surgery; and Liver transplant.    Family History  Her family history includes Alzheimer's disease in her father; Arthritis in her brother; Cancer in her mother; Depression in her brother, brother, and mother; Diabetes in her brother; Heart disease in her brother and mother; Hypertension in her brother and father; Miscarriages / Stillbirths in her mother; Pancreatic cancer in her mother.    Social History  She reports that she quit smoking about 41 years ago. Her smoking use included cigarettes. She started smoking about 63 years ago. She has a 10.50 pack-year smoking history. She has never used smokeless tobacco. She reports previous alcohol use of about 1.0 standard drink of alcohol per week. She reports that she does not use drugs.    Allergies  She is allergic to sulfa " (sulfonamide antibiotics).    Medications  She has a current medication list which includes the following prescription(s): aspirin, wheat dextrin, calcium citrate-vitamin d3, escitalopram oxalate, fish oil-omega-3 fatty acids, fluticasone propionate, lactobacillus acidophilus, latanoprost, losartan, metoprolol tartrate, mirabegron, multivit-iron-min-folic acid, pantoprazole, polyethylene glycol, pravastatin, restasis, triamterene-hydrochlorothiazide 37.5-25 mg, vit a/vit c/vit e/zinc/copper, and zolpidem.    Review of Systems   Constitutional: Negative for chills and fever.   HENT: Positive for ear pain. Negative for ear discharge, hearing loss and tinnitus.    Eyes: Negative for pain and visual disturbance.   Respiratory: Negative for cough and shortness of breath.    Gastrointestinal: Negative for nausea and vomiting.   Neurological: Negative for dizziness and headaches.          Objective:     Wt 66 kg (145 lb 8 oz)   LMP  (LMP Unknown)   BMI 24.98 kg/m²      Constitutional:   She is oriented to person, place, and time. Vital signs are normal. She appears well-developed and well-nourished. She appears alert. Normal speech.      Head:  Normocephalic and atraumatic.     Ears:    Right Ear: No lacerations. No drainage, swelling or tenderness. No foreign bodies. No mastoid tenderness. Tympanic membrane is not injected, not scarred, not perforated, not erythematous, not retracted and not bulging. Tympanic membrane mobility is normal. No middle ear effusion. No hemotympanum.   Left Ear: No lacerations. No drainage, swelling or tenderness. No foreign bodies. No mastoid tenderness. Tympanic membrane is not injected, not scarred, not perforated, not erythematous, not retracted and not bulging. Tympanic membrane mobility is normal.  No middle ear effusion. No hemotympanum.   Ears:      Nose:  Mucosal edema and rhinorrhea present. No nose lacerations, sinus tenderness, septal deviation, nasal septal hematoma or polyps. No  epistaxis.  No foreign bodies. No turbinate hypertrophy.  Right sinus exhibits no maxillary sinus tenderness and no frontal sinus tenderness. Left sinus exhibits no maxillary sinus tenderness and no frontal sinus tenderness.     Mouth/Throat  Oropharynx clear and moist without lesions or asymmetry, normal uvula midline and lips, teeth, and gums normal. No uvula swelling, oral lesions, trismus, mucous membrane lesions or xerostomia. No oropharyngeal exudate, posterior oropharyngeal edema or posterior oropharyngeal erythema.     Neck:  Neck normal without thyromegaly masses, asymmetry, normal tracheal structure, crepitus, and tenderness and no adenopathy.     Psychiatric:   She has a normal mood and affect. Her speech is normal and behavior is normal.     Neurological:   She is alert and oriented to person, place, and time. No cranial nerve deficit.     Skin:   No abrasions, lacerations, lesions, or rashes.       Procedure    Cerumen removal performed.  See procedure note.      Data Reviewed    WBC (K/uL)   Date Value   03/23/2022 4.92     Platelets (K/uL)   Date Value   03/23/2022 218      Creatinine (mg/dL)   Date Value   03/23/2022 1.03     TSH (uIU/mL)   Date Value   11/24/2021 0.805     Glucose (mg/dL)   Date Value   03/23/2022 100     No results found for: HGBA1C           Assessment:     1. Impacted cerumen of left ear    2. Cervicalgia    3. Runny nose         Plan:       Impacted cerumen of left ear  -     Ear Cerumen Removal    Cervicalgia        -      There is no evidence of ear infection, middle ear effusion, TM retraction or other otologic involvement. I recommend she follow up with her PCP if symptoms persist.     Runny nose        -      Continue Fluticasone (Flonase) - 2 sprays each nostril daily        -      Continue azelastine (Astelin) - 1 spray each nostril twice daily      Follow up if symptoms worsen or fail to improve.

## 2022-08-23 NOTE — PROCEDURES
Ear Cerumen Removal    Date/Time: 8/23/2022 10:45 AM  Performed by: Amarilis Fabian DNP, FNP-C  Authorized by: Amarilis Fabian DNP, FNP-C     Consent Done?:  Yes (Verbal)    Local anesthetic:  None  Location details:  Left ear  Procedure type: curette    Cerumen  Removal Results:  Cerumen completely removed  Patient tolerance:  Patient tolerated the procedure well with no immediate complications     Procedure Note:    The patient was brought to the minor procedure room and placed under the operating microscope of the left ear canal which was cleaned of ceruminous debris. Using a combination of suction, curettes and cup forceps the patient's cerumen impaction was removed. The tympanic membrane was evaluated and was unremarkable. The patient tolerated the procedure well. There were no complications.

## 2022-08-24 ENCOUNTER — OFFICE VISIT (OUTPATIENT)
Dept: FAMILY MEDICINE | Facility: CLINIC | Age: 82
End: 2022-08-24
Payer: MEDICARE

## 2022-08-24 VITALS
WEIGHT: 147.69 LBS | DIASTOLIC BLOOD PRESSURE: 70 MMHG | SYSTOLIC BLOOD PRESSURE: 138 MMHG | BODY MASS INDEX: 25.21 KG/M2 | HEIGHT: 64 IN | HEART RATE: 67 BPM | OXYGEN SATURATION: 97 %

## 2022-08-24 DIAGNOSIS — F41.9 ANXIETY AND DEPRESSION: Chronic | ICD-10-CM

## 2022-08-24 DIAGNOSIS — E04.1 CYSTIC THYROID NODULE: ICD-10-CM

## 2022-08-24 DIAGNOSIS — R06.00 DYSPNEA, UNSPECIFIED TYPE: Chronic | ICD-10-CM

## 2022-08-24 DIAGNOSIS — R00.2 PALPITATIONS: Primary | ICD-10-CM

## 2022-08-24 DIAGNOSIS — F32.A ANXIETY AND DEPRESSION: Chronic | ICD-10-CM

## 2022-08-24 DIAGNOSIS — F51.04 PSYCHOPHYSIOLOGICAL INSOMNIA: Chronic | ICD-10-CM

## 2022-08-24 DIAGNOSIS — I10 ESSENTIAL HYPERTENSION: Chronic | ICD-10-CM

## 2022-08-24 PROCEDURE — 1101F PT FALLS ASSESS-DOCD LE1/YR: CPT | Mod: CPTII,S$GLB,, | Performed by: FAMILY MEDICINE

## 2022-08-24 PROCEDURE — 3078F PR MOST RECENT DIASTOLIC BLOOD PRESSURE < 80 MM HG: ICD-10-PCS | Mod: CPTII,S$GLB,, | Performed by: FAMILY MEDICINE

## 2022-08-24 PROCEDURE — 3075F PR MOST RECENT SYSTOLIC BLOOD PRESS GE 130-139MM HG: ICD-10-PCS | Mod: CPTII,S$GLB,, | Performed by: FAMILY MEDICINE

## 2022-08-24 PROCEDURE — 1126F AMNT PAIN NOTED NONE PRSNT: CPT | Mod: CPTII,S$GLB,, | Performed by: FAMILY MEDICINE

## 2022-08-24 PROCEDURE — 3288F FALL RISK ASSESSMENT DOCD: CPT | Mod: CPTII,S$GLB,, | Performed by: FAMILY MEDICINE

## 2022-08-24 PROCEDURE — 1126F PR PAIN SEVERITY QUANTIFIED, NO PAIN PRESENT: ICD-10-PCS | Mod: CPTII,S$GLB,, | Performed by: FAMILY MEDICINE

## 2022-08-24 PROCEDURE — 3078F DIAST BP <80 MM HG: CPT | Mod: CPTII,S$GLB,, | Performed by: FAMILY MEDICINE

## 2022-08-24 PROCEDURE — 3075F SYST BP GE 130 - 139MM HG: CPT | Mod: CPTII,S$GLB,, | Performed by: FAMILY MEDICINE

## 2022-08-24 PROCEDURE — 1157F PR ADVANCE CARE PLAN OR EQUIV PRESENT IN MEDICAL RECORD: ICD-10-PCS | Mod: CPTII,S$GLB,, | Performed by: FAMILY MEDICINE

## 2022-08-24 PROCEDURE — 1159F PR MEDICATION LIST DOCUMENTED IN MEDICAL RECORD: ICD-10-PCS | Mod: CPTII,S$GLB,, | Performed by: FAMILY MEDICINE

## 2022-08-24 PROCEDURE — 99999 PR PBB SHADOW E&M-EST. PATIENT-LVL V: ICD-10-PCS | Mod: PBBFAC,,, | Performed by: FAMILY MEDICINE

## 2022-08-24 PROCEDURE — 99214 OFFICE O/P EST MOD 30 MIN: CPT | Mod: S$GLB,,, | Performed by: FAMILY MEDICINE

## 2022-08-24 PROCEDURE — 3288F PR FALLS RISK ASSESSMENT DOCUMENTED: ICD-10-PCS | Mod: CPTII,S$GLB,, | Performed by: FAMILY MEDICINE

## 2022-08-24 PROCEDURE — 99999 PR PBB SHADOW E&M-EST. PATIENT-LVL V: CPT | Mod: PBBFAC,,, | Performed by: FAMILY MEDICINE

## 2022-08-24 PROCEDURE — 1159F MED LIST DOCD IN RCRD: CPT | Mod: CPTII,S$GLB,, | Performed by: FAMILY MEDICINE

## 2022-08-24 PROCEDURE — 1157F ADVNC CARE PLAN IN RCRD: CPT | Mod: CPTII,S$GLB,, | Performed by: FAMILY MEDICINE

## 2022-08-24 PROCEDURE — 1160F RVW MEDS BY RX/DR IN RCRD: CPT | Mod: CPTII,S$GLB,, | Performed by: FAMILY MEDICINE

## 2022-08-24 PROCEDURE — 1101F PR PT FALLS ASSESS DOC 0-1 FALLS W/OUT INJ PAST YR: ICD-10-PCS | Mod: CPTII,S$GLB,, | Performed by: FAMILY MEDICINE

## 2022-08-24 PROCEDURE — 1160F PR REVIEW ALL MEDS BY PRESCRIBER/CLIN PHARMACIST DOCUMENTED: ICD-10-PCS | Mod: CPTII,S$GLB,, | Performed by: FAMILY MEDICINE

## 2022-08-24 PROCEDURE — 99214 PR OFFICE/OUTPT VISIT, EST, LEVL IV, 30-39 MIN: ICD-10-PCS | Mod: S$GLB,,, | Performed by: FAMILY MEDICINE

## 2022-08-24 RX ORDER — AMITRIPTYLINE HYDROCHLORIDE 10 MG/1
20 TABLET, FILM COATED ORAL NIGHTLY PRN
Qty: 180 TABLET | Refills: 0 | Status: SHIPPED | OUTPATIENT
Start: 2022-08-24 | End: 2022-10-03 | Stop reason: SDUPTHER

## 2022-08-24 RX ORDER — METOPROLOL TARTRATE 25 MG/1
25 TABLET, FILM COATED ORAL 2 TIMES DAILY
Qty: 180 TABLET | Refills: 3 | Status: SHIPPED | OUTPATIENT
Start: 2022-08-24 | End: 2023-05-04 | Stop reason: SDUPTHER

## 2022-08-24 RX ORDER — AZELASTINE 1 MG/ML
1 SPRAY, METERED NASAL 2 TIMES DAILY
COMMUNITY
End: 2022-10-03 | Stop reason: SDUPTHER

## 2022-08-24 NOTE — PROGRESS NOTES
PATIENT VISIT FAMILY MEDICINE    CC:   Chief Complaint   Patient presents with    Follow-up     Med refill     Fatigue    Shortness of Breath     Since bp med got changed       HPI: Nereyda Hernandez  is a 82 y.o. female:    Patient is known to me.  Patient presents alone for routine follow up on chronic conditions.]    Insomnia  Ran out of ambien and took old prescription of amitriptyline which she stated did not work previously but feels this works better than ambien now. Wants higher dose of amitriptyline    Self d/c'ed Lexapro due to rash but thinks this was due to soap she was using    Felt her shortness of breath and palpitations were better on higher dose of losartan which was previously decreased due to hypotension and fatigue     Had health screening with [facility] imaging which showed complex thyroid cyst 1.91 x 1.1 cm does not have formal report but has form and US picture    ROS: ROS    PMHX:   Past Medical History:   Diagnosis Date    HATTIE (acute kidney injury) 12/10/2021    Anxiety     Arthritis     Breast cancer 1990    left    Chronic disease anemia     Hyperlipidemia     Hypertension     Insomnia     Lobular carcinoma in situ     KANWAL (obstructive sleep apnea)     Osteoporosis     Rosacea     Squamous cell carcinoma     Stable angina pectoris 8/27/2020    Urinary incontinence     Vertigo        PSHX:   Past Surgical History:   Procedure Laterality Date    ADENOIDECTOMY      BELT ABDOMINOPLASTY      BREAST BIOPSY Left 1990    ex bx    BREAST LUMPECTOMY      COLONOSCOPY N/A 1/28/2020    Procedure: COLONOSCOPY;  Surgeon: Bianka Macias MD;  Location: [facility];  Service: Endoscopy;  Laterality: N/A;    ESOPHAGOGASTRODUODENOSCOPY N/A 1/28/2020    Procedure: EGD (ESOPHAGOGASTRODUODENOSCOPY);  Surgeon: Bianka Macias MD;  Location: [facility];  Service: Endoscopy;  Laterality: N/A;    EYE SURGERY      HERNIA REPAIR      Ventral    LIVER TRANSPLANT      OOPHORECTOMY       TONSILLECTOMY      TOTAL ABDOMINAL HYSTERECTOMY         FAMHX:   Family History   Problem Relation Age of Onset    Cancer Mother         liver    Pancreatic cancer Mother     Heart disease Mother     Depression Mother     Miscarriages / Stillbirths Mother     Hypertension Father     Alzheimer's disease Father     Depression Brother     Depression Brother     Diabetes Brother     Arthritis Brother     Hypertension Brother     Heart disease Brother        SOCHX:   Social History     Socioeconomic History    Marital status:     Number of children: 6    Years of education: college   Occupational History    Occupation: retired  for [employer]   Tobacco Use    Smoking status: Former Smoker     Packs/day: 0.50     Years: 21.00     Pack years: 10.50     Types: Cigarettes     Start date: 1959     Quit date: 1980     Years since quittin.9    Smokeless tobacco: Never Used   Substance and Sexual Activity    Alcohol use: Not Currently     Alcohol/week: 1.0 standard drink     Types: 1 Glasses of wine per week     Comment: Occasionally    Drug use: Never    Sexual activity: Not Currently     Partners: Male     Birth control/protection: Condom, Other-see comments     Comment: Pill       ALLERGIES:   Review of patient's allergies indicates:   Allergen Reactions    Sulfa (sulfonamide antibiotics)        MEDS:   Current Outpatient Medications:     aspirin (ECOTRIN) 81 MG EC tablet, Take 81 mg by mouth once daily., Disp: , Rfl:     azelastine (ASTELIN) 137 mcg (0.1 %) nasal spray, 1 spray by Nasal route 2 (two) times daily., Disp: , Rfl:     BENEFIBER, WHEAT DEXTRIN, ORAL, Take by mouth. PRN, Disp: , Rfl:     calcium citrate-vitamin D3 500 mg calcium -400 unit Chew, Take 1 tablet by mouth 2 (two) times daily. 12.5mcg, Disp: , Rfl:     fish oil-omega-3 fatty acids 300-1,000 mg capsule, Take 2 g by mouth once daily., Disp: , Rfl:     fluticasone propionate (FLONASE) 50  "mcg/actuation nasal spray, 1 spray (50 mcg total) by Each Nostril route once daily., Disp: 16 mL, Rfl: 3    Lactobacillus acidophilus (PROBIOTIC ACIDOPHILUS ORAL), Take by mouth., Disp: , Rfl:     latanoprost 0.005 % ophthalmic solution, 1 drop every evening., Disp: , Rfl:     losartan (COZAAR) 25 MG tablet, Take 1 tablet (25 mg total) by mouth once daily., Disp: 90 tablet, Rfl: 1    mirabegron (MYRBETRIQ) 25 mg Tb24 ER tablet, Take 1 tablet (25 mg total) by mouth once daily., Disp: 90 tablet, Rfl: 3    MULTIVIT-IRON-MIN-FOLIC ACID 3,500-18-0.4 UNIT-MG-MG ORAL CHEW, Take by mouth., Disp: , Rfl:     pantoprazole (PROTONIX) 40 MG tablet, Take 1 tablet (40 mg total) by mouth once daily., Disp: 90 tablet, Rfl: 3    polyethylene glycol (GLYCOLAX) 17 gram PwPk, Take 17 g by mouth 3 (three) times daily as needed (for constipation)., Disp: 30 each, Rfl: 0    pravastatin (PRAVACHOL) 20 MG tablet, Take 1 tablet (20 mg total) by mouth once daily., Disp: 90 tablet, Rfl: 3    RESTASIS 0.05 % ophthalmic emulsion, , Disp: , Rfl:     triamterene-hydrochlorothiazide 37.5-25 mg (DYAZIDE) 37.5-25 mg per capsule, Take 1 capsule by mouth every morning., Disp: 90 capsule, Rfl: 3    vit A/vit C/vit E/zinc/copper (PRESERVISION AREDS ORAL), Take by mouth. Bottle does not say a but does say Lutein, Disp: , Rfl:     zolpidem (AMBIEN) 5 MG Tab, Take 1 tablet (5 mg total) by mouth nightly as needed (insomnia)., Disp: 30 tablet, Rfl: 0    amitriptyline (ELAVIL) 10 MG tablet, Take 2 tablets (20 mg total) by mouth nightly as needed for Insomnia., Disp: 180 tablet, Rfl: 0    metoprolol tartrate (LOPRESSOR) 25 MG tablet, Take 1 tablet (25 mg total) by mouth 2 (two) times daily., Disp: 180 tablet, Rfl: 3    OBJECTIVE:   Vitals:    08/24/22 0755   BP: 138/70   BP Location: Left arm   Patient Position: Sitting   BP Method: Medium (Manual)   Pulse: 67   SpO2: 97%   Weight: 67 kg (147 lb 11.3 oz)   Height: 5' 4" (1.626 m)     Body mass " "index is 25.35 kg/m².      Physical Exam      LABS:   A1C:      CBC:  Recent Labs   Lab 03/23/22  0842   WBC 4.92   RBC 3.88 L   Hemoglobin 12.1   Hematocrit 36.9 L   Platelets 218   MCV 95   MCH 31.2 H   MCHC 32.8     CMP:  Recent Labs   Lab 03/23/22  0842   Glucose 100   Calcium 9.5   Albumin 4.5   Total Protein 7.9   Sodium 142   Potassium 4.0   CO2 30 H   Chloride 102   BUN 23 H   Creatinine 1.03   Alkaline Phosphatase 47   ALT 24   AST 33   Total Bilirubin 0.6     LIPIDS:  Recent Labs   Lab 11/24/21  1137 03/23/22  0842   TSH 0.805  --    HDL 60 61   Cholesterol 158 159   Triglycerides 110 85   LDL Cholesterol 76.0 81.0   HDL/Cholesterol Ratio 38.0 38.4   Non-HDL Cholesterol 98 98   Total Cholesterol/HDL Ratio 2.6 2.6     TSH:  Recent Labs   Lab 11/24/21  1137   TSH 0.805         ASSESSMENT & PLAN:    Problem List Items Addressed This Visit        Psychiatric    Anxiety and depression (Chronic)    Overview     Paxil ineffective. Previously on Zoloft but self d/c'ed as she was "feeling better". Also tried Lexapro and had rash but does not think this was due to medication.     Consider resuming Lexapro on follow up.               Pulmonary    Dyspnea (Chronic)    Overview     Recent echo without severe findings. Repeat holter and follow up with Cardiology to discuss BP medication adjustment as patient feels these are contributing to symptoms. Advised to keep home BP log and take to her Cardiology appt.               Cardiac/Vascular    Essential hypertension (Chronic)    Overview     Well controlled. Continue current regimen  Bradycardia at home may be contributing to fatigue however discrepancy between metoprolol dosage in chart            Relevant Medications    metoprolol tartrate (LOPRESSOR) 25 MG tablet    Palpitations - Primary (Chronic)    Relevant Orders    Holter monitor - 48 hour       Other    Psychophysiological insomnia (Chronic)    Overview     Prior treatments which were ineffective or with noted " side effects: trazodone, mirtazapine, melatonin, amitriptyline, gabapentin, ambien    Wants to go back to amitriptyline at higher dose. Trial of 20mg             Other Visit Diagnoses     Cystic thyroid nodule        Relevant Orders    Ambulatory referral/consult to Endocrinology      records requested for thyroid ultrasound done via screening program showing complex cyst      Follow up in about 6 weeks (around 10/5/2022) for insomnia.      RTC/ED precautions discussed where applicable.   Encouraged patient to let me know if there are any further questions/concerns.     Advise patient/caretaker to check with insurance regarding orders to avoid unexpected fees/costs.     The patient/caretaker indicates understanding of these issues and agrees with the plan.    Dr. Sharona Weaver MD  Family Medicine

## 2022-08-31 DIAGNOSIS — Z78.0 MENOPAUSE: ICD-10-CM

## 2022-09-06 NOTE — TELEPHONE ENCOUNTER
----- Message from Sloane Beck sent at 9/6/2022  3:06 PM CDT -----  Type:  RX Refill Request    Who Called: pt  Refill or New Rx:refill  RX Name and Strength:mirabegron (MYRBETRIQ) 25 mg Tb24 ER tablet  How is the patient currently taking it? (ex. 1XDay):Take 1 tablet (25 mg total) by mouth once daily.  Is this a 30 day or 90 day RX:90  Preferred Pharmacy with phone number:[facility] ([facility]) - [facility] [address]   Phone: [phone]  Fax:  [phone]        Local or Mail Order:mail  Ordering Provider:Dr Weaver  Would the patient rather a call back or a response via [facility]? call  Best Call Back Number:[phone]  Additional Information:

## 2022-09-21 NOTE — PROGRESS NOTES
PATIENT VISIT FAMILY MEDICINE    CC:   Chief Complaint   Patient presents with    Follow-up     Rt ear pain/not taking med you gave her thinking it caused the rash    Rash     All over       HPI: Nereyda Hernandez  is a 82 y.o. female:    Patient is known to me.  Patient presents alone for routine follow up on chronic conditions. She stopped Lexapro on 7/6 because she felt it made her itchy/caused rash. Rash is better but still somewhat itchy. Feels depressed because she feels so tired and short of breath and finds it hard to do much of anything else. Otherwise, feels like she would not be depressed if she didn't have these symptoms and does not want to try another antidepressant for now.     Right ear. Has been having pain without discharge for about 1 week. Does not use q tips. Making her feel nauseous. Mild dizziness with it.     ROS: Review of Systems   Constitutional: Positive for malaise/fatigue. Negative for fever.   Respiratory: Positive for shortness of breath.    Cardiovascular: Negative for chest pain.       PMHX:   Past Medical History:   Diagnosis Date    HATTIE (acute kidney injury) 12/10/2021    Anxiety     Arthritis     Breast cancer 1990    left    Chronic disease anemia     Hyperlipidemia     Hypertension     Insomnia     Lobular carcinoma in situ     KANWAL (obstructive sleep apnea)     Osteoporosis     Rosacea     Squamous cell carcinoma     Stable angina pectoris 8/27/2020    Urinary incontinence     Vertigo        PSHX:   Past Surgical History:   Procedure Laterality Date    ADENOIDECTOMY      BELT ABDOMINOPLASTY      BREAST BIOPSY Left 1990    ex bx    BREAST LUMPECTOMY      COLONOSCOPY N/A 1/28/2020    Procedure: COLONOSCOPY;  Surgeon: Bianka Macias MD;  Location: [facility];  Service: Endoscopy;  Laterality: N/A;    ESOPHAGOGASTRODUODENOSCOPY N/A 1/28/2020    Procedure: EGD (ESOPHAGOGASTRODUODENOSCOPY);  Surgeon: Bianka Macias MD;  Location: [facility];  Service:  Endoscopy;  Laterality: N/A;    EYE SURGERY      HERNIA REPAIR      Ventral    LIVER TRANSPLANT      OOPHORECTOMY      TONSILLECTOMY      TOTAL ABDOMINAL HYSTERECTOMY         FAMHX:   Family History   Problem Relation Age of Onset    Cancer Mother         liver    Pancreatic cancer Mother     Heart disease Mother     Depression Mother     Miscarriages / Stillbirths Mother     Hypertension Father     Alzheimer's disease Father     Depression Brother     Depression Brother     Diabetes Brother     Arthritis Brother     Hypertension Brother     Heart disease Brother        SOCHX:   Social History     Socioeconomic History    Marital status:     Number of children: 6    Years of education: college   Occupational History    Occupation: retired  for [employer]   Tobacco Use    Smoking status: Former Smoker     Packs/day: 0.50     Years: 21.00     Pack years: 10.50     Types: Cigarettes     Start date: 1959     Quit date: 1980     Years since quittin.8    Smokeless tobacco: Never Used   Substance and Sexual Activity    Alcohol use: Not Currently     Alcohol/week: 1.0 standard drink     Types: 1 Glasses of wine per week     Comment: Occasionally    Drug use: Never    Sexual activity: Not Currently     Partners: Male     Birth control/protection: Condom, Other-see comments     Comment: Pill       ALLERGIES:   Review of patient's allergies indicates:   Allergen Reactions    Sulfa (sulfonamide antibiotics)        MEDS:   Current Outpatient Medications:     aspirin (ECOTRIN) 81 MG EC tablet, Take 81 mg by mouth once daily., Disp: , Rfl:     BENEFIBER, WHEAT DEXTRIN, ORAL, Take by mouth. PRN, Disp: , Rfl:     calcium citrate-vitamin D3 500 mg calcium -400 unit Chew, Take 1 tablet by mouth 2 (two) times daily. 12.5mcg, Disp: , Rfl:     fish oil-omega-3 fatty acids 300-1,000 mg capsule, Take 2 g by mouth once daily., Disp: , Rfl:     Lactobacillus  acidophilus (PROBIOTIC ACIDOPHILUS ORAL), Take by mouth., Disp: , Rfl:     latanoprost 0.005 % ophthalmic solution, 1 drop every evening., Disp: , Rfl:     losartan (COZAAR) 25 MG tablet, Take 1 tablet (25 mg total) by mouth once daily., Disp: 90 tablet, Rfl: 1    metoprolol tartrate (LOPRESSOR) 25 MG tablet, Take 1 tablet (25 mg total) by mouth 2 (two) times daily. for 10 days, Disp: 20 tablet, Rfl: 0    mirabegron (MYRBETRIQ) 25 mg Tb24 ER tablet, Take 1 tablet (25 mg total) by mouth once daily., Disp: 90 tablet, Rfl: 3    MULTIVIT-IRON-MIN-FOLIC ACID 3,500-18-0.4 UNIT-MG-MG ORAL CHEW, Take by mouth., Disp: , Rfl:     pantoprazole (PROTONIX) 40 MG tablet, Take 1 tablet (40 mg total) by mouth once daily., Disp: 90 tablet, Rfl: 3    polyethylene glycol (GLYCOLAX) 17 gram PwPk, Take 17 g by mouth 3 (three) times daily as needed (for constipation)., Disp: 30 each, Rfl: 0    pravastatin (PRAVACHOL) 20 MG tablet, Take 1 tablet (20 mg total) by mouth once daily., Disp: 90 tablet, Rfl: 3    RESTASIS 0.05 % ophthalmic emulsion, , Disp: , Rfl:     triamterene-hydrochlorothiazide 37.5-25 mg (DYAZIDE) 37.5-25 mg per capsule, Take 1 capsule by mouth every morning., Disp: 90 capsule, Rfl: 3    vit A/vit C/vit E/zinc/copper (PRESERVISION AREDS ORAL), Take by mouth. Bottle does not say a but does say Lutein, Disp: , Rfl:     zolpidem (AMBIEN) 5 MG Tab, Take 1 tablet (5 mg total) by mouth nightly as needed (insomnia)., Disp: 30 tablet, Rfl: 0    EScitalopram oxalate (LEXAPRO) 10 MG tablet, Take 1 tablet (10 mg total) by mouth once daily. (Patient not taking: Reported on 7/20/2022), Disp: 90 tablet, Rfl: 0    fluticasone propionate (FLONASE) 50 mcg/actuation nasal spray, 1 spray (50 mcg total) by Each Nostril route once daily., Disp: 16 mL, Rfl: 3    OBJECTIVE:   Vitals:    07/20/22 1352   BP: 128/78   BP Location: Left arm   Patient Position: Sitting   BP Method: Medium (Manual)   Pulse: 60   SpO2: 96%   Weight: 66  "kg (145 lb 8.1 oz)   Height: 5' 4" (1.626 m)     Body mass index is 24.98 kg/m².      Physical Exam  Vitals and nursing note reviewed.   Constitutional:       Appearance: Normal appearance.   HENT:      Head: Normocephalic and atraumatic.      Right Ear: Tympanic membrane, ear canal and external ear normal. There is no impacted cerumen.      Left Ear: Tympanic membrane, ear canal and external ear normal. There is no impacted cerumen.   Eyes:      General: No scleral icterus.     Extraocular Movements: Extraocular movements intact.   Pulmonary:      Effort: Pulmonary effort is normal.   Neurological:      Mental Status: She is alert.           LABS:   A1C:      CBC:  Recent Labs   Lab 03/23/22  0842   WBC 4.92   RBC 3.88 L   Hemoglobin 12.1   Hematocrit 36.9 L   Platelets 218   MCV 95   MCH 31.2 H   MCHC 32.8     CMP:  Recent Labs   Lab 03/23/22  0842   Glucose 100   Calcium 9.5   Albumin 4.5   Total Protein 7.9   Sodium 142   Potassium 4.0   CO2 30 H   Chloride 102   BUN 23 H   Creatinine 1.03   Alkaline Phosphatase 47   ALT 24   AST 33   Total Bilirubin 0.6     LIPIDS:  Recent Labs   Lab 11/24/21  1137 03/23/22  0842   TSH 0.805  --    HDL 60 61   Cholesterol 158 159   Triglycerides 110 85   LDL Cholesterol 76.0 81.0   HDL/Cholesterol Ratio 38.0 38.4   Non-HDL Cholesterol 98 98   Total Cholesterol/HDL Ratio 2.6 2.6     TSH:  Recent Labs   Lab 11/24/21  1137   TSH 0.805         ASSESSMENT & PLAN:    Problem List Items Addressed This Visit        Pulmonary    Dyspnea - Primary (Chronic)    Overview     Will repeat Echo.            Relevant Orders    Echo      Other Visit Diagnoses     Acute otalgia, right        Relevant Medications    fluticasone propionate (FLONASE) 50 mcg/actuation nasal spray    Rash        Relevant Orders    Ambulatory referral/consult to Dermatology            Follow up in about 1 month (around 8/20/2022) for insomnia, fatigue.      RTC/ED precautions discussed where applicable.   Encouraged " patient to let me know if there are any further questions/concerns.     Advise patient/caretaker to check with insurance regarding orders to avoid unexpected fees/costs.     The patient/caretaker indicates understanding of these issues and agrees with the plan.    Dr. Sharona Weaver MD  Family Medicine   81

## 2022-09-26 ENCOUNTER — OFFICE VISIT (OUTPATIENT)
Dept: NEUROLOGY | Facility: CLINIC | Age: 82
End: 2022-09-26
Payer: MEDICARE

## 2022-09-26 VITALS
HEIGHT: 64 IN | BODY MASS INDEX: 25.16 KG/M2 | HEART RATE: 63 BPM | WEIGHT: 147.38 LBS | SYSTOLIC BLOOD PRESSURE: 128 MMHG | DIASTOLIC BLOOD PRESSURE: 82 MMHG

## 2022-09-26 DIAGNOSIS — R51.9 CHRONIC FACIAL PAIN: Primary | ICD-10-CM

## 2022-09-26 DIAGNOSIS — G89.29 CHRONIC FACIAL PAIN: Primary | ICD-10-CM

## 2022-09-26 PROCEDURE — 99999 PR PBB SHADOW E&M-EST. PATIENT-LVL IV: ICD-10-PCS | Mod: PBBFAC,GC,, | Performed by: STUDENT IN AN ORGANIZED HEALTH CARE EDUCATION/TRAINING PROGRAM

## 2022-09-26 PROCEDURE — 99999 PR PBB SHADOW E&M-EST. PATIENT-LVL IV: CPT | Mod: PBBFAC,GC,, | Performed by: STUDENT IN AN ORGANIZED HEALTH CARE EDUCATION/TRAINING PROGRAM

## 2022-09-26 NOTE — PATIENT INSTRUCTIONS
Thank you for coming to see us during your visit today. If you have any changes in your symptoms, or any additional concerns, please call or message!

## 2022-09-26 NOTE — PROGRESS NOTES
"[facility] - NEUROLOGY 7TH [place] [facility], [place]    Date: 9/26/22  Patient Name: Nereyda Hernandez   MRN: [id number]   PCP: Sharona Weaver  Referring Provider: No ref. provider found    Assessment:   Nereyda Hernandez is a 82 y.o. female presenting w/ pmh of htn, hld, root canals, and squamous cell carcinoma presenting w/ a cc of BL abnormal facial sensation for 3-4 years after bilateral root canals, and since that time, she has experienced an "annoying, pressure-like" sensation para-medially, just above her top lip. It now comes and goes occasionally, does not hurt to touch, is not affected by changes in temperature, and is 2/10 pain at worst. She states she does not need to take medicines for the pain.     Pain and presentation is not indicative of trigeminal neuralgia at this time, most likely related to structural change s/p procedures due to timing and description of sensation.    Plan:     -Instructed patient to call or message with any concerning change in symptoms.  -RTC PRN    Problem List Items Addressed This Visit          Neuro    Chronic facial pain - Primary       G. Michael Bruno MD    Patient note was created using MModal Dictation.  Any errors in syntax or even information may not have been identified and edited on initial review prior to signing this note.  Subjective:   Patient seen in consultation at the request of No ref. provider found for the evaluation of BL facial pain. A copy of this note will be sent to the referring physician.        HPI:   Ms. Nereyda Hernandez is a 82 y.o. female w/ pmh of htn, hld, root canals, and squamous cell carcinoma presenting w/ a cc of BL abnormal facial sensation. She states that 3-4 years ago she had bilateral root canals performed, and since that time, she has experienced an "annoying, pressure-like" sensation para-medially, just above her top lip. She states immediately after her procedure, the sensation improved initially and then stabilized " since then. It now comes and goes occasionally, does not hurt to touch, is not affected by changes in temperature, and is 2/10 pain at worst. She states she does not need to take medicines for the pain. She denies speech changes, vision changes, or new onset weakness. She has some chronic, mild, R sided neck pain as well.    PAST MEDICAL HISTORY:  Past Medical History:   Diagnosis Date    HATTIE (acute kidney injury) 12/10/2021    Anxiety     Arthritis     Breast cancer 1990    left    Chronic disease anemia     Hyperlipidemia     Hypertension     Insomnia     Lobular carcinoma in situ     KANWAL (obstructive sleep apnea)     Osteoporosis     Rosacea     Squamous cell carcinoma     Stable angina pectoris 8/27/2020    Urinary incontinence     Vertigo        PAST SURGICAL HISTORY:  Past Surgical History:   Procedure Laterality Date    ADENOIDECTOMY      BELT ABDOMINOPLASTY      BREAST BIOPSY Left 1990    ex bx    BREAST LUMPECTOMY      COLONOSCOPY N/A 1/28/2020    Procedure: COLONOSCOPY;  Surgeon: Bianka Macias MD;  Location: [facility];  Service: Endoscopy;  Laterality: N/A;    ESOPHAGOGASTRODUODENOSCOPY N/A 1/28/2020    Procedure: EGD (ESOPHAGOGASTRODUODENOSCOPY);  Surgeon: Bianka Macias MD;  Location: [facility];  Service: Endoscopy;  Laterality: N/A;    EYE SURGERY      HERNIA REPAIR      Ventral    LIVER TRANSPLANT      OOPHORECTOMY      TONSILLECTOMY      TOTAL ABDOMINAL HYSTERECTOMY         CURRENT MEDS:  Current Outpatient Medications   Medication Sig Dispense Refill    amitriptyline (ELAVIL) 10 MG tablet Take 2 tablets (20 mg total) by mouth nightly as needed for Insomnia. 180 tablet 0    aspirin (ECOTRIN) 81 MG EC tablet Take 81 mg by mouth once daily.      azelastine (ASTELIN) 137 mcg (0.1 %) nasal spray 1 spray by Nasal route 2 (two) times daily.      BENEFIBER, WHEAT DEXTRIN, ORAL Take by mouth. PRN      calcium citrate-vitamin D3 500 mg calcium -400 unit Chew Take 1 tablet by mouth 2 (two) times  daily. 12.5mcg      fish oil-omega-3 fatty acids 300-1,000 mg capsule Take 2 g by mouth once daily.      fluticasone propionate (FLONASE) 50 mcg/actuation nasal spray 1 spray (50 mcg total) by Each Nostril route once daily. 16 mL 3    Lactobacillus acidophilus (PROBIOTIC ACIDOPHILUS ORAL) Take by mouth.      latanoprost 0.005 % ophthalmic solution 1 drop every evening.      losartan (COZAAR) 25 MG tablet Take 1 tablet (25 mg total) by mouth once daily. 90 tablet 1    metoprolol tartrate (LOPRESSOR) 25 MG tablet Take 1 tablet (25 mg total) by mouth 2 (two) times daily. 180 tablet 3    mirabegron (MYRBETRIQ) 25 mg Tb24 ER tablet Take 1 tablet (25 mg total) by mouth once daily. 90 tablet 3    MULTIVIT-IRON-MIN-FOLIC ACID 3,500-18-0.4 UNIT-MG-MG ORAL CHEW Take by mouth.      pantoprazole (PROTONIX) 40 MG tablet Take 1 tablet (40 mg total) by mouth once daily. 90 tablet 3    polyethylene glycol (GLYCOLAX) 17 gram PwPk Take 17 g by mouth 3 (three) times daily as needed (for constipation). 30 each 0    pravastatin (PRAVACHOL) 20 MG tablet Take 1 tablet (20 mg total) by mouth once daily. 90 tablet 3    RESTASIS 0.05 % ophthalmic emulsion       triamterene-hydrochlorothiazide 37.5-25 mg (DYAZIDE) 37.5-25 mg per capsule Take 1 capsule by mouth every morning. 90 capsule 3    vit A/vit C/vit E/zinc/copper (PRESERVISION AREDS ORAL) Take by mouth. Bottle does not say a but does say Lutein      zolpidem (AMBIEN) 5 MG Tab Take 1 tablet (5 mg total) by mouth nightly as needed (insomnia). 30 tablet 0     No current facility-administered medications for this visit.       ALLERGIES:  Review of patient's allergies indicates:   Allergen Reactions    Sulfa (sulfonamide antibiotics)        FAMILY HISTORY:  Family History   Problem Relation Age of Onset    Cancer Mother         liver    Pancreatic cancer Mother     Heart disease Mother     Depression Mother     Miscarriages / Stillbirths Mother     Hypertension Father     Alzheimer's  "disease Father     Depression Brother     Depression Brother     Diabetes Brother     Arthritis Brother     Hypertension Brother     Heart disease Brother        SOCIAL HISTORY:  Social History     Tobacco Use    Smoking status: Former     Packs/day: 0.50     Years: 21.00     Pack years: 10.50     Types: Cigarettes     Start date: 1959     Quit date: 1980     Years since quittin.0    Smokeless tobacco: Never   Substance Use Topics    Alcohol use: Not Currently     Alcohol/week: 1.0 standard drink     Types: 1 Glasses of wine per week     Comment: Occasionally    Drug use: Never       Review of Systems:  12 system review of systems is negative except for the symptoms mentioned in HPI.      Objective:     Vitals:    22 1337   BP: 128/82   BP Location: Right arm   Patient Position: Sitting   BP Method: Medium (Automatic)   Pulse: 63   Weight: 66.8 kg (147 lb 6 oz)   Height: 5' 4" (1.626 m)     General: NAD, well nourished   Eyes: no tearing, discharge, no erythema   ENT: moist mucous membranes of the oral cavity, nares patent    Neck: Supple, full range of motion  Cardiovascular: Warm and well perfused, pulses equal and symmetrical  Lungs: Normal work of breathing, normal chest wall excursions  Skin: No rash, lesions, or breakdown on exposed skin  Psychiatry: Mood and affect are appropriate   Abdomen: soft, non tender, non distended  Extremeties: No cyanosis, clubbing or edema.    Neurological   MENTAL STATUS: Alert and oriented to person, place, and time. Attention and concentration within normal limits. Speech without dysarthria, able to name and repeat without difficulty. Recent and remote memory within normal limits   CRANIAL NERVES: Visual fields intact. PERRL. EOMI. Facial sensation intact. Face symmetrical. Hearing grossly intact. Full shoulder shrug bilaterally. Tongue protrudes midline   SENSORY: Sensation is intact to light touch throughout.  Joint position perception intact. Negative " Romberg.   MOTOR: Normal bulk and tone. No pronator drift.  5/5 deltoid, biceps, triceps, interosseous, hand  bilaterally. 5/5 iliopsoas, knee extension/flexion, foot dorsi/plantarflexion bilaterally.    REFLEXES: Symmetric and 2+ throughout. Toes down going bilaterally.   CEREBELLAR/COORDINATION/GAIT: Gait steady with normal arm swing and stride length.  Heel to shin intact. Finger to nose intact. Normal rapid alternating movements.

## 2022-09-27 ENCOUNTER — TELEPHONE (OUTPATIENT)
Dept: FAMILY MEDICINE | Facility: CLINIC | Age: 82
End: 2022-09-27
Payer: MEDICARE

## 2022-09-27 ENCOUNTER — OFFICE VISIT (OUTPATIENT)
Dept: CARDIOLOGY | Facility: CLINIC | Age: 82
End: 2022-09-27
Payer: MEDICARE

## 2022-09-27 VITALS
OXYGEN SATURATION: 97 % | SYSTOLIC BLOOD PRESSURE: 120 MMHG | BODY MASS INDEX: 25.27 KG/M2 | DIASTOLIC BLOOD PRESSURE: 70 MMHG | WEIGHT: 148 LBS | HEIGHT: 64 IN | HEART RATE: 73 BPM

## 2022-09-27 DIAGNOSIS — R00.2 PALPITATIONS: Chronic | ICD-10-CM

## 2022-09-27 DIAGNOSIS — E78.2 MIXED HYPERLIPIDEMIA: Chronic | ICD-10-CM

## 2022-09-27 DIAGNOSIS — I10 ESSENTIAL HYPERTENSION: Chronic | ICD-10-CM

## 2022-09-27 DIAGNOSIS — E78.5 HYPERLIPIDEMIA, UNSPECIFIED HYPERLIPIDEMIA TYPE: Chronic | ICD-10-CM

## 2022-09-27 DIAGNOSIS — D50.8 OTHER IRON DEFICIENCY ANEMIA: ICD-10-CM

## 2022-09-27 PROCEDURE — 99999 PR PBB SHADOW E&M-EST. PATIENT-LVL III: ICD-10-PCS | Mod: PBBFAC,,, | Performed by: INTERNAL MEDICINE

## 2022-09-27 PROCEDURE — 99999 PR PBB SHADOW E&M-EST. PATIENT-LVL III: CPT | Mod: PBBFAC,,, | Performed by: INTERNAL MEDICINE

## 2022-09-27 PROCEDURE — 3074F SYST BP LT 130 MM HG: CPT | Mod: CPTII,S$GLB,, | Performed by: INTERNAL MEDICINE

## 2022-09-27 PROCEDURE — 3074F PR MOST RECENT SYSTOLIC BLOOD PRESSURE < 130 MM HG: ICD-10-PCS | Mod: CPTII,S$GLB,, | Performed by: INTERNAL MEDICINE

## 2022-09-27 PROCEDURE — 1159F MED LIST DOCD IN RCRD: CPT | Mod: CPTII,S$GLB,, | Performed by: INTERNAL MEDICINE

## 2022-09-27 PROCEDURE — 1159F PR MEDICATION LIST DOCUMENTED IN MEDICAL RECORD: ICD-10-PCS | Mod: CPTII,S$GLB,, | Performed by: INTERNAL MEDICINE

## 2022-09-27 PROCEDURE — 1160F RVW MEDS BY RX/DR IN RCRD: CPT | Mod: CPTII,S$GLB,, | Performed by: INTERNAL MEDICINE

## 2022-09-27 PROCEDURE — 1157F ADVNC CARE PLAN IN RCRD: CPT | Mod: CPTII,S$GLB,, | Performed by: INTERNAL MEDICINE

## 2022-09-27 PROCEDURE — 3078F DIAST BP <80 MM HG: CPT | Mod: CPTII,S$GLB,, | Performed by: INTERNAL MEDICINE

## 2022-09-27 PROCEDURE — 3078F PR MOST RECENT DIASTOLIC BLOOD PRESSURE < 80 MM HG: ICD-10-PCS | Mod: CPTII,S$GLB,, | Performed by: INTERNAL MEDICINE

## 2022-09-27 PROCEDURE — 99214 PR OFFICE/OUTPT VISIT, EST, LEVL IV, 30-39 MIN: ICD-10-PCS | Mod: S$GLB,,, | Performed by: INTERNAL MEDICINE

## 2022-09-27 PROCEDURE — 99214 OFFICE O/P EST MOD 30 MIN: CPT | Mod: S$GLB,,, | Performed by: INTERNAL MEDICINE

## 2022-09-27 PROCEDURE — 1157F PR ADVANCE CARE PLAN OR EQUIV PRESENT IN MEDICAL RECORD: ICD-10-PCS | Mod: CPTII,S$GLB,, | Performed by: INTERNAL MEDICINE

## 2022-09-27 PROCEDURE — 1160F PR REVIEW ALL MEDS BY PRESCRIBER/CLIN PHARMACIST DOCUMENTED: ICD-10-PCS | Mod: CPTII,S$GLB,, | Performed by: INTERNAL MEDICINE

## 2022-09-27 RX ORDER — PRAVASTATIN SODIUM 20 MG/1
20 TABLET ORAL DAILY
Qty: 90 TABLET | Refills: 3 | Status: SHIPPED | OUTPATIENT
Start: 2022-09-27 | End: 2024-01-03 | Stop reason: SDUPTHER

## 2022-09-27 RX ORDER — TRIAMTERENE AND HYDROCHLOROTHIAZIDE 37.5; 25 MG/1; MG/1
1 CAPSULE ORAL EVERY MORNING
Qty: 90 CAPSULE | Refills: 3 | Status: SHIPPED | OUTPATIENT
Start: 2022-09-27 | End: 2023-09-25

## 2022-09-27 NOTE — PROGRESS NOTES
Subjective:    Patient ID:  Nereyda Hernandez is a 82 y.o. female who presents for follow-up of No chief complaint on file.      PCP: Sharona Weaver MD     Pt is a 81 yo F w/ PMH of HTN, HLD, and PVCs/PACs who presents for follow up.  She was last seen 4/12/2021 and was continued on medical therapy.  She mentions that she has continued to have fatigue and camacho.  She notes cotninued camacho which starts following walking a few blocks and has become consistent.  She denies cp, edema, orthopnea, PND, presyncope, LOC, palpitations, or claudication.   She notes compliance w/ meds and denies side effects.  She has been monitoring her BP at home and it has been running 110-130s/70-80s.  She has been going to the gym 4x/wk however notes some camacho and fatigue.         Past Medical History:   Diagnosis Date    HATTIE (acute kidney injury) 12/10/2021    Anxiety     Arthritis     Breast cancer 1990    left    Chronic disease anemia     Hyperlipidemia     Hypertension     Insomnia     Lobular carcinoma in situ     KANWAL (obstructive sleep apnea)     Osteoporosis     Rosacea     Squamous cell carcinoma     Stable angina pectoris 8/27/2020    Urinary incontinence     Vertigo      Past Surgical History:   Procedure Laterality Date    ADENOIDECTOMY      BELT ABDOMINOPLASTY      BREAST BIOPSY Left 1990    ex bx    BREAST LUMPECTOMY      COLONOSCOPY N/A 1/28/2020    Procedure: COLONOSCOPY;  Surgeon: Bianka Macias MD;  Location: [facility];  Service: Endoscopy;  Laterality: N/A;    ESOPHAGOGASTRODUODENOSCOPY N/A 1/28/2020    Procedure: EGD (ESOPHAGOGASTRODUODENOSCOPY);  Surgeon: Bianka Macias MD;  Location: [facility];  Service: Endoscopy;  Laterality: N/A;    EYE SURGERY      HERNIA REPAIR      Ventral    LIVER TRANSPLANT      OOPHORECTOMY      TONSILLECTOMY      TOTAL ABDOMINAL HYSTERECTOMY       Social History     Socioeconomic History    Marital status:     Number of children: 6    Years of education: college   Occupational History     Occupation: retired  for [employer]   Tobacco Use    Smoking status: Former     Packs/day: 0.50     Years: 21.00     Pack years: 10.50     Types: Cigarettes     Start date: 1959     Quit date: 1980     Years since quittin.0    Smokeless tobacco: Never   Substance and Sexual Activity    Alcohol use: Not Currently     Alcohol/week: 1.0 standard drink     Types: 1 Glasses of wine per week     Comment: Occasionally    Drug use: Never    Sexual activity: Not Currently     Partners: Male     Birth control/protection: Condom, Other-see comments     Comment: Pill     Family History   Problem Relation Age of Onset    Cancer Mother         liver    Pancreatic cancer Mother     Heart disease Mother     Depression Mother     Miscarriages / Stillbirths Mother     Hypertension Father     Alzheimer's disease Father     Depression Brother     Depression Brother     Diabetes Brother     Arthritis Brother     Hypertension Brother     Heart disease Brother        Review of patient's allergies indicates:   Allergen Reactions    Sulfa (sulfonamide antibiotics)        Medication List with Changes/Refills   Current Medications    AMITRIPTYLINE (ELAVIL) 10 MG TABLET    Take 2 tablets (20 mg total) by mouth nightly as needed for Insomnia.    ASPIRIN (ECOTRIN) 81 MG EC TABLET    Take 81 mg by mouth once daily.    AZELASTINE (ASTELIN) 137 MCG (0.1 %) NASAL SPRAY    1 spray by Nasal route 2 (two) times daily.    BENEFIBER, WHEAT DEXTRIN, ORAL    Take by mouth. PRN    CALCIUM CITRATE-VITAMIN D3 500 MG CALCIUM -400 UNIT CHEW    Take 1 tablet by mouth 2 (two) times daily. 12.5mcg    FISH OIL-OMEGA-3 FATTY ACIDS 300-1,000 MG CAPSULE    Take 2 g by mouth once daily.    FLUTICASONE PROPIONATE (FLONASE) 50 MCG/ACTUATION NASAL SPRAY    1 spray (50 mcg total) by Each Nostril route once daily.    LACTOBACILLUS ACIDOPHILUS (PROBIOTIC ACIDOPHILUS ORAL)    Take by mouth.    LATANOPROST 0.005 % OPHTHALMIC SOLUTION    1  drop every evening.    METOPROLOL TARTRATE (LOPRESSOR) 25 MG TABLET    Take 1 tablet (25 mg total) by mouth 2 (two) times daily.    MIRABEGRON (MYRBETRIQ) 25 MG TB24 ER TABLET    Take 1 tablet (25 mg total) by mouth once daily.    MULTIVIT-IRON-MIN-FOLIC ACID 3,500-18-0.4 UNIT-MG-MG ORAL CHEW    Take by mouth.    PANTOPRAZOLE (PROTONIX) 40 MG TABLET    Take 1 tablet (40 mg total) by mouth once daily.    POLYETHYLENE GLYCOL (GLYCOLAX) 17 GRAM PWPK    Take 17 g by mouth 3 (three) times daily as needed (for constipation).    RESTASIS 0.05 % OPHTHALMIC EMULSION        VIT A/VIT C/VIT E/ZINC/COPPER (PRESERVISION AREDS ORAL)    Take by mouth. Bottle does not say a but does say Lutein    ZOLPIDEM (AMBIEN) 5 MG TAB    Take 1 tablet (5 mg total) by mouth nightly as needed (insomnia).   Changed and/or Refilled Medications    Modified Medication Previous Medication    PRAVASTATIN (PRAVACHOL) 20 MG TABLET pravastatin (PRAVACHOL) 20 MG tablet       Take 1 tablet (20 mg total) by mouth once daily.    Take 1 tablet (20 mg total) by mouth once daily.    TRIAMTERENE-HYDROCHLOROTHIAZIDE 37.5-25 MG (DYAZIDE) 37.5-25 MG PER CAPSULE triamterene-hydrochlorothiazide 37.5-25 mg (DYAZIDE) 37.5-25 mg per capsule       Take 1 capsule by mouth every morning.    Take 1 capsule by mouth every morning.   Discontinued Medications    LOSARTAN (COZAAR) 25 MG TABLET    Take 1 tablet (25 mg total) by mouth once daily.       Review of Systems   Constitutional: Positive for malaise/fatigue. Negative for diaphoresis and fever.   HENT:  Negative for congestion and hearing loss.    Eyes:  Negative for blurred vision and pain.   Cardiovascular:  Positive for dyspnea on exertion. Negative for chest pain, claudication, leg swelling, near-syncope, orthopnea, palpitations, paroxysmal nocturnal dyspnea and syncope.   Respiratory:  Negative for shortness of breath and sleep disturbances due to breathing.    Hematologic/Lymphatic: Negative for bleeding problem.  "Does not bruise/bleed easily.   Skin:  Negative for color change and poor wound healing.   Gastrointestinal:  Negative for abdominal pain and nausea.   Genitourinary:  Negative for bladder incontinence and flank pain.   Neurological:  Positive for loss of balance. Negative for dizziness, focal weakness and light-headedness.      Objective:   /70   Pulse 73   Ht 5' 4" (1.626 m)   Wt 67.1 kg (148 lb)   LMP  (LMP Unknown)   SpO2 97%   BMI 25.40 kg/m²    Physical Exam  Constitutional:       Appearance: She is well-developed. She is not diaphoretic.   HENT:      Head: Normocephalic and atraumatic.      Right Ear: Tympanic membrane and ear canal normal. There is no impacted cerumen.      Left Ear: Tympanic membrane and ear canal normal. There is no impacted cerumen.   Eyes:      General: No scleral icterus.     Pupils: Pupils are equal, round, and reactive to light.   Neck:      Vascular: No JVD.   Cardiovascular:      Rate and Rhythm: Normal rate and regular rhythm.      Pulses: Intact distal pulses.      Heart sounds: S1 normal and S2 normal. No murmur heard.    No friction rub. No gallop.   Pulmonary:      Effort: Pulmonary effort is normal. No respiratory distress.      Breath sounds: Normal breath sounds. No wheezing or rales.   Chest:      Chest wall: No tenderness.   Abdominal:      General: Bowel sounds are normal. There is no distension.      Palpations: Abdomen is soft. There is no mass.      Tenderness: There is no abdominal tenderness. There is no rebound.   Musculoskeletal:         General: No tenderness. Normal range of motion.      Cervical back: Normal range of motion and neck supple.   Skin:     General: Skin is warm and dry.      Coloration: Skin is not pale.   Neurological:      Mental Status: She is alert and oriented to person, place, and time.      Coordination: Coordination normal.      Deep Tendon Reflexes: Reflexes normal.   Psychiatric:         Behavior: Behavior normal.         " Judgment: Judgment normal.         Lexiscan MPI: 9/28/2020- reviewed.    Conclusion         The EKG portion of this study is negative for ischemia.    The patient reported chest pain during the stress test.    There were no arrhythmias during stress.    ECG Stress Nuclear portion of this study will be reported separately.     FINDINGS:  There is appropriate wall motion.  There is no significant fixed or reversible perfusion defect.  The stress and rest end-diastolic volumes each measure 51 mL.  The stress and rest end systolic volumes measure 9 and 12 mL respectively.  The stress and rest ejection fraction measure 82 and 76% respectively.     Impression:     No acute findings.      Echo: 7/29/2022- reviewed.    Summary    The left ventricle is normal in size with normal systolic function.  The estimated ejection fraction is 55%.  Normal left ventricular diastolic function.  Normal right ventricular size with normal right ventricular systolic function.  Mild-to-moderate aortic regurgitation.  Mild tricuspid regurgitation.  Mild pulmonic regurgitation.  Normal central venous pressure (3 mmHg).  The estimated PA systolic pressure is 32 mmHg.    48 hr Holter: 9/19/2022- reviewed.    Conclusion    No significant arrhythmia noted.  Diary returned incomplete.        Assessment:       1. Essential hypertension    2. Hyperlipidemia, unspecified hyperlipidemia type    3. Mixed hyperlipidemia    4. Palpitations    5. Other iron deficiency anemia           Plan:         Essential hypertension  Controlled.  Goal BP < 140/90.  Compliant w/ meds.    - continue other meds, d/c losartan 25 mg daily   - encouraged lifestyle modifications  - pt to continue to monitor BP at home and record    Mixed hyperlipidemia  Controlled.  Goal LDL < 130, LDL 81 3/23/2022.  - continue statin therapy  - encouraged lifestyle modifications    Palpitations  Controlled.      - continue metoprolol  - continue to control BP     Iron deficiency  anemia  Management per PCP.       Total duration of face to face visit time 30 minutes.  Total time spent counseling greater than fifty percent of total visit time.  Counseling included discussion regarding imaging findings, diagnosis, possibilities, treatment options, risks and benefits.  The patient had many questions regarding the options and long-term effects      Sujit Dent M.D.  Interventional Cardiology                Follow-up  Pertinent negatives include no abdominal pain, chest pain, congestion, diaphoresis, fever or nausea.

## 2022-09-27 NOTE — TELEPHONE ENCOUNTER
----- Message from Allyssa Chan RN sent at 9/27/2022 11:25 AM CDT -----  Regarding: handicap sticker  GM,    Patient saw Dr. Dent this am and requests handicap sticker please. This is a regular pt of Dr. Weaver. Please advise.     Thanks, Allyssa

## 2022-09-27 NOTE — ASSESSMENT & PLAN NOTE
Controlled.  Goal BP < 140/90.  Compliant w/ meds.    - continue other meds, d/c losartan 25 mg daily   - encouraged lifestyle modifications  - pt to continue to monitor BP at home and record

## 2022-10-03 ENCOUNTER — OFFICE VISIT (OUTPATIENT)
Dept: FAMILY MEDICINE | Facility: CLINIC | Age: 82
End: 2022-10-03
Payer: MEDICARE

## 2022-10-03 VITALS
BODY MASS INDEX: 25.1 KG/M2 | HEIGHT: 64 IN | OXYGEN SATURATION: 96 % | HEART RATE: 58 BPM | SYSTOLIC BLOOD PRESSURE: 134 MMHG | DIASTOLIC BLOOD PRESSURE: 80 MMHG | WEIGHT: 147 LBS

## 2022-10-03 DIAGNOSIS — R06.00 DYSPNEA, UNSPECIFIED TYPE: Chronic | ICD-10-CM

## 2022-10-03 DIAGNOSIS — J30.9 ALLERGIC RHINITIS, UNSPECIFIED SEASONALITY, UNSPECIFIED TRIGGER: ICD-10-CM

## 2022-10-03 DIAGNOSIS — I10 ESSENTIAL HYPERTENSION: Chronic | ICD-10-CM

## 2022-10-03 DIAGNOSIS — F51.04 PSYCHOPHYSIOLOGICAL INSOMNIA: Primary | Chronic | ICD-10-CM

## 2022-10-03 DIAGNOSIS — F32.A ANXIETY AND DEPRESSION: Chronic | ICD-10-CM

## 2022-10-03 DIAGNOSIS — F41.9 ANXIETY AND DEPRESSION: Chronic | ICD-10-CM

## 2022-10-03 PROCEDURE — 99214 PR OFFICE/OUTPT VISIT, EST, LEVL IV, 30-39 MIN: ICD-10-PCS | Mod: S$GLB,,,

## 2022-10-03 PROCEDURE — 1126F AMNT PAIN NOTED NONE PRSNT: CPT | Mod: CPTII,S$GLB,,

## 2022-10-03 PROCEDURE — 1159F PR MEDICATION LIST DOCUMENTED IN MEDICAL RECORD: ICD-10-PCS | Mod: CPTII,S$GLB,,

## 2022-10-03 PROCEDURE — 99214 OFFICE O/P EST MOD 30 MIN: CPT | Mod: S$GLB,,,

## 2022-10-03 PROCEDURE — 99999 PR PBB SHADOW E&M-EST. PATIENT-LVL IV: CPT | Mod: PBBFAC,,,

## 2022-10-03 PROCEDURE — 1159F MED LIST DOCD IN RCRD: CPT | Mod: CPTII,S$GLB,,

## 2022-10-03 PROCEDURE — 99999 PR PBB SHADOW E&M-EST. PATIENT-LVL IV: ICD-10-PCS | Mod: PBBFAC,,,

## 2022-10-03 PROCEDURE — 3075F PR MOST RECENT SYSTOLIC BLOOD PRESS GE 130-139MM HG: ICD-10-PCS | Mod: CPTII,S$GLB,,

## 2022-10-03 PROCEDURE — 1157F PR ADVANCE CARE PLAN OR EQUIV PRESENT IN MEDICAL RECORD: ICD-10-PCS | Mod: CPTII,S$GLB,,

## 2022-10-03 PROCEDURE — 3079F PR MOST RECENT DIASTOLIC BLOOD PRESSURE 80-89 MM HG: ICD-10-PCS | Mod: CPTII,S$GLB,,

## 2022-10-03 PROCEDURE — 1101F PT FALLS ASSESS-DOCD LE1/YR: CPT | Mod: CPTII,S$GLB,,

## 2022-10-03 PROCEDURE — 3288F PR FALLS RISK ASSESSMENT DOCUMENTED: ICD-10-PCS | Mod: CPTII,S$GLB,,

## 2022-10-03 PROCEDURE — 1157F ADVNC CARE PLAN IN RCRD: CPT | Mod: CPTII,S$GLB,,

## 2022-10-03 PROCEDURE — 3288F FALL RISK ASSESSMENT DOCD: CPT | Mod: CPTII,S$GLB,,

## 2022-10-03 PROCEDURE — 3075F SYST BP GE 130 - 139MM HG: CPT | Mod: CPTII,S$GLB,,

## 2022-10-03 PROCEDURE — 1126F PR PAIN SEVERITY QUANTIFIED, NO PAIN PRESENT: ICD-10-PCS | Mod: CPTII,S$GLB,,

## 2022-10-03 PROCEDURE — 3079F DIAST BP 80-89 MM HG: CPT | Mod: CPTII,S$GLB,,

## 2022-10-03 PROCEDURE — 1101F PR PT FALLS ASSESS DOC 0-1 FALLS W/OUT INJ PAST YR: ICD-10-PCS | Mod: CPTII,S$GLB,,

## 2022-10-03 RX ORDER — ESCITALOPRAM OXALATE 10 MG/1
10 TABLET ORAL DAILY
Qty: 90 TABLET | Refills: 0 | Status: SHIPPED | OUTPATIENT
Start: 2022-10-03 | End: 2022-11-03 | Stop reason: SDUPTHER

## 2022-10-03 RX ORDER — AMITRIPTYLINE HYDROCHLORIDE 10 MG/1
30 TABLET, FILM COATED ORAL NIGHTLY PRN
Qty: 270 TABLET | Refills: 0 | Status: SHIPPED | OUTPATIENT
Start: 2022-10-03 | End: 2022-12-19 | Stop reason: SDUPTHER

## 2022-10-03 RX ORDER — AZELASTINE 1 MG/ML
1 SPRAY, METERED NASAL 2 TIMES DAILY
Qty: 30 ML | Refills: 3 | Status: SHIPPED | OUTPATIENT
Start: 2022-10-03 | End: 2022-11-03 | Stop reason: SDUPTHER

## 2022-10-03 NOTE — PROGRESS NOTES
Subjective:         Chief Complaint: Insomnia, Follow-up, Hypertension, and Depression  HPI   Nereyda Hernandez is a 82 y.o. female, patient of Sharona Weaver MD with chronic facial pain, anxiety and depression, dyspnea, hypertension, hyperlipidemia, palpitations, JOSELINE, GERD, osteoporosis, insomnia, unknown to me, presents today with complaints of Insomnia, Follow-up, Hypertension, and Depression    Here for follow up on insomnia. Currently taking  amitriptyline 20 mg. No longer taking ambien. States that she is having a hard time getting to sleep sometimes. Would like to increase dose of amitriptyline. Sometimes fall asleep and wake up once or twice. Takes pill about 9:15-9:30 pm, falls asleep about an hour after that.     Still reports dyspnea with exertion. Has to stop sometimes when walking. Requesting handicapped tag.     Anxiety: lexapro stopped due to a rash. Really thinks it was due to soap powder. States she has been feeling depressed, just tired. States she has tried it for a few days and does not feel like its worked. Explained to patient it may take up to 6-8 weeks to start seeing a change.     BP: followed by Dr. Dent in cardiology, Was taken off of Losartan, does not see any difference in her fatigue.     States she is still with hoarseness. Dx LPR. Takes pantoprazole 40 mg daily after breakfast. Instructed to take about 30 minutes before breakfast.  Followed by Dr. Zavala with ENT. Advised to schedule a follow up appointment.     Past Medical History:   Diagnosis Date    HATTIE (acute kidney injury) 12/10/2021    Anxiety     Arthritis     Breast cancer 1990    left    Chronic disease anemia     Hyperlipidemia     Hypertension     Insomnia     Lobular carcinoma in situ     KANWAL (obstructive sleep apnea)     Osteoporosis     Rosacea     Squamous cell carcinoma     Stable angina pectoris 8/27/2020    Urinary incontinence     Vertigo        Past Surgical History:   Procedure Laterality Date    ADENOIDECTOMY       BELT ABDOMINOPLASTY      BREAST BIOPSY Left     ex bx    BREAST LUMPECTOMY      COLONOSCOPY N/A 2020    Procedure: COLONOSCOPY;  Surgeon: Bianka Macias MD;  Location: [facility] ENDO;  Service: Endoscopy;  Laterality: N/A;    ESOPHAGOGASTRODUODENOSCOPY N/A 2020    Procedure: EGD (ESOPHAGOGASTRODUODENOSCOPY);  Surgeon: Bianka Macias MD;  Location: [facility] ENDO;  Service: Endoscopy;  Laterality: N/A;    EYE SURGERY      HERNIA REPAIR      Ventral    LIVER TRANSPLANT      OOPHORECTOMY      TONSILLECTOMY      TOTAL ABDOMINAL HYSTERECTOMY         Family History   Problem Relation Age of Onset    Cancer Mother         liver    Pancreatic cancer Mother     Heart disease Mother     Depression Mother     Miscarriages / Stillbirths Mother     Hypertension Father     Alzheimer's disease Father     Depression Brother     Depression Brother     Diabetes Brother     Arthritis Brother     Hypertension Brother     Heart disease Brother        Social History     Socioeconomic History    Marital status:     Number of children: 6    Years of education: college   Occupational History    Occupation: retired  for [employer]   Tobacco Use    Smoking status: Former     Packs/day: 0.50     Years: 21.00     Pack years: 10.50     Types: Cigarettes     Start date: 1959     Quit date: 1980     Years since quittin.0    Smokeless tobacco: Never   Substance and Sexual Activity    Alcohol use: Not Currently     Alcohol/week: 1.0 standard drink     Types: 1 Glasses of wine per week     Comment: Occasionally    Drug use: Never    Sexual activity: Not Currently     Partners: Male     Birth control/protection: Condom, Other-see comments     Comment: Pill       Review of Systems   Constitutional:  Positive for fatigue.   Respiratory:  Positive for shortness of breath.    Cardiovascular:  Negative for chest pain and leg swelling.   Gastrointestinal:  Negative for anal bleeding, constipation,  "nausea and vomiting.   Musculoskeletal:  Negative for arthralgias, joint swelling and myalgias.   Neurological:  Negative for dizziness, speech difficulty, light-headedness and numbness.       Objective:     Vitals:    10/03/22 0824   BP: 134/80   Pulse: (!) 58   SpO2: 96%   Weight: 66.7 kg (147 lb)   Height: 5' 4" (1.626 m)          Physical Exam  Vitals reviewed.   Constitutional:       Appearance: Normal appearance. She is well-developed and well-groomed.   HENT:      Head: Normocephalic and atraumatic.   Eyes:      Extraocular Movements: Extraocular movements intact.   Cardiovascular:      Rate and Rhythm: Normal rate and regular rhythm.      Heart sounds: Normal heart sounds.   Pulmonary:      Effort: Pulmonary effort is normal.      Breath sounds: Normal breath sounds.   Musculoskeletal:      Right lower leg: No edema.      Left lower leg: No edema.   Skin:     General: Skin is warm and dry.      Capillary Refill: Capillary refill takes less than 2 seconds.   Neurological:      General: No focal deficit present.      Mental Status: She is alert and oriented to person, place, and time.   Psychiatric:         Attention and Perception: Attention normal.         Behavior: Behavior normal.         Laboratory:  CBC:  No results for input(s): WBC, RBC, HGB, HCT, PLT, MCV, MCH, MCHC in the last 2160 hours.  CMP:  No results for input(s): GLU, CALCIUM, ALBUMIN, PROT, NA, K, CO2, CL, BUN, ALKPHOS, ALT, AST, BILITOT in the last 2160 hours.    Invalid input(s): CREATININ  URINALYSIS:  No results for input(s): COLORU, CLARITYU, SPECGRAV, PHUR, PROTEINUA, GLUCOSEU, BILIRUBINCON, BLOODU, WBCU, RBCU, BACTERIA, MUCUS, NITRITE, LEUKOCYTESUR, UROBILINOGEN, HYALINECASTS in the last 2160 hours.   LIPIDS:  No results for input(s): TSH, HDL, CHOL, TRIG, LDLCALC, CHOLHDL, NONHDLCHOL, TOTALCHOLEST in the last 2160 hours.  TSH:  No results for input(s): TSH in the last 2160 hours.  A1C:  No results for input(s): HGBA1C in the last 2160 " hours.    Assessment:         ICD-10-CM ICD-9-CM   1. Psychophysiological insomnia  F51.04 307.42   2. Anxiety and depression  F41.9 300.00    F32.A 311   3. Dyspnea, unspecified type  R06.00 786.09   4. Allergic rhinitis, unspecified seasonality, unspecified trigger  J30.9 477.9   5. Essential hypertension  I10 401.9       Plan:       Psychophysiological insomnia  -     amitriptyline (ELAVIL) 10 MG tablet; Take 3 tablets (30 mg total) by mouth nightly as needed for Insomnia.  Dispense: 270 tablet; Refill: 0  Dose adjusted, increased to 30 mg qhs prn as patient states 20 mg is not working. Will follow up in 1 month or sooner to assess.     Anxiety and depression  -     EScitalopram oxalate (LEXAPRO) 10 MG tablet; Take 1 tablet (10 mg total) by mouth once daily.  Dispense: 90 tablet; Refill: 0  Restart escitalopram. Instructed patient to take daily, may take up to 8 weeks to start seeing a different/effects of medications.     Dyspnea, unspecified type  Followed by Cardiology.     Allergic rhinitis, unspecified seasonality, unspecified trigger  -     azelastine (ASTELIN) 137 mcg (0.1 %) nasal spray; 1 spray (137 mcg total) by Nasal route 2 (two) times daily.  Dispense: 30 mL; Refill: 3  Chronic; stable on medication. Continue medication as prescribed.    Essential hypertension  Chronic; stable on medication. Continue medication as prescribed.  Followed by Cardiology.       If symptoms worsen, go to ER.  If symptoms do not improve, return to clinic.   Keep appointments with all specialists.     Patient verbalizes understanding and agrees with current treatment plan.      Follow up in about 1 month (around 11/3/2022).     Patient's Medications   New Prescriptions    No medications on file   Previous Medications    ASPIRIN (ECOTRIN) 81 MG EC TABLET    Take 81 mg by mouth once daily.    BENEFIBER, WHEAT DEXTRIN, ORAL    Take by mouth. PRN    CALCIUM CITRATE-VITAMIN D3 500 MG CALCIUM -400 UNIT CHEW    Take 1 tablet by  mouth 2 (two) times daily. 12.5mcg    FISH OIL-OMEGA-3 FATTY ACIDS 300-1,000 MG CAPSULE    Take 2 g by mouth once daily.    FLUTICASONE PROPIONATE (FLONASE) 50 MCG/ACTUATION NASAL SPRAY    1 spray (50 mcg total) by Each Nostril route once daily.    LACTOBACILLUS ACIDOPHILUS (PROBIOTIC ACIDOPHILUS ORAL)    Take by mouth.    LATANOPROST 0.005 % OPHTHALMIC SOLUTION    1 drop every evening.    METOPROLOL TARTRATE (LOPRESSOR) 25 MG TABLET    Take 1 tablet (25 mg total) by mouth 2 (two) times daily.    MIRABEGRON (MYRBETRIQ) 25 MG TB24 ER TABLET    Take 1 tablet (25 mg total) by mouth once daily.    MULTIVIT-IRON-MIN-FOLIC ACID 3,500-18-0.4 UNIT-MG-MG ORAL CHEW    Take by mouth.    PANTOPRAZOLE (PROTONIX) 40 MG TABLET    Take 1 tablet (40 mg total) by mouth once daily.    POLYETHYLENE GLYCOL (GLYCOLAX) 17 GRAM PWPK    Take 17 g by mouth 3 (three) times daily as needed (for constipation).    PRAVASTATIN (PRAVACHOL) 20 MG TABLET    Take 1 tablet (20 mg total) by mouth once daily.    RESTASIS 0.05 % OPHTHALMIC EMULSION        TRIAMTERENE-HYDROCHLOROTHIAZIDE 37.5-25 MG (DYAZIDE) 37.5-25 MG PER CAPSULE    Take 1 capsule by mouth every morning.    VIT A/VIT C/VIT E/ZINC/COPPER (PRESERVISION AREDS ORAL)    Take by mouth. Bottle does not say a but does say Lutein    ZOLPIDEM (AMBIEN) 5 MG TAB    Take 1 tablet (5 mg total) by mouth nightly as needed (insomnia).   Modified Medications    Modified Medication Previous Medication    AMITRIPTYLINE (ELAVIL) 10 MG TABLET amitriptyline (ELAVIL) 10 MG tablet       Take 3 tablets (30 mg total) by mouth nightly as needed for Insomnia.    Take 2 tablets (20 mg total) by mouth nightly as needed for Insomnia.    AZELASTINE (ASTELIN) 137 MCG (0.1 %) NASAL SPRAY azelastine (ASTELIN) 137 mcg (0.1 %) nasal spray       1 spray (137 mcg total) by Nasal route 2 (two) times daily.    1 spray by Nasal route 2 (two) times daily.    ESCITALOPRAM OXALATE (LEXAPRO) 10 MG TABLET EScitalopram oxalate  (LEXAPRO) 10 MG tablet       Take 1 tablet (10 mg total) by mouth once daily.    Take 1 tablet (10 mg total) by mouth once daily.   Discontinued Medications    No medications on file         Mery Benites NP

## 2022-11-03 ENCOUNTER — OFFICE VISIT (OUTPATIENT)
Dept: FAMILY MEDICINE | Facility: CLINIC | Age: 82
End: 2022-11-03
Payer: MEDICARE

## 2022-11-03 VITALS
WEIGHT: 148.31 LBS | SYSTOLIC BLOOD PRESSURE: 102 MMHG | DIASTOLIC BLOOD PRESSURE: 68 MMHG | BODY MASS INDEX: 25.32 KG/M2 | HEIGHT: 64 IN

## 2022-11-03 DIAGNOSIS — I10 ESSENTIAL HYPERTENSION: Chronic | ICD-10-CM

## 2022-11-03 DIAGNOSIS — H92.01 ACUTE OTALGIA, RIGHT: ICD-10-CM

## 2022-11-03 DIAGNOSIS — F32.A ANXIETY AND DEPRESSION: Primary | Chronic | ICD-10-CM

## 2022-11-03 DIAGNOSIS — F41.9 ANXIETY AND DEPRESSION: Primary | Chronic | ICD-10-CM

## 2022-11-03 DIAGNOSIS — J30.9 ALLERGIC RHINITIS, UNSPECIFIED SEASONALITY, UNSPECIFIED TRIGGER: ICD-10-CM

## 2022-11-03 DIAGNOSIS — F51.04 PSYCHOPHYSIOLOGICAL INSOMNIA: Chronic | ICD-10-CM

## 2022-11-03 PROCEDURE — 1159F MED LIST DOCD IN RCRD: CPT | Mod: CPTII,S$GLB,,

## 2022-11-03 PROCEDURE — 99214 PR OFFICE/OUTPT VISIT, EST, LEVL IV, 30-39 MIN: ICD-10-PCS | Mod: S$GLB,,,

## 2022-11-03 PROCEDURE — 1159F PR MEDICATION LIST DOCUMENTED IN MEDICAL RECORD: ICD-10-PCS | Mod: CPTII,S$GLB,,

## 2022-11-03 PROCEDURE — 1157F PR ADVANCE CARE PLAN OR EQUIV PRESENT IN MEDICAL RECORD: ICD-10-PCS | Mod: CPTII,S$GLB,,

## 2022-11-03 PROCEDURE — 99999 PR PBB SHADOW E&M-EST. PATIENT-LVL IV: ICD-10-PCS | Mod: PBBFAC,,,

## 2022-11-03 PROCEDURE — 99499 UNLISTED E&M SERVICE: CPT | Mod: HCNC,S$GLB,,

## 2022-11-03 PROCEDURE — 1157F ADVNC CARE PLAN IN RCRD: CPT | Mod: CPTII,S$GLB,,

## 2022-11-03 PROCEDURE — 3074F SYST BP LT 130 MM HG: CPT | Mod: CPTII,S$GLB,,

## 2022-11-03 PROCEDURE — 3078F DIAST BP <80 MM HG: CPT | Mod: CPTII,S$GLB,,

## 2022-11-03 PROCEDURE — 1126F PR PAIN SEVERITY QUANTIFIED, NO PAIN PRESENT: ICD-10-PCS | Mod: CPTII,S$GLB,,

## 2022-11-03 PROCEDURE — 1126F AMNT PAIN NOTED NONE PRSNT: CPT | Mod: CPTII,S$GLB,,

## 2022-11-03 PROCEDURE — 99999 PR PBB SHADOW E&M-EST. PATIENT-LVL IV: CPT | Mod: PBBFAC,,,

## 2022-11-03 PROCEDURE — 3078F PR MOST RECENT DIASTOLIC BLOOD PRESSURE < 80 MM HG: ICD-10-PCS | Mod: CPTII,S$GLB,,

## 2022-11-03 PROCEDURE — 99499 RISK ADDL DX/OHS AUDIT: ICD-10-PCS | Mod: HCNC,S$GLB,,

## 2022-11-03 PROCEDURE — 99214 OFFICE O/P EST MOD 30 MIN: CPT | Mod: S$GLB,,,

## 2022-11-03 PROCEDURE — 3074F PR MOST RECENT SYSTOLIC BLOOD PRESSURE < 130 MM HG: ICD-10-PCS | Mod: CPTII,S$GLB,,

## 2022-11-03 RX ORDER — AZELASTINE 1 MG/ML
1 SPRAY, METERED NASAL 2 TIMES DAILY
Qty: 30 ML | Refills: 3 | Status: SHIPPED | OUTPATIENT
Start: 2022-11-03 | End: 2023-11-14 | Stop reason: SDUPTHER

## 2022-11-03 RX ORDER — ESCITALOPRAM OXALATE 20 MG/1
20 TABLET ORAL DAILY
Qty: 90 TABLET | Refills: 1 | Status: SHIPPED | OUTPATIENT
Start: 2022-11-03 | End: 2023-06-12

## 2022-11-03 RX ORDER — FLUTICASONE PROPIONATE 50 MCG
1 SPRAY, SUSPENSION (ML) NASAL DAILY
Qty: 16 ML | Refills: 3 | Status: SHIPPED | OUTPATIENT
Start: 2022-11-03 | End: 2023-02-28

## 2022-11-03 NOTE — PROGRESS NOTES
Subjective:         Chief Complaint: Fatigue, Hypertension (Stopped taking losartan pressure started to rise is back on that medication. Did not feel any difference in fatigue when not taking the medication ), and Diabetes  HPI   Nereyda Hernandez is a 82 y.o. female, patient of Sharona Weaver MD with chronic facial pain, anxiety and depression, dyspnea, hypertension, hyperlipidemia, palpitations, JOSELINE, GERD, osteoporosis, insomnia, known to me, presents today with complaints of Fatigue, Hypertension (Stopped taking losartan pressure started to rise is back on that medication. Did not feel any difference in fatigue when not taking the medication ), and Diabetes.    1 month follow up for insomnia, anxiety/depression.   Currently lives alone. Loves living by herself for the peace and quiet.     Insomnia:   During last visit, amitriptyline was increased to 30 mg. Reports she is sleeping better.  Sometimes fall asleep and still wakes up once or twice. Reports sleeping better since the increase.     Anxiety:   Restarted Lexapro during last visit.   Reports she is really feeling good, she just doesn't want to do anything. Used to love working in her yard but doesn't want to do anything. Still feeling depressed and just tired.     BP: followed by Dr. Dent in cardiology, Was taken off of Losartan, does not see any difference in her fatigue. States she restarted the Losartan due to the BP increasing. Ranging from 120s/70s-140s/90s.    Allergic rhinitis:  States she is feeling much better. Still hoarse but her ears and neck are feeling much better.             Past Medical History:   Diagnosis Date    HATTIE (acute kidney injury) 12/10/2021    Anxiety     Arthritis     Breast cancer 1990    left    Chronic disease anemia     Hyperlipidemia     Hypertension     Insomnia     Lobular carcinoma in situ     KANWAL (obstructive sleep apnea)     Osteoporosis     Rosacea     Squamous cell carcinoma     Stable angina pectoris 8/27/2020     Urinary incontinence     Vertigo        Past Surgical History:   Procedure Laterality Date    ADENOIDECTOMY      BELT ABDOMINOPLASTY      BREAST BIOPSY Left     ex bx    BREAST LUMPECTOMY      COLONOSCOPY N/A 2020    Procedure: COLONOSCOPY;  Surgeon: Bianka Macias MD;  Location: [facility] ENDO;  Service: Endoscopy;  Laterality: N/A;    ESOPHAGOGASTRODUODENOSCOPY N/A 2020    Procedure: EGD (ESOPHAGOGASTRODUODENOSCOPY);  Surgeon: Bianka Macias MD;  Location: [facility] ENDO;  Service: Endoscopy;  Laterality: N/A;    EYE SURGERY      HERNIA REPAIR      Ventral    LIVER TRANSPLANT      OOPHORECTOMY      TONSILLECTOMY      TOTAL ABDOMINAL HYSTERECTOMY         Family History   Problem Relation Age of Onset    Cancer Mother         liver    Pancreatic cancer Mother     Heart disease Mother     Depression Mother     Miscarriages / Stillbirths Mother     Hypertension Father     Alzheimer's disease Father     Depression Brother     Depression Brother     Diabetes Brother     Arthritis Brother     Hypertension Brother     Heart disease Brother        Social History     Socioeconomic History    Marital status:     Number of children: 6    Years of education: college   Occupational History    Occupation: retired  for [employer]   Tobacco Use    Smoking status: Former     Packs/day: 0.50     Years: 21.00     Pack years: 10.50     Types: Cigarettes     Start date: 1959     Quit date: 1980     Years since quittin.1    Smokeless tobacco: Never   Substance and Sexual Activity    Alcohol use: Not Currently     Alcohol/week: 1.0 standard drink     Types: 1 Glasses of wine per week     Comment: Occasionally    Drug use: Never    Sexual activity: Not Currently     Partners: Male     Birth control/protection: Condom, Other-see comments     Comment: Pill       Review of Systems   Constitutional:  Positive for fatigue.   Respiratory:  Negative for shortness of breath.    Cardiovascular:  " Negative for chest pain and leg swelling.   Gastrointestinal:  Negative for anal bleeding, constipation, nausea and vomiting.   Musculoskeletal:  Negative for arthralgias, joint swelling and myalgias.   Neurological:  Negative for dizziness, speech difficulty, light-headedness and numbness.       Objective:     Vitals:    11/03/22 1003   BP: 102/68   BP Location: Left arm   Patient Position: Sitting   BP Method: Small (Manual)   Weight: 67.3 kg (148 lb 4.8 oz)   Height: 5' 4" (1.626 m)          Physical Exam  Vitals reviewed.   Constitutional:       Appearance: Normal appearance. She is well-developed and well-groomed.   HENT:      Head: Normocephalic and atraumatic.   Eyes:      Extraocular Movements: Extraocular movements intact.   Cardiovascular:      Rate and Rhythm: Normal rate and regular rhythm.      Heart sounds: Normal heart sounds.   Pulmonary:      Effort: Pulmonary effort is normal.      Breath sounds: Normal breath sounds.   Musculoskeletal:      Right lower leg: No edema.      Left lower leg: No edema.   Skin:     General: Skin is warm and dry.      Capillary Refill: Capillary refill takes less than 2 seconds.   Neurological:      General: No focal deficit present.      Mental Status: She is alert and oriented to person, place, and time.   Psychiatric:         Attention and Perception: Attention normal.         Behavior: Behavior normal.         Laboratory:  CBC:  No results for input(s): WBC, RBC, HGB, HCT, PLT, MCV, MCH, MCHC in the last 2160 hours.  CMP:  No results for input(s): GLU, CALCIUM, ALBUMIN, PROT, NA, K, CO2, CL, BUN, ALKPHOS, ALT, AST, BILITOT in the last 2160 hours.    Invalid input(s): CREATININ  URINALYSIS:  No results for input(s): COLORU, CLARITYU, SPECGRAV, PHUR, PROTEINUA, GLUCOSEU, BILIRUBINCON, BLOODU, WBCU, RBCU, BACTERIA, MUCUS, NITRITE, LEUKOCYTESUR, UROBILINOGEN, HYALINECASTS in the last 2160 hours.   LIPIDS:  No results for input(s): TSH, HDL, CHOL, TRIG, LDLCALC, CHOLHDL, " NONHDLCHOL, TOTALCHOLEST in the last 2160 hours.  TSH:  No results for input(s): TSH in the last 2160 hours.  A1C:  No results for input(s): HGBA1C in the last 2160 hours.    Assessment:         ICD-10-CM ICD-9-CM   1. Anxiety and depression  F41.9 300.00    F32.A 311   2. Essential hypertension  I10 401.9   3. Psychophysiological insomnia  F51.04 307.42   4. Allergic rhinitis, unspecified seasonality, unspecified trigger  J30.9 477.9   5. Acute otalgia, right  H92.01 388.70       Plan:         Anxiety and depression  -     EScitalopram oxalate (LEXAPRO) 20 MG tablet; Take 1 tablet (20 mg total) by mouth once daily.  Dispense: 90 tablet; Refill: 0  Lexapro increased to 20 mg. Instructed to take 2 of the 10 mg tabs of escitalopram (Lexapro) daily until you run out then start taking 1 tab of the 20 mg.    Work towards trying to get back out in the yard and taking short walks.  Therapy discussed, declines at this time.     Essential hypertension  Chronic; stable on medication. Continue medication as prescribed.  Followed by Cardiology.     Psychophysiological insomnia  Chronic; stable on medication. Continue medication as prescribed.    Allergic rhinitis, unspecified seasonality, unspecified trigger  -     azelastine (ASTELIN) 137 mcg (0.1 %) nasal spray; 1 spray (137 mcg total) by Nasal route 2 (two) times daily.  Dispense: 30 mL; Refill: 3  Chronic; stable on medication. Continue medication as prescribed.    Acute otalgia, right  Script sent for fluticasone daily.     If symptoms worsen, go to ER.  If symptoms do not improve, return to clinic.   Keep appointments with all specialists.     Patient verbalizes understanding and agrees with current treatment plan.      Follow up in about 1 month (around 12/3/2022), or if symptoms worsen or fail to improve, for depression/anxiety.     Patient's Medications   New Prescriptions    No medications on file   Previous Medications    AMITRIPTYLINE (ELAVIL) 10 MG TABLET    Take 3  tablets (30 mg total) by mouth nightly as needed for Insomnia.    ASPIRIN (ECOTRIN) 81 MG EC TABLET    Take 81 mg by mouth once daily.    BENEFIBER, WHEAT DEXTRIN, ORAL    Take by mouth. PRN    CALCIUM CITRATE-VITAMIN D3 500 MG CALCIUM -400 UNIT CHEW    Take 1 tablet by mouth 2 (two) times daily. 12.5mcg    FISH OIL-OMEGA-3 FATTY ACIDS 300-1,000 MG CAPSULE    Take 2 g by mouth once daily.    LACTOBACILLUS ACIDOPHILUS (PROBIOTIC ACIDOPHILUS ORAL)    Take by mouth.    LATANOPROST 0.005 % OPHTHALMIC SOLUTION    1 drop every evening.    METOPROLOL TARTRATE (LOPRESSOR) 25 MG TABLET    Take 1 tablet (25 mg total) by mouth 2 (two) times daily.    MIRABEGRON (MYRBETRIQ) 25 MG TB24 ER TABLET    Take 1 tablet (25 mg total) by mouth once daily.    MULTIVIT-IRON-MIN-FOLIC ACID 3,500-18-0.4 UNIT-MG-MG ORAL CHEW    Take by mouth.    PANTOPRAZOLE (PROTONIX) 40 MG TABLET    Take 1 tablet (40 mg total) by mouth once daily.    POLYETHYLENE GLYCOL (GLYCOLAX) 17 GRAM PWPK    Take 17 g by mouth 3 (three) times daily as needed (for constipation).    PRAVASTATIN (PRAVACHOL) 20 MG TABLET    Take 1 tablet (20 mg total) by mouth once daily.    RESTASIS 0.05 % OPHTHALMIC EMULSION        TRIAMTERENE-HYDROCHLOROTHIAZIDE 37.5-25 MG (DYAZIDE) 37.5-25 MG PER CAPSULE    Take 1 capsule by mouth every morning.    VIT A/VIT C/VIT E/ZINC/COPPER (PRESERVISION AREDS ORAL)    Take by mouth. Bottle does not say a but does say Lutein    ZOLPIDEM (AMBIEN) 5 MG TAB    Take 1 tablet (5 mg total) by mouth nightly as needed (insomnia).   Modified Medications    Modified Medication Previous Medication    AZELASTINE (ASTELIN) 137 MCG (0.1 %) NASAL SPRAY azelastine (ASTELIN) 137 mcg (0.1 %) nasal spray       1 spray (137 mcg total) by Nasal route 2 (two) times daily.    1 spray (137 mcg total) by Nasal route 2 (two) times daily.    ESCITALOPRAM OXALATE (LEXAPRO) 20 MG TABLET EScitalopram oxalate (LEXAPRO) 10 MG tablet       Take 1 tablet (20 mg total) by mouth once  daily.    Take 1 tablet (10 mg total) by mouth once daily.    FLUTICASONE PROPIONATE (FLONASE) 50 MCG/ACTUATION NASAL SPRAY fluticasone propionate (FLONASE) 50 mcg/actuation nasal spray       1 spray (50 mcg total) by Each Nostril route once daily.    1 spray (50 mcg total) by Each Nostril route once daily.   Discontinued Medications    No medications on file         Mery Benites NP      HPI

## 2022-11-03 NOTE — PATIENT INSTRUCTIONS
Return to clinic in 1 month for a follow up.   Take 2 of the 10 mg tabs of escitalopram (Lexapro) daily until you run out then start taking 1 tab of the 20 mg.    Work towards trying to get back out in the yard and taking short walks.    Will discuss if you want to see Ayan when you come back.

## 2022-11-07 ENCOUNTER — OFFICE VISIT (OUTPATIENT)
Dept: ENDOCRINOLOGY | Facility: CLINIC | Age: 82
End: 2022-11-07
Payer: MEDICARE

## 2022-11-07 VITALS
TEMPERATURE: 98 F | BODY MASS INDEX: 25.06 KG/M2 | SYSTOLIC BLOOD PRESSURE: 110 MMHG | WEIGHT: 146 LBS | DIASTOLIC BLOOD PRESSURE: 66 MMHG | HEART RATE: 66 BPM

## 2022-11-07 DIAGNOSIS — E04.1 CYSTIC THYROID NODULE: ICD-10-CM

## 2022-11-07 PROCEDURE — 3074F PR MOST RECENT SYSTOLIC BLOOD PRESSURE < 130 MM HG: ICD-10-PCS | Mod: CPTII,S$GLB,, | Performed by: HOSPITALIST

## 2022-11-07 PROCEDURE — 3288F PR FALLS RISK ASSESSMENT DOCUMENTED: ICD-10-PCS | Mod: CPTII,S$GLB,, | Performed by: HOSPITALIST

## 2022-11-07 PROCEDURE — 1159F PR MEDICATION LIST DOCUMENTED IN MEDICAL RECORD: ICD-10-PCS | Mod: CPTII,S$GLB,, | Performed by: HOSPITALIST

## 2022-11-07 PROCEDURE — 1101F PR PT FALLS ASSESS DOC 0-1 FALLS W/OUT INJ PAST YR: ICD-10-PCS | Mod: CPTII,S$GLB,, | Performed by: HOSPITALIST

## 2022-11-07 PROCEDURE — 3288F FALL RISK ASSESSMENT DOCD: CPT | Mod: CPTII,S$GLB,, | Performed by: HOSPITALIST

## 2022-11-07 PROCEDURE — 99999 PR PBB SHADOW E&M-EST. PATIENT-LVL V: CPT | Mod: PBBFAC,,, | Performed by: HOSPITALIST

## 2022-11-07 PROCEDURE — 1160F RVW MEDS BY RX/DR IN RCRD: CPT | Mod: CPTII,S$GLB,, | Performed by: HOSPITALIST

## 2022-11-07 PROCEDURE — 3078F DIAST BP <80 MM HG: CPT | Mod: CPTII,S$GLB,, | Performed by: HOSPITALIST

## 2022-11-07 PROCEDURE — 1126F PR PAIN SEVERITY QUANTIFIED, NO PAIN PRESENT: ICD-10-PCS | Mod: CPTII,S$GLB,, | Performed by: HOSPITALIST

## 2022-11-07 PROCEDURE — 3078F PR MOST RECENT DIASTOLIC BLOOD PRESSURE < 80 MM HG: ICD-10-PCS | Mod: CPTII,S$GLB,, | Performed by: HOSPITALIST

## 2022-11-07 PROCEDURE — 99204 PR OFFICE/OUTPT VISIT, NEW, LEVL IV, 45-59 MIN: ICD-10-PCS | Mod: S$GLB,,, | Performed by: HOSPITALIST

## 2022-11-07 PROCEDURE — 1157F ADVNC CARE PLAN IN RCRD: CPT | Mod: CPTII,S$GLB,, | Performed by: HOSPITALIST

## 2022-11-07 PROCEDURE — 99204 OFFICE O/P NEW MOD 45 MIN: CPT | Mod: S$GLB,,, | Performed by: HOSPITALIST

## 2022-11-07 PROCEDURE — 1101F PT FALLS ASSESS-DOCD LE1/YR: CPT | Mod: CPTII,S$GLB,, | Performed by: HOSPITALIST

## 2022-11-07 PROCEDURE — 3074F SYST BP LT 130 MM HG: CPT | Mod: CPTII,S$GLB,, | Performed by: HOSPITALIST

## 2022-11-07 PROCEDURE — 1157F PR ADVANCE CARE PLAN OR EQUIV PRESENT IN MEDICAL RECORD: ICD-10-PCS | Mod: CPTII,S$GLB,, | Performed by: HOSPITALIST

## 2022-11-07 PROCEDURE — 1159F MED LIST DOCD IN RCRD: CPT | Mod: CPTII,S$GLB,, | Performed by: HOSPITALIST

## 2022-11-07 PROCEDURE — 99999 PR PBB SHADOW E&M-EST. PATIENT-LVL V: ICD-10-PCS | Mod: PBBFAC,,, | Performed by: HOSPITALIST

## 2022-11-07 PROCEDURE — 1160F PR REVIEW ALL MEDS BY PRESCRIBER/CLIN PHARMACIST DOCUMENTED: ICD-10-PCS | Mod: CPTII,S$GLB,, | Performed by: HOSPITALIST

## 2022-11-07 PROCEDURE — 1126F AMNT PAIN NOTED NONE PRSNT: CPT | Mod: CPTII,S$GLB,, | Performed by: HOSPITALIST

## 2022-11-07 NOTE — ASSESSMENT & PLAN NOTE
- incidental finding of thyroid nodule, records unavailable for review  - we will it will get formal thyroid ultrasound for evaluation  - pending size can dictate need for FNA  - TSH reviewed> stable previously.  Will check for this year, checking antibody  - patient willing to undergo FNA if needed  - Pending thyroid ultrasound workup, if unremarkable, or does not fit FNA criteria.  Patient can get repeat thyroid ultrasound in 1 year, can be ordered by PCP

## 2022-11-07 NOTE — PROGRESS NOTES
"Subjective:      Patient ID: Nereyda Hernandez is a 82 y.o. female presented to [facility] on 11/7/2022.  Chief Complaint:  Thyroid Nodule      History of Present Illness: Nereyda Hernandez is a 82 y.o. female here for thyroid nodule  Other significant past medical history:  Hypertension    Here for evaluation Multiple Thyroid nodule   - sent by primary care doctor thyroid nodule  - seen by PCP 8/22: Reportedly had health screening with [facility] which showed complex thyroid cyst 1.91 x 1.1 cm does not have formal report but has form and US picture  - Family history thyroid disorder: yes>> 2 daughters with thyroid nodules>> denies thyroid cancer  - Some hoariness, denies goiter  - Reported "tired all the time"  - Tobacco user: yes>> former, quit in her 40s, smoke about 20 years    - Compressive symptoms: no  - Anterior neck pressure?: no  - Dysphagia?: no  - Voice changes?: yes    Risk Factors:  - Radiation to head or neck for any treatment of cancer or exposure to radiation?: no  - Personal history of colon or breast cancer?: yes, took medication for breast cancer, reported in site tu,     Lab Results   Component Value Date    TSH 0.805 11/24/2021    TSH 1.650 01/08/2020    TSH 1.910 09/16/2019    FREET4 1.02 11/24/2021    FREET4 1.12 02/07/2008    FREET4 0.96 05/30/2007     Osteopenia:  - chronic, bone density ordered by PCP soon    Reviewed past surgical, medical, family, social history and updated as appropriate.  Review of Systems: see HPI above  Objective:   /66 (BP Location: Right arm)   Pulse 66   Temp 98.1 °F (36.7 °C) (Oral)   Wt 66.2 kg (146 lb)   LMP  (LMP Unknown)   BMI 25.06 kg/m²     Body mass index is 25.06 kg/m².  Vital signs reviewed    Physical Exam  Vitals and nursing note reviewed.   Constitutional:       Appearance: Normal appearance. She is well-developed. She is obese. She is not ill-appearing.   Neck:      Thyroid: No thyromegaly.      Comments: Small right " thyroid nodule, normal left thyroid gland  Cardiovascular:      Rate and Rhythm: Normal rate and regular rhythm.   Pulmonary:      Effort: Pulmonary effort is normal. No respiratory distress.      Breath sounds: Normal breath sounds.   Musculoskeletal:         General: Normal range of motion.      Cervical back: Normal range of motion.   Neurological:      General: No focal deficit present.      Mental Status: She is alert. Mental status is at baseline.   Psychiatric:         Mood and Affect: Mood normal.         Behavior: Behavior normal.       Lab Reviewed:  See results in subjective  No results found for: HGBA1C    Lab Results   Component Value Date    CHOL 159 03/23/2022    HDL 61 03/23/2022    LDLCALC 81.0 03/23/2022    TRIG 85 03/23/2022    CHOLHDL 38.4 03/23/2022     Lab Results   Component Value Date     03/23/2022    K 4.0 03/23/2022     03/23/2022    CO2 30 (H) 03/23/2022     03/23/2022    BUN 23 (H) 03/23/2022    CREATININE 1.03 03/23/2022    CALCIUM 9.5 03/23/2022    PROT 7.9 03/23/2022    ALBUMIN 4.5 03/23/2022    BILITOT 0.6 03/23/2022    ALKPHOS 47 03/23/2022    AST 33 03/23/2022    ALT 24 03/23/2022    ANIONGAP 10 03/23/2022    ESTGFRAFRICA 58.5 (A) 03/23/2022    EGFRNONAA 50.7 (A) 03/23/2022    TSH 0.805 11/24/2021     Lab Results   Component Value Date    GAZQKREB11ZI 50 04/21/2021    CALCIUM 9.5 03/23/2022    ALKPHOS 47 03/23/2022    TSH 0.805 11/24/2021       Assessment     1. Cystic thyroid nodule  Ambulatory referral/consult to Endocrinology    TSH    T4, Free    Thyroid Peroxidase Antibody    US Soft Tissue Head Neck Thyroid           Plan     Cystic thyroid nodule  - incidental finding of thyroid nodule, records unavailable for review  - we will it will get formal thyroid ultrasound for evaluation  - pending size can dictate need for FNA  - TSH reviewed> stable previously.  Will check for this year, checking antibody  - patient willing to undergo FNA if needed  - Pending  thyroid ultrasound workup, if unremarkable, or does not fit FNA criteria.  Patient can get repeat thyroid ultrasound in 1 year, can be ordered by PCP       Advised patient to follow up with PCP for routine health maintenance care.   RTC in pending workup above      Jae Rascon M.D.  Endocrinology  [facility]  11/7/2022      Disclaimer: This note has been generated in part with the use of voice-recognition software. There may be typographical errors that have been missed during proof-reading.

## 2022-11-15 ENCOUNTER — TELEPHONE (OUTPATIENT)
Dept: ENDOCRINOLOGY | Facility: CLINIC | Age: 82
End: 2022-11-15

## 2022-11-15 NOTE — TELEPHONE ENCOUNTER
----- Message from Georgie William sent at 11/15/2022  3:35 PM CST -----  Regarding: self .[phone]  .Type: Patient Call Back    Who called: self     What is the request in detail: Pt is requesting to discuss results from U/S    Can the clinic reply by [facility]? Call back     Would the patient rather a call back or a response via [facility]?  Call back     Best call back number: .[phone]

## 2022-11-17 ENCOUNTER — TELEPHONE (OUTPATIENT)
Dept: ENDOCRINOLOGY | Facility: CLINIC | Age: 82
End: 2022-11-17
Payer: MEDICARE

## 2022-11-25 NOTE — TELEPHONE ENCOUNTER
patient did not read portal message as a 11/25/2022 8:45 AM   we will have team contact patient about FNA      Jae Rascon MD  Endocrinology  11/25/2022

## 2022-11-30 ENCOUNTER — TELEPHONE (OUTPATIENT)
Dept: NEUROLOGY | Facility: CLINIC | Age: 82
End: 2022-11-30
Payer: MEDICARE

## 2022-11-30 NOTE — TELEPHONE ENCOUNTER
11/29/22 Received a phone call from Hemant Hernandez, pt's son. He left a voice mail message at approximately 4:50pm.     11/30/22 Called Hemant Hernandez back to discuss his voice mail message. In the message he mentioned receiving a call from someone regarding a system called [employer]. He called back to let us know that his mother was not interested in Home connect at this time.     I explained what home connect is, and also explained what the care eco system is. After reviewing pt's chart, she is not eligible for the CEE, but I shared Kay Boyle's contact information with caregiver in case they need any assistance.

## 2022-12-01 ENCOUNTER — PATIENT MESSAGE (OUTPATIENT)
Dept: ENDOCRINOLOGY | Facility: CLINIC | Age: 82
End: 2022-12-01
Payer: MEDICARE

## 2022-12-01 DIAGNOSIS — E04.2 MULTIPLE THYROID NODULES: Primary | ICD-10-CM

## 2022-12-07 ENCOUNTER — PATIENT MESSAGE (OUTPATIENT)
Dept: ENDOCRINOLOGY | Facility: CLINIC | Age: 82
End: 2022-12-07
Payer: MEDICARE

## 2022-12-19 ENCOUNTER — OFFICE VISIT (OUTPATIENT)
Dept: FAMILY MEDICINE | Facility: CLINIC | Age: 82
End: 2022-12-19
Payer: MEDICARE

## 2022-12-19 VITALS
SYSTOLIC BLOOD PRESSURE: 122 MMHG | WEIGHT: 150.19 LBS | HEIGHT: 64 IN | DIASTOLIC BLOOD PRESSURE: 72 MMHG | OXYGEN SATURATION: 97 % | HEART RATE: 85 BPM | BODY MASS INDEX: 25.64 KG/M2

## 2022-12-19 DIAGNOSIS — N39.41 URGE INCONTINENCE: ICD-10-CM

## 2022-12-19 DIAGNOSIS — I70.0 CALCIFICATION OF AORTA: ICD-10-CM

## 2022-12-19 DIAGNOSIS — R07.81 RIB PAIN ON LEFT SIDE: ICD-10-CM

## 2022-12-19 DIAGNOSIS — F51.04 PSYCHOPHYSIOLOGICAL INSOMNIA: Chronic | ICD-10-CM

## 2022-12-19 DIAGNOSIS — R07.1 CHEST PAIN VARYING WITH BREATHING: ICD-10-CM
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
## 2022-12-19 DIAGNOSIS — I10 ESSENTIAL HYPERTENSION: Chronic | ICD-10-CM

## 2022-12-19 DIAGNOSIS — K21.9 GASTROESOPHAGEAL REFLUX DISEASE WITHOUT ESOPHAGITIS: ICD-10-CM

## 2022-12-19 DIAGNOSIS — F32.A ANXIETY AND DEPRESSION: Primary | Chronic | ICD-10-CM

## 2022-12-19 DIAGNOSIS — F41.9 ANXIETY AND DEPRESSION: Primary | Chronic | ICD-10-CM

## 2022-12-19 PROCEDURE — 99499 RISK ADDL DX/OHS AUDIT: ICD-10-PCS | Mod: HCNC,S$GLB,,

## 2022-12-19 PROCEDURE — 99214 OFFICE O/P EST MOD 30 MIN: CPT | Mod: HCNC,S$GLB,,

## 2022-12-19 PROCEDURE — 1100F PTFALLS ASSESS-DOCD GE2>/YR: CPT | Mod: HCNC,CPTII,S$GLB,

## 2022-12-19 PROCEDURE — 1157F PR ADVANCE CARE PLAN OR EQUIV PRESENT IN MEDICAL RECORD: ICD-10-PCS | Mod: HCNC,CPTII,S$GLB,

## 2022-12-19 PROCEDURE — 3074F PR MOST RECENT SYSTOLIC BLOOD PRESSURE < 130 MM HG: ICD-10-PCS | Mod: HCNC,CPTII,S$GLB,

## 2022-12-19 PROCEDURE — 1125F PR PAIN SEVERITY QUANTIFIED, PAIN PRESENT: ICD-10-PCS | Mod: HCNC,CPTII,S$GLB,

## 2022-12-19 PROCEDURE — 3078F PR MOST RECENT DIASTOLIC BLOOD PRESSURE < 80 MM HG: ICD-10-PCS | Mod: HCNC,CPTII,S$GLB,

## 2022-12-19 PROCEDURE — 1100F PR PT FALLS ASSESS DOC 2+ FALLS/FALL W/INJURY/YR: ICD-10-PCS | Mod: HCNC,CPTII,S$GLB,

## 2022-12-19 PROCEDURE — 3288F FALL RISK ASSESSMENT DOCD: CPT | Mod: HCNC,CPTII,S$GLB,

## 2022-12-19 PROCEDURE — 99999 PR PBB SHADOW E&M-EST. PATIENT-LVL V: CPT | Mod: PBBFAC,HCNC,,

## 2022-12-19 PROCEDURE — 3074F SYST BP LT 130 MM HG: CPT | Mod: HCNC,CPTII,S$GLB,

## 2022-12-19 PROCEDURE — 1125F AMNT PAIN NOTED PAIN PRSNT: CPT | Mod: HCNC,CPTII,S$GLB,

## 2022-12-19 PROCEDURE — 99499 UNLISTED E&M SERVICE: CPT | Mod: HCNC,S$GLB,,

## 2022-12-19 PROCEDURE — 3288F PR FALLS RISK ASSESSMENT DOCUMENTED: ICD-10-PCS | Mod: HCNC,CPTII,S$GLB,

## 2022-12-19 PROCEDURE — 3078F DIAST BP <80 MM HG: CPT | Mod: HCNC,CPTII,S$GLB,

## 2022-12-19 PROCEDURE — 99999 PR PBB SHADOW E&M-EST. PATIENT-LVL V: ICD-10-PCS | Mod: PBBFAC,HCNC,,

## 2022-12-19 PROCEDURE — 1157F ADVNC CARE PLAN IN RCRD: CPT | Mod: HCNC,CPTII,S$GLB,

## 2022-12-19 PROCEDURE — 99214 PR OFFICE/OUTPT VISIT, EST, LEVL IV, 30-39 MIN: ICD-10-PCS | Mod: HCNC,S$GLB,,

## 2022-12-19 RX ORDER — AMITRIPTYLINE HYDROCHLORIDE 10 MG/1
30 TABLET, FILM COATED ORAL NIGHTLY PRN
Qty: 270 TABLET | Refills: 2 | Status: SHIPPED | OUTPATIENT
Start: 2022-12-19 | End: 2023-09-26

## 2022-12-19 RX ORDER — OXYBUTYNIN CHLORIDE 10 MG/1
10 TABLET, EXTENDED RELEASE ORAL NIGHTLY
Qty: 30 TABLET | Refills: 11 | Status: SHIPPED | OUTPATIENT
Start: 2022-12-19 | End: 2023-03-22 | Stop reason: SINTOL

## 2022-12-19 RX ORDER — OXYBUTYNIN CHLORIDE 10 MG/1
10 TABLET, EXTENDED RELEASE ORAL NIGHTLY
Qty: 30 TABLET | Refills: 11 | Status: SHIPPED | OUTPATIENT
Start: 2022-12-19 | End: 2022-12-19

## 2022-12-19 RX ORDER — AMITRIPTYLINE HYDROCHLORIDE 10 MG/1
30 TABLET, FILM COATED ORAL NIGHTLY PRN
Qty: 270 TABLET | Refills: 2 | Status: SHIPPED | OUTPATIENT
Start: 2022-12-19 | End: 2022-12-19

## 2022-12-19 NOTE — PROGRESS NOTES
Subjective:         Chief Complaint: Follow-up  HPI     Nereyda Hernandez is a 82 y.o. female, patient of Sharona Weaver MD with chronic facial pain, anxiety and depression, dyspnea, hypertension, hyperlipidemia, palpitations, JOSELINE, GERD, osteoporosis, insomnia, known to me, presents today for 1 month follow up after increasing escitalopram from 10 mg to 20.     Currently lives alone. Reports on Saturday her heart rate was 129 and she felt like she had indigestion. A little while later it went down to 70. Reports drinking chocolate and strawberry moonshine at that time, added more than normal.  Took rolaids. The pain went away. Denies being more anxious at that time. Denies nausea/vomiting, dizziness, shortness of breath. Advised patient to call 911 if this occurs again.     Reports falling last Saturday, 1 weeks ago. Thinks she was not paying attention where she walked. She was going in her daughters backyard to look at the garden, when she stepped off of the patio there was a step that she missed. Reports left ribs were sore, now most of the pain is when she takes a deep breath to mid and upper chest area. Reports a bruise to her left arm. Pain is 2/10, if she takes a deep breath its maybe 4. Took advil for a few doses which helped. Advised patient to avoid advil/ibuprofen/aleve. Encouraged to take tylenol.     Insomnia:   Once she gets to sleep, she sleeps well but she can't get to sleep. Reports she takes the med at 9:15, sits up in bed reading, sometimes falls asleep at like 11-11:30 pm.   Reports on Saturday she tried to lie down and not read, didn't help. Reports she reads on her ipad.   Currently taking amitriptyline 30 mg.     Anxiety/depression:   Increased Lexapro during last visit from 10 to 20 mg. Reports she is still feeling a litle depressed, doesn't really feel different. States at this time, she does not want to speak with a therapist.     OAB:  Would like to switch from myrbetriq to ditropan. Reports the  myrbetriq is getting too expensive; has taken ditropan in the past and would like to restart it and discontinue to myrbetriq. States she had dry mouth with the ditropan but she would rather deal with that than pay for the expensive myrbetriq. Reports the medication relieves the constant pressure.       Past Medical History:   Diagnosis Date    HATTIE (acute kidney injury) 12/10/2021    Anxiety     Arthritis     Breast cancer 1990    left    Chronic disease anemia     Hyperlipidemia     Hypertension     Insomnia     Lobular carcinoma in situ     KANWAL (obstructive sleep apnea)     Osteoporosis     Rosacea     Squamous cell carcinoma     Stable angina pectoris 8/27/2020    Urinary incontinence     Vertigo        Past Surgical History:   Procedure Laterality Date    ADENOIDECTOMY      BELT ABDOMINOPLASTY      BREAST BIOPSY Left 1990    ex bx    BREAST LUMPECTOMY      COLONOSCOPY N/A 1/28/2020    Procedure: COLONOSCOPY;  Surgeon: Bianka Macias MD;  Location: [facility];  Service: Endoscopy;  Laterality: N/A;    ESOPHAGOGASTRODUODENOSCOPY N/A 1/28/2020    Procedure: EGD (ESOPHAGOGASTRODUODENOSCOPY);  Surgeon: Bianka Macias MD;  Location: [facility];  Service: Endoscopy;  Laterality: N/A;    EYE SURGERY      HERNIA REPAIR      Ventral    LIVER TRANSPLANT      OOPHORECTOMY      TONSILLECTOMY      TOTAL ABDOMINAL HYSTERECTOMY         Family History   Problem Relation Age of Onset    Cancer Mother         liver    Pancreatic cancer Mother     Heart disease Mother     Depression Mother     Miscarriages / Stillbirths Mother     Hypertension Father     Alzheimer's disease Father     Depression Brother     Depression Brother     Diabetes Brother     Arthritis Brother     Hypertension Brother     Heart disease Brother        Social History     Socioeconomic History    Marital status:     Number of children: 6    Years of education: college   Occupational History    Occupation: retired  for  "[employer]   Tobacco Use    Smoking status: Former     Packs/day: 0.50     Years: 21.00     Pack years: 10.50     Types: Cigarettes     Start date: 1959     Quit date: 1980     Years since quittin.2    Smokeless tobacco: Never   Substance and Sexual Activity    Alcohol use: Not Currently     Alcohol/week: 1.0 standard drink     Types: 1 Glasses of wine per week     Comment: Occasionally    Drug use: Never    Sexual activity: Not Currently     Partners: Male     Birth control/protection: Condom, Other-see comments     Comment: Pill       Review of Systems   Constitutional:  Negative for appetite change.   Respiratory:  Negative for cough and shortness of breath.    Cardiovascular:  Negative for chest pain, palpitations and leg swelling.   Gastrointestinal:  Negative for abdominal pain, constipation, diarrhea, nausea and vomiting.   Musculoskeletal:  Positive for arthralgias and myalgias.   Skin:  Negative for color change.   Neurological:  Negative for dizziness, weakness, light-headedness and headaches.   Psychiatric/Behavioral:  Positive for dysphoric mood and sleep disturbance.        Objective:     Vitals:    22 1030   BP: 122/72   Pulse: 85   SpO2: 97%   Weight: 68.1 kg (150 lb 3.2 oz)   Height: 5' 4" (1.626 m)          Physical Exam  Vitals reviewed. Exam conducted with a chaperone present.   Constitutional:       Appearance: Normal appearance. She is well-developed and well-groomed.   HENT:      Head: Normocephalic and atraumatic.   Eyes:      General: No scleral icterus.     Extraocular Movements: Extraocular movements intact.   Cardiovascular:      Rate and Rhythm: Normal rate and regular rhythm.      Heart sounds: Normal heart sounds.   Pulmonary:      Effort: Pulmonary effort is normal.      Breath sounds: Normal breath sounds.   Chest:      Chest wall: Tenderness present. No lacerations, deformity, crepitus or edema.          Comments: Midchest TTP  Musculoskeletal:         General: " Normal range of motion.        Arms:       Cervical back: Normal range of motion.      Right lower leg: No edema.      Left lower leg: No edema.      Comments: Mid chest TTP. No TTP noted to left side ribs.    Skin:     General: Skin is warm and dry.      Capillary Refill: Capillary refill takes less than 2 seconds.      Findings: Bruising present.             Comments: Small bruise noted to left upper arm   Neurological:      General: No focal deficit present.      Mental Status: She is alert and oriented to person, place, and time.   Psychiatric:         Behavior: Behavior is cooperative.         Laboratory:  CBC:  No results for input(s): WBC, RBC, HGB, HCT, PLT, MCV, MCH, MCHC in the last 2160 hours.  CMP:  No results for input(s): GLU, CALCIUM, ALBUMIN, PROT, NA, K, CO2, CL, BUN, ALKPHOS, ALT, AST, BILITOT in the last 2160 hours.    Invalid input(s): CREATININ  URINALYSIS:  No results for input(s): COLORU, CLARITYU, SPECGRAV, PHUR, PROTEINUA, GLUCOSEU, BILIRUBINCON, BLOODU, WBCU, RBCU, BACTERIA, MUCUS, NITRITE, LEUKOCYTESUR, UROBILINOGEN, HYALINECASTS in the last 2160 hours.   LIPIDS:  Recent Labs   Lab Result Units 11/08/22  0803   TSH uIU/mL 1.790     TSH:  Recent Labs   Lab Result Units 11/08/22  0803   TSH uIU/mL 1.790     A1C:  No results for input(s): HGBA1C in the last 2160 hours.    X-Ray Chest PA And Lateral  Status: Final result     [facility]ON[facility]EL[facility] Results Release    [employer] Status: Active  Results Release     PACS Images for [facility] Viewer     Show images for X-Ray Chest PA And Lateral  X-Ray Chest PA And Lateral  Order: [id number]  Status: Final result     Visible to patient: Yes (not seen)     Next appt: 02/16/2023 at 01:00 PM in Endocrinology ([facility] ENDOCRINOLOGY US1)     Dx: Rib pain on left side; Chest pain naila...     0 Result Notes  Details    Reading Physician Reading Date Result Priority   Geoff Bradford MD  [phone]  [phone] 12/19/2022 Routine     Narrative &  Impression  EXAMINATION:  XR CHEST PA AND LATERAL     CLINICAL HISTORY:  Pleurodynia     TECHNIQUE:  PA and lateral views of the chest were performed.     FINDINGS:  The lungs are clear.  There is no pneumothorax or pleural fluid.  The cardiac silhouette is not enlarged.  There is calcification of the aorta which is tortuous.  The osseous structures demonstrate degenerative change and osteopenia.     Impression:     As above.        Electronically signed by: Geoff Bradford MD  Date:                                            12/19/2022  Time:                                           12:25       X-Ray Ribs 2 View Left  Status: Final result     MyChart Results Release    [employer] Status: Active  Results Release     PACS Images for [facility] Viewer     Show images for X-Ray Ribs 2 View Left  X-Ray Ribs 2 View Left  Order: [id number]  Status: Final result     Visible to patient: Yes (not seen)     Next appt: 02/16/2023 at 01:00 PM in Endocrinology ([facility] ENDOCRINOLOGY US1)     Dx: Rib pain on left side; Chest pain naila...     0 Result Notes  Details    Reading Physician Reading Date Result Priority   Geoff Bradford MD  [phone]  [phone] 12/19/2022 Routine     Narrative & Impression  EXAMINATION:  XR RIBS 2 VIEW LEFT     CLINICAL HISTORY:  Pleurodynia     TECHNIQUE:  Two views of the left ribs were performed.     FINDINGS:  There is no acute fracture, dislocation, or bony erosion.  There is calcification of the aorta.     Impression:     As above.        Electronically signed by: Geoff Bradford MD  Date:                                            12/19/2022  Time:                                           12:24           Exam Ended: 12/19/22 12:18 Last Resulted: 12/19/22 12:24             Assessment:         ICD-10-CM ICD-9-CM   1. Anxiety and depression  F41.9 300.00    F32.A 311   2. Essential hypertension  I10 401.9   3. Urge incontinence  N39.41 788.31   4. Psychophysiological insomnia  F51.04 307.42   5. Rib  pain on left side  R07.81 786.50   6. Fall (on) (from) other stairs and steps, initial encounter  W10.8XXA E880.9   7. Chest pain varying with breathing  R07.1 786.50   8. Gastroesophageal reflux disease without esophagitis  K21.9 530.81   9. Calcification of aorta  I70.0 440.0       Plan:       Anxiety and depression  Continue current medication as prescribed.   Encouraged patient to considered counseling. Instructed to let me know when she is ready and I can place the referral.     Essential hypertension  Chronic; stable on medication. Continue medication as prescribed.    Urge incontinence  -     Discontinue: oxybutynin (DITROPAN-XL) 10 MG 24 hr tablet; Take 1 tablet (10 mg total) by mouth every evening.  Dispense: 30 tablet; Refill: 11  -     oxybutynin (DITROPAN-XL) 10 MG 24 hr tablet; Take 1 tablet (10 mg total) by mouth every evening.  Dispense: 30 tablet; Refill: 11    Psychophysiological insomnia  -     amitriptyline (ELAVIL) 10 MG tablet; Take 3 tablets (30 mg total) by mouth nightly as needed for Insomnia.  Dispense: 270 tablet; Refill: 2    Rib pain on left side  Chest pain varying with breathing  Fall (on) (from) other stairs and steps, initial encounter  -     X-Ray Ribs 2 View Left; Future; Expected date: 12/19/2022  -     X-Ray Chest PA And Lateral; Future; Expected date: 12/19/2022    Chest x-ray and rib x-ray shows no abnormalities.   Continue with Tylenol as needed for pain. Advised patient to avoid NSAIDs due to renal function.     Gastroesophageal reflux disease without esophagitis  Continue medication as prescribed.   Encouraged patient to avoid chocolate moonshine, mints, chocolate, anything that may worsen GERD.     Calcification of aorta  Continue statin therapy.     If symptoms worsen, go to ER.  If symptoms do not improve, return to clinic.   Keep appointments with all specialists.     Patient verbalizes understanding and agrees with current treatment plan.      Follow up in about 3 months  (around 3/19/2023), or if symptoms worsen or fail to improve, for chronic conditions.     Patient's Medications   New Prescriptions    No medications on file   Previous Medications    ASPIRIN (ECOTRIN) 81 MG EC TABLET    Take 81 mg by mouth once daily.    AZELASTINE (ASTELIN) 137 MCG (0.1 %) NASAL SPRAY    1 spray (137 mcg total) by Nasal route 2 (two) times daily.    BENEFIBER, WHEAT DEXTRIN, ORAL    Take by mouth. PRN    CALCIUM CITRATE-VITAMIN D3 500 MG CALCIUM -400 UNIT CHEW    Take 1 tablet by mouth 2 (two) times daily. 12.5mcg    ESCITALOPRAM OXALATE (LEXAPRO) 20 MG TABLET    Take 1 tablet (20 mg total) by mouth once daily.    FISH OIL-OMEGA-3 FATTY ACIDS 300-1,000 MG CAPSULE    Take 2 g by mouth once daily.    FLUTICASONE PROPIONATE (FLONASE) 50 MCG/ACTUATION NASAL SPRAY    1 spray (50 mcg total) by Each Nostril route once daily.    LACTOBACILLUS ACIDOPHILUS (PROBIOTIC ACIDOPHILUS ORAL)    Take by mouth.    LATANOPROST 0.005 % OPHTHALMIC SOLUTION    1 drop every evening.    METOPROLOL TARTRATE (LOPRESSOR) 25 MG TABLET    Take 1 tablet (25 mg total) by mouth 2 (two) times daily.    MULTIVIT-IRON-MIN-FOLIC ACID 3,500-18-0.4 UNIT-MG-MG ORAL CHEW    Take by mouth.    PANTOPRAZOLE (PROTONIX) 40 MG TABLET    Take 1 tablet (40 mg total) by mouth once daily.    POLYETHYLENE GLYCOL (GLYCOLAX) 17 GRAM PWPK    Take 17 g by mouth 3 (three) times daily as needed (for constipation).    PRAVASTATIN (PRAVACHOL) 20 MG TABLET    Take 1 tablet (20 mg total) by mouth once daily.    RESTASIS 0.05 % OPHTHALMIC EMULSION        TRIAMTERENE-HYDROCHLOROTHIAZIDE 37.5-25 MG (DYAZIDE) 37.5-25 MG PER CAPSULE    Take 1 capsule by mouth every morning.    VIT A/VIT C/VIT E/ZINC/COPPER (PRESERVISION AREDS ORAL)    Take by mouth. Bottle does not say a but does say Lutein    ZOLPIDEM (AMBIEN) 5 MG TAB    Take 1 tablet (5 mg total) by mouth nightly as needed (insomnia).   Modified Medications    Modified Medication Previous Medication     AMITRIPTYLINE (ELAVIL) 10 MG TABLET amitriptyline (ELAVIL) 10 MG tablet       Take 3 tablets (30 mg total) by mouth nightly as needed for Insomnia.    Take 3 tablets (30 mg total) by mouth nightly as needed for Insomnia.    OXYBUTYNIN (DITROPAN-XL) 10 MG 24 HR TABLET oxybutynin (DITROPAN-XL) 10 MG 24 hr tablet       Take 1 tablet (10 mg total) by mouth every evening.    Take 1 tablet (10 mg total) by mouth every evening.   Discontinued Medications    MIRABEGRON (MYRBETRIQ) 25 MG TB24 ER TABLET    Take 1 tablet (25 mg total) by mouth once daily.         Mery Benites NP

## 2022-12-19 NOTE — PATIENT INSTRUCTIONS
Avoid chocolate moonshine or anything else that would cause worsening to GERD.    Will notify of results of x-ray via my chart. Continue tylenol as needed for pain.

## 2023-01-17 NOTE — TELEPHONE ENCOUNTER
----- Message from Bria Mcginnis sent at 1/17/2023  3:10 PM CST -----  Type:  Needs Medical Advice    Who Called:  Pt  Symptoms (please be specific):   How long has patient had these symptoms:    Pharmacy name and phone #:  [facility] - [place], [place] - [address]    Phone:  [phone]  Would the patient rather a call back or a response via [facility]?  Call  Best Call Back Number: [phone]  Additional Information:  Pt is calling in regards about medication for bone density.  Pt is not taking medication as suggested for once a week.  Pt would like a call back for further information. Pt doesn't recall the name of the medication.

## 2023-01-20 RX ORDER — ALENDRONATE SODIUM 70 MG/1
70 TABLET ORAL
COMMUNITY
End: 2023-01-20 | Stop reason: SDUPTHER

## 2023-01-22 RX ORDER — ALENDRONATE SODIUM 70 MG/1
70 TABLET ORAL
Qty: 12 TABLET | Refills: 3 | Status: SHIPPED | OUTPATIENT
Start: 2023-01-22 | End: 2024-01-12 | Stop reason: SDUPTHER

## 2023-01-26 ENCOUNTER — TELEPHONE (OUTPATIENT)
Dept: FAMILY MEDICINE | Facility: CLINIC | Age: 83
End: 2023-01-26
Payer: MEDICARE

## 2023-01-26 RX ORDER — LOSARTAN POTASSIUM 25 MG/1
25 TABLET ORAL DAILY
COMMUNITY
End: 2023-03-16

## 2023-01-26 NOTE — TELEPHONE ENCOUNTER
Patient said she is willing to take osteoporosis medicine if you still recommend it. Also, provider recommended 1200 mg Ca per day. Her MVI has 300 mg and Ca supplement has 600 for total of 900. She wants to know if she should take two Ca tabs which would put her over 1200 mg or should she stay at 900 mg until current bottle is completed. Also, pt has been taking losartan 25 mg po daily but it is not on her med list. I will add it.

## 2023-01-26 NOTE — TELEPHONE ENCOUNTER
----- Message from Helen Richardson sent at 1/26/2023  1:29 PM CST -----  Contact: pt  Type:  call back     Who Called:pt  Would the patient rather a call back or a response via [facility]? call  Best Call Back Number:[phone]  Additional Information:   Pt stated she has questions about her medication and a test   Pt stated she called last Tuesday and no one responded

## 2023-01-27 NOTE — TELEPHONE ENCOUNTER
Spoke with pt and informed her that Dr. Weaver did send her Alendronate (Fosamax) to her mail order pharmacy [facility].  Dr. Weaver also stated that taking the multivitamin with 900 calcium supplement is okay and that she can get the rest of the calcium from her diet. And that Losartan was added to her medication list.  Pt understood all verbally.

## 2023-02-09 DIAGNOSIS — Z00.00 ENCOUNTER FOR MEDICARE ANNUAL WELLNESS EXAM: ICD-10-CM

## 2023-02-16 ENCOUNTER — HOSPITAL ENCOUNTER (OUTPATIENT)
Dept: ENDOCRINOLOGY | Facility: CLINIC | Age: 83
Discharge: HOME | End: 2023-02-16
Attending: HOSPITALIST
Payer: MEDICARE

## 2023-02-16 DIAGNOSIS — E04.2 MULTIPLE THYROID NODULES: Primary | ICD-10-CM

## 2023-02-16 PROCEDURE — 88173 PR  INTERPRETATION OF FNA SMEAR: ICD-10-PCS | Mod: 26,HCNC,, | Performed by: PATHOLOGY

## 2023-02-16 PROCEDURE — 10005 US FINE NEEDLE ASPIRATION THYROID, FIRST LESION: ICD-10-PCS | Mod: HCNC,S$GLB,, | Performed by: INTERNAL MEDICINE

## 2023-02-16 PROCEDURE — 88173 CYTOPATH EVAL FNA REPORT: CPT | Mod: HCNC | Performed by: PATHOLOGY

## 2023-02-16 PROCEDURE — 10005 FNA BX W/US GDN 1ST LES: CPT | Mod: HCNC,S$GLB,, | Performed by: INTERNAL MEDICINE

## 2023-02-16 PROCEDURE — 88173 CYTOPATH EVAL FNA REPORT: CPT | Mod: 26,HCNC,, | Performed by: PATHOLOGY

## 2023-02-17 NOTE — TELEPHONE ENCOUNTER
----- Message from Cristina Gonzales sent at 5/26/2020  3:20 PM CDT -----  Contact: patient  Type:  RX Refill Request    Who Called:  patient  Refill or New Rx: refill  RX Name and Strength: sertraline (ZOLOFT) 100 MG tablet  How is the patient currently taking it? (ex. 1XDay): as directed  Is this a 30 day or 90 day RX: 90 day  Preferred Pharmacy with phone number: [id number] - [place], [place] - [address]  Local or Mail Order: Local  Ordering Provider: Estefania  Would the patient rather a call back or a response via [facility]?  Call back  Best Call Back Number: [phone]  Additional Information:  Says Trevon sent a request to your office 05/14/2020 but no one responded so she needs it seen to [facility] now as she is out of the medication and needs it totasha     vein. Radial access needle used.

## 2023-02-22 LAB
COMMENT: ABNORMAL
FINAL PATHOLOGIC DIAGNOSIS: ABNORMAL
Lab: ABNORMAL
MICROSCOPIC EXAM: ABNORMAL

## 2023-02-24 ENCOUNTER — TELEPHONE (OUTPATIENT)
Dept: ENDOCRINOLOGY | Facility: CLINIC | Age: 83
End: 2023-02-24

## 2023-02-24 NOTE — TELEPHONE ENCOUNTER
----- Message from Whitney Reyes sent at 2/24/2023  1:23 PM CST -----  Regarding: patient call back  Type: Patient Call Back    Who called: Self     What is the request in detail: Would like a call back from the nurse about getting another biopsy. She feels like her thyroid is causing all of the symptoms.     Can the clinic reply by [facility]? no    Would the patient rather a call back or a response via [facility]? Call     Best call back number: .[phone]

## 2023-02-24 NOTE — TELEPHONE ENCOUNTER
Rt pt call, reports having a lot of hoarseness , on and off s.o.b. , has slight from neck to ear . Thinks its contributed from biopsy

## 2023-02-27 NOTE — TELEPHONE ENCOUNTER
Provider advise for us to call pt regarding her symptoms after biopsy, did they get better. Pt stated she was having soreness and sensitivity to that area before biopsy and shortness of breath. Informed pt that we will send a message to the provider on the next step. Provider is suggesting an ultrasound.  If symptoms worsen pt advised to go the ED or Urgent Care. Pt verbalized understanding

## 2023-02-28 ENCOUNTER — TELEPHONE (OUTPATIENT)
Dept: ENDOCRINOLOGY | Facility: CLINIC | Age: 83
End: 2023-02-28
Payer: MEDICARE

## 2023-02-28 NOTE — TELEPHONE ENCOUNTER
----- Message from Cristin Silver sent at 2/28/2023  3:52 PM CST -----  Regarding: pt [phone]  Type: Patient Call Back    Who called:pt     What is the request in detail:the procedure she had did not go as plan she would like to see if she can get it redone sooner because she is concerned pt stated     Can the clinic reply by [facility]?no     Would the patient rather a call back or a response via [facility]?call back     Best call back number: [phone]      Additional Information:

## 2023-02-28 NOTE — TELEPHONE ENCOUNTER
Pt refused Ultrasound as her symptoms were present way before biopsy. Pt wants to do another biopsy, but was informed she has to wait 3 months before she can so it can heal. Pt verbalized understanding

## 2023-03-07 ENCOUNTER — TELEPHONE (OUTPATIENT)
Dept: ENDOCRINOLOGY | Facility: CLINIC | Age: 83
End: 2023-03-07
Payer: MEDICARE

## 2023-03-07 NOTE — TELEPHONE ENCOUNTER
----- Message from Madelin Toribio sent at 3/7/2023 10:39 AM CST -----  Nereyda Hernandez calling regarding Patient Advice the biopsy that she was told is needing to be redone, stating she was told that she has to wait 3 month before it can be done again and wanted to clarify because she is having shortness of breath and need to be done as possible, call back [phone]

## 2023-03-16 RX ORDER — LOSARTAN POTASSIUM 25 MG/1
TABLET ORAL
Qty: 90 TABLET | Refills: 3 | Status: SHIPPED | OUTPATIENT
Start: 2023-03-16 | End: 2024-03-27 | Stop reason: SDUPTHER

## 2023-03-22 ENCOUNTER — OFFICE VISIT (OUTPATIENT)
Dept: FAMILY MEDICINE | Facility: CLINIC | Age: 83
End: 2023-03-22
Payer: MEDICARE

## 2023-03-22 VITALS
OXYGEN SATURATION: 95 % | SYSTOLIC BLOOD PRESSURE: 134 MMHG | WEIGHT: 144 LBS | HEART RATE: 75 BPM | DIASTOLIC BLOOD PRESSURE: 72 MMHG | BODY MASS INDEX: 23.96 KG/M2

## 2023-03-22 DIAGNOSIS — I10 ESSENTIAL HYPERTENSION: Chronic | ICD-10-CM

## 2023-03-22 DIAGNOSIS — I70.0 CALCIFICATION OF AORTA: ICD-10-CM

## 2023-03-22 DIAGNOSIS — R06.09 CHRONIC DYSPNEA: ICD-10-CM
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
## 2023-03-22 DIAGNOSIS — Z86.2 HISTORY OF IRON DEFICIENCY ANEMIA: Chronic | ICD-10-CM

## 2023-03-22 DIAGNOSIS — F41.9 ANXIETY AND DEPRESSION: Chronic | ICD-10-CM

## 2023-03-22 DIAGNOSIS — E78.2 MIXED HYPERLIPIDEMIA: Chronic | ICD-10-CM

## 2023-03-22 DIAGNOSIS — F32.A ANXIETY AND DEPRESSION: Chronic | ICD-10-CM

## 2023-03-22 DIAGNOSIS — M81.0 AGE-RELATED OSTEOPOROSIS WITHOUT CURRENT PATHOLOGICAL FRACTURE: Chronic | ICD-10-CM

## 2023-03-22 DIAGNOSIS — I95.2 HYPOTENSION DUE TO DRUGS: Chronic | ICD-10-CM

## 2023-03-22 DIAGNOSIS — F51.04 PSYCHOPHYSIOLOGICAL INSOMNIA: Chronic | ICD-10-CM

## 2023-03-22 PROCEDURE — 3075F SYST BP GE 130 - 139MM HG: CPT | Mod: HCNC,CPTII,S$GLB, | Performed by: FAMILY MEDICINE

## 2023-03-22 PROCEDURE — 99214 OFFICE O/P EST MOD 30 MIN: CPT | Mod: HCNC,S$GLB,, | Performed by: FAMILY MEDICINE

## 2023-03-22 PROCEDURE — 99999 PR PBB SHADOW E&M-EST. PATIENT-LVL V: CPT | Mod: PBBFAC,HCNC,, | Performed by: FAMILY MEDICINE

## 2023-03-22 PROCEDURE — 99999 PR PBB SHADOW E&M-EST. PATIENT-LVL V: ICD-10-PCS | Mod: PBBFAC,HCNC,, | Performed by: FAMILY MEDICINE

## 2023-03-22 PROCEDURE — 1160F PR REVIEW ALL MEDS BY PRESCRIBER/CLIN PHARMACIST DOCUMENTED: ICD-10-PCS | Mod: HCNC,CPTII,S$GLB, | Performed by: FAMILY MEDICINE

## 2023-03-22 PROCEDURE — 99214 PR OFFICE/OUTPT VISIT, EST, LEVL IV, 30-39 MIN: ICD-10-PCS | Mod: HCNC,S$GLB,, | Performed by: FAMILY MEDICINE

## 2023-03-22 PROCEDURE — 1157F PR ADVANCE CARE PLAN OR EQUIV PRESENT IN MEDICAL RECORD: ICD-10-PCS | Mod: HCNC,CPTII,S$GLB, | Performed by: FAMILY MEDICINE

## 2023-03-22 PROCEDURE — 1160F RVW MEDS BY RX/DR IN RCRD: CPT | Mod: HCNC,CPTII,S$GLB, | Performed by: FAMILY MEDICINE

## 2023-03-22 PROCEDURE — 3078F DIAST BP <80 MM HG: CPT | Mod: HCNC,CPTII,S$GLB, | Performed by: FAMILY MEDICINE

## 2023-03-22 PROCEDURE — 3078F PR MOST RECENT DIASTOLIC BLOOD PRESSURE < 80 MM HG: ICD-10-PCS | Mod: HCNC,CPTII,S$GLB, | Performed by: FAMILY MEDICINE

## 2023-03-22 PROCEDURE — 1159F PR MEDICATION LIST DOCUMENTED IN MEDICAL RECORD: ICD-10-PCS | Mod: HCNC,CPTII,S$GLB, | Performed by: FAMILY MEDICINE

## 2023-03-22 PROCEDURE — 1157F ADVNC CARE PLAN IN RCRD: CPT | Mod: HCNC,CPTII,S$GLB, | Performed by: FAMILY MEDICINE

## 2023-03-22 PROCEDURE — 1159F MED LIST DOCD IN RCRD: CPT | Mod: HCNC,CPTII,S$GLB, | Performed by: FAMILY MEDICINE

## 2023-03-22 PROCEDURE — 3075F PR MOST RECENT SYSTOLIC BLOOD PRESS GE 130-139MM HG: ICD-10-PCS | Mod: HCNC,CPTII,S$GLB, | Performed by: FAMILY MEDICINE

## 2023-03-22 NOTE — PROGRESS NOTES
PATIENT VISIT FAMILY MEDICINE    CC:   Chief Complaint   Patient presents with    Follow-up       HPI: Nereyda Hernandez  is a 82 y.o. female:    Patient is known to me.  Patient presents alone for routine follow up on chronic conditions.    Continue to note chronic dyspnea. Has not been exercising lately. She has had extensive work up for her dyspnea including cardiopulmonary work up, neuro eval. She does not note any alleviating factors.     She had a fall 3 months ago and continue to note back pain.     ROS: Review of Systems   Constitutional:  Negative for fever.   Respiratory:  Positive for shortness of breath. Negative for cough, hemoptysis, sputum production and wheezing.    Cardiovascular:  Negative for chest pain.     PMHX:   Past Medical History:   Diagnosis Date    HATTIE (acute kidney injury) 12/10/2021    Anxiety     Arthritis     Breast cancer 1990    left    Chronic disease anemia     Hyperlipidemia     Hypertension     Insomnia     Lobular carcinoma in situ     KANWAL (obstructive sleep apnea)     Osteoporosis     Rosacea     Squamous cell carcinoma     Stable angina pectoris 8/27/2020    Urinary incontinence     Vertigo        PSHX:   Past Surgical History:   Procedure Laterality Date    ADENOIDECTOMY      BELT ABDOMINOPLASTY      BREAST BIOPSY Left 1990    ex bx    BREAST LUMPECTOMY      COLONOSCOPY N/A 1/28/2020    Procedure: COLONOSCOPY;  Surgeon: Bianka Macias MD;  Location: [facility];  Service: Endoscopy;  Laterality: N/A;    ESOPHAGOGASTRODUODENOSCOPY N/A 1/28/2020    Procedure: EGD (ESOPHAGOGASTRODUODENOSCOPY);  Surgeon: Bianka Macias MD;  Location: [facility];  Service: Endoscopy;  Laterality: N/A;    EYE SURGERY      HERNIA REPAIR      Ventral    LIVER TRANSPLANT      OOPHORECTOMY      TONSILLECTOMY      TOTAL ABDOMINAL HYSTERECTOMY         FAMHX:   Family History   Problem Relation Age of Onset    Cancer Mother         liver    Pancreatic cancer Mother     Heart disease Mother      Depression Mother     Miscarriages / Stillbirths Mother     Hypertension Father     Alzheimer's disease Father     Depression Brother     Depression Brother     Diabetes Brother     Arthritis Brother     Hypertension Brother     Heart disease Brother        SOCHX:   Social History     Socioeconomic History    Marital status:     Number of children: 6    Years of education: college   Occupational History    Occupation: retired  for [employer]   Tobacco Use    Smoking status: Former     Packs/day: 0.50     Years: 21.00     Pack years: 10.50     Types: Cigarettes     Start date: 1959     Quit date: 1980     Years since quittin.5    Smokeless tobacco: Never   Substance and Sexual Activity    Alcohol use: Not Currently     Alcohol/week: 1.0 standard drink     Types: 1 Glasses of wine per week     Comment: Occasionally    Drug use: Never    Sexual activity: Not Currently     Partners: Male     Birth control/protection: Condom, Other-see comments     Comment: Pill       ALLERGIES:   Review of patient's allergies indicates:   Allergen Reactions    Sulfa (sulfonamide antibiotics)        MEDS:   Current Outpatient Medications:     alendronate (FOSAMAX) 70 MG tablet, Take 1 tablet (70 mg total) by mouth every 7 days., Disp: 12 tablet, Rfl: 3    aspirin (ECOTRIN) 81 MG EC tablet, Take 81 mg by mouth once daily., Disp: , Rfl:     azelastine (ASTELIN) 137 mcg (0.1 %) nasal spray, 1 spray (137 mcg total) by Nasal route 2 (two) times daily., Disp: 30 mL, Rfl: 3    BENEFIBER, WHEAT DEXTRIN, ORAL, Take by mouth. PRN, Disp: , Rfl:     calcium citrate-vitamin D3 500 mg calcium -400 unit Chew, Take 1 tablet by mouth 2 (two) times daily. 12.5mcg, Disp: , Rfl:     EScitalopram oxalate (LEXAPRO) 20 MG tablet, Take 1 tablet (20 mg total) by mouth once daily., Disp: 90 tablet, Rfl: 1    fish oil-omega-3 fatty acids 300-1,000 mg capsule, Take 2 g by mouth once daily., Disp: , Rfl:     Lactobacillus  acidophilus (PROBIOTIC ACIDOPHILUS ORAL), Take by mouth., Disp: , Rfl:     latanoprost 0.005 % ophthalmic solution, 1 drop every evening., Disp: , Rfl:     losartan (COZAAR) 25 MG tablet, TAKE 1 TABLET ONE TIME DAILY, Disp: 90 tablet, Rfl: 3    MULTIVIT-IRON-MIN-FOLIC ACID 3,500-18-0.4 UNIT-MG-MG ORAL CHEW, Take by mouth., Disp: , Rfl:     pantoprazole (PROTONIX) 40 MG tablet, TAKE 1 TABLET EVERY DAY, Disp: 90 tablet, Rfl: 3    polyethylene glycol (GLYCOLAX) 17 gram PwPk, Take 17 g by mouth 3 (three) times daily as needed (for constipation)., Disp: 30 each, Rfl: 0    pravastatin (PRAVACHOL) 20 MG tablet, Take 1 tablet (20 mg total) by mouth once daily., Disp: 90 tablet, Rfl: 3    triamterene-hydrochlorothiazide 37.5-25 mg (DYAZIDE) 37.5-25 mg per capsule, Take 1 capsule by mouth every morning., Disp: 90 capsule, Rfl: 3    vit A/vit C/vit E/zinc/copper (PRESERVISION AREDS ORAL), Take by mouth. Bottle does not say a but does say Lutein, Disp: , Rfl:     amitriptyline (ELAVIL) 10 MG tablet, Take 3 tablets (30 mg total) by mouth nightly as needed for Insomnia., Disp: 270 tablet, Rfl: 2    metoprolol tartrate (LOPRESSOR) 25 MG tablet, Take 1 tablet (25 mg total) by mouth 2 (two) times daily., Disp: 180 tablet, Rfl: 3    OBJECTIVE:   Vitals:    03/22/23 1310   BP: 134/72   Pulse: 75   SpO2: 95%   Weight: 65.3 kg (144 lb)     Body mass index is 23.96 kg/m².      Physical Exam  Vitals and nursing note reviewed.   Constitutional:       Appearance: Normal appearance.   HENT:      Head: Normocephalic.   Eyes:      General:         Right eye: No discharge.         Left eye: No discharge.      Extraocular Movements: Extraocular movements intact.   Cardiovascular:      Rate and Rhythm: Normal rate and regular rhythm.      Heart sounds: Normal heart sounds.   Pulmonary:      Effort: Pulmonary effort is normal.      Breath sounds: Normal breath sounds.   Skin:     Comments: No obvious rash on exposed skin   Neurological:       "Mental Status: She is alert.   Psychiatric:         Behavior: Behavior normal.         LABS:   A1C:      CBC:  Recent Labs   Lab 03/22/23  1424   WBC 6.10   RBC 3.84 L   Hemoglobin 12.0   Hematocrit 35.8 L   Platelets 238   MCV 93   MCH 31.3 H   MCHC 33.5     CMP:  Recent Labs   Lab 03/22/23  1424   Glucose 99   Calcium 9.9   Albumin 4.7   Total Protein 8.0   Sodium 140   Potassium 4.8   CO2 32 H   Chloride 101   BUN 21 H   Creatinine 0.81   Alkaline Phosphatase 64   ALT 32   AST 38   Total Bilirubin 0.5     LIPIDS:  Recent Labs   Lab 03/23/22  0842 11/08/22  0803   TSH  --  1.790   HDL 61  --    Cholesterol 159  --    Triglycerides 85  --    LDL Cholesterol 81.0  --    HDL/Cholesterol Ratio 38.4  --    Non-HDL Cholesterol 98  --    Total Cholesterol/HDL Ratio 2.6  --      TSH:  Recent Labs   Lab 11/08/22  0803   TSH 1.790         ASSESSMENT & PLAN:    Problem List Items Addressed This Visit          Psychiatric    Anxiety and depression (Chronic)    Overview     Paxil ineffective. Previously on Zoloft but self d/c'ed as she was "feeling better". Also tried Lexapro and had rash but does not think this was due to medication.     Consider resuming Lexapro on follow up.             Cardiac/Vascular    Essential hypertension (Chronic)    Overview     Well controlled. 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             Other    Psychophysiological insomnia (Chronic)    Overview     Prior treatments which were ineffective or with noted side effects: " trazodone, mirtazapine, melatonin, amitriptyline, gabapentin, ambien    Wants to go back to amitriptyline at higher dose. Trial of 20mg          Other Visit Diagnoses       Fall, subsequent encounter    -  Primary    Relevant Orders    X-Ray Thoracic Spine AP Lateral (Completed)    X-Ray Lumbar Spine AP And Lateral (Completed)    Ambulatory referral/consult to Physical/Occupational Therapy    Chronic dyspnea        Relevant Orders    CT Chest Without Contrast (Completed)    CBC Auto Differential (Completed)    Comprehensive Metabolic Panel (Completed)              Follow up in about 3 months (around 6/22/2023) for med f/u.      RTC/ED precautions discussed where applicable.   Encouraged patient to let me know if there are any further questions/concerns.     Advise patient/caretaker to check with insurance regarding orders to avoid unexpected fees/costs.     The patient/caretaker indicates understanding of these issues and agrees with the plan.    Dr. Sharona Weaver MD  Family Medicine

## 2023-03-24 ENCOUNTER — TELEPHONE (OUTPATIENT)
Dept: FAMILY MEDICINE | Facility: CLINIC | Age: 83
End: 2023-03-24
Payer: MEDICARE

## 2023-03-24 DIAGNOSIS — R93.89 ABNORMAL CT OF THE CHEST: Primary | ICD-10-CM

## 2023-03-24 NOTE — TELEPHONE ENCOUNTER
Spoke with pt son informed him that her CT scan of her chest is abnormal. She has multiple nodules and an area which is concerning for infection. I recommend she see Pulmonology and that someone will contact them to schedule. Also informed her scan also showed a fracture at T12 which may be causing her back pain. From the scan, they could not say if the fracture is new or old. We can get an MRI and refer her to IR for further evaluation if she'd like. Pt son verbally understood

## 2023-03-24 NOTE — TELEPHONE ENCOUNTER
----- Message from Sharona Weaver MD sent at 3/24/2023  2:17 PM CDT -----  Let patient know her CT scan of her chest is abnormal. She has multiple nodules and an area which is concerning for infection. I recommend she see Pulmonology. I have ordered the referral.

## 2023-03-24 NOTE — PROGRESS NOTES
PATIENT VISIT FAMILY MEDICINE    CC: No chief complaint on file.      HPI: Nereyda Hernandez  is a 82 y.o. female:    Patient is {ayintro:64029} Patient presents {ayintro2:17714} for {ayintro3:63841}    ROS: ROS    PMHX:   Past Medical History:   Diagnosis Date    HATTIE (acute kidney injury) 12/10/2021    Anxiety     Arthritis     Breast cancer 1990    left    Chronic disease anemia     Hyperlipidemia     Hypertension     Insomnia     Lobular carcinoma in situ     KANWAL (obstructive sleep apnea)     Osteoporosis     Rosacea     Squamous cell carcinoma     Stable angina pectoris 8/27/2020    Urinary incontinence     Vertigo        PSHX:   Past Surgical History:   Procedure Laterality Date    ADENOIDECTOMY      BELT ABDOMINOPLASTY      BREAST BIOPSY Left 1990    ex bx    BREAST LUMPECTOMY      COLONOSCOPY N/A 1/28/2020    Procedure: COLONOSCOPY;  Surgeon: Bianka Macias MD;  Location: [facility];  Service: Endoscopy;  Laterality: N/A;    ESOPHAGOGASTRODUODENOSCOPY N/A 1/28/2020    Procedure: EGD (ESOPHAGOGASTRODUODENOSCOPY);  Surgeon: Bianka Macias MD;  Location: [facility];  Service: Endoscopy;  Laterality: N/A;    EYE SURGERY      HERNIA REPAIR      Ventral    LIVER TRANSPLANT      OOPHORECTOMY      TONSILLECTOMY      TOTAL ABDOMINAL HYSTERECTOMY         FAMHX:   Family History   Problem Relation Age of Onset    Cancer Mother         liver    Pancreatic cancer Mother     Heart disease Mother     Depression Mother     Miscarriages / Stillbirths Mother     Hypertension Father     Alzheimer's disease Father     Depression Brother     Depression Brother     Diabetes Brother     Arthritis Brother     Hypertension Brother     Heart disease Brother        SOCHX:   Social History     Socioeconomic History    Marital status:     Number of children: 6    Years of education: college   Occupational History    Occupation: retired  for [employer]   Tobacco Use    Smoking status: Former     Packs/day: 0.50      Years: 21.00     Pack years: 10.50     Types: Cigarettes     Start date: 1959     Quit date: 1980     Years since quittin.5    Smokeless tobacco: Never   Substance and Sexual Activity    Alcohol use: Not Currently     Alcohol/week: 1.0 standard drink     Types: 1 Glasses of wine per week     Comment: Occasionally    Drug use: Never    Sexual activity: Not Currently     Partners: Male     Birth control/protection: Condom, Other-see comments     Comment: Pill       ALLERGIES:   Review of patient's allergies indicates:   Allergen Reactions    Sulfa (sulfonamide antibiotics)        MEDS:   Current Outpatient Medications:     alendronate (FOSAMAX) 70 MG tablet, Take 1 tablet (70 mg total) by mouth every 7 days., Disp: 12 tablet, Rfl: 3    amitriptyline (ELAVIL) 10 MG tablet, Take 3 tablets (30 mg total) by mouth nightly as needed for Insomnia., Disp: 270 tablet, Rfl: 2    aspirin (ECOTRIN) 81 MG EC tablet, Take 81 mg by mouth once daily., Disp: , Rfl:     azelastine (ASTELIN) 137 mcg (0.1 %) nasal spray, 1 spray (137 mcg total) by Nasal route 2 (two) times daily., Disp: 30 mL, Rfl: 3    BENEFIBER, WHEAT DEXTRIN, ORAL, Take by mouth. PRN, Disp: , Rfl:     calcium citrate-vitamin D3 500 mg calcium -400 unit Chew, Take 1 tablet by mouth 2 (two) times daily. 12.5mcg, Disp: , Rfl:     EScitalopram oxalate (LEXAPRO) 20 MG tablet, Take 1 tablet (20 mg total) by mouth once daily., Disp: 90 tablet, Rfl: 1    fish oil-omega-3 fatty acids 300-1,000 mg capsule, Take 2 g by mouth once daily., Disp: , Rfl:     Lactobacillus acidophilus (PROBIOTIC ACIDOPHILUS ORAL), Take by mouth., Disp: , Rfl:     latanoprost 0.005 % ophthalmic solution, 1 drop every evening., Disp: , Rfl:     losartan (COZAAR) 25 MG tablet, TAKE 1 TABLET ONE TIME DAILY, Disp: 90 tablet, Rfl: 3    metoprolol tartrate (LOPRESSOR) 25 MG tablet, Take 1 tablet (25 mg total) by mouth 2 (two) times daily., Disp: 180 tablet, Rfl: 3     MULTIVIT-IRON-MIN-FOLIC ACID 3,500-18-0.4 UNIT-MG-MG ORAL CHEW, Take by mouth., Disp: , Rfl:     pantoprazole (PROTONIX) 40 MG tablet, TAKE 1 TABLET EVERY DAY, Disp: 90 tablet, Rfl: 3    polyethylene glycol (GLYCOLAX) 17 gram PwPk, Take 17 g by mouth 3 (three) times daily as needed (for constipation)., Disp: 30 each, Rfl: 0    pravastatin (PRAVACHOL) 20 MG tablet, Take 1 tablet (20 mg total) by mouth once daily., Disp: 90 tablet, Rfl: 3    triamterene-hydrochlorothiazide 37.5-25 mg (DYAZIDE) 37.5-25 mg per capsule, Take 1 capsule by mouth every morning., Disp: 90 capsule, Rfl: 3    vit A/vit C/vit E/zinc/copper (PRESERVISION AREDS ORAL), Take by mouth. Bottle does not say a but does say Lutein, Disp: , Rfl:     OBJECTIVE:   There were no vitals filed for this visit.  There is no height or weight on file to calculate BMI.      Physical Exam      LABS:   A1C:      CBC:  Recent Labs   Lab 03/22/23  1424   WBC 6.10   RBC 3.84 L   Hemoglobin 12.0   Hematocrit 35.8 L   Platelets 238   MCV 93   MCH 31.3 H   MCHC 33.5     CMP:  Recent Labs   Lab 03/22/23  1424   Glucose 99   Calcium 9.9   Albumin 4.7   Total Protein 8.0   Sodium 140   Potassium 4.8   CO2 32 H   Chloride 101   BUN 21 H   Creatinine 0.81   Alkaline Phosphatase 64   ALT 32   AST 38   Total Bilirubin 0.5     LIPIDS:  Recent Labs   Lab 03/23/22  0842 11/08/22  0803   TSH  --  1.790   HDL 61  --    Cholesterol 159  --    Triglycerides 85  --    LDL Cholesterol 81.0  --    HDL/Cholesterol Ratio 38.4  --    Non-HDL Cholesterol 98  --    Total Cholesterol/HDL Ratio 2.6  --      TSH:  Recent Labs   Lab 11/08/22  0803   TSH 1.790         ASSESSMENT & PLAN:    Problem List Items Addressed This Visit    None  Visit Diagnoses       Abnormal CT of the chest    -  Primary    Relevant Orders    Ambulatory referral/consult to Pulmonology              No follow-ups on file.      RTC/ED precautions discussed where applicable.   Encouraged patient to let me know if there are  any further questions/concerns.     Advise patient/caretaker to check with insurance regarding orders to avoid unexpected fees/costs.     The patient/caretaker indicates understanding of these issues and agrees with the plan.    Dr. Sharona Weaver MD  Family Medicine

## 2023-03-27 ENCOUNTER — TELEPHONE (OUTPATIENT)
Dept: FAMILY MEDICINE | Facility: CLINIC | Age: 83
End: 2023-03-27
Payer: MEDICARE

## 2023-03-27 ENCOUNTER — PATIENT MESSAGE (OUTPATIENT)
Dept: FAMILY MEDICINE | Facility: CLINIC | Age: 83
End: 2023-03-27
Payer: MEDICARE

## 2023-03-27 DIAGNOSIS — S22.089A CLOSED FRACTURE OF TWELFTH THORACIC VERTEBRA, UNSPECIFIED FRACTURE MORPHOLOGY, INITIAL ENCOUNTER: Primary | ICD-10-CM

## 2023-03-27 NOTE — TELEPHONE ENCOUNTER
----- Message from Kasandra Gonzales sent at 3/27/2023  3:55 PM CDT -----  Patient is scheduled for her MRI on April 11. She would like to have a Valium sent to [facility] in [place] and do she did someone with her for the test. Please call patient to advise.

## 2023-03-28 ENCOUNTER — TELEPHONE (OUTPATIENT)
Dept: FAMILY MEDICINE | Facility: CLINIC | Age: 83
End: 2023-03-28
Payer: MEDICARE

## 2023-03-28 ENCOUNTER — OFFICE VISIT (OUTPATIENT)
Dept: PULMONOLOGY | Facility: CLINIC | Age: 83
End: 2023-03-28
Payer: MEDICARE

## 2023-03-28 VITALS
SYSTOLIC BLOOD PRESSURE: 120 MMHG | DIASTOLIC BLOOD PRESSURE: 62 MMHG | HEIGHT: 65 IN | OXYGEN SATURATION: 93 % | BODY MASS INDEX: 24.17 KG/M2 | WEIGHT: 145.06 LBS | HEART RATE: 76 BPM

## 2023-03-28 DIAGNOSIS — R06.09 DYSPNEA ON EXERTION: ICD-10-CM

## 2023-03-28 DIAGNOSIS — R91.8 MULTIPLE PULMONARY NODULES DETERMINED BY COMPUTED TOMOGRAPHY OF LUNG: ICD-10-CM

## 2023-03-28 DIAGNOSIS — R13.19 OTHER DYSPHAGIA: ICD-10-CM

## 2023-03-28 DIAGNOSIS — R93.89 ABNORMAL CT OF THE CHEST: Primary | ICD-10-CM

## 2023-03-28 PROBLEM — Z91.89 AT RISK FOR ASPIRATION: Status: ACTIVE | Noted: 2023-03-28

## 2023-03-28 PROBLEM — Z91.89 AT RISK FOR ASPIRATION: Status: RESOLVED | Noted: 2023-03-28 | Resolved: 2023-03-28

## 2023-03-28 PROCEDURE — 1157F PR ADVANCE CARE PLAN OR EQUIV PRESENT IN MEDICAL RECORD: ICD-10-PCS | Mod: HCNC,CPTII,S$GLB, | Performed by: INTERNAL MEDICINE

## 2023-03-28 PROCEDURE — 3074F PR MOST RECENT SYSTOLIC BLOOD PRESSURE < 130 MM HG: ICD-10-PCS | Mod: HCNC,CPTII,S$GLB, | Performed by: INTERNAL MEDICINE

## 2023-03-28 PROCEDURE — 3074F SYST BP LT 130 MM HG: CPT | Mod: HCNC,CPTII,S$GLB, | Performed by: INTERNAL MEDICINE

## 2023-03-28 PROCEDURE — 1159F PR MEDICATION LIST DOCUMENTED IN MEDICAL RECORD: ICD-10-PCS | Mod: HCNC,CPTII,S$GLB, | Performed by: INTERNAL MEDICINE

## 2023-03-28 PROCEDURE — 1101F PT FALLS ASSESS-DOCD LE1/YR: CPT | Mod: HCNC,CPTII,S$GLB, | Performed by: INTERNAL MEDICINE

## 2023-03-28 PROCEDURE — 3078F DIAST BP <80 MM HG: CPT | Mod: HCNC,CPTII,S$GLB, | Performed by: INTERNAL MEDICINE

## 2023-03-28 PROCEDURE — 99999 PR PBB SHADOW E&M-EST. PATIENT-LVL IV: ICD-10-PCS | Mod: PBBFAC,HCNC,, | Performed by: INTERNAL MEDICINE

## 2023-03-28 PROCEDURE — 3288F PR FALLS RISK ASSESSMENT DOCUMENTED: ICD-10-PCS | Mod: HCNC,CPTII,S$GLB, | Performed by: INTERNAL MEDICINE

## 2023-03-28 PROCEDURE — 1101F PR PT FALLS ASSESS DOC 0-1 FALLS W/OUT INJ PAST YR: ICD-10-PCS | Mod: HCNC,CPTII,S$GLB, | Performed by: INTERNAL MEDICINE

## 2023-03-28 PROCEDURE — 99999 PR PBB SHADOW E&M-EST. PATIENT-LVL IV: CPT | Mod: PBBFAC,HCNC,, | Performed by: INTERNAL MEDICINE

## 2023-03-28 PROCEDURE — 3078F PR MOST RECENT DIASTOLIC BLOOD PRESSURE < 80 MM HG: ICD-10-PCS | Mod: HCNC,CPTII,S$GLB, | Performed by: INTERNAL MEDICINE

## 2023-03-28 PROCEDURE — 99213 PR OFFICE/OUTPT VISIT, EST, LEVL III, 20-29 MIN: ICD-10-PCS | Mod: HCNC,S$GLB,, | Performed by: INTERNAL MEDICINE

## 2023-03-28 PROCEDURE — 1157F ADVNC CARE PLAN IN RCRD: CPT | Mod: HCNC,CPTII,S$GLB, | Performed by: INTERNAL MEDICINE

## 2023-03-28 PROCEDURE — 3288F FALL RISK ASSESSMENT DOCD: CPT | Mod: HCNC,CPTII,S$GLB, | Performed by: INTERNAL MEDICINE

## 2023-03-28 PROCEDURE — 1159F MED LIST DOCD IN RCRD: CPT | Mod: HCNC,CPTII,S$GLB, | Performed by: INTERNAL MEDICINE

## 2023-03-28 PROCEDURE — 99213 OFFICE O/P EST LOW 20 MIN: CPT | Mod: HCNC,S$GLB,, | Performed by: INTERNAL MEDICINE

## 2023-03-28 RX ORDER — DIAZEPAM 2 MG/1
TABLET ORAL
Qty: 2 TABLET | Refills: 0 | Status: SHIPPED | OUTPATIENT
Start: 2023-03-28 | End: 2023-04-17

## 2023-03-28 NOTE — ASSESSMENT & PLAN NOTE
Multiple small pulmonary nodules evident on CT chest. Short smoking hx. Obtain CT chest in 6 months.

## 2023-03-28 NOTE — ASSESSMENT & PLAN NOTE
Patient with coughing when eating and CT findings that could indicate chronic aspiration.   - obtain modified barium swallow. Possible speech therapy referral if abnormal.

## 2023-03-28 NOTE — ASSESSMENT & PLAN NOTE
Unsure of etiology at this time. Obtain PFTs and compare to previous that were normal. Differential includes deconditioning, chronic aspiration vs reflux with recurrent pneumonitis, less likely COPD, possible diastolic dysfunction with LE edema (could be venous insufficiency as no significant GGO on CT chest vs NTM. Discussed that work up for NTM would be unhelpful as the treatment is rigorous and poorly tolerated. If this becomes high suspicion would instruct on airway clearance techniques. Obtain modified barium swallow to investigate for aspiration. If decreased diffusion capacity would consider diastolic dysfunction causing pulmonary hypertension higher on differential. No significant emphysema noted on CT chest.

## 2023-03-28 NOTE — PROGRESS NOTES
"Subjective:       Patient ID: Nereyda Hernandez is a 82 y.o. female.    Chief Complaint: abnormal CT chest and HUBBARD    Pt is an 83 yo CW pmh HTN, HLD, who presents for evaluation of Abnormal chest CT and progressive dyspnea on exertion. Pt states that she believes it started approximately a year ago and daughter who is present states that it has been affecting her quality of life. Is no longer able to garden like she used to. She gets short of breath with walking distances such as from the parking garage to clinic. When she grocery shops she has to walk slower and hold onto the buggy to keep from becoming short of breath. Has episodes of coughing with eating and sensation of aspiration. 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 Negative for weight loss and weight gain.   HENT:  Negative for postnasal drip and congestion.    Respiratory:  Positive for cough (intermittent), sputum production (thick clear) and dyspnea on extertion. Negative for hemoptysis, shortness of breath, previous hospitialization due to pulmonary problems and use of rescue inhaler.    Cardiovascular:  Positive for leg swelling. Negative for chest pain and palpitations.   Gastrointestinal:  Positive for acid reflux. Negative for nausea and vomiting.   Neurological:  Negative for syncope and light-headedness.   Psychiatric/Behavioral:  Negative for confusion and sleep disturbance. The patient is not nervous/anxious.      Objective:      Physical Exam   Constitutional: She is oriented to person, place, and time. She appears well-developed and well-nourished. She is not obese.   HENT:   Head: " Normocephalic.   Musculoskeletal:         General: Edema (1+ pitting edema ankle) present.   Neurological: She is alert and oriented to person, place, and time. Gait normal.   Skin: No cyanosis. Nails show no clubbing.   Psychiatric: She has a normal mood and affect. Her behavior is normal. Judgment and thought content normal.   Vitals reviewed.    Personal Diagnostic Review  CT of chest performed on 3/22/23 without contrast revealed bilateral apical scaring. Left lingular bronchiectasis with surrounding tree and bud. Multiple areas of RML and RLL of mucus impaction. RML pulmonary nodule. Multiple small RLL pulmonary nodules.    Echocardiogram: 22  The left ventricle is normal in size with normal systolic function.  The estimated ejection fraction is 55%.  Normal left ventricular diastolic function.  Normal right ventricular size with normal right ventricular systolic function.  Mild-to-moderate aortic regurgitation.  Mild tricuspid regurgitation.  Mild pulmonic regurgitation.  Normal central venous pressure (3 mmHg).  The estimated PA systolic pressure is 32 mmHg.    Pulmonary function tests:   20  FEV1: 1.40L  (76.3 % predicted),   FVC:  1.94L (80.3 % predicted),   FEV1/FVC:  72,   T.29L (89.9 % predicted),   DLCO: 18.50 (97.1 % predicted)    Echo: 22  The left ventricle is normal in size with normal systolic function.  The estimated ejection fraction is 55%.  Normal left ventricular diastolic function.  Normal right ventricular size with normal right ventricular systolic function.  Mild-to-moderate aortic regurgitation.  Mild tricuspid regurgitation.  Mild pulmonic regurgitation.  Normal central venous pressure (3 mmHg).  The estimated PA systolic pressure is 32 mmHg.    No flowsheet data found.      Assessment:       1. Abnormal CT of the chest    2. Dyspnea on exertion    3. Multiple pulmonary nodules determined by computed tomography of lung    4. Other dysphagia        Outpatient Encounter  Medications as of 3/28/2023   Medication Sig Dispense Refill    alendronate (FOSAMAX) 70 MG tablet Take 1 tablet (70 mg total) by mouth every 7 days. 12 tablet 3    amitriptyline (ELAVIL) 10 MG tablet Take 3 tablets (30 mg total) by mouth nightly as needed for Insomnia. 270 tablet 2    aspirin (ECOTRIN) 81 MG EC tablet Take 81 mg by mouth once daily.      azelastine (ASTELIN) 137 mcg (0.1 %) nasal spray 1 spray (137 mcg total) by Nasal route 2 (two) times daily. 30 mL 3    BENEFIBER, WHEAT DEXTRIN, ORAL Take by mouth. PRN      calcium citrate-vitamin D3 500 mg calcium -400 unit Chew Take 1 tablet by mouth 2 (two) times daily. 12.5mcg      EScitalopram oxalate (LEXAPRO) 20 MG tablet Take 1 tablet (20 mg total) by mouth once daily. 90 tablet 1    fish oil-omega-3 fatty acids 300-1,000 mg capsule Take 2 g by mouth once daily.      Lactobacillus acidophilus (PROBIOTIC ACIDOPHILUS ORAL) Take by mouth.      latanoprost 0.005 % ophthalmic solution 1 drop every evening.      losartan (COZAAR) 25 MG tablet TAKE 1 TABLET ONE TIME DAILY 90 tablet 3    metoprolol tartrate (LOPRESSOR) 25 MG tablet Take 1 tablet (25 mg total) by mouth 2 (two) times daily. 180 tablet 3    MULTIVIT-IRON-MIN-FOLIC ACID 3,500-18-0.4 UNIT-MG-MG ORAL CHEW Take by mouth.      pantoprazole (PROTONIX) 40 MG tablet TAKE 1 TABLET EVERY DAY 90 tablet 3    polyethylene glycol (GLYCOLAX) 17 gram PwPk Take 17 g by mouth 3 (three) times daily as needed (for constipation). 30 each 0    pravastatin (PRAVACHOL) 20 MG tablet Take 1 tablet (20 mg total) by mouth once daily. 90 tablet 3    triamterene-hydrochlorothiazide 37.5-25 mg (DYAZIDE) 37.5-25 mg per capsule Take 1 capsule by mouth every morning. 90 capsule 3    vit A/vit C/vit E/zinc/copper (PRESERVISION AREDS ORAL) Take by mouth. Bottle does not say a but does say Lutein       No facility-administered encounter medications on file as of 3/28/2023.     Orders Placed This Encounter   Procedures    Fl Modified  Barium Swallow Speech     Standing Status:   Future     Standing Expiration Date:   3/28/2024     Order Specific Question:   May the Radiologist modify the order per protocol to meet the clinical needs of the patient?     Answer:   Yes     Order Specific Question:   Is the patient allergic to iodine contrast?     Answer:   No     Order Specific Question:   Release to patient     Answer:   Immediate    CT Chest Without Contrast     Standing Status:   Future     Standing Expiration Date:   3/28/2024     Order Specific Question:   May the Radiologist modify the order per protocol to meet the clinical needs of the patient?     Answer:   Yes    Spirometry with/without bronchodilator     Standing Status:   Future     Standing Expiration Date:   3/28/2024     Order Specific Question:   Release to patient     Answer:   Immediate    Lung Volumes     Standing Status:   Future     Standing Expiration Date:   3/28/2024     Order Specific Question:   Release to patient     Answer:   Immediate    DLCO-Carbon Monoxide Diffusing Capacity     Standing Status:   Future     Standing Expiration Date:   3/28/2024     Order Specific Question:   Release to patient     Answer:   Immediate       Plan:       Other dysphagia  Patient with coughing when eating and CT findings that could indicate chronic aspiration.   - obtain modified barium swallow. Possible speech therapy referral if abnormal.     Dyspnea  Unsure of etiology at this time. Obtain PFTs and compare to previous that were normal. Differential includes deconditioning, chronic aspiration vs reflux with recurrent pneumonitis, less likely COPD, possible diastolic dysfunction with LE edema (could be venous insufficiency as no significant GGO on CT chest vs NTM. Discussed that work up for NTM would be unhelpful as the treatment is rigorous and poorly tolerated. If this becomes high suspicion would instruct on airway clearance techniques. Obtain modified barium swallow to investigate  for aspiration. If decreased diffusion capacity would consider diastolic dysfunction causing pulmonary hypertension higher on differential. No significant emphysema noted on CT chest.     Multiple pulmonary nodules determined by computed tomography of lung  Multiple small pulmonary nodules evident on CT chest. Short smoking hx. Obtain CT chest in 6 months.     Abnormal CT of the chest  Multiple abnormalities including pulmonary nodules (see plan under respective subject), bronchiectasis (possible aspiration vs NTM both possibilities being adressed) and mucous impactions. Will repeat CT chest in 6 months and continue work up as stated elsewhere for shortness of breath.        Follow up in 3 months.     Yandel Hi MD  [facility]

## 2023-03-28 NOTE — ASSESSMENT & PLAN NOTE
Multiple abnormalities including pulmonary nodules (see plan under respective subject), bronchiectasis (possible aspiration vs NTM both possibilities being adressed) and mucous impactions. Will repeat CT chest in 6 months and continue work up as stated elsewhere for shortness of breath.

## 2023-03-30 ENCOUNTER — TELEPHONE (OUTPATIENT)
Dept: INTERVENTIONAL RADIOLOGY/VASCULAR | Facility: CLINIC | Age: 83
End: 2023-03-30
Payer: MEDICARE

## 2023-03-30 NOTE — TELEPHONE ENCOUNTER
Call and spoke to patient to schedule NeuroIR clinic appointment to discuss vertebro/kyhoplasty. Patient schedule on 4/17/2023 and MRI will be done on 4/11/23.

## 2023-03-31 ENCOUNTER — HOSPITAL ENCOUNTER (OUTPATIENT)
Dept: PULMONOLOGY | Facility: CLINIC | Age: 83
Discharge: HOME | End: 2023-03-31
Payer: MEDICARE

## 2023-03-31 DIAGNOSIS — R06.09 DYSPNEA ON EXERTION: ICD-10-CM

## 2023-03-31 DIAGNOSIS — R93.89 ABNORMAL CT OF THE CHEST: ICD-10-CM

## 2023-03-31 PROCEDURE — 94729 DIFFUSING CAPACITY: CPT | Mod: HCNC,S$GLB,, | Performed by: INTERNAL MEDICINE

## 2023-03-31 PROCEDURE — 94060 PR EVAL OF BRONCHOSPASM: ICD-10-PCS | Mod: HCNC,S$GLB,, | Performed by: INTERNAL MEDICINE

## 2023-03-31 PROCEDURE — 94729 PR C02/MEMBANE DIFFUSE CAPACITY: ICD-10-PCS | Mod: HCNC,S$GLB,, | Performed by: INTERNAL MEDICINE

## 2023-03-31 PROCEDURE — 94727 PR PULM FUNCTION TEST BY GAS: ICD-10-PCS | Mod: HCNC,S$GLB,, | Performed by: INTERNAL MEDICINE

## 2023-03-31 PROCEDURE — 94060 EVALUATION OF WHEEZING: CPT | Mod: HCNC,S$GLB,, | Performed by: INTERNAL MEDICINE

## 2023-03-31 PROCEDURE — 94727 GAS DIL/WSHOT DETER LNG VOL: CPT | Mod: HCNC,S$GLB,, | Performed by: INTERNAL MEDICINE

## 2023-04-04 ENCOUNTER — PATIENT MESSAGE (OUTPATIENT)
Dept: PULMONOLOGY | Facility: CLINIC | Age: 83
End: 2023-04-04
Payer: MEDICARE

## 2023-04-06 DIAGNOSIS — R06.09 OTHER FORM OF DYSPNEA: Primary | Chronic | ICD-10-CM

## 2023-04-17 ENCOUNTER — OFFICE VISIT (OUTPATIENT)
Dept: INTERVENTIONAL RADIOLOGY/VASCULAR | Facility: CLINIC | Age: 83
End: 2023-04-17
Payer: MEDICARE

## 2023-04-17 ENCOUNTER — OFFICE VISIT (OUTPATIENT)
Dept: CARDIOLOGY | Facility: CLINIC | Age: 83
End: 2023-04-17
Payer: MEDICARE

## 2023-04-17 VITALS
BODY MASS INDEX: 24.04 KG/M2 | HEIGHT: 65 IN | SYSTOLIC BLOOD PRESSURE: 130 MMHG | HEART RATE: 61 BPM | DIASTOLIC BLOOD PRESSURE: 74 MMHG | WEIGHT: 144.31 LBS

## 2023-04-17 VITALS
DIASTOLIC BLOOD PRESSURE: 78 MMHG | HEART RATE: 82 BPM | SYSTOLIC BLOOD PRESSURE: 134 MMHG | OXYGEN SATURATION: 97 % | BODY MASS INDEX: 24.13 KG/M2 | WEIGHT: 145 LBS

## 2023-04-17 DIAGNOSIS — I70.0 CALCIFICATION OF AORTA: ICD-10-CM

## 2023-04-17 DIAGNOSIS — D50.8 OTHER IRON DEFICIENCY ANEMIA: ICD-10-CM

## 2023-04-17 DIAGNOSIS — S22.080G COMPRESSION FRACTURE OF T12 VERTEBRA WITH DELAYED HEALING, SUBSEQUENT ENCOUNTER: Primary | ICD-10-CM

## 2023-04-17 DIAGNOSIS — R00.2 PALPITATIONS: Chronic | ICD-10-CM

## 2023-04-17 DIAGNOSIS — E78.2 MIXED HYPERLIPIDEMIA: Chronic | ICD-10-CM

## 2023-04-17 DIAGNOSIS — I10 ESSENTIAL HYPERTENSION: Chronic | ICD-10-CM

## 2023-04-17 DIAGNOSIS — M80.08XS AGE-RELATED OSTEOPOROSIS WITH CURRENT PATHOLOGICAL FRACTURE, VERTEBRA(E), SEQUELA: ICD-10-CM

## 2023-04-17 DIAGNOSIS — Z01.810 PREOP CARDIOVASCULAR EXAM: ICD-10-CM

## 2023-04-17 PROCEDURE — 1126F PR PAIN SEVERITY QUANTIFIED, NO PAIN PRESENT: ICD-10-PCS | Mod: HCNC,CPTII,S$GLB, | Performed by: INTERNAL MEDICINE

## 2023-04-17 PROCEDURE — 1157F PR ADVANCE CARE PLAN OR EQUIV PRESENT IN MEDICAL RECORD: ICD-10-PCS | Mod: HCNC,CPTII,S$GLB, | Performed by: INTERNAL MEDICINE

## 2023-04-17 PROCEDURE — 1160F PR REVIEW ALL MEDS BY PRESCRIBER/CLIN PHARMACIST DOCUMENTED: ICD-10-PCS | Mod: HCNC,CPTII,S$GLB, | Performed by: INTERNAL MEDICINE

## 2023-04-17 PROCEDURE — 99214 OFFICE O/P EST MOD 30 MIN: CPT | Mod: HCNC,25,S$GLB, | Performed by: INTERNAL MEDICINE

## 2023-04-17 PROCEDURE — 99999 PR PBB SHADOW E&M-EST. PATIENT-LVL III: CPT | Mod: PBBFAC,HCNC,, | Performed by: INTERNAL MEDICINE

## 2023-04-17 PROCEDURE — 3075F SYST BP GE 130 - 139MM HG: CPT | Mod: HCNC,CPTII,S$GLB, | Performed by: INTERNAL MEDICINE

## 2023-04-17 PROCEDURE — 93000 EKG 12-LEAD: ICD-10-PCS | Mod: HCNC,S$GLB,, | Performed by: INTERNAL MEDICINE

## 2023-04-17 PROCEDURE — 99999 PR PBB SHADOW E&M-EST. PATIENT-LVL III: ICD-10-PCS | Mod: PBBFAC,HCNC,,

## 2023-04-17 PROCEDURE — 1126F AMNT PAIN NOTED NONE PRSNT: CPT | Mod: HCNC,CPTII,S$GLB, | Performed by: INTERNAL MEDICINE

## 2023-04-17 PROCEDURE — 3078F PR MOST RECENT DIASTOLIC BLOOD PRESSURE < 80 MM HG: ICD-10-PCS | Mod: HCNC,CPTII,S$GLB,

## 2023-04-17 PROCEDURE — 99214 PR OFFICE/OUTPT VISIT, EST, LEVL IV, 30-39 MIN: ICD-10-PCS | Mod: HCNC,25,S$GLB, | Performed by: INTERNAL MEDICINE

## 2023-04-17 PROCEDURE — 3075F SYST BP GE 130 - 139MM HG: CPT | Mod: HCNC,CPTII,S$GLB,

## 2023-04-17 PROCEDURE — 1159F MED LIST DOCD IN RCRD: CPT | Mod: HCNC,CPTII,S$GLB, | Performed by: INTERNAL MEDICINE

## 2023-04-17 PROCEDURE — 1157F ADVNC CARE PLAN IN RCRD: CPT | Mod: HCNC,CPTII,S$GLB,

## 2023-04-17 PROCEDURE — 99999 PR PBB SHADOW E&M-EST. PATIENT-LVL III: ICD-10-PCS | Mod: PBBFAC,HCNC,, | Performed by: INTERNAL MEDICINE

## 2023-04-17 PROCEDURE — 3288F PR FALLS RISK ASSESSMENT DOCUMENTED: ICD-10-PCS | Mod: HCNC,CPTII,S$GLB, | Performed by: INTERNAL MEDICINE

## 2023-04-17 PROCEDURE — 99999 PR PBB SHADOW E&M-EST. PATIENT-LVL III: CPT | Mod: PBBFAC,HCNC,,

## 2023-04-17 PROCEDURE — 99203 PR OFFICE/OUTPT VISIT, NEW, LEVL III, 30-44 MIN: ICD-10-PCS | Mod: HCNC,S$GLB,,

## 2023-04-17 PROCEDURE — 1157F ADVNC CARE PLAN IN RCRD: CPT | Mod: HCNC,CPTII,S$GLB, | Performed by: INTERNAL MEDICINE

## 2023-04-17 PROCEDURE — 1160F RVW MEDS BY RX/DR IN RCRD: CPT | Mod: HCNC,CPTII,S$GLB, | Performed by: INTERNAL MEDICINE

## 2023-04-17 PROCEDURE — 3075F PR MOST RECENT SYSTOLIC BLOOD PRESS GE 130-139MM HG: ICD-10-PCS | Mod: HCNC,CPTII,S$GLB,

## 2023-04-17 PROCEDURE — 1101F PR PT FALLS ASSESS DOC 0-1 FALLS W/OUT INJ PAST YR: ICD-10-PCS | Mod: HCNC,CPTII,S$GLB, | Performed by: INTERNAL MEDICINE

## 2023-04-17 PROCEDURE — 1157F PR ADVANCE CARE PLAN OR EQUIV PRESENT IN MEDICAL RECORD: ICD-10-PCS | Mod: HCNC,CPTII,S$GLB,

## 2023-04-17 PROCEDURE — 3078F PR MOST RECENT DIASTOLIC BLOOD PRESSURE < 80 MM HG: ICD-10-PCS | Mod: HCNC,CPTII,S$GLB, | Performed by: INTERNAL MEDICINE

## 2023-04-17 PROCEDURE — 3288F FALL RISK ASSESSMENT DOCD: CPT | Mod: HCNC,CPTII,S$GLB, | Performed by: INTERNAL MEDICINE

## 2023-04-17 PROCEDURE — 1101F PT FALLS ASSESS-DOCD LE1/YR: CPT | Mod: HCNC,CPTII,S$GLB, | Performed by: INTERNAL MEDICINE

## 2023-04-17 PROCEDURE — 3078F DIAST BP <80 MM HG: CPT | Mod: HCNC,CPTII,S$GLB, | Performed by: INTERNAL MEDICINE

## 2023-04-17 PROCEDURE — 3078F DIAST BP <80 MM HG: CPT | Mod: HCNC,CPTII,S$GLB,

## 2023-04-17 PROCEDURE — 99203 OFFICE O/P NEW LOW 30 MIN: CPT | Mod: HCNC,S$GLB,,

## 2023-04-17 PROCEDURE — 93000 ELECTROCARDIOGRAM COMPLETE: CPT | Mod: HCNC,S$GLB,, | Performed by: INTERNAL MEDICINE

## 2023-04-17 PROCEDURE — 3075F PR MOST RECENT SYSTOLIC BLOOD PRESS GE 130-139MM HG: ICD-10-PCS | Mod: HCNC,CPTII,S$GLB, | Performed by: INTERNAL MEDICINE

## 2023-04-17 PROCEDURE — 1159F PR MEDICATION LIST DOCUMENTED IN MEDICAL RECORD: ICD-10-PCS | Mod: HCNC,CPTII,S$GLB, | Performed by: INTERNAL MEDICINE

## 2023-04-17 NOTE — ASSESSMENT & PLAN NOTE
Controlled.  Goal BP < 140/90.  Compliant w/ meds.    - continue other meds   - encouraged lifestyle modifications  - pt to continue to monitor BP at home and record

## 2023-04-17 NOTE — PROGRESS NOTES
Subjective     Patient ID: Nereyda Hernandez is a 82 y.o. female.    Chief Complaint: Back Pain    Referral from Dr. Weaver to discuss khyphoplasty.  81 yo F with a history known of osteoporosis presents to discuss treatment for a T12 compression fracture. She reports she suffered a fall at [facility] in December 2022. She reports he back pain has progressively gotten worse. She lives an active life style (gardening). CC: Back pain R>L. Pain relieved with rest. Pain worsened with activity. She also reports HUBBARD followed by her PCP. She denies any bowel incontinence. She has suffered from chronic bladder incontinence (unrelated to spine).     She takes aspirin 81mg daily.   No issues laying flat. No O2 or CPAP use at home.   Family medicine clinic note reviewed.      Review of Systems   Constitutional:  Negative for fatigue and fever.   Respiratory:  Positive for shortness of breath (HUBBARD). Negative for cough.    Cardiovascular:  Negative for chest pain and leg swelling.   Gastrointestinal:  Negative for abdominal distention and abdominal pain.   Genitourinary:  Positive for bladder incontinence (chronic).   Musculoskeletal:  Positive for arthralgias and back pain.   Neurological:  Negative for headaches.   Psychiatric/Behavioral:  Negative for behavioral problems and confusion.         Objective     Physical Exam  Constitutional:       General: She is not in acute distress.     Appearance: Normal appearance. She is not ill-appearing or diaphoretic.   HENT:      Head: Normocephalic and atraumatic.      Right Ear: External ear normal.      Left Ear: External ear normal.      Nose: Nose normal.   Eyes:      Conjunctiva/sclera: Conjunctivae normal.   Musculoskeletal:         General: Tenderness (thoracic spine (T12)) present. Normal range of motion.      Right lower leg: No edema.      Left lower leg: No edema.   Skin:     General: Skin is warm and dry.   Neurological:      General: No focal deficit present.       Mental Status: She is alert and oriented to person, place, and time.      Motor: No weakness.      Coordination: Coordination normal.       MRI Thoracic Spine Without Contrast  Status: Final result     Visible to patient: Yes (seen)     Next appt: Today at 01:40 PM in Cardiology (Sujit Dent III, MD)     Dx: Closed fracture of twelfth thoracic v...     0 Result Notes  Details    Reading Physician Reading Date Result Priority   Luis Dill MD  [phone] 4/11/2023 Routine     Narrative & Impression  EXAMINATION:  MRI THORACIC SPINE WITHOUT CONTRAST     CLINICAL HISTORY:  t12 fracture;  Unspecified fracture of t11-T12 vertebra, initial encounter for closed fracture     TECHNIQUE:  Multiplanar, multisequence MRI of thoracic spine.  Contrast was not administered.     COMPARISON:  Radiographs 03/22/2023     FINDINGS:  Alignment: Grade 1 anterolisthesis noted at T2-T3.  There is dextroconvex curvature of the lumbar spine.     Vertebrae: There is a T12 vertebral body compression fracture with involvement of the posterior cortex, moderate height loss and mild bone marrow edema beneath the superior endplate, likely subacute.  There is mild retropulsion resulting in mild spinal canal stenosis.  No evidence for marrow infiltrative process.  Hemangioma is noted within the T3 and T10 vertebral bodies.     Discs: Multilevel mild disc height loss and desiccation.     Cord: Normal morphology and signal.  Conus terminates at L1.     Degenerative findings: Disc bulge at T2-T3 results in mild spinal canal stenosis.  Facet arthropathy at T9-T10 results in mild left neural foraminal narrowing.  Facet arthropathy at T10-T11 results in mild spinal canal stenosis.  Disc bulge and facet arthropathy at T12-L1 result in mild spinal canal stenosis.  Note made of multilevel disc osteophyte production throughout the lower cervical spine resulting in mild spinal canal stenosis, suboptimally assessed.     Paraspinal  muscles & soft tissues: Unremarkable.     Impression:     1. Likely subacute T12 vertebral body burst fracture with moderate height loss and mild retropulsion resulting in mild spinal canal stenosis.  2. Degenerative changes of the cervical and thoracic spine.              Assessment and Plan     Compression fracture of T12 vertebra with delayed healing, subsequent encounter  -     IR Kyphoplasty Thoracic First Vertebral with Imaging; Future; Expected date: 04/17/2023    Age-related osteoporosis with current pathological fracture, vertebra(e), sequela  -     IR Kyphoplasty Thoracic First Vertebral with Imaging; Future; Expected date: 04/17/2023      -Patient will hold aspirin for 5 days prior to procedure.    - Imaging reviewed with Dr. Gonzalez   - Subacute T12 vertebral body fracture with moderate height loss. Patient reports he pain has not improved with rest. Will schedule for a T12 khyphoplasty.     Discussed how the procedure will be performed, risks (including, but not limited to, pain, bleeding, infection, damage to nearby structures, and the need for additional procedures), benefits, possible complications, pre-post procedure expectations, and alternatives. The patient voices understanding and all questions have been answered.  The patient agrees to proceed as planned. clinic phone number provided.    Cayla Lunsford PA-C  Interventional Radiology  [phone]

## 2023-04-17 NOTE — ASSESSMENT & PLAN NOTE
Controlled.  Goal LDL < 130, LDL 81 3/23/2022.  - check FLP   - continue statin therapy  - encouraged lifestyle modifications

## 2023-04-17 NOTE — PROGRESS NOTES
Subjective:    Patient ID:  Nereyda Hernandez is a 82 y.o. female who presents for follow-up of Follow-up, Pre-op Exam, and Shortness of Breath      PCP: Sharona Weaver MD     Pt is a 83 yo F w/ PMH of HTN, HLD, and PVCs/PACs who presents for follow up.  She was last seen 9/27/2022 noted chronic do and was continued on medical therapy.  Since this time she has been stable and has continued on a dyspnea w/u which per pt was negative for a cause of her camacho.  She notes continued camacho which starts following walking a couple of blocks and has become consistent.  She denies cp, edema, orthopnea, PND, presyncope, LOC, palpitations, or claudication.   She notes compliance w/ meds and denies side effects.  She has been monitoring her BP at home and it has been running 110-130s/70-80s.  She has not been exercising due to chronic back pain.         Past Medical History:   Diagnosis Date    HATTIE (acute kidney injury) 12/10/2021    Anxiety     Arthritis     Breast cancer 1990    left    Chronic disease anemia     Hyperlipidemia     Hypertension     Insomnia     Lobular carcinoma in situ     KANWAL (obstructive sleep apnea)     Osteoporosis     Rosacea     Squamous cell carcinoma     Stable angina pectoris 8/27/2020    Urinary incontinence     Vertigo      Past Surgical History:   Procedure Laterality Date    ADENOIDECTOMY      BELT ABDOMINOPLASTY      BREAST BIOPSY Left 1990    ex bx    BREAST LUMPECTOMY      COLONOSCOPY N/A 1/28/2020    Procedure: COLONOSCOPY;  Surgeon: Bianka Macias MD;  Location: [facility];  Service: Endoscopy;  Laterality: N/A;    ESOPHAGOGASTRODUODENOSCOPY N/A 1/28/2020    Procedure: EGD (ESOPHAGOGASTRODUODENOSCOPY);  Surgeon: Bianka Macias MD;  Location: [facility];  Service: Endoscopy;  Laterality: N/A;    EYE SURGERY      HERNIA REPAIR      Ventral    LIVER TRANSPLANT      OOPHORECTOMY      TONSILLECTOMY      TOTAL ABDOMINAL HYSTERECTOMY       Social History     Socioeconomic History    Marital status:      Number of children: 6    Years of education: college   Occupational History    Occupation: retired  for [employer]   Tobacco Use    Smoking status: Former     Packs/day: 0.50     Years: 21.00     Pack years: 10.50     Types: Cigarettes     Start date: 1959     Quit date: 1980     Years since quittin.6    Smokeless tobacco: Never   Substance and Sexual Activity    Alcohol use: Not Currently     Alcohol/week: 1.0 standard drink     Types: 1 Glasses of wine per week     Comment: Occasionally    Drug use: Never    Sexual activity: Not Currently     Partners: Male     Birth control/protection: Condom, Other-see comments     Comment: Pill     Family History   Problem Relation Age of Onset    Cancer Mother         liver    Pancreatic cancer Mother     Heart disease Mother     Depression Mother     Miscarriages / Stillbirths Mother     Hypertension Father     Alzheimer's disease Father     Depression Brother     Depression Brother     Diabetes Brother     Arthritis Brother     Hypertension Brother     Heart disease Brother        Review of patient's allergies indicates:   Allergen Reactions    Sulfa (sulfonamide antibiotics)        Medication List with Changes/Refills   Current Medications    ALENDRONATE (FOSAMAX) 70 MG TABLET    Take 1 tablet (70 mg total) by mouth every 7 days.    AMITRIPTYLINE (ELAVIL) 10 MG TABLET    Take 3 tablets (30 mg total) by mouth nightly as needed for Insomnia.    ASPIRIN (ECOTRIN) 81 MG EC TABLET    Take 81 mg by mouth once daily.    AZELASTINE (ASTELIN) 137 MCG (0.1 %) NASAL SPRAY    1 spray (137 mcg total) by Nasal route 2 (two) times daily.    BENEFIBER, WHEAT DEXTRIN, ORAL    Take by mouth. PRN    CALCIUM CITRATE-VITAMIN D3 500 MG CALCIUM -400 UNIT CHEW    Take 1 tablet by mouth 2 (two) times daily. 12.5mcg    ESCITALOPRAM OXALATE (LEXAPRO) 20 MG TABLET    Take 1 tablet (20 mg total) by mouth once daily.    FISH OIL-OMEGA-3 FATTY ACIDS 300-1,000  MG CAPSULE    Take 2 g by mouth once daily.    LACTOBACILLUS ACIDOPHILUS (PROBIOTIC ACIDOPHILUS ORAL)    Take by mouth.    LATANOPROST 0.005 % OPHTHALMIC SOLUTION    1 drop every evening.    LOSARTAN (COZAAR) 25 MG TABLET    TAKE 1 TABLET ONE TIME DAILY    METOPROLOL TARTRATE (LOPRESSOR) 25 MG TABLET    Take 1 tablet (25 mg total) by mouth 2 (two) times daily.    MULTIVIT-IRON-MIN-FOLIC ACID 3,500-18-0.4 UNIT-MG-MG ORAL CHEW    Take by mouth.    PANTOPRAZOLE (PROTONIX) 40 MG TABLET    TAKE 1 TABLET EVERY DAY    PRAVASTATIN (PRAVACHOL) 20 MG TABLET    Take 1 tablet (20 mg total) by mouth once daily.    TIOTROPIUM-OLODATEROL (STIOLTO RESPIMAT) 2.5-2.5 MCG/ACTUATION MIST    Inhale 2 puffs into the lungs once daily. Controller    TRIAMTERENE-HYDROCHLOROTHIAZIDE 37.5-25 MG (DYAZIDE) 37.5-25 MG PER CAPSULE    Take 1 capsule by mouth every morning.    VIT A/VIT C/VIT E/ZINC/COPPER (PRESERVISION AREDS ORAL)    Take by mouth. Bottle does not say a but does say Lutein   Discontinued Medications    DIAZEPAM (VALIUM) 2 MG TABLET    Take 1 tablet 15 min prior to MRI. Can repeat dose once    POLYETHYLENE GLYCOL (GLYCOLAX) 17 GRAM PWPK    Take 17 g by mouth 3 (three) times daily as needed (for constipation).       Review of Systems   Constitutional: Positive for malaise/fatigue. Negative for diaphoresis and fever.   HENT:  Negative for congestion and hearing loss.    Eyes:  Negative for blurred vision and pain.   Cardiovascular:  Positive for dyspnea on exertion. Negative for chest pain, claudication, leg swelling, near-syncope, orthopnea, palpitations, paroxysmal nocturnal dyspnea and syncope.   Respiratory:  Positive for shortness of breath. Negative for sleep disturbances due to breathing.    Hematologic/Lymphatic: Negative for bleeding problem. Does not bruise/bleed easily.   Skin:  Negative for color change and poor wound healing.   Gastrointestinal:  Negative for abdominal pain and nausea.   Genitourinary:  Negative for  bladder incontinence and flank pain.   Neurological:  Positive for loss of balance. Negative for dizziness, focal weakness and light-headedness.      Objective:   /78   Pulse 82   Wt 65.8 kg (145 lb)   LMP  (LMP Unknown)   SpO2 97%   BMI 24.13 kg/m²    Physical Exam  Constitutional:       Appearance: She is well-developed. She is not diaphoretic.   HENT:      Head: Normocephalic and atraumatic.      Right Ear: Tympanic membrane and ear canal normal. There is no impacted cerumen.      Left Ear: Tympanic membrane and ear canal normal. There is no impacted cerumen.   Eyes:      General: No scleral icterus.     Pupils: Pupils are equal, round, and reactive to light.   Neck:      Vascular: No JVD.   Cardiovascular:      Rate and Rhythm: Normal rate and regular rhythm.      Pulses: Intact distal pulses.      Heart sounds: S1 normal and S2 normal. No murmur heard.    No friction rub. No gallop.   Pulmonary:      Effort: Pulmonary effort is normal. No respiratory distress.      Breath sounds: Normal breath sounds. No wheezing or rales.   Chest:      Chest wall: No tenderness.   Abdominal:      General: Bowel sounds are normal. There is no distension.      Palpations: Abdomen is soft. There is no mass.      Tenderness: There is no abdominal tenderness. There is no rebound.   Musculoskeletal:         General: No tenderness. Normal range of motion.      Cervical back: Normal range of motion and neck supple.      Right lower leg: No edema.      Left lower leg: No edema.   Skin:     General: Skin is warm and dry.      Coloration: Skin is not pale.   Neurological:      Mental Status: She is alert and oriented to person, place, and time.      Coordination: Coordination normal.      Deep Tendon Reflexes: Reflexes normal.   Psychiatric:         Behavior: Behavior normal.         Judgment: Judgment normal.         EC2023- reviewed.  NSR, NL ECG.     Lexiscan MPI: 2020- reviewed.    Conclusion         The EKG  portion of this study is negative for ischemia.    The patient reported chest pain during the stress test.    There were no arrhythmias during stress.    ECG Stress Nuclear portion of this study will be reported separately.     FINDINGS:  There is appropriate wall motion.  There is no significant fixed or reversible perfusion defect.  The stress and rest end-diastolic volumes each measure 51 mL.  The stress and rest end systolic volumes measure 9 and 12 mL respectively.  The stress and rest ejection fraction measure 82 and 76% respectively.     Impression:     No acute findings.      Echo: 7/29/2022- reviewed.    Summary    The left ventricle is normal in size with normal systolic function.  The estimated ejection fraction is 55%.  Normal left ventricular diastolic function.  Normal right ventricular size with normal right ventricular systolic function.  Mild-to-moderate aortic regurgitation.  Mild tricuspid regurgitation.  Mild pulmonic regurgitation.  Normal central venous pressure (3 mmHg).  The estimated PA systolic pressure is 32 mmHg.    48 hr Holter: 9/19/2022- reviewed.    Conclusion    No significant arrhythmia noted.  Diary returned incomplete.        Assessment:       1. Calcification of aorta    2. Essential hypertension    3. Mixed hyperlipidemia    4. Palpitations    5. Other iron deficiency anemia    6. Preop cardiovascular exam           Plan:         Calcification of aorta  Continue current therapy.      Essential hypertension  Controlled.  Goal BP < 140/90.  Compliant w/ meds.    - continue other meds   - encouraged lifestyle modifications  - pt to continue to monitor BP at home and record    Mixed hyperlipidemia  Controlled.  Goal LDL < 130, LDL 81 3/23/2022.  - check FLP   - continue statin therapy  - encouraged lifestyle modifications    Palpitations  Controlled.      - continue metoprolol  - continue to control BP     Iron deficiency anemia  Management per PCP.  Continue medical therapy.       Preop cardiovascular exam  RCRI=0.  Pt low risk, planned for a low risk procedure.  She is able to perform > 4 METS w/o cp.    - no further cardiac w/u required prior to procedure   - ok to hold ASA x7 days prior to procedure      Total duration of face to face visit time 30 minutes.  Total time spent counseling greater than fifty percent of total visit time.  Counseling included discussion regarding imaging findings, diagnosis, possibilities, treatment options, risks and benefits.  The patient had many questions regarding the options and long-term effects      Sujit Dent M.D.  Interventional Cardiology                Follow-up  Pertinent negatives include no abdominal pain, chest pain, congestion, diaphoresis, fever or nausea.   Shortness of Breath  Pertinent negatives include no abdominal pain, chest pain, claudication, fever, leg swelling, orthopnea, PND or syncope.

## 2023-04-17 NOTE — ASSESSMENT & PLAN NOTE
RCRI=0.  Pt low risk, planned for a low risk procedure.  She is able to perform > 4 METS w/o cp.    - no further cardiac w/u required prior to procedure   - ok to hold ASA x7 days prior to procedure

## 2023-05-03 ENCOUNTER — TELEPHONE (OUTPATIENT)
Dept: CARDIOLOGY | Facility: CLINIC | Age: 83
End: 2023-05-03
Payer: MEDICARE

## 2023-05-03 NOTE — TELEPHONE ENCOUNTER
----- Message from Nanci Pascual sent at 5/3/2023 10:56 AM CDT -----  .Type:  Needs Medical Advice     Who Called: pt   Pharmacy name and phone #:  [id number] [place] [address] [address]   Phone: [phone]   Fax: [phone]   Would the patient rather a call back or a response via [facility]? call   Best Call Back Number: [phone]   Additional Information:     Pt stated that she is having heart palpitations and would like to know if medication can be called in for this

## 2023-05-04 ENCOUNTER — OFFICE VISIT (OUTPATIENT)
Dept: CARDIOLOGY | Facility: CLINIC | Age: 83
End: 2023-05-04
Payer: MEDICARE

## 2023-05-04 ENCOUNTER — TELEPHONE (OUTPATIENT)
Dept: CARDIOLOGY | Facility: CLINIC | Age: 83
End: 2023-05-04

## 2023-05-04 ENCOUNTER — TELEPHONE (OUTPATIENT)
Dept: CARDIOLOGY | Facility: CLINIC | Age: 83
End: 2023-05-04
Payer: MEDICARE

## 2023-05-04 VITALS
BODY MASS INDEX: 24.07 KG/M2 | DIASTOLIC BLOOD PRESSURE: 68 MMHG | HEART RATE: 70 BPM | WEIGHT: 144.5 LBS | SYSTOLIC BLOOD PRESSURE: 130 MMHG | OXYGEN SATURATION: 96 % | HEIGHT: 65 IN

## 2023-05-04 DIAGNOSIS — Z01.810 PREOP CARDIOVASCULAR EXAM: ICD-10-CM

## 2023-05-04 DIAGNOSIS — R00.2 PALPITATIONS: Chronic | ICD-10-CM

## 2023-05-04 DIAGNOSIS — I10 ESSENTIAL HYPERTENSION: Chronic | ICD-10-CM

## 2023-05-04 DIAGNOSIS — I20.89 STABLE ANGINA PECTORIS: Primary | ICD-10-CM

## 2023-05-04 DIAGNOSIS — E78.2 MIXED HYPERLIPIDEMIA: Chronic | ICD-10-CM

## 2023-05-04 DIAGNOSIS — I70.0 CALCIFICATION OF AORTA: ICD-10-CM

## 2023-05-04 PROCEDURE — 1159F PR MEDICATION LIST DOCUMENTED IN MEDICAL RECORD: ICD-10-PCS | Mod: CPTII,S$GLB,, | Performed by: INTERNAL MEDICINE

## 2023-05-04 PROCEDURE — 1101F PT FALLS ASSESS-DOCD LE1/YR: CPT | Mod: CPTII,S$GLB,, | Performed by: INTERNAL MEDICINE

## 2023-05-04 PROCEDURE — 3288F PR FALLS RISK ASSESSMENT DOCUMENTED: ICD-10-PCS | Mod: CPTII,S$GLB,, | Performed by: INTERNAL MEDICINE

## 2023-05-04 PROCEDURE — 99214 OFFICE O/P EST MOD 30 MIN: CPT | Mod: 25,S$GLB,, | Performed by: INTERNAL MEDICINE

## 2023-05-04 PROCEDURE — 3078F DIAST BP <80 MM HG: CPT | Mod: CPTII,S$GLB,, | Performed by: INTERNAL MEDICINE

## 2023-05-04 PROCEDURE — 3075F PR MOST RECENT SYSTOLIC BLOOD PRESS GE 130-139MM HG: ICD-10-PCS | Mod: CPTII,S$GLB,, | Performed by: INTERNAL MEDICINE

## 2023-05-04 PROCEDURE — 93000 ELECTROCARDIOGRAM COMPLETE: CPT | Mod: S$GLB,,, | Performed by: INTERNAL MEDICINE

## 2023-05-04 PROCEDURE — 3075F SYST BP GE 130 - 139MM HG: CPT | Mod: CPTII,S$GLB,, | Performed by: INTERNAL MEDICINE

## 2023-05-04 PROCEDURE — 1101F PR PT FALLS ASSESS DOC 0-1 FALLS W/OUT INJ PAST YR: ICD-10-PCS | Mod: CPTII,S$GLB,, | Performed by: INTERNAL MEDICINE

## 2023-05-04 PROCEDURE — 1157F PR ADVANCE CARE PLAN OR EQUIV PRESENT IN MEDICAL RECORD: ICD-10-PCS | Mod: CPTII,S$GLB,, | Performed by: INTERNAL MEDICINE

## 2023-05-04 PROCEDURE — 99999 PR PBB SHADOW E&M-EST. PATIENT-LVL IV: CPT | Mod: PBBFAC,,, | Performed by: INTERNAL MEDICINE

## 2023-05-04 PROCEDURE — 1157F ADVNC CARE PLAN IN RCRD: CPT | Mod: CPTII,S$GLB,, | Performed by: INTERNAL MEDICINE

## 2023-05-04 PROCEDURE — 1159F MED LIST DOCD IN RCRD: CPT | Mod: CPTII,S$GLB,, | Performed by: INTERNAL MEDICINE

## 2023-05-04 PROCEDURE — 93000 EKG 12-LEAD: ICD-10-PCS | Mod: S$GLB,,, | Performed by: INTERNAL MEDICINE

## 2023-05-04 PROCEDURE — 99214 PR OFFICE/OUTPT VISIT, EST, LEVL IV, 30-39 MIN: ICD-10-PCS | Mod: 25,S$GLB,, | Performed by: INTERNAL MEDICINE

## 2023-05-04 PROCEDURE — 99999 PR PBB SHADOW E&M-EST. PATIENT-LVL IV: ICD-10-PCS | Mod: PBBFAC,,, | Performed by: INTERNAL MEDICINE

## 2023-05-04 PROCEDURE — 3078F PR MOST RECENT DIASTOLIC BLOOD PRESSURE < 80 MM HG: ICD-10-PCS | Mod: CPTII,S$GLB,, | Performed by: INTERNAL MEDICINE

## 2023-05-04 PROCEDURE — 1160F RVW MEDS BY RX/DR IN RCRD: CPT | Mod: CPTII,S$GLB,, | Performed by: INTERNAL MEDICINE

## 2023-05-04 PROCEDURE — 3288F FALL RISK ASSESSMENT DOCD: CPT | Mod: CPTII,S$GLB,, | Performed by: INTERNAL MEDICINE

## 2023-05-04 PROCEDURE — 1160F PR REVIEW ALL MEDS BY PRESCRIBER/CLIN PHARMACIST DOCUMENTED: ICD-10-PCS | Mod: CPTII,S$GLB,, | Performed by: INTERNAL MEDICINE

## 2023-05-04 RX ORDER — METOPROLOL TARTRATE 37.5 MG/1
37.5 TABLET, FILM COATED ORAL 2 TIMES DAILY
Qty: 180 TABLET | Refills: 3 | Status: SHIPPED | OUTPATIENT
Start: 2023-05-04 | End: 2024-02-06

## 2023-05-04 NOTE — PROGRESS NOTES
Subjective:    Patient ID:  Nereyda Hernandez is a 82 y.o. female who presents for follow-up of Palpitations and Shortness of Breath      PCP: Sharona Weaver MD     Pt is a 83 yo F w/ PMH of HTN, HLD, and PVCs/PACs who presents for follow up.  She was last seen 4/17/2023 noted chronic camacho and was continued on medical therapy.  Since this time she states that her palpitations and camacho have worsened some and she requests an increase in her metoprolol.  She notes camacho which starts <1 block a/w palpitations and presyncope.  She denies cp, edema, orthopnea, PND, LOC, or claudication.   She notes compliance w/ meds and denies side effects.  She has been monitoring her BP at home and it has been running 110-130s/70-80s.  She has not been exercising due to chronic back pain.         Past Medical History:   Diagnosis Date    HATTIE (acute kidney injury) 12/10/2021    Anxiety     Arthritis     Breast cancer 1990    left    Chronic disease anemia     Hyperlipidemia     Hypertension     Insomnia     Lobular carcinoma in situ     KANWAL (obstructive sleep apnea)     Osteoporosis     Rosacea     Squamous cell carcinoma     Stable angina pectoris 8/27/2020    Urinary incontinence     Vertigo      Past Surgical History:   Procedure Laterality Date    ADENOIDECTOMY      BELT ABDOMINOPLASTY      BREAST BIOPSY Left 1990    ex bx    BREAST LUMPECTOMY      COLONOSCOPY N/A 1/28/2020    Procedure: COLONOSCOPY;  Surgeon: Bianka Macias MD;  Location: [facility];  Service: Endoscopy;  Laterality: N/A;    ESOPHAGOGASTRODUODENOSCOPY N/A 1/28/2020    Procedure: EGD (ESOPHAGOGASTRODUODENOSCOPY);  Surgeon: Bianka Macias MD;  Location: [facility];  Service: Endoscopy;  Laterality: N/A;    EYE SURGERY      HERNIA REPAIR      Ventral    LIVER TRANSPLANT      OOPHORECTOMY      TONSILLECTOMY      TOTAL ABDOMINAL HYSTERECTOMY       Social History     Socioeconomic History    Marital status:     Number of children: 6    Years of education: college    Occupational History    Occupation: retired  for [employer]   Tobacco Use    Smoking status: Former     Packs/day: 0.50     Years: 21.00     Pack years: 10.50     Types: Cigarettes     Start date: 1959     Quit date: 1980     Years since quittin.6    Smokeless tobacco: Never   Substance and Sexual Activity    Alcohol use: Not Currently     Alcohol/week: 1.0 standard drink     Types: 1 Glasses of wine per week     Comment: Occasionally    Drug use: Never    Sexual activity: Not Currently     Partners: Male     Birth control/protection: Condom, Other-see comments     Comment: Pill     Family History   Problem Relation Age of Onset    Cancer Mother         liver    Pancreatic cancer Mother     Heart disease Mother     Depression Mother     Miscarriages / Stillbirths Mother     Hypertension Father     Alzheimer's disease Father     Depression Brother     Depression Brother     Diabetes Brother     Arthritis Brother     Hypertension Brother     Heart disease Brother        Review of patient's allergies indicates:   Allergen Reactions    Sulfa (sulfonamide antibiotics)        Medication List with Changes/Refills   Current Medications    ALENDRONATE (FOSAMAX) 70 MG TABLET    Take 1 tablet (70 mg total) by mouth every 7 days.    AMITRIPTYLINE (ELAVIL) 10 MG TABLET    Take 3 tablets (30 mg total) by mouth nightly as needed for Insomnia.    ASPIRIN (ECOTRIN) 81 MG EC TABLET    Take 81 mg by mouth once daily.    AZELASTINE (ASTELIN) 137 MCG (0.1 %) NASAL SPRAY    1 spray (137 mcg total) by Nasal route 2 (two) times daily.    BENEFIBER, WHEAT DEXTRIN, ORAL    Take by mouth. PRN    CALCIUM CITRATE-VITAMIN D3 500 MG CALCIUM -400 UNIT CHEW    Take 1 tablet by mouth 2 (two) times daily. 12.5mcg    ESCITALOPRAM OXALATE (LEXAPRO) 20 MG TABLET    Take 1 tablet (20 mg total) by mouth once daily.    FISH OIL-OMEGA-3 FATTY ACIDS 300-1,000 MG CAPSULE    Take 2 g by mouth once daily.    LACTOBACILLUS  ACIDOPHILUS (PROBIOTIC ACIDOPHILUS ORAL)    Take by mouth.    LATANOPROST 0.005 % OPHTHALMIC SOLUTION    1 drop every evening.    LOSARTAN (COZAAR) 25 MG TABLET    TAKE 1 TABLET ONE TIME DAILY    MULTIVIT-IRON-MIN-FOLIC ACID 3,500-18-0.4 UNIT-MG-MG ORAL CHEW    Take by mouth.    PANTOPRAZOLE (PROTONIX) 40 MG TABLET    TAKE 1 TABLET EVERY DAY    PRAVASTATIN (PRAVACHOL) 20 MG TABLET    Take 1 tablet (20 mg total) by mouth once daily.    TIOTROPIUM-OLODATEROL (STIOLTO RESPIMAT) 2.5-2.5 MCG/ACTUATION MIST    Inhale 2 puffs into the lungs once daily. Controller    TRIAMTERENE-HYDROCHLOROTHIAZIDE 37.5-25 MG (DYAZIDE) 37.5-25 MG PER CAPSULE    Take 1 capsule by mouth every morning.    VIT A/VIT C/VIT E/ZINC/COPPER (PRESERVISION AREDS ORAL)    Take by mouth. Bottle does not say a but does say Lutein   Changed and/or Refilled Medications    Modified Medication Previous Medication    METOPROLOL TARTRATE 37.5 MG TAB metoprolol tartrate (LOPRESSOR) 25 MG tablet       Take 37.5 mg by mouth 2 (two) times daily.    Take 1 tablet (25 mg total) by mouth 2 (two) times daily.       Review of Systems   Constitutional: Positive for malaise/fatigue. Negative for diaphoresis and fever.   HENT:  Negative for congestion and hearing loss.    Eyes:  Negative for blurred vision and pain.   Cardiovascular:  Positive for dyspnea on exertion, near-syncope and palpitations. Negative for chest pain, claudication, leg swelling, orthopnea, paroxysmal nocturnal dyspnea and syncope.   Respiratory:  Positive for shortness of breath. Negative for sleep disturbances due to breathing.    Hematologic/Lymphatic: Negative for bleeding problem. Does not bruise/bleed easily.   Skin:  Negative for color change and poor wound healing.   Musculoskeletal:  Positive for back pain.   Gastrointestinal:  Negative for abdominal pain and nausea.   Genitourinary:  Negative for bladder incontinence and flank pain.   Neurological:  Positive for loss of balance. Negative for  "dizziness, focal weakness and light-headedness.      Objective:   /68 (BP Location: Left arm, Patient Position: Sitting, BP Method: Large (Manual))   Pulse 70   Ht 5' 5" (1.651 m)   Wt 65.5 kg (144 lb 8 oz)   LMP  (LMP Unknown)   SpO2 96%   BMI 24.05 kg/m²    Physical Exam  Constitutional:       Appearance: She is well-developed. She is not diaphoretic.   HENT:      Head: Normocephalic and atraumatic.      Right Ear: Tympanic membrane and ear canal normal. There is no impacted cerumen.      Left Ear: Tympanic membrane and ear canal normal. There is no impacted cerumen.   Eyes:      General: No scleral icterus.     Pupils: Pupils are equal, round, and reactive to light.   Neck:      Vascular: No JVD.   Cardiovascular:      Rate and Rhythm: Normal rate and regular rhythm.      Pulses: Intact distal pulses.      Heart sounds: S1 normal and S2 normal. No murmur heard.    No friction rub. No gallop.   Pulmonary:      Effort: Pulmonary effort is normal. No respiratory distress.      Breath sounds: Normal breath sounds. No wheezing or rales.   Chest:      Chest wall: No tenderness.   Abdominal:      General: Bowel sounds are normal. There is no distension.      Palpations: Abdomen is soft. There is no mass.      Tenderness: There is no abdominal tenderness. There is no rebound.   Musculoskeletal:         General: No tenderness. Normal range of motion.      Cervical back: Normal range of motion and neck supple.      Right lower leg: No edema.      Left lower leg: No edema.   Skin:     General: Skin is warm and dry.      Coloration: Skin is not pale.   Neurological:      Mental Status: She is alert and oriented to person, place, and time.      Coordination: Coordination normal.      Deep Tendon Reflexes: Reflexes normal.   Psychiatric:         Behavior: Behavior normal.         Judgment: Judgment normal.         EC2023- reviewed.  NSR, NL ECG.     Lexiscan MPI: 2020- reviewed.    Conclusion         " The EKG portion of this study is negative for ischemia.    The patient reported chest pain during the stress test.    There were no arrhythmias during stress.    ECG Stress Nuclear portion of this study will be reported separately.     FINDINGS:  There is appropriate wall motion.  There is no significant fixed or reversible perfusion defect.  The stress and rest end-diastolic volumes each measure 51 mL.  The stress and rest end systolic volumes measure 9 and 12 mL respectively.  The stress and rest ejection fraction measure 82 and 76% respectively.     Impression:     No acute findings.      Echo: 7/29/2022- reviewed.    Summary    The left ventricle is normal in size with normal systolic function.  The estimated ejection fraction is 55%.  Normal left ventricular diastolic function.  Normal right ventricular size with normal right ventricular systolic function.  Mild-to-moderate aortic regurgitation.  Mild tricuspid regurgitation.  Mild pulmonic regurgitation.  Normal central venous pressure (3 mmHg).  The estimated PA systolic pressure is 32 mmHg.    48 hr Holter: 9/19/2022- reviewed.    Conclusion    No significant arrhythmia noted.  Diary returned incomplete.        Assessment:       1. Stable angina pectoris    2. Essential hypertension    3. Preop cardiovascular exam    4. Mixed hyperlipidemia    5. Palpitations    6. Calcification of aorta           Plan:         Preop cardiovascular exam  RCRI=0.  Pt low risk, planned for a low risk procedure.  She is able to perform > 4 METS w/o cp.    - no further cardiac w/u required prior to procedure   - ok to hold ASA x7 days prior to procedure    Mixed hyperlipidemia  Controlled.  Goal LDL < 130, LDL 81 3/23/2022.  - check FLP   - continue statin therapy  - encouraged lifestyle modifications    Palpitations  Pt notes worsening in palpitations and camacho.    - check 48 hr Holter monitor     - increase metoprolol to 37.5 mg BID     Essential hypertension  Controlled.  Goal  BP < 140/90.  Compliant w/ meds.    - continue meds   - encouraged lifestyle modifications  - pt to continue to monitor BP at home and record    Calcification of aorta  Continue current therapy.      Stable angina pectoris  Check Lexiscan to eval for ischemia given worsening camacho and palpitations.       Total duration of face to face visit time 30 minutes.  Total time spent counseling greater than fifty percent of total visit time.  Counseling included discussion regarding imaging findings, diagnosis, possibilities, treatment options, risks and benefits.  The patient had many questions regarding the options and long-term effects      Sujit Dent M.D.  Interventional Cardiology                Follow-up  Pertinent negatives include no abdominal pain, chest pain, congestion, diaphoresis, fever or nausea.   Shortness of Breath  Pertinent negatives include no abdominal pain, chest pain, claudication, fever, leg swelling, orthopnea, PND or syncope.

## 2023-05-04 NOTE — TELEPHONE ENCOUNTER
----- Message from Pastora Germain sent at 5/4/2023  9:24 AM CDT -----  Regarding: Call Back  Contact: [phone]  Who Called: PT     Patient is calling to talk to nurse in regards to see if her medication metoprolol tartrate (LOPRESSOR) 25 MG tablet 180 tablet can be increased and sent to [facility] [address] [place] [address] [id number] Phone: [phone] Fax:  [phone]

## 2023-05-04 NOTE — ASSESSMENT & PLAN NOTE
Controlled.  Goal BP < 140/90.  Compliant w/ meds.    - continue meds   - encouraged lifestyle modifications  - pt to continue to monitor BP at home and record

## 2023-05-04 NOTE — ASSESSMENT & PLAN NOTE
Pt notes worsening in palpitations and camacho.    - check 48 hr Holter monitor     - increase metoprolol to 37.5 mg BID

## 2023-05-04 NOTE — TELEPHONE ENCOUNTER
----- Message from Sujit Dent III, MD sent at 5/2/2023  2:10 PM CDT -----  She is already on medication for palpitations, her metoprolol.    ----- Message -----  From: Sreekanth Nevarez MA  Sent: 5/2/2023   1:41 PM CDT  To: Sujit Dent III, MD    Please advise. Would you like her to be seen by you or AB?    ----- Message -----  From: Nanci Pascual  Sent: 5/2/2023   1:12 PM CDT  To: [facility] Staff    .Type:  Needs Medical Advice    Who Called: pt   Pharmacy name and phone #:  [id number] - [place] - [address]   Phone:  [phone]  Fax:  [phone]  Would the patient rather a call back or a response via [facility]? call  Best Call Back Number: [phone]  Additional Information:     Pt stated that she is having heart palpitations and would like to know if medication can be called in for this

## 2023-05-05 ENCOUNTER — TELEPHONE (OUTPATIENT)
Dept: CARDIOLOGY | Facility: CLINIC | Age: 83
End: 2023-05-05
Payer: MEDICARE

## 2023-05-05 NOTE — TELEPHONE ENCOUNTER
Spoke with pt to give her appt time to  48hr holter on May 11, 2023, pt states that she is having a thyroid bx done the next day and can not  holter. Pt also stated that the following week is not going to be good either message sent to Bernarda Mackay to call pt to reschedule for .

## 2023-05-05 NOTE — TELEPHONE ENCOUNTER
----- Message from Bernarda Mackay sent at 5/5/2023  9:09 AM CDT -----  Regarding: RE: 48hr holter  She's on for 1230 May 11.  Please remind her that she will need to return it by 8a on Monday May 15.  Thx  ----- Message -----  From: Tawny Gonzales MA  Sent: 5/4/2023   3:19 PM CDT  To: Bernarda Mackay, Colton Bradford RN  Subject: 48hr holter                                      Please call pt to schedule 48hr holter put.    Thanks

## 2023-05-08 ENCOUNTER — TELEPHONE (OUTPATIENT)
Dept: CARDIOLOGY | Facility: CLINIC | Age: 83
End: 2023-05-08
Payer: MEDICARE

## 2023-05-08 NOTE — TELEPHONE ENCOUNTER
----- Message from Sujit Dent III, MD sent at 5/8/2023  2:47 PM CDT -----  Contact: pt  To continue with the tests which were already ordered.  Thanks.      ----- Message -----  From: Sreekanth Nevarez MA  Sent: 5/8/2023   1:42 PM CDT  To: Sujit Dent III, MD    Patient stated her ekg was abnormal & wanted to know her next step    ----- Message -----  From: Trey Mart  Sent: 5/8/2023  11:39 AM CDT  To: [facility] Staff    Type: Requesting to speak with nurse        Who Called: PT  Regarding: discuss ekg results   Would the patient rather a call back or a response via [facility]? Call back  Best Call Back Number: [phone]  Additional Information:

## 2023-05-12 ENCOUNTER — PATIENT MESSAGE (OUTPATIENT)
Dept: INTERVENTIONAL RADIOLOGY/VASCULAR | Facility: HOSPITAL | Age: 83
End: 2023-05-12
Payer: MEDICARE

## 2023-05-12 ENCOUNTER — HOSPITAL ENCOUNTER (OUTPATIENT)
Dept: ENDOCRINOLOGY | Facility: CLINIC | Age: 83
Discharge: HOME | End: 2023-05-12
Attending: HOSPITALIST
Payer: MEDICARE

## 2023-05-12 DIAGNOSIS — E04.2 MULTIPLE THYROID NODULES: ICD-10-CM

## 2023-05-12 PROCEDURE — 10005 FNA BX W/US GDN 1ST LES: CPT | Mod: S$GLB,,, | Performed by: INTERNAL MEDICINE

## 2023-05-12 PROCEDURE — 88173 PR  INTERPRETATION OF FNA SMEAR: ICD-10-PCS | Mod: 26,,, | Performed by: PATHOLOGY

## 2023-05-12 PROCEDURE — 88173 CYTOPATH EVAL FNA REPORT: CPT | Mod: 26,,, | Performed by: PATHOLOGY

## 2023-05-12 PROCEDURE — 10005 US FINE NEEDLE ASPIRATION THYROID, FIRST LESION: ICD-10-PCS | Mod: S$GLB,,, | Performed by: INTERNAL MEDICINE

## 2023-05-12 PROCEDURE — 88173 CYTOPATH EVAL FNA REPORT: CPT | Performed by: PATHOLOGY

## 2023-05-12 NOTE — PROGRESS NOTES
"Completed peer to peer with Dr. Bardales who needed clarification regarding the time line.     Discussed that patient obtained an X-Ray of the thoracic spine on 3/22/2023 which showed "No evidence of fracture"    MRI showed completed on 4/11/2023 showed an subacture T12 fracture.    Safe to conclude that the fracture occurred between 3/22/23 and 4/11/2023 and not during the fall in December.     Case was approved and will be uploaded to the portal.     Cayla Lunsford PA-C  Interventional Radiology  [phone]  "

## 2023-05-16 ENCOUNTER — HOSPITAL ENCOUNTER (OUTPATIENT)
Dept: INTERVENTIONAL RADIOLOGY/VASCULAR | Facility: HOSPITAL | Age: 83
Discharge: HOME | End: 2023-05-16
Admitting: RADIOLOGY
Payer: MEDICARE

## 2023-05-16 VITALS
TEMPERATURE: 98 F | HEIGHT: 65 IN | DIASTOLIC BLOOD PRESSURE: 84 MMHG | OXYGEN SATURATION: 96 % | WEIGHT: 142 LBS | RESPIRATION RATE: 17 BRPM | BODY MASS INDEX: 23.66 KG/M2 | SYSTOLIC BLOOD PRESSURE: 157 MMHG | HEART RATE: 60 BPM

## 2023-05-16 DIAGNOSIS — S22.080G COMPRESSION FRACTURE OF T12 VERTEBRA WITH DELAYED HEALING, SUBSEQUENT ENCOUNTER: ICD-10-CM

## 2023-05-16 DIAGNOSIS — M80.08XS AGE-RELATED OSTEOPOROSIS WITH CURRENT PATHOLOGICAL FRACTURE, VERTEBRA(E), SEQUELA: ICD-10-CM

## 2023-05-16 LAB
FINAL PATHOLOGIC DIAGNOSIS: NORMAL
Lab: NORMAL
MICROSCOPIC EXAM: NORMAL

## 2023-05-16 PROCEDURE — 22513 IR KYPHOPLASTY THORACIC FIRST VERTEBRAL: ICD-10-PCS | Mod: ,,, | Performed by: RADIOLOGY

## 2023-05-16 PROCEDURE — 63600175 PHARM REV CODE 636 W HCPCS: Performed by: STUDENT IN AN ORGANIZED HEALTH CARE EDUCATION/TRAINING PROGRAM

## 2023-05-16 PROCEDURE — 99152 MOD SED SAME PHYS/QHP 5/>YRS: CPT | Mod: ,,, | Performed by: RADIOLOGY

## 2023-05-16 PROCEDURE — 99152 MOD SED SAME PHYS/QHP 5/>YRS: CPT | Performed by: RADIOLOGY

## 2023-05-16 PROCEDURE — A4550 SURGICAL TRAYS: HCPCS

## 2023-05-16 PROCEDURE — 22513 PERQ VERTEBRAL AUGMENTATION: CPT | Performed by: RADIOLOGY

## 2023-05-16 PROCEDURE — 25000003 PHARM REV CODE 250

## 2023-05-16 PROCEDURE — 99153 MOD SED SAME PHYS/QHP EA: CPT | Performed by: RADIOLOGY

## 2023-05-16 PROCEDURE — 99152 PR MOD CONSCIOUS SEDATION, SAME PHYS, 5+ YRS, FIRST 15 MIN: ICD-10-PCS | Mod: ,,, | Performed by: RADIOLOGY

## 2023-05-16 PROCEDURE — 27201068 IR KYPHOPLASTY THORACIC FIRST VERTEBRAL

## 2023-05-16 RX ORDER — MIDAZOLAM HYDROCHLORIDE 1 MG/ML
INJECTION INTRAMUSCULAR; INTRAVENOUS
Status: COMPLETED | OUTPATIENT
Start: 2023-05-16 | End: 2023-05-16

## 2023-05-16 RX ORDER — SODIUM CHLORIDE 9 MG/ML
75 INJECTION, SOLUTION INTRAVENOUS CONTINUOUS
Status: DISCONTINUED | OUTPATIENT
Start: 2023-05-16 | End: 2023-05-17 | Stop reason: HOSPADM

## 2023-05-16 RX ORDER — LIDOCAINE HYDROCHLORIDE 10 MG/ML
1 INJECTION, SOLUTION EPIDURAL; INFILTRATION; INTRACAUDAL; PERINEURAL ONCE
Status: COMPLETED | OUTPATIENT
Start: 2023-05-16 | End: 2023-05-16

## 2023-05-16 RX ORDER — CEFAZOLIN SODIUM 1 G/3ML
INJECTION, POWDER, FOR SOLUTION INTRAMUSCULAR; INTRAVENOUS
Status: COMPLETED | OUTPATIENT
Start: 2023-05-16 | End: 2023-05-16

## 2023-05-16 RX ORDER — ONDANSETRON 2 MG/ML
4 INJECTION INTRAMUSCULAR; INTRAVENOUS EVERY 6 HOURS PRN
Status: CANCELLED | OUTPATIENT
Start: 2023-05-16

## 2023-05-16 RX ORDER — FENTANYL CITRATE 50 UG/ML
INJECTION, SOLUTION INTRAMUSCULAR; INTRAVENOUS
Status: COMPLETED | OUTPATIENT
Start: 2023-05-16 | End: 2023-05-16

## 2023-05-16 RX ORDER — OXYCODONE HYDROCHLORIDE 5 MG/1
5 TABLET ORAL EVERY 4 HOURS PRN
Status: CANCELLED | OUTPATIENT
Start: 2023-05-16

## 2023-05-16 RX ADMIN — MIDAZOLAM HYDROCHLORIDE 1 MG: 1 INJECTION INTRAMUSCULAR; INTRAVENOUS at 10:05

## 2023-05-16 RX ADMIN — CEFAZOLIN 1 G: 330 INJECTION, POWDER, FOR SOLUTION INTRAMUSCULAR; INTRAVENOUS at 10:05

## 2023-05-16 RX ADMIN — FENTANYL CITRATE 50 MCG: 50 INJECTION, SOLUTION INTRAMUSCULAR; INTRAVENOUS at 10:05

## 2023-05-16 RX ADMIN — LIDOCAINE HYDROCHLORIDE 10 ML: 10 INJECTION, SOLUTION EPIDURAL; INFILTRATION; INTRACAUDAL; PERINEURAL at 10:05

## 2023-05-16 NOTE — PROCEDURES
Radiology Post-Procedure Note    Pre Op Diagnosis: LBP    Post Op Diagnosis: Same    Procedure: T12 kyphoplasty     Procedure performed by: Sonny Gonzalez MD    Written Informed Consent Obtained: Yes    Specimen Removed: NO    Estimated Blood Loss: Minimal    Findings:     T12 kyphoplasty   - Bi pedicular approach   - Posterior lateral approach   - Needle used: Kyphon VTM 10 G     Patient tolerated procedure well.        Ofelia Rouse MD     Neuro Endovascular Surgery Fellow   [facility]

## 2023-05-16 NOTE — NURSING
Patient tolerated the procedure well without any difficulties. Patient in NAD. Pt. transported to MPU by IR RN. Report at bedside upon arrival.

## 2023-05-16 NOTE — H&P
History and Physical  Interventional Radiology    Admit Date: 5/16/2023  LOS: 0    Code Status: Prior     CC: <principal problem not specified>    SUBJECTIVE:     History of Present Illness: 81 yo female with history of subacute T12 compression fracture presenting for elective percutaneous vertebroplasty versus kyphoplasty.           OBJECTIVE:   Vital Signs (Most Recent):        Vital Signs (24h Range):   BP: ()/()   Arterial Line BP: ()/()       I & O (Last 24h):  No intake or output data in the 24 hours ending 05/16/23 0910    Physical Exam:  General: well developed, well nourished, no distress.   Head: normocephalic, atraumatic  Cervical Spine: No midline tenderness to palpation.  Thoracolumbosacral Spine: No midline tenderness to palpation.  GCS: Motor: 6/Verbal: 5/Eyes: 4 GCS Total: 15  Mental Status: Awake, Alert, Oriented x 4  Language: No aphasia  Speech: No dysarthria  Facial Droop: None   Cranial nerves: CN III-XII grossly intact.  Visual Fields: Intact.   Eyes: Pupils equal and reactive to light. Intact Conjugate horizontal and vertical pursuit. No nystagmus. No gaze deviation.   Pulmonary: No distress.  Sensory: No deficit.  Propioception: No deficit in 1st digit of toe bilaterally.  Rectal Tone: Not tested.  Drift: None.  Upper Extremity Ataxia: No Dysmetria Bilaterally  Lower Extremity Ataxia: Not tested.  Dysdiadochokinesia: Not tested.  Reflexes: 2+ patellar bilaterally.  Jewell: Absent  Clonus: Absent  Babinski: Absent  Romberg: Not Tested  Pulses: Brisk and symmetric radial, DP and tibial pulses.  Motor Strength:    Strength  Shoulder Abduction Elbow Extension Elbow Flexion Wrist Extension Wrist Flexion Finger Opposition Finger Add Finger Abd   Upper: R 5/5 5/5 5/5 5/5 5/5 5/5 5/5 5/5    L 5/5 5/5 5/5 5/5 5/5 5/5 5/5 5/5     Hip Flexion Knee Extension Knee  Flexion Ankle Dflexion Ankle Pflexion EHL     Lower: R 5/5 5/5 5/5 5/5 5/5 5/5      L 5/5 5/5 5/5 5/5 5/5 5/5               Lines/Drains/Airway:          Nutrition/Tube Feeds:   Current Diet Order   No orders of the defined types were placed in this encounter.       Labs:  ABG: No results for input(s): PH, PO2, PCO2, HCO3, POCSATURATED, BE in the last 24 hours.  BMP:No results for input(s): NA, K, CL, CO2, BUN, CREATININE, GLU, MG, PHOS in the last 24 hours.  LFT:   Lab Results   Component Value Date    AST 38 03/22/2023    ALT 32 03/22/2023    ALKPHOS 64 03/22/2023    BILITOT 0.5 03/22/2023    ALBUMIN 4.7 03/22/2023    PROT 8.0 03/22/2023     CBC:   Lab Results   Component Value Date    WBC 6.10 03/22/2023    HGB 12.0 03/22/2023    HCT 35.8 (L) 03/22/2023    MCV 93 03/22/2023     03/22/2023     Microbiology x 7d:   Microbiology Results (last 7 days)       ** No results found for the last 168 hours. **              ASSESSMENT/PLAN:   81 yo female with history of subacute T12 compression fracture presenting for elective percutaneous vertebroplasty versus kyphoplasty.    --To biplanar suite for T12 percutaneous cement augementation.  --We will continue to monitor closely, please contact us with any questions or concerns.    Klever Zhou     Moderate Sedation  Mallampati I  ASA 2

## 2023-05-16 NOTE — NURSING
Patient arrived to room 4 for Kyphoplasty. Patient oriented to unit and staff. Plan of care reviewed with the patient and the patient verbalized understanding the plan. Comfort measures utilized and patient encouraged to verbalize if they become uncomfortable. Patient safely transferred from the stretcher to the procedural table. Fall risk reviewed with the patient and fall risk interventions maintained. Safety strap applied, positioner pillows utilized to minimize pressure points. Blankets applied. Patient prepped and draped utilizing standard sterile technique. Patient placed on continuous monitoring, as required by sedation policy. Timeouts completed utilizing standard universal time-out, per department and facility policy. RN to remain at the bedside and the patient will be constantly in view of the monitoring RN. Constant monitoring will be maintained. Patient resting comfortably. Denies any pain or discomfort. RN will continue to monitor. See flow sheets for monitoring, medication administration, other additional documentation and updates.

## 2023-05-16 NOTE — DISCHARGE INSTRUCTIONS
[facility] [phone] (MON-FRI 8 AM- 5PM). Radiology Resident on call [phone].

## 2023-05-16 NOTE — PLAN OF CARE
3hr IR recovery complete. VSS. Dressing CDI. IV removed. Discharge instructions reviewed and given. Pt. Awaiting transport via wheelchair to garage and private vehicle.

## 2023-05-22 DIAGNOSIS — I10 ESSENTIAL HYPERTENSION: Primary | Chronic | ICD-10-CM

## 2023-05-26 ENCOUNTER — TELEPHONE (OUTPATIENT)
Dept: CARDIOLOGY | Facility: CLINIC | Age: 83
End: 2023-05-26
Payer: MEDICARE

## 2023-05-26 NOTE — TELEPHONE ENCOUNTER
Informed pt that I sent over a message to Dr. Dent to get her results and she expressed understanding.

## 2023-05-26 NOTE — TELEPHONE ENCOUNTER
----- Message from Sujit Dent III, MD sent at 5/26/2023  9:13 AM CDT -----  They are there and I replied to it as well.  She must be looking in the wrong place.  The stress test was normal and her holter is not yet completed as she just turned it in.      ----- Message -----  From: Sreekanth Nevarez MA  Sent: 5/26/2023   8:34 AM CDT  To: Sujit Dent III, MD    I can not find the results for this pt    ----- Message -----  From: Katie Nieves MA  Sent: 5/25/2023   5:07 PM CDT  To: Sreekanth Nevarez MA      ----- Message -----  From: Meghna Gonzales  Sent: 5/25/2023   3:11 PM CDT  To: [facility] Staff    Pt Requesting Call Back/Results    Who called: pt  Who called for pt:  Best call back #: [phone]  Add notes: pt said she cannot see the results from her holter monitor test  and stress test; pt said the results are not on her portal (says she can't find it)

## 2023-05-26 NOTE — TELEPHONE ENCOUNTER
----- Message from Katie Nieves MA sent at 5/25/2023  5:07 PM CDT -----    ----- Message -----  From: Meghna Gonzales  Sent: 5/25/2023   3:11 PM CDT  To: [facility] Beckett Staff    Pt Requesting Call Back/Results    Who called: pt  Who called for pt:  Best call back #: [phone]  Add notes: pt said she cannot see the results from her holter monitor test  and stress test; pt said the results are not on her portal (says she can't find it)

## 2023-05-26 NOTE — TELEPHONE ENCOUNTER
Explained to pt that her stress test was normal but we did not have her Holter results in because she just turned it in on Wednesday and she expressed understanding

## 2023-05-30 ENCOUNTER — TELEPHONE (OUTPATIENT)
Dept: CARDIOLOGY | Facility: CLINIC | Age: 83
End: 2023-05-30
Payer: MEDICARE

## 2023-05-30 NOTE — TELEPHONE ENCOUNTER
----- Message from Toya Boyle sent at 5/30/2023  3:49 PM CDT -----  Type:  Test Results    Who Called: PT  Name of Test (Lab/Mammo/Etc):  HOLTER MONITOR  Date of Test:   Ordering Provider:   Where the test was performed:   Would the patient rather a call back or a response via [facility]? CALL   Best Call Back Number: [phone] (H)   Additional Information:

## 2023-05-30 NOTE — TELEPHONE ENCOUNTER
Pt stated she does not understand why she is still having SOB and palpitations and would like to inform provider   She states she would like further clarification on her EKG results as well.   Informed pt I will let the provider know and to see the ED if she has cp or sob worsens

## 2023-06-02 ENCOUNTER — TELEPHONE (OUTPATIENT)
Dept: FAMILY MEDICINE | Facility: CLINIC | Age: 83
End: 2023-06-02
Payer: MEDICARE

## 2023-06-02 NOTE — TELEPHONE ENCOUNTER
----- Message from Nelson Coley sent at 6/2/2023 12:09 PM CDT -----  Type:  Patient Requesting Referral    Who Called:Pt  Referral to What Specialty:ep cardiologist   Reason for Referral:heart rhythm   Does the patient want the referral with a specific physician?:open  [facility]  Patient Requesting a Response?:yes  Would the patient rather a call back or a response via [facility]? Call   Best Call Back Number:[phone]  Additional Information:

## 2023-06-05 ENCOUNTER — OFFICE VISIT (OUTPATIENT)
Dept: INTERVENTIONAL RADIOLOGY/VASCULAR | Facility: CLINIC | Age: 83
End: 2023-06-05
Payer: MEDICARE

## 2023-06-05 VITALS
WEIGHT: 142 LBS | SYSTOLIC BLOOD PRESSURE: 120 MMHG | HEART RATE: 66 BPM | BODY MASS INDEX: 23.66 KG/M2 | DIASTOLIC BLOOD PRESSURE: 80 MMHG | HEIGHT: 65 IN

## 2023-06-05 DIAGNOSIS — M54.9 BACK PAIN, UNSPECIFIED BACK LOCATION, UNSPECIFIED BACK PAIN LATERALITY, UNSPECIFIED CHRONICITY: Primary | ICD-10-CM

## 2023-06-05 PROCEDURE — 99212 OFFICE O/P EST SF 10 MIN: CPT | Mod: S$GLB,,,

## 2023-06-05 PROCEDURE — 1157F PR ADVANCE CARE PLAN OR EQUIV PRESENT IN MEDICAL RECORD: ICD-10-PCS | Mod: CPTII,S$GLB,,

## 2023-06-05 PROCEDURE — 3079F PR MOST RECENT DIASTOLIC BLOOD PRESSURE 80-89 MM HG: ICD-10-PCS | Mod: CPTII,S$GLB,,

## 2023-06-05 PROCEDURE — 99999 PR PBB SHADOW E&M-EST. PATIENT-LVL II: CPT | Mod: PBBFAC,,,

## 2023-06-05 PROCEDURE — 99999 PR PBB SHADOW E&M-EST. PATIENT-LVL II: ICD-10-PCS | Mod: PBBFAC,,,

## 2023-06-05 PROCEDURE — 3074F PR MOST RECENT SYSTOLIC BLOOD PRESSURE < 130 MM HG: ICD-10-PCS | Mod: CPTII,S$GLB,,

## 2023-06-05 PROCEDURE — 3074F SYST BP LT 130 MM HG: CPT | Mod: CPTII,S$GLB,,

## 2023-06-05 PROCEDURE — 1157F ADVNC CARE PLAN IN RCRD: CPT | Mod: CPTII,S$GLB,,

## 2023-06-05 PROCEDURE — 99212 PR OFFICE/OUTPT VISIT, EST, LEVL II, 10-19 MIN: ICD-10-PCS | Mod: S$GLB,,,

## 2023-06-05 PROCEDURE — 3079F DIAST BP 80-89 MM HG: CPT | Mod: CPTII,S$GLB,,

## 2023-06-05 NOTE — PROGRESS NOTES
Subjective     Patient ID: Nereyda Hernandez is a 82 y.o. female.    Chief Complaint: Back Pain    81 yo F with a history known of osteoporosis presents to follow up s/p T12 Khyphoplasty on 5/16/2023. She continues with some back pain. Seems to be different back pain from when described before. CC today SOB. Make worse with activity. Unable to garden or exercise. She has seen her PCP and scheduled to see cardiology at the end of the month. No other complaints today.     Review of Systems   Constitutional:  Negative for fatigue and fever.   Respiratory:  Positive for shortness of breath. Negative for cough.    Musculoskeletal:  Positive for arthralgias and back pain.   Neurological:  Negative for facial asymmetry, speech difficulty, coordination difficulties and coordination difficulties.   Psychiatric/Behavioral:  Negative for behavioral problems and confusion.       Objective     Physical Exam  Constitutional:       General: She is not in acute distress.     Appearance: Normal appearance.   HENT:      Head: Normocephalic and atraumatic.      Nose: Nose normal.   Eyes:      Conjunctiva/sclera: Conjunctivae normal.   Pulmonary:      Effort: Pulmonary effort is normal.   Abdominal:      General: Abdomen is flat.   Musculoskeletal:      Right lower leg: No edema.      Left lower leg: No edema.      Comments: Unable to reproduce pain on exam.   Skin:     General: Skin is warm and dry.   Neurological:      General: No focal deficit present.      Mental Status: She is alert.      Motor: No weakness.   Psychiatric:         Mood and Affect: Mood normal.         Behavior: Behavior normal.        Assessment and Plan     1. Back pain, unspecified back location, unspecified back pain laterality, unspecified chronicity    - s/p T12 Khyphoplasty on 5/16/2023   - Patient continues to experience back pain; however, her progressively worsening of SOB has been stopping her from exercise and gardening   - she is scheduled to she cardiology  at the end of the month.   - SOB takes priority over back discomfort most likely cause be arthritic changes. Patient agrees.     Patient will follow up as needed.     Cayla Lunsford PA-C  Interventional Radiology  [phone]

## 2023-06-12 DIAGNOSIS — F32.A ANXIETY AND DEPRESSION: Chronic | ICD-10-CM

## 2023-06-12 DIAGNOSIS — F41.9 ANXIETY AND DEPRESSION: Chronic | ICD-10-CM

## 2023-06-12 RX ORDER — ESCITALOPRAM OXALATE 20 MG/1
20 TABLET ORAL DAILY
Qty: 90 TABLET | Refills: 3 | Status: SHIPPED | OUTPATIENT
Start: 2023-06-12 | End: 2024-06-06

## 2023-06-16 DIAGNOSIS — R00.2 PALPITATIONS: Primary | Chronic | ICD-10-CM

## 2023-06-27 ENCOUNTER — TELEPHONE (OUTPATIENT)
Dept: ELECTROPHYSIOLOGY | Facility: CLINIC | Age: 83
End: 2023-06-27
Payer: MEDICARE

## 2023-06-27 PROBLEM — I45.10 RBBB: Status: ACTIVE | Noted: 2023-06-27

## 2023-06-27 NOTE — PROGRESS NOTES
Subjective:   Patient ID:  Nereyda Hernandez is a 82 y.o. female who presents for evaluation of Palpitations      HPI 83 yo female with palpitations, Htn, anxiety, insomnia.  Notes dyspnea on exertion. Notes indicate palpitations, she reports more of a chest heaviness. This has been progressive over the past year.  Has seen Dr. Dent (interventional cardiology), Pulmonary, Endocrine.  48 hr holter 5/22/23 nsr with average HR of 62 bpm with 1021 PVC's, 500 PAC's.  Spect stress 5/24/23 negative.  Echo 7/29/22 EF 55% normal RV size and function mild to mod AI.    Has prior history of sleep apnea. Has not tolerated therapy.      Review of Systems   Constitutional: Negative. Negative for fever and malaise/fatigue.   HENT:  Negative for congestion and sore throat.    Cardiovascular:  Positive for dyspnea on exertion. Negative for chest pain, irregular heartbeat, leg swelling, near-syncope, orthopnea, palpitations, paroxysmal nocturnal dyspnea and syncope.   Respiratory:  Negative for cough and shortness of breath.    Gastrointestinal:  Negative for abdominal pain, constipation and diarrhea.   Neurological:  Negative for dizziness, light-headedness and weakness.   Psychiatric/Behavioral:  Negative for depression. The patient is not nervous/anxious.    All other systems reviewed and are negative.    Objective:   Physical Exam  Constitutional:       Appearance: She is well-developed.   Eyes:      General: No scleral icterus.     Conjunctiva/sclera: Conjunctivae normal.   Neck:      Vascular: No JVD.      Trachea: No tracheal deviation.   Cardiovascular:      Rate and Rhythm: Normal rate and regular rhythm. Occasional Extrasystoles are present.     Chest Wall: PMI is not displaced.   Pulmonary:      Effort: Pulmonary effort is normal. No respiratory distress.      Breath sounds: Normal breath sounds.   Abdominal:      Palpations: Abdomen is soft.      Tenderness: There is no abdominal tenderness.   Musculoskeletal:          General: No tenderness.   Skin:     General: Skin is warm and dry.      Findings: No rash.   Neurological:      Mental Status: She is alert and oriented to person, place, and time.   Psychiatric:         Behavior: Behavior normal.       Assessment:      1. Palpitations    2. Essential hypertension    3. Anxiety and depression    4. Psychophysiological insomnia    5. RBBB    6. Premature ventricular contractions (PVCs) (VPCs)        Plan:     Low index of suspicion that her symptoms are related to PVC's.  Repeat echo.  PRN f/u with EP.

## 2023-06-28 ENCOUNTER — OFFICE VISIT (OUTPATIENT)
Dept: ELECTROPHYSIOLOGY | Facility: CLINIC | Age: 83
End: 2023-06-28
Payer: MEDICARE

## 2023-06-28 VITALS
HEART RATE: 47 BPM | BODY MASS INDEX: 24.06 KG/M2 | HEIGHT: 65 IN | WEIGHT: 144.38 LBS | DIASTOLIC BLOOD PRESSURE: 78 MMHG | SYSTOLIC BLOOD PRESSURE: 122 MMHG

## 2023-06-28 DIAGNOSIS — I49.3 PREMATURE VENTRICULAR CONTRACTIONS (PVCS) (VPCS): ICD-10-CM

## 2023-06-28 DIAGNOSIS — R00.2 PALPITATIONS: Primary | ICD-10-CM

## 2023-06-28 DIAGNOSIS — F41.9 ANXIETY AND DEPRESSION: Chronic | ICD-10-CM

## 2023-06-28 DIAGNOSIS — F51.04 PSYCHOPHYSIOLOGICAL INSOMNIA: Chronic | ICD-10-CM

## 2023-06-28 DIAGNOSIS — F32.A ANXIETY AND DEPRESSION: Chronic | ICD-10-CM

## 2023-06-28 DIAGNOSIS — I45.10 RBBB: ICD-10-CM

## 2023-06-28 DIAGNOSIS — I10 ESSENTIAL HYPERTENSION: Chronic | ICD-10-CM

## 2023-06-28 PROCEDURE — 1159F MED LIST DOCD IN RCRD: CPT | Mod: CPTII,S$GLB,, | Performed by: INTERNAL MEDICINE

## 2023-06-28 PROCEDURE — 99999 PR PBB SHADOW E&M-EST. PATIENT-LVL III: CPT | Mod: PBBFAC,,, | Performed by: INTERNAL MEDICINE

## 2023-06-28 PROCEDURE — 99999 PR PBB SHADOW E&M-EST. PATIENT-LVL III: ICD-10-PCS | Mod: PBBFAC,,, | Performed by: INTERNAL MEDICINE

## 2023-06-28 PROCEDURE — 3288F FALL RISK ASSESSMENT DOCD: CPT | Mod: CPTII,S$GLB,, | Performed by: INTERNAL MEDICINE

## 2023-06-28 PROCEDURE — 99204 OFFICE O/P NEW MOD 45 MIN: CPT | Mod: S$GLB,,, | Performed by: INTERNAL MEDICINE

## 2023-06-28 PROCEDURE — 3074F PR MOST RECENT SYSTOLIC BLOOD PRESSURE < 130 MM HG: ICD-10-PCS | Mod: CPTII,S$GLB,, | Performed by: INTERNAL MEDICINE

## 2023-06-28 PROCEDURE — 1157F PR ADVANCE CARE PLAN OR EQUIV PRESENT IN MEDICAL RECORD: ICD-10-PCS | Mod: CPTII,S$GLB,, | Performed by: INTERNAL MEDICINE

## 2023-06-28 PROCEDURE — 99204 PR OFFICE/OUTPT VISIT, NEW, LEVL IV, 45-59 MIN: ICD-10-PCS | Mod: S$GLB,,, | Performed by: INTERNAL MEDICINE

## 2023-06-28 PROCEDURE — 3078F PR MOST RECENT DIASTOLIC BLOOD PRESSURE < 80 MM HG: ICD-10-PCS | Mod: CPTII,S$GLB,, | Performed by: INTERNAL MEDICINE

## 2023-06-28 PROCEDURE — 93005 ELECTROCARDIOGRAM TRACING: CPT | Mod: S$GLB,,, | Performed by: INTERNAL MEDICINE

## 2023-06-28 PROCEDURE — 1101F PR PT FALLS ASSESS DOC 0-1 FALLS W/OUT INJ PAST YR: ICD-10-PCS | Mod: CPTII,S$GLB,, | Performed by: INTERNAL MEDICINE

## 2023-06-28 PROCEDURE — 3078F DIAST BP <80 MM HG: CPT | Mod: CPTII,S$GLB,, | Performed by: INTERNAL MEDICINE

## 2023-06-28 PROCEDURE — 1125F PR PAIN SEVERITY QUANTIFIED, PAIN PRESENT: ICD-10-PCS | Mod: CPTII,S$GLB,, | Performed by: INTERNAL MEDICINE

## 2023-06-28 PROCEDURE — 1159F PR MEDICATION LIST DOCUMENTED IN MEDICAL RECORD: ICD-10-PCS | Mod: CPTII,S$GLB,, | Performed by: INTERNAL MEDICINE

## 2023-06-28 PROCEDURE — 93005 RHYTHM STRIP: ICD-10-PCS | Mod: S$GLB,,, | Performed by: INTERNAL MEDICINE

## 2023-06-28 PROCEDURE — 93010 ELECTROCARDIOGRAM REPORT: CPT | Mod: S$GLB,,, | Performed by: INTERNAL MEDICINE

## 2023-06-28 PROCEDURE — 1101F PT FALLS ASSESS-DOCD LE1/YR: CPT | Mod: CPTII,S$GLB,, | Performed by: INTERNAL MEDICINE

## 2023-06-28 PROCEDURE — 93010 RHYTHM STRIP: ICD-10-PCS | Mod: S$GLB,,, | Performed by: INTERNAL MEDICINE

## 2023-06-28 PROCEDURE — 3074F SYST BP LT 130 MM HG: CPT | Mod: CPTII,S$GLB,, | Performed by: INTERNAL MEDICINE

## 2023-06-28 PROCEDURE — 1125F AMNT PAIN NOTED PAIN PRSNT: CPT | Mod: CPTII,S$GLB,, | Performed by: INTERNAL MEDICINE

## 2023-06-28 PROCEDURE — 3288F PR FALLS RISK ASSESSMENT DOCUMENTED: ICD-10-PCS | Mod: CPTII,S$GLB,, | Performed by: INTERNAL MEDICINE

## 2023-06-28 PROCEDURE — 1157F ADVNC CARE PLAN IN RCRD: CPT | Mod: CPTII,S$GLB,, | Performed by: INTERNAL MEDICINE

## 2023-07-03 ENCOUNTER — TELEPHONE (OUTPATIENT)
Dept: SLEEP MEDICINE | Facility: CLINIC | Age: 83
End: 2023-07-03
Payer: MEDICARE

## 2023-07-13 NOTE — PROGRESS NOTES
Subjective:         Chief Complaint: Follow-up (3 month )  HPI   Nereyda Hernandez is a 83 y.o. female, patient of Sharona Weaver MD with chronic facial pain, anxiety and depression, dyspnea, hypertension, hyperlipidemia, palpitations, JOSELINE, GERD, osteoporosis, insomnia, known to me, presents today for a 3 month f/u.   Patient reports she still has shortness of breath with exertion. Was seen by Dr. Yandel Hi with Pulmonary, was advised to f/u in 3 months, need to schedule appt. States inhaler makes her cough, currently not using it at all.     Reports she is still having back pain, 5/10 with movement, had kyphplasty on 04/17/23 after MRI revealed Likely subacute T12 vertebral body burst fracture with moderate height loss and mild retropulsion resulting in mild spinal canal stenosis. MR results reviewed in detail with patient.     Patient reports visit with Cardiology and EP for shortness of breath and palpitations. Reports holter and stress was normal, scheduled for Echo  next week.   Checks BP at home daily, ranging from low 100s/60s-150/90s. Reports compliance with BP meds.   Scheduled to see sleep medicine as she believes her shortness of breath may be linked to KANWAL. No one can tell her why she has SOB.      Discussed anxiety, patient reports she is taking lexapro as prescribed. Denies anxiety attacks.       Past Medical History:   Diagnosis Date    HATTIE (acute kidney injury) 12/10/2021    Anxiety     Arthritis     Breast cancer 1990    left    Chronic disease anemia     Hyperlipidemia     Hypertension     Insomnia     Lobular carcinoma in situ     KANWAL (obstructive sleep apnea)     Osteoporosis     Rosacea     Squamous cell carcinoma     Stable angina pectoris 8/27/2020    Urinary incontinence     Vertigo        Past Surgical History:   Procedure Laterality Date    ADENOIDECTOMY      BELT ABDOMINOPLASTY      BREAST BIOPSY Left 1990    ex bx    BREAST LUMPECTOMY      COLONOSCOPY N/A 1/28/2020    Procedure:  "COLONOSCOPY;  Surgeon: Bianka Macias MD;  Location: [facility] ENDO;  Service: Endoscopy;  Laterality: N/A;    ESOPHAGOGASTRODUODENOSCOPY N/A 2020    Procedure: EGD (ESOPHAGOGASTRODUODENOSCOPY);  Surgeon: Bianka Macias MD;  Location: [facility] ENDO;  Service: Endoscopy;  Laterality: N/A;    EYE SURGERY      HERNIA REPAIR      Ventral    LIVER TRANSPLANT      OOPHORECTOMY      TONSILLECTOMY      TOTAL ABDOMINAL HYSTERECTOMY         Family History   Problem Relation Age of Onset    Cancer Mother         liver    Pancreatic cancer Mother     Heart disease Mother     Depression Mother     Miscarriages / Stillbirths Mother     Hypertension Father     Alzheimer's disease Father     Depression Brother     Depression Brother     Diabetes Brother     Arthritis Brother     Hypertension Brother     Heart disease Brother        Social History     Socioeconomic History    Marital status:     Number of children: 6    Years of education: college   Occupational History    Occupation: retired  for [employer]   Tobacco Use    Smoking status: Former     Packs/day: 0.50     Years: 21.00     Pack years: 10.50     Types: Cigarettes     Start date: 1959     Quit date: 1980     Years since quittin.8    Smokeless tobacco: Never   Substance and Sexual Activity    Alcohol use: Not Currently     Alcohol/week: 1.0 standard drink     Types: 1 Glasses of wine per week     Comment: Occasionally    Drug use: Never    Sexual activity: Not Currently     Partners: Male     Birth control/protection: Condom, Other-see comments     Comment: Pill       Review of Systems   Constitutional:  Positive for fatigue.   Respiratory:  Positive for shortness of breath.    Musculoskeletal:  Positive for back pain.       Objective:     Vitals:    23 0823   BP: (!) 150/84   BP Location: Left arm   Patient Position: Sitting   Pulse: 62   SpO2: 96%   Weight: 65.6 kg (144 lb 9.6 oz)   Height: 5' 5" (1.651 m)        "   Physical Exam  Vitals reviewed.   Constitutional:       Appearance: Normal appearance. She is well-developed and well-groomed.   HENT:      Head: Normocephalic and atraumatic.   Cardiovascular:      Rate and Rhythm: Normal rate and regular rhythm.      Heart sounds: Normal heart sounds.   Pulmonary:      Effort: Pulmonary effort is normal.      Breath sounds: Normal breath sounds.   Musculoskeletal:      Right lower leg: No edema.      Left lower leg: No edema.      Right ankle: Swelling present.      Left ankle: Swelling present.   Skin:     General: Skin is warm and dry.      Capillary Refill: Capillary refill takes less than 2 seconds.   Neurological:      General: No focal deficit present.      Mental Status: She is alert and oriented to person, place, and time.   Psychiatric:         Attention and Perception: Attention normal.         Mood and Affect: Mood normal.         Speech: Speech normal.         Behavior: Behavior is cooperative.         Laboratory:  CBC:  No results for input(s): WBC, RBC, HGB, HCT, PLT, MCV, MCH, MCHC in the last 2160 hours.  CMP:  No results for input(s): GLU, CALCIUM, ALBUMIN, PROT, NA, K, CO2, CL, BUN, ALKPHOS, ALT, AST, BILITOT in the last 2160 hours.    Invalid input(s): CREATININ  URINALYSIS:  No results for input(s): COLORU, CLARITYU, SPECGRAV, PHUR, PROTEINUA, GLUCOSEU, BILIRUBINCON, BLOODU, WBCU, RBCU, BACTERIA, MUCUS, NITRITE, LEUKOCYTESUR, UROBILINOGEN, HYALINECASTS in the last 2160 hours.   LIPIDS:  Recent Labs   Lab Result Units 05/22/23  1027   HDL mg/dL 56   Cholesterol mg/dL 173   Triglycerides mg/dL 97   LDL Cholesterol mg/dL 97.6   HDL/Cholesterol Ratio % 32.4   Non-HDL Cholesterol mg/dL 117   Total Cholesterol/HDL Ratio  3.1     TSH:  No results for input(s): TSH in the last 2160 hours.  A1C:  No results for input(s): HGBA1C in the last 2160 hours.    Assessment:         ICD-10-CM ICD-9-CM   1. Anxiety and depression  F41.9 300.00    F32.A 311   2. Dyspnea,  unspecified type  R06.00 786.09   3. Essential hypertension  I10 401.9   4. Mixed hyperlipidemia  E78.2 272.2   5. Back pain, unspecified back location, unspecified back pain laterality, unspecified chronicity  M54.9 724.5       Plan:       Anxiety and depression  Controlled, continue lexapro as prescribed.     Dyspnea, unspecified type  CT chest showed mild bronchiectasis, multiple pulmonary nodules, the largest of which measures 1.3 cm in the left lower lobe. Followed by Pulmonary, should have had f/u last month (per MD note), will assist with scheduling appointment. PFT shows normal spirometry.     Patient was also seen by Cardiology and EP, Scheduled for Echo on 07/19/2023  Stress test negative, nothing significant on 48 hour holter  History of sleep apnea, scheduled to see sleep medicine.  Instructed patient to go to ED if symptoms worsen.     Essential hypertension  BP still above goal. Continue to check BP, low Na diet, f/u in 1 month.     Mixed hyperlipidemia  Continue statin therapy.     Back pain, unspecified back location, unspecified back pain laterality, unspecified chronicity  MRI shows Compression fracture of T12 vertebra with delayed healing. Patient still with back pain despite kyphoplasty 05/16/2023. Encouraged use of Tylenol as needed.   Instructed patient to f/u with IR.       If symptoms worsen, go to ER.  If symptoms do not improve, return to clinic.   Keep appointments with all specialists.     Patient verbalizes understanding and agrees with current treatment plan.      Follow up in about 3 months (around 10/14/2023).     Patient's Medications   New Prescriptions    No medications on file   Previous Medications    ALENDRONATE (FOSAMAX) 70 MG TABLET    Take 1 tablet (70 mg total) by mouth every 7 days.    AMITRIPTYLINE (ELAVIL) 10 MG TABLET    Take 3 tablets (30 mg total) by mouth nightly as needed for Insomnia.    ASPIRIN (ECOTRIN) 81 MG EC TABLET    Take 81 mg by mouth once daily.     AZELASTINE (ASTELIN) 137 MCG (0.1 %) NASAL SPRAY    1 spray (137 mcg total) by Nasal route 2 (two) times daily.    BENEFIBER, WHEAT DEXTRIN, ORAL    Take by mouth. PRN    CALCIUM CITRATE-VITAMIN D3 500 MG CALCIUM -400 UNIT CHEW    Take 1 tablet by mouth 2 (two) times daily. 12.5mcg    ESCITALOPRAM OXALATE (LEXAPRO) 20 MG TABLET    TAKE 1 TABLET (20 MG TOTAL) BY MOUTH ONCE DAILY.    FISH OIL-OMEGA-3 FATTY ACIDS 300-1,000 MG CAPSULE    Take 2 g by mouth once daily.    LACTOBACILLUS ACIDOPHILUS (PROBIOTIC ACIDOPHILUS ORAL)    Take by mouth.    LATANOPROST 0.005 % OPHTHALMIC SOLUTION    1 drop every evening.    LOSARTAN (COZAAR) 25 MG TABLET    TAKE 1 TABLET ONE TIME DAILY    METOPROLOL TARTRATE 37.5 MG TAB    Take 37.5 mg by mouth 2 (two) times daily.    MULTIVIT-IRON-MIN-FOLIC ACID 3,500-18-0.4 UNIT-MG-MG ORAL CHEW    Take by mouth.    PANTOPRAZOLE (PROTONIX) 40 MG TABLET    TAKE 1 TABLET EVERY DAY    PRAVASTATIN (PRAVACHOL) 20 MG TABLET    Take 1 tablet (20 mg total) by mouth once daily.    TIOTROPIUM-OLODATEROL (STIOLTO RESPIMAT) 2.5-2.5 MCG/ACTUATION MIST    Inhale 2 puffs into the lungs once daily. Controller    TRIAMTERENE-HYDROCHLOROTHIAZIDE 37.5-25 MG (DYAZIDE) 37.5-25 MG PER CAPSULE    Take 1 capsule by mouth every morning.    VIT A/VIT C/VIT E/ZINC/COPPER (PRESERVISION AREDS ORAL)    Take by mouth. Bottle does not say a but does say Lutein   Modified Medications    No medications on file   Discontinued Medications    No medications on file         Mery Benites NP

## 2023-07-14 ENCOUNTER — OFFICE VISIT (OUTPATIENT)
Dept: FAMILY MEDICINE | Facility: CLINIC | Age: 83
End: 2023-07-14
Payer: MEDICARE

## 2023-07-14 ENCOUNTER — TELEPHONE (OUTPATIENT)
Dept: PULMONOLOGY | Facility: CLINIC | Age: 83
End: 2023-07-14
Payer: MEDICARE

## 2023-07-14 VITALS
SYSTOLIC BLOOD PRESSURE: 150 MMHG | BODY MASS INDEX: 24.1 KG/M2 | HEIGHT: 65 IN | DIASTOLIC BLOOD PRESSURE: 84 MMHG | OXYGEN SATURATION: 96 % | WEIGHT: 144.63 LBS | HEART RATE: 62 BPM

## 2023-07-14 DIAGNOSIS — I10 ESSENTIAL HYPERTENSION: Chronic | ICD-10-CM

## 2023-07-14 DIAGNOSIS — E78.2 MIXED HYPERLIPIDEMIA: Chronic | ICD-10-CM

## 2023-07-14 DIAGNOSIS — F41.9 ANXIETY AND DEPRESSION: Primary | Chronic | ICD-10-CM

## 2023-07-14 DIAGNOSIS — R06.00 DYSPNEA, UNSPECIFIED TYPE: Chronic | ICD-10-CM

## 2023-07-14 DIAGNOSIS — F32.A ANXIETY AND DEPRESSION: Primary | Chronic | ICD-10-CM

## 2023-07-14 DIAGNOSIS — M54.9 BACK PAIN, UNSPECIFIED BACK LOCATION, UNSPECIFIED BACK PAIN LATERALITY, UNSPECIFIED CHRONICITY: ICD-10-CM

## 2023-07-14 PROCEDURE — 3079F PR MOST RECENT DIASTOLIC BLOOD PRESSURE 80-89 MM HG: ICD-10-PCS | Mod: HCNC,CPTII,S$GLB,

## 2023-07-14 PROCEDURE — 3077F PR MOST RECENT SYSTOLIC BLOOD PRESSURE >= 140 MM HG: ICD-10-PCS | Mod: HCNC,CPTII,S$GLB,

## 2023-07-14 PROCEDURE — 1159F MED LIST DOCD IN RCRD: CPT | Mod: HCNC,CPTII,S$GLB,

## 2023-07-14 PROCEDURE — 99214 OFFICE O/P EST MOD 30 MIN: CPT | Mod: HCNC,S$GLB,,

## 2023-07-14 PROCEDURE — 99999 PR PBB SHADOW E&M-EST. PATIENT-LVL IV: ICD-10-PCS | Mod: PBBFAC,HCNC,,

## 2023-07-14 PROCEDURE — 1159F PR MEDICATION LIST DOCUMENTED IN MEDICAL RECORD: ICD-10-PCS | Mod: HCNC,CPTII,S$GLB,

## 2023-07-14 PROCEDURE — 1160F RVW MEDS BY RX/DR IN RCRD: CPT | Mod: HCNC,CPTII,S$GLB,

## 2023-07-14 PROCEDURE — 1126F AMNT PAIN NOTED NONE PRSNT: CPT | Mod: HCNC,CPTII,S$GLB,

## 2023-07-14 PROCEDURE — 99214 PR OFFICE/OUTPT VISIT, EST, LEVL IV, 30-39 MIN: ICD-10-PCS | Mod: HCNC,S$GLB,,

## 2023-07-14 PROCEDURE — 1126F PR PAIN SEVERITY QUANTIFIED, NO PAIN PRESENT: ICD-10-PCS | Mod: HCNC,CPTII,S$GLB,

## 2023-07-14 PROCEDURE — 1157F PR ADVANCE CARE PLAN OR EQUIV PRESENT IN MEDICAL RECORD: ICD-10-PCS | Mod: HCNC,CPTII,S$GLB,

## 2023-07-14 PROCEDURE — 1160F PR REVIEW ALL MEDS BY PRESCRIBER/CLIN PHARMACIST DOCUMENTED: ICD-10-PCS | Mod: HCNC,CPTII,S$GLB,

## 2023-07-14 PROCEDURE — 3288F FALL RISK ASSESSMENT DOCD: CPT | Mod: HCNC,CPTII,S$GLB,

## 2023-07-14 PROCEDURE — 3077F SYST BP >= 140 MM HG: CPT | Mod: HCNC,CPTII,S$GLB,

## 2023-07-14 PROCEDURE — 3288F PR FALLS RISK ASSESSMENT DOCUMENTED: ICD-10-PCS | Mod: HCNC,CPTII,S$GLB,

## 2023-07-14 PROCEDURE — 1157F ADVNC CARE PLAN IN RCRD: CPT | Mod: HCNC,CPTII,S$GLB,

## 2023-07-14 PROCEDURE — 99999 PR PBB SHADOW E&M-EST. PATIENT-LVL IV: CPT | Mod: PBBFAC,HCNC,,

## 2023-07-14 PROCEDURE — 3079F DIAST BP 80-89 MM HG: CPT | Mod: HCNC,CPTII,S$GLB,

## 2023-07-14 PROCEDURE — 1101F PR PT FALLS ASSESS DOC 0-1 FALLS W/OUT INJ PAST YR: ICD-10-PCS | Mod: HCNC,CPTII,S$GLB,

## 2023-07-14 PROCEDURE — 1101F PT FALLS ASSESS-DOCD LE1/YR: CPT | Mod: HCNC,CPTII,S$GLB,

## 2023-07-14 NOTE — TELEPHONE ENCOUNTER
----- Message from Kasandra Gonzales sent at 7/14/2023  9:18 AM CDT -----  Patient was seen today and need to have a follow with Dr. Hi. Please call patient to schedule. Thanks

## 2023-07-14 NOTE — TELEPHONE ENCOUNTER
Spoke with  in regards to scheduled Ct Scan in September 2023.  I advised patient  will call with Ct Scan results and also to discuss treatment plan..  Patient verbalized she understands.

## 2023-07-19 ENCOUNTER — HOSPITAL ENCOUNTER (OUTPATIENT)
Dept: CARDIOLOGY | Facility: HOSPITAL | Age: 83
Discharge: HOME | End: 2023-07-19
Attending: INTERNAL MEDICINE
Payer: MEDICARE

## 2023-07-19 VITALS
HEART RATE: 70 BPM | SYSTOLIC BLOOD PRESSURE: 150 MMHG | WEIGHT: 144 LBS | BODY MASS INDEX: 23.99 KG/M2 | DIASTOLIC BLOOD PRESSURE: 80 MMHG | HEIGHT: 65 IN

## 2023-07-19 LAB
ASCENDING AORTA: 3.33 CM
AV INDEX (PROSTH): 0.85
AV MEAN GRADIENT: 2 MMHG
AV PEAK GRADIENT: 4 MMHG
AV VALVE AREA: 2.84 CM2
AV VELOCITY RATIO: 0.81
BSA FOR ECHO PROCEDURE: 1.73 M2
CV ECHO LV RWT: 0.37 CM
DOP CALC AO PEAK VEL: 1.05 M/S
DOP CALC AO VTI: 24.46 CM
DOP CALC LVOT AREA: 3.4 CM2
DOP CALC LVOT DIAMETER: 2.07 CM
DOP CALC LVOT PEAK VEL: 0.85 M/S
DOP CALC LVOT STROKE VOLUME: 69.53 CM3
DOP CALCLVOT PEAK VEL VTI: 20.67 CM
E WAVE DECELERATION TIME: 155.71 MSEC
E/A RATIO: 0.58
E/E' RATIO: 10.8 M/S
ECHO LV POSTERIOR WALL: 0.72 CM (ref 0.6–1.1)
EJECTION FRACTION: 58 %
FRACTIONAL SHORTENING: 29 % (ref 28–44)
INTERVENTRICULAR SEPTUM: 0.87 CM (ref 0.6–1.1)
IVRT: 106.57 MSEC
LA MAJOR: 4.34 CM
LA MINOR: 4.88 CM
LA WIDTH: 3.91 CM
LEFT ATRIUM SIZE: 3.41 CM
LEFT ATRIUM VOLUME INDEX MOD: 25.7 ML/M2
LEFT ATRIUM VOLUME INDEX: 30.3 ML/M2
LEFT ATRIUM VOLUME MOD: 44.16 CM3
LEFT ATRIUM VOLUME: 52.07 CM3
LEFT INTERNAL DIMENSION IN SYSTOLE: 2.78 CM (ref 2.1–4)
LEFT VENTRICLE DIASTOLIC VOLUME INDEX: 38.13 ML/M2
LEFT VENTRICLE DIASTOLIC VOLUME: 65.58 ML
LEFT VENTRICLE MASS INDEX: 51 G/M2
LEFT VENTRICLE SYSTOLIC VOLUME INDEX: 16.9 ML/M2
LEFT VENTRICLE SYSTOLIC VOLUME: 29.04 ML
LEFT VENTRICULAR INTERNAL DIMENSION IN DIASTOLE: 3.89 CM (ref 3.5–6)
LEFT VENTRICULAR MASS: 88.54 G
LV LATERAL E/E' RATIO: 9 M/S
LV SEPTAL E/E' RATIO: 13.5 M/S
MV A" WAVE DURATION": 21.31 MSEC
MV PEAK A VEL: 0.93 M/S
MV PEAK E VEL: 0.54 M/S
MV STENOSIS PRESSURE HALF TIME: 45.16 MS
MV VALVE AREA P 1/2 METHOD: 4.87 CM2
PISA TR MAX VEL: 2.4 M/S
PULM VEIN S/D RATIO: 0.95
PV PEAK D VEL: 0.41 M/S
PV PEAK S VEL: 0.39 M/S
RA MAJOR: 4.19 CM
RA PRESSURE: 8 MMHG
RA WIDTH: 3.37 CM
RIGHT VENTRICULAR END-DIASTOLIC DIMENSION: 2.93 CM
SINUS: 3.27 CM
STJ: 2.77 CM
TDI LATERAL: 0.06 M/S
TDI SEPTAL: 0.04 M/S
TDI: 0.05 M/S
TR MAX PG: 23 MMHG
TRICUSPID ANNULAR PLANE SYSTOLIC EXCURSION: 2.57 CM
TV REST PULMONARY ARTERY PRESSURE: 31 MMHG

## 2023-07-19 PROCEDURE — 93306 TTE W/DOPPLER COMPLETE: CPT | Mod: 26,HCNC,, | Performed by: INTERNAL MEDICINE

## 2023-07-19 PROCEDURE — 93306 TTE W/DOPPLER COMPLETE: CPT | Mod: HCNC

## 2023-07-19 PROCEDURE — 93306 ECHO (CUPID ONLY): ICD-10-PCS | Mod: 26,HCNC,, | Performed by: INTERNAL MEDICINE

## 2023-07-27 ENCOUNTER — TELEPHONE (OUTPATIENT)
Dept: FAMILY MEDICINE | Facility: CLINIC | Age: 83
End: 2023-07-27
Payer: MEDICARE

## 2023-07-27 DIAGNOSIS — Z12.31 SCREENING MAMMOGRAM FOR BREAST CANCER: Primary | ICD-10-CM

## 2023-07-27 NOTE — TELEPHONE ENCOUNTER
----- Message from Sharri Eldridge sent at 7/27/2023  4:07 PM CDT -----  Contact: pt  Type:  Orders     Who Called:pt    Does the patient know what this is regarding?:mammo orders   Would the patient rather a call back or a response via [facility]? Call   Best Call Back Number:[phone]  Additional Information:     Pt stated she would like her mammo orders put in by August 2nd

## 2023-08-10 ENCOUNTER — TELEPHONE (OUTPATIENT)
Dept: INTERVENTIONAL RADIOLOGY/VASCULAR | Facility: HOSPITAL | Age: 83
End: 2023-08-10
Payer: MEDICARE

## 2023-08-10 NOTE — PROGRESS NOTES
"Returned patients call regarding back pain. Explained that I can offer interventions or referral to PT. Difficult to pin point patients pain. She says her back pain is "all over." Since she is unable to pin point a location of back pain this makes treating her interventionally difficult. She is going to follow up with her PCP. She is concerned regarding her bone density. I told her to reach back out if she would like to try an intervention or a referral to PT.     Cayla Lunsford PA-C  Interventional Radiology  [phone]    "

## 2023-08-14 ENCOUNTER — TELEPHONE (OUTPATIENT)
Dept: PULMONOLOGY | Facility: CLINIC | Age: 83
End: 2023-08-14
Payer: MEDICARE

## 2023-08-14 ENCOUNTER — OFFICE VISIT (OUTPATIENT)
Dept: FAMILY MEDICINE | Facility: CLINIC | Age: 83
End: 2023-08-14
Payer: MEDICARE

## 2023-08-14 VITALS
SYSTOLIC BLOOD PRESSURE: 136 MMHG | DIASTOLIC BLOOD PRESSURE: 82 MMHG | HEART RATE: 60 BPM | WEIGHT: 145.5 LBS | OXYGEN SATURATION: 96 % | HEIGHT: 65 IN | BODY MASS INDEX: 24.24 KG/M2

## 2023-08-14 DIAGNOSIS — M54.9 BACK PAIN, UNSPECIFIED BACK LOCATION, UNSPECIFIED BACK PAIN LATERALITY, UNSPECIFIED CHRONICITY: ICD-10-CM

## 2023-08-14 DIAGNOSIS — F41.9 ANXIETY AND DEPRESSION: Chronic | ICD-10-CM

## 2023-08-14 DIAGNOSIS — I10 ESSENTIAL HYPERTENSION: Primary | Chronic | ICD-10-CM

## 2023-08-14 DIAGNOSIS — R06.00 DYSPNEA, UNSPECIFIED TYPE: Chronic | ICD-10-CM

## 2023-08-14 DIAGNOSIS — F32.A ANXIETY AND DEPRESSION: Chronic | ICD-10-CM

## 2023-08-14 DIAGNOSIS — K21.9 GASTROESOPHAGEAL REFLUX DISEASE WITHOUT ESOPHAGITIS: ICD-10-CM

## 2023-08-14 PROCEDURE — 3075F SYST BP GE 130 - 139MM HG: CPT | Mod: HCNC,CPTII,S$GLB,

## 2023-08-14 PROCEDURE — 3288F FALL RISK ASSESSMENT DOCD: CPT | Mod: HCNC,CPTII,S$GLB,

## 2023-08-14 PROCEDURE — 1160F RVW MEDS BY RX/DR IN RCRD: CPT | Mod: HCNC,CPTII,S$GLB,

## 2023-08-14 PROCEDURE — 3288F PR FALLS RISK ASSESSMENT DOCUMENTED: ICD-10-PCS | Mod: HCNC,CPTII,S$GLB,

## 2023-08-14 PROCEDURE — 1100F PR PT FALLS ASSESS DOC 2+ FALLS/FALL W/INJURY/YR: ICD-10-PCS | Mod: HCNC,CPTII,S$GLB,

## 2023-08-14 PROCEDURE — 1125F AMNT PAIN NOTED PAIN PRSNT: CPT | Mod: HCNC,CPTII,S$GLB,

## 2023-08-14 PROCEDURE — 99214 PR OFFICE/OUTPT VISIT, EST, LEVL IV, 30-39 MIN: ICD-10-PCS | Mod: HCNC,S$GLB,,

## 2023-08-14 PROCEDURE — 99999 PR PBB SHADOW E&M-EST. PATIENT-LVL V: CPT | Mod: PBBFAC,HCNC,,

## 2023-08-14 PROCEDURE — 1160F PR REVIEW ALL MEDS BY PRESCRIBER/CLIN PHARMACIST DOCUMENTED: ICD-10-PCS | Mod: HCNC,CPTII,S$GLB,

## 2023-08-14 PROCEDURE — 3075F PR MOST RECENT SYSTOLIC BLOOD PRESS GE 130-139MM HG: ICD-10-PCS | Mod: HCNC,CPTII,S$GLB,

## 2023-08-14 PROCEDURE — 1159F PR MEDICATION LIST DOCUMENTED IN MEDICAL RECORD: ICD-10-PCS | Mod: HCNC,CPTII,S$GLB,

## 2023-08-14 PROCEDURE — 1157F ADVNC CARE PLAN IN RCRD: CPT | Mod: HCNC,CPTII,S$GLB,

## 2023-08-14 PROCEDURE — 3079F DIAST BP 80-89 MM HG: CPT | Mod: HCNC,CPTII,S$GLB,

## 2023-08-14 PROCEDURE — 3079F PR MOST RECENT DIASTOLIC BLOOD PRESSURE 80-89 MM HG: ICD-10-PCS | Mod: HCNC,CPTII,S$GLB,

## 2023-08-14 PROCEDURE — 1159F MED LIST DOCD IN RCRD: CPT | Mod: HCNC,CPTII,S$GLB,

## 2023-08-14 PROCEDURE — 1100F PTFALLS ASSESS-DOCD GE2>/YR: CPT | Mod: HCNC,CPTII,S$GLB,

## 2023-08-14 PROCEDURE — 1125F PR PAIN SEVERITY QUANTIFIED, PAIN PRESENT: ICD-10-PCS | Mod: HCNC,CPTII,S$GLB,

## 2023-08-14 PROCEDURE — 1157F PR ADVANCE CARE PLAN OR EQUIV PRESENT IN MEDICAL RECORD: ICD-10-PCS | Mod: HCNC,CPTII,S$GLB,

## 2023-08-14 PROCEDURE — 99999 PR PBB SHADOW E&M-EST. PATIENT-LVL V: ICD-10-PCS | Mod: PBBFAC,HCNC,,

## 2023-08-14 PROCEDURE — 99214 OFFICE O/P EST MOD 30 MIN: CPT | Mod: HCNC,S$GLB,,

## 2023-08-14 NOTE — TELEPHONE ENCOUNTER
Spoke with patient about scheduling an appointment with Dr Hi for a follow up visit. Patient is schedule for September 29th after her CT appointment as she requested.

## 2023-08-14 NOTE — PATIENT INSTRUCTIONS
Keep a blood pressure log over the next 3 months. Your goal blood pressure is less than 140/90              -check it around the same time each day              -make sure you are resting for 5 minutes prior to checking              -be seated with your legs uncrossed   -make sure your bladder is empty              -I prefer a upper arm cuff instead of a wrist cuff because it tends to be more accurate  Bring your BP logs and machine back to our next appointment.

## 2023-08-14 NOTE — PROGRESS NOTES
Subjective:         Chief Complaint: Follow-up (1 month) and Hypertension  HPI   Nereyda Hernandez is a 83 y.o. female, patient of Sharona Weaver MD with chronic facial pain, anxiety and depression, dyspnea, hypertension, hyperlipidemia, palpitations, JOSELINE, GERD, osteoporosis, insomnia, known to me, presents today for a Follow-up (1 month) and Hypertension  BP during last visit was 150/84. Reports she checks her BP about twice per week. BP runs 1 teens/60s-150s/90s. Forgot to bring the log. Reports compliance with medications.     Still has some SOB with walking, still with back pain. Scheduled at the end of September with sleep medicine. Reports compliance with her inhaler. States she called IR to schedule an appointment and was told that they only do injections and PT but her PCP could do that. Doesn't want to do PT at this time bc of her SOB. Scheduled for CT chest 09/28, ordered by Pulmonology. Per Dr. Hi's note, patient was supposed to f/u in 3 months (June 2023). Request sent today to schedule f/u.     Still hoarse, taking Protonix daily.     Past Medical History:   Diagnosis Date    HATTIE (acute kidney injury) 12/10/2021    Anxiety     Arthritis     Breast cancer 1990    left    Chronic disease anemia     Hyperlipidemia     Hypertension     Insomnia     Lobular carcinoma in situ     KANWAL (obstructive sleep apnea)     Osteoporosis     Rosacea     Squamous cell carcinoma     Stable angina pectoris 8/27/2020    Urinary incontinence     Vertigo        Past Surgical History:   Procedure Laterality Date    ADENOIDECTOMY      BELT ABDOMINOPLASTY      BREAST BIOPSY Left 1990    ex bx    BREAST LUMPECTOMY      COLONOSCOPY N/A 1/28/2020    Procedure: COLONOSCOPY;  Surgeon: Bianka Macias MD;  Location: [facility];  Service: Endoscopy;  Laterality: N/A;    ESOPHAGOGASTRODUODENOSCOPY N/A 1/28/2020    Procedure: EGD (ESOPHAGOGASTRODUODENOSCOPY);  Surgeon: Bianka Macias MD;  Location: [facility];  Service: Endoscopy;   Laterality: N/A;    EYE SURGERY      HERNIA REPAIR      Ventral    LIVER TRANSPLANT      OOPHORECTOMY      TONSILLECTOMY      TOTAL ABDOMINAL HYSTERECTOMY         Family History   Problem Relation Age of Onset    Cancer Mother         liver    Pancreatic cancer Mother     Heart disease Mother     Depression Mother     Miscarriages / Stillbirths Mother     Hypertension Father     Alzheimer's disease Father     Depression Brother     Depression Brother     Diabetes Brother     Arthritis Brother     Hypertension Brother     Heart disease Brother        Social History     Socioeconomic History    Marital status:     Number of children: 6    Years of education: college   Occupational History    Occupation: retired  for [employer]   Tobacco Use    Smoking status: Former     Current packs/day: 0.00     Average packs/day: 0.5 packs/day for 21.7 years (10.9 ttl pk-yrs)     Types: Cigarettes     Start date: 1959     Quit date: 1980     Years since quittin.9    Smokeless tobacco: Never   Substance and Sexual Activity    Alcohol use: Not Currently     Alcohol/week: 1.0 standard drink of alcohol     Types: 1 Glasses of wine per week     Comment: Occasionally    Drug use: Never    Sexual activity: Not Currently     Partners: Male     Birth control/protection: Condom, Other-see comments     Comment: Pill     Social Determinants of Health     Financial Resource Strain: Low Risk  (2020)    Overall Financial Resource Strain (CARDIA)     Difficulty of Paying Living Expenses: Not hard at all   Food Insecurity: No Food Insecurity (2020)    Hunger Vital Sign     Worried About Running Out of Food in the Last Year: Never true     Ran Out of Food in the Last Year: Never true   Transportation Needs: No Transportation Needs (2020)    PRAPARE - Transportation     Lack of Transportation (Medical): No     Lack of Transportation (Non-Medical): No   Physical Activity: Unknown (2020)     "Exercise Vital Sign     Days of Exercise per Week: Patient refused     Minutes of Exercise per Session: 0 min   Stress: No Stress Concern Present (4/6/2020)    [place] [place] [employer] - Occupational Stress Questionnaire     Feeling of Stress : Not at all   Social Connections: Unknown (4/6/2020)    Social Connection and Isolation Panel [NHANES]     Frequency of Communication with Friends and Family: More than three times a week     Frequency of Social Gatherings with Friends and Family: Twice a week     Active Member of Clubs or Organizations: Yes     Attends Club or Organization Meetings: More than 4 times per year     Marital Status:        Review of Systems   Respiratory:  Positive for shortness of breath.    Musculoskeletal:  Positive for back pain.         Objective:     Vitals:    08/14/23 0855 08/14/23 0915   BP: 136/82    BP Location: Left arm    Patient Position: Sitting    Pulse: (!) 55 60   SpO2: 96%    Weight: 66 kg (145 lb 8 oz)    Height: 5' 5" (1.651 m)           Physical Exam  Vitals reviewed.   Constitutional:       Appearance: Normal appearance. She is well-developed and well-groomed.   HENT:      Head: Normocephalic and atraumatic.   Eyes:      Extraocular Movements: Extraocular movements intact.   Cardiovascular:      Rate and Rhythm: Normal rate and regular rhythm.      Heart sounds: Normal heart sounds.   Pulmonary:      Effort: Pulmonary effort is normal.      Breath sounds: Normal breath sounds.   Musculoskeletal:      Right lower leg: No edema.      Left lower leg: No edema.   Skin:     General: Skin is warm and dry.   Neurological:      General: No focal deficit present.      Mental Status: She is alert and oriented to person, place, and time.   Psychiatric:         Speech: Speech normal.         Behavior: Behavior is cooperative.           Laboratory:  CBC:  No results for input(s): "WBC", "RBC", "HGB", "HCT", "PLT", "MCV", "MCH", "MCHC" in the last 2160 " "hours.  CMP:  No results for input(s): "GLU", "CALCIUM", "ALBUMIN", "PROT", "NA", "K", "CO2", "CL", "BUN", "ALKPHOS", "ALT", "AST", "BILITOT" in the last 2160 hours.    Invalid input(s): "CREATININ"  URINALYSIS:  No results for input(s): "COLORU", "CLARITYU", "SPECGRAV", "PHUR", "PROTEINUA", "GLUCOSEU", "BILIRUBINCON", "BLOODU", "WBCU", "RBCU", "BACTERIA", "MUCUS", "NITRITE", "LEUKOCYTESUR", "UROBILINOGEN", "HYALINECASTS" in the last 2160 hours.   LIPIDS:  Recent Labs   Lab Result Units 05/22/23  1027   HDL mg/dL 56   Cholesterol mg/dL 173   Triglycerides mg/dL 97   LDL Cholesterol mg/dL 97.6   HDL/Cholesterol Ratio % 32.4   Non-HDL Cholesterol mg/dL 117   Total Cholesterol/HDL Ratio  3.1     TSH:  No results for input(s): "TSH" in the last 2160 hours.  A1C:  No results for input(s): "HGBA1C" in the last 2160 hours.    Assessment:         ICD-10-CM ICD-9-CM   1. Essential hypertension  I10 401.9   2. Anxiety and depression  F41.9 300.00    F32.A 311   3. Gastroesophageal reflux disease without esophagitis  K21.9 530.81   4. Back pain, unspecified back location, unspecified back pain laterality, unspecified chronicity  M54.9 724.5   5. Dyspnea, unspecified type  R06.00 786.09       Plan:       Essential hypertension  Controlled with current regimen.     Anxiety and depression  Controlled with escitalopram daily.     Gastroesophageal reflux disease without esophagitis  Continue Pantoprazole daily 30 minutes before breakfast. Encouraged diet modification.     Back pain, unspecified back location, unspecified back pain laterality, unspecified chronicity  Continue over the counter meds as needed. Instructed to notify us if she wishes to try physical therapy.     Dyspnea, unspecified  type  Continue inhaler as prescribed. Assisted to schedule f/u with Dr. Hi with Pulmonology.   CT chest scheduled for 09/28/2023.     If symptoms worsen, go to ER.  If symptoms do not improve, return to clinic.   Keep appointments with " all specialists.     Patient verbalizes understanding and agrees with current treatment plan.      Follow up in about 3 months (around 11/14/2023), or if symptoms worsen or fail to improve.     Patient's Medications   New Prescriptions    No medications on file   Previous Medications    ALENDRONATE (FOSAMAX) 70 MG TABLET    Take 1 tablet (70 mg total) by mouth every 7 days.    AMITRIPTYLINE (ELAVIL) 10 MG TABLET    Take 3 tablets (30 mg total) by mouth nightly as needed for Insomnia.    ASPIRIN (ECOTRIN) 81 MG EC TABLET    Take 81 mg by mouth once daily.    AZELASTINE (ASTELIN) 137 MCG (0.1 %) NASAL SPRAY    1 spray (137 mcg total) by Nasal route 2 (two) times daily.    BENEFIBER, WHEAT DEXTRIN, ORAL    Take by mouth. PRN    CALCIUM CITRATE-VITAMIN D3 500 MG CALCIUM -400 UNIT CHEW    Take 1 tablet by mouth 2 (two) times daily. 12.5mcg    ESCITALOPRAM OXALATE (LEXAPRO) 20 MG TABLET    TAKE 1 TABLET (20 MG TOTAL) BY MOUTH ONCE DAILY.    FISH OIL-OMEGA-3 FATTY ACIDS 300-1,000 MG CAPSULE    Take 2 g by mouth once daily.    LACTOBACILLUS ACIDOPHILUS (PROBIOTIC ACIDOPHILUS ORAL)    Take by mouth.    LATANOPROST 0.005 % OPHTHALMIC SOLUTION    1 drop every evening.    LOSARTAN (COZAAR) 25 MG TABLET    TAKE 1 TABLET ONE TIME DAILY    METOPROLOL TARTRATE 37.5 MG TAB    Take 37.5 mg by mouth 2 (two) times daily.    MULTIVIT-IRON-MIN-FOLIC ACID 3,500-18-0.4 UNIT-MG-MG ORAL CHEW    Take by mouth.    PANTOPRAZOLE (PROTONIX) 40 MG TABLET    TAKE 1 TABLET EVERY DAY    PRAVASTATIN (PRAVACHOL) 20 MG TABLET    Take 1 tablet (20 mg total) by mouth once daily.    TIOTROPIUM-OLODATEROL (STIOLTO RESPIMAT) 2.5-2.5 MCG/ACTUATION MIST    Inhale 2 puffs into the lungs once daily. Controller    TRIAMTERENE-HYDROCHLOROTHIAZIDE 37.5-25 MG (DYAZIDE) 37.5-25 MG PER CAPSULE    Take 1 capsule by mouth every morning.    VIT A/VIT C/VIT E/ZINC/COPPER (PRESERVISION AREDS ORAL)    Take by mouth. Bottle does not say a but does say Lutein   Modified  Medications    No medications on file   Discontinued Medications    No medications on file         Mery Benites NP

## 2023-08-22 ENCOUNTER — TELEPHONE (OUTPATIENT)
Dept: ORTHOPEDICS | Facility: CLINIC | Age: 83
End: 2023-08-22
Payer: MEDICARE

## 2023-08-22 NOTE — H&P (VIEW-ONLY)
DATE: 8/29/2023  PATIENT: Nereyda Hernandez    Supervising Physician: Maximo Zavala M.D.    CHIEF COMPLAINT: back pain    HISTORY:  Nereyda Hernandez is a 83 y.o. female here for initial evaluation of low back pain (Back - 6). The pain has been present since a ground level fall in March of 2023. She was diagnosed with a T12 compression fracture and had a kyphoplasty done that did not help with her pain. The patient describes the pain as sharp.  The pain is worse with walking and standing and improved by sitting. There is no associated numbness and tingling. There is no subjective weakness. Prior treatments have included tylenol, but no PT, MEHRDAD or surgery.    The patient denies myelopathic symptoms such as handwriting changes or difficulty with buttons/coins/keys. Denies perineal paresthesias, bowel/bladder dysfunction.    PAST MEDICAL/SURGICAL HISTORY:  Past Medical History:   Diagnosis Date    HATTIE (acute kidney injury) 12/10/2021    Anxiety     Arthritis     Breast cancer 1990    left    Chronic disease anemia     Hyperlipidemia     Hypertension     Insomnia     Lobular carcinoma in situ     KANWAL (obstructive sleep apnea)     Osteoporosis     Rosacea     Squamous cell carcinoma     Stable angina pectoris 8/27/2020    Urinary incontinence     Vertigo      Past Surgical History:   Procedure Laterality Date    ADENOIDECTOMY      BELT ABDOMINOPLASTY      BREAST BIOPSY Left 1990    ex bx    BREAST LUMPECTOMY      COLONOSCOPY N/A 1/28/2020    Procedure: COLONOSCOPY;  Surgeon: Bianka Macias MD;  Location: [facility];  Service: Endoscopy;  Laterality: N/A;    ESOPHAGOGASTRODUODENOSCOPY N/A 1/28/2020    Procedure: EGD (ESOPHAGOGASTRODUODENOSCOPY);  Surgeon: Bianka Macias MD;  Location: [facility];  Service: Endoscopy;  Laterality: N/A;    EYE SURGERY      HERNIA REPAIR      Ventral    LIVER TRANSPLANT      OOPHORECTOMY      TONSILLECTOMY      TOTAL ABDOMINAL HYSTERECTOMY         Current Medications:   Current Outpatient  Medications:     alendronate (FOSAMAX) 70 MG tablet, Take 1 tablet (70 mg total) by mouth every 7 days., Disp: 12 tablet, Rfl: 3    aspirin (ECOTRIN) 81 MG EC tablet, Take 81 mg by mouth once daily., Disp: , Rfl:     azelastine (ASTELIN) 137 mcg (0.1 %) nasal spray, 1 spray (137 mcg total) by Nasal route 2 (two) times daily., Disp: 30 mL, Rfl: 3    BENEFIBER, WHEAT DEXTRIN, ORAL, Take by mouth. PRN, Disp: , Rfl:     calcium citrate-vitamin D3 500 mg calcium -400 unit Chew, Take 1 tablet by mouth 2 (two) times daily. 12.5mcg, Disp: , Rfl:     EScitalopram oxalate (LEXAPRO) 20 MG tablet, TAKE 1 TABLET (20 MG TOTAL) BY MOUTH ONCE DAILY., Disp: 90 tablet, Rfl: 3    fish oil-omega-3 fatty acids 300-1,000 mg capsule, Take 2 g by mouth once daily., Disp: , Rfl:     Lactobacillus acidophilus (PROBIOTIC ACIDOPHILUS ORAL), Take by mouth., Disp: , Rfl:     latanoprost 0.005 % ophthalmic solution, 1 drop every evening., Disp: , Rfl:     losartan (COZAAR) 25 MG tablet, TAKE 1 TABLET ONE TIME DAILY, Disp: 90 tablet, Rfl: 3    MULTIVIT-IRON-MIN-FOLIC ACID 3,500-18-0.4 UNIT-MG-MG ORAL CHEW, Take by mouth., Disp: , Rfl:     pantoprazole (PROTONIX) 40 MG tablet, TAKE 1 TABLET EVERY DAY, Disp: 90 tablet, Rfl: 3    pravastatin (PRAVACHOL) 20 MG tablet, Take 1 tablet (20 mg total) by mouth once daily., Disp: 90 tablet, Rfl: 3    tiotropium-olodateroL (STIOLTO RESPIMAT) 2.5-2.5 mcg/actuation Mist, Inhale 2 puffs into the lungs once daily. Controller, Disp: 4 g, Rfl: 6    triamterene-hydrochlorothiazide 37.5-25 mg (DYAZIDE) 37.5-25 mg per capsule, Take 1 capsule by mouth every morning., Disp: 90 capsule, Rfl: 3    vit A/vit C/vit E/zinc/copper (PRESERVISION AREDS ORAL), Take by mouth. Bottle does not say a but does say Lutein, Disp: , Rfl:     amitriptyline (ELAVIL) 10 MG tablet, Take 3 tablets (30 mg total) by mouth nightly as needed for Insomnia., Disp: 270 tablet, Rfl: 2    metoprolol tartrate 37.5 mg Tab, Take 37.5 mg by mouth 2 (two)  times daily., Disp: 180 tablet, Rfl: 3    tiZANidine (ZANAFLEX) 2 MG tablet, Take 1 tablet (2 mg total) by mouth every 6 (six) hours as needed (muscle spasms)., Disp: 60 tablet, Rfl: 4    Social History:   Social History     Socioeconomic History    Marital status:     Number of children: 6    Years of education: college   Occupational History    Occupation: retired  for [employer]   Tobacco Use    Smoking status: Former     Current packs/day: 0.00     Average packs/day: 0.5 packs/day for 21.7 years (10.9 ttl pk-yrs)     Types: Cigarettes     Start date: 1959     Quit date: 1980     Years since quittin.9    Smokeless tobacco: Never   Substance and Sexual Activity    Alcohol use: Not Currently     Alcohol/week: 1.0 standard drink of alcohol     Types: 1 Glasses of wine per week     Comment: Occasionally    Drug use: Never    Sexual activity: Not Currently     Partners: Male     Birth control/protection: Condom, Other-see comments     Comment: Pill     Social Determinants of Health     Financial Resource Strain: Low Risk  (2020)    Overall Financial Resource Strain (CARDIA)     Difficulty of Paying Living Expenses: Not hard at all   Food Insecurity: No Food Insecurity (2020)    Hunger Vital Sign     Worried About Running Out of Food in the Last Year: Never true     Ran Out of Food in the Last Year: Never true   Transportation Needs: No Transportation Needs (2020)    PRAPARE - Transportation     Lack of Transportation (Medical): No     Lack of Transportation (Non-Medical): No   Physical Activity: Unknown (2020)    Exercise Vital Sign     Days of Exercise per Week: Patient refused     Minutes of Exercise per Session: 0 min   Stress: No Stress Concern Present (2020)    [employer] [place] [employer] - Occupational Stress Questionnaire     Feeling of Stress : Not at all   Social Connections: Unknown (2020)    Social Connection and Isolation Panel  "[NHANES]     Frequency of Communication with Friends and Family: More than three times a week     Frequency of Social Gatherings with Friends and Family: Twice a week     Active Member of Clubs or Organizations: Yes     Attends Club or Organization Meetings: More than 4 times per year     Marital Status:        REVIEW OF SYSTEMS:  Constitution: Negative. Negative for chills, fever and night sweats.   Cardiovascular: Negative for chest pain and syncope.   Respiratory: Negative for cough and shortness of breath.   Gastrointestinal: See HPI. Negative for nausea/vomiting. Negative for abdominal pain.  Genitourinary: See HPI. Negative for discoloration or dysuria.  Skin: Negative for dry skin, itching and rash.   Hematologic/Lymphatic: Negative for bleeding problem. Does not bruise/bleed easily.   Musculoskeletal: Negative for falls and muscle weakness.   Neurological: See HPI. No seizures.   Endocrine: Negative for polydipsia, polyphagia and polyuria.   Allergic/Immunologic: Negative for hives and persistent infections.    PHYSICAL EXAMINATION:    Ht 5' 5" (1.651 m)   Wt 66.6 kg (146 lb 11.5 oz)   LMP  (LMP Unknown)   BMI 24.41 kg/m²     General: The patient is a 83 y.o. female in no apparent distress, the patient is oriented to person, place and time.   Psych: Normal mood and affect  HEENT: Vision grossly intact, hearing intact to the spoken word.  Lungs: Respirations unlabored.  Gait: Normal station and gait, no difficulty with toe or heel walk.   Skin: Dorsal lumbar skin negative for rashes, lesions, hairy patches and surgical scars.  Range of motion: Lumbar range of motion is acceptable. There is mild lumbar tenderness to palpation.  Spinal Balance: Global saggital and coronal spinal balance acceptable, no significant for scoliosis and kyphosis.  Musculoskeletal: No pain with the range of motion of the bilateral hips. No trochanteric tenderness to palpation.  Vascular: Bilateral lower extremities warm and " "well perfused, Dorsalis pedis pulses 2+ bilaterally.  Neurological: Normal strength and tone in all major motor groups in the bilateral lower extremities. Normal sensation to light touch in the L2-S1 dermatomes bilaterally.  Deep tendon reflexes symmetric 2+ in the bilateral lower extremities.  Negative Babinski bilaterally.  Straight leg raise negative bilaterally.     IMAGING:   Today I personally reviewed AP, Lat and Flex/Ex upright L-spine films that demonstrate T12 compression fracture.  dextroconvex curvature with moderate DDD throughout.     Thoracic MRI (3/27/23) shows subacute T12 vertebral body burst fracture with moderate height loss and mild retropulsion resulting in mild spinal canal stenosis.    Body mass index is 24.41 kg/m².    No results found for: "HGBA1C"      ASSESSMENT/PLAN:    Nereyda was seen today for low-back pain.    Diagnoses and all orders for this visit:    Chronic right-sided thoracic back pain  -     Discontinue: tiZANidine (ZANAFLEX) 2 MG tablet; Take 1 tablet (2 mg total) by mouth every 6 (six) hours as needed (muscle spasms).  -     Procedure Order to Pain Management; Future  -     tiZANidine (ZANAFLEX) 2 MG tablet; Take 1 tablet (2 mg total) by mouth every 6 (six) hours as needed (muscle spasms).      Today we discussed at length all of the different treatment options including anti-inflammatories, acetaminophen, rest, ice, heat, physical therapy including strengthening and stretching exercises, home exercises, ROM, aerobic conditioning, aqua therapy, other modalities including ultrasound, massage, and dry needling, epidural steroid injections and finally surgical intervention.  right TF MEHRDAD ordered with pain mgmt. She may follow up 2 weeks after if her pain persists.     "

## 2023-08-22 NOTE — TELEPHONE ENCOUNTER
----- Message from Trini Barrow sent at 8/21/2023  3:43 PM CDT -----  Regarding: appt  Contact: pt [phone]  PATIENTCALL     Pt daughter Valeri is calling to speak with someone in provider office regarding her mother fell a yr ago she and she is in pain still she is asking for a return call please call pt at  [phone]         WE SPOKE:  she chose Ms. Keerthi Khalil NP this coming Tuesday @ 2 pm

## 2023-08-22 NOTE — PROGRESS NOTES
DATE: 8/29/2023  PATIENT: Nereyda Hernandez    Supervising Physician: Maximo Zavala M.D.    CHIEF COMPLAINT: back pain    HISTORY:  Nereyda Hernandez is a 83 y.o. female here for initial evaluation of low back pain (Back - 6). The pain has been present since a ground level fall in March of 2023. She was diagnosed with a T12 compression fracture and had a kyphoplasty done that did not help with her pain. The patient describes the pain as sharp.  The pain is worse with walking and standing and improved by sitting. There is no associated numbness and tingling. There is no subjective weakness. Prior treatments have included tylenol, but no PT, MEHRDAD or surgery.    The patient denies myelopathic symptoms such as handwriting changes or difficulty with buttons/coins/keys. Denies perineal paresthesias, bowel/bladder dysfunction.    PAST MEDICAL/SURGICAL HISTORY:  Past Medical History:   Diagnosis Date    HATTIE (acute kidney injury) 12/10/2021    Anxiety     Arthritis     Breast cancer 1990    left    Chronic disease anemia     Hyperlipidemia     Hypertension     Insomnia     Lobular carcinoma in situ     KANWAL (obstructive sleep apnea)     Osteoporosis     Rosacea     Squamous cell carcinoma     Stable angina pectoris 8/27/2020    Urinary incontinence     Vertigo      Past Surgical History:   Procedure Laterality Date    ADENOIDECTOMY      BELT ABDOMINOPLASTY      BREAST BIOPSY Left 1990    ex bx    BREAST LUMPECTOMY      COLONOSCOPY N/A 1/28/2020    Procedure: COLONOSCOPY;  Surgeon: Bianka Macias MD;  Location: [facility];  Service: Endoscopy;  Laterality: N/A;    ESOPHAGOGASTRODUODENOSCOPY N/A 1/28/2020    Procedure: EGD (ESOPHAGOGASTRODUODENOSCOPY);  Surgeon: Bianka Macias MD;  Location: [facility];  Service: Endoscopy;  Laterality: N/A;    EYE SURGERY      HERNIA REPAIR      Ventral    LIVER TRANSPLANT      OOPHORECTOMY      TONSILLECTOMY      TOTAL ABDOMINAL HYSTERECTOMY         Current Medications:   Current Outpatient  Medications:     alendronate (FOSAMAX) 70 MG tablet, Take 1 tablet (70 mg total) by mouth every 7 days., Disp: 12 tablet, Rfl: 3    aspirin (ECOTRIN) 81 MG EC tablet, Take 81 mg by mouth once daily., Disp: , Rfl:     azelastine (ASTELIN) 137 mcg (0.1 %) nasal spray, 1 spray (137 mcg total) by Nasal route 2 (two) times daily., Disp: 30 mL, Rfl: 3    BENEFIBER, WHEAT DEXTRIN, ORAL, Take by mouth. PRN, Disp: , Rfl:     calcium citrate-vitamin D3 500 mg calcium -400 unit Chew, Take 1 tablet by mouth 2 (two) times daily. 12.5mcg, Disp: , Rfl:     EScitalopram oxalate (LEXAPRO) 20 MG tablet, TAKE 1 TABLET (20 MG TOTAL) BY MOUTH ONCE DAILY., Disp: 90 tablet, Rfl: 3    fish oil-omega-3 fatty acids 300-1,000 mg capsule, Take 2 g by mouth once daily., Disp: , Rfl:     Lactobacillus acidophilus (PROBIOTIC ACIDOPHILUS ORAL), Take by mouth., Disp: , Rfl:     latanoprost 0.005 % ophthalmic solution, 1 drop every evening., Disp: , Rfl:     losartan (COZAAR) 25 MG tablet, TAKE 1 TABLET ONE TIME DAILY, Disp: 90 tablet, Rfl: 3    MULTIVIT-IRON-MIN-FOLIC ACID 3,500-18-0.4 UNIT-MG-MG ORAL CHEW, Take by mouth., Disp: , Rfl:     pantoprazole (PROTONIX) 40 MG tablet, TAKE 1 TABLET EVERY DAY, Disp: 90 tablet, Rfl: 3    pravastatin (PRAVACHOL) 20 MG tablet, Take 1 tablet (20 mg total) by mouth once daily., Disp: 90 tablet, Rfl: 3    tiotropium-olodateroL (STIOLTO RESPIMAT) 2.5-2.5 mcg/actuation Mist, Inhale 2 puffs into the lungs once daily. Controller, Disp: 4 g, Rfl: 6    triamterene-hydrochlorothiazide 37.5-25 mg (DYAZIDE) 37.5-25 mg per capsule, Take 1 capsule by mouth every morning., Disp: 90 capsule, Rfl: 3    vit A/vit C/vit E/zinc/copper (PRESERVISION AREDS ORAL), Take by mouth. Bottle does not say a but does say Lutein, Disp: , Rfl:     amitriptyline (ELAVIL) 10 MG tablet, Take 3 tablets (30 mg total) by mouth nightly as needed for Insomnia., Disp: 270 tablet, Rfl: 2    metoprolol tartrate 37.5 mg Tab, Take 37.5 mg by mouth 2 (two)  times daily., Disp: 180 tablet, Rfl: 3    tiZANidine (ZANAFLEX) 2 MG tablet, Take 1 tablet (2 mg total) by mouth every 6 (six) hours as needed (muscle spasms)., Disp: 60 tablet, Rfl: 4    Social History:   Social History     Socioeconomic History    Marital status:     Number of children: 6    Years of education: college   Occupational History    Occupation: retired  for [employer]   Tobacco Use    Smoking status: Former     Current packs/day: 0.00     Average packs/day: 0.5 packs/day for 21.7 years (10.9 ttl pk-yrs)     Types: Cigarettes     Start date: 1959     Quit date: 1980     Years since quittin.9    Smokeless tobacco: Never   Substance and Sexual Activity    Alcohol use: Not Currently     Alcohol/week: 1.0 standard drink of alcohol     Types: 1 Glasses of wine per week     Comment: Occasionally    Drug use: Never    Sexual activity: Not Currently     Partners: Male     Birth control/protection: Condom, Other-see comments     Comment: Pill     Social Determinants of Health     Financial Resource Strain: Low Risk  (2020)    Overall Financial Resource Strain (CARDIA)     Difficulty of Paying Living Expenses: Not hard at all   Food Insecurity: No Food Insecurity (2020)    Hunger Vital Sign     Worried About Running Out of Food in the Last Year: Never true     Ran Out of Food in the Last Year: Never true   Transportation Needs: No Transportation Needs (2020)    PRAPARE - Transportation     Lack of Transportation (Medical): No     Lack of Transportation (Non-Medical): No   Physical Activity: Unknown (2020)    Exercise Vital Sign     Days of Exercise per Week: Patient refused     Minutes of Exercise per Session: 0 min   Stress: No Stress Concern Present (2020)    [place] [employer] - Occupational Stress Questionnaire     Feeling of Stress : Not at all   Social Connections: Unknown (2020)    Social Connection and Isolation Panel  "[NHANES]     Frequency of Communication with Friends and Family: More than three times a week     Frequency of Social Gatherings with Friends and Family: Twice a week     Active Member of Clubs or Organizations: Yes     Attends Club or Organization Meetings: More than 4 times per year     Marital Status:        REVIEW OF SYSTEMS:  Constitution: Negative. Negative for chills, fever and night sweats.   Cardiovascular: Negative for chest pain and syncope.   Respiratory: Negative for cough and shortness of breath.   Gastrointestinal: See HPI. Negative for nausea/vomiting. Negative for abdominal pain.  Genitourinary: See HPI. Negative for discoloration or dysuria.  Skin: Negative for dry skin, itching and rash.   Hematologic/Lymphatic: Negative for bleeding problem. Does not bruise/bleed easily.   Musculoskeletal: Negative for falls and muscle weakness.   Neurological: See HPI. No seizures.   Endocrine: Negative for polydipsia, polyphagia and polyuria.   Allergic/Immunologic: Negative for hives and persistent infections.    PHYSICAL EXAMINATION:    Ht 5' 5" (1.651 m)   Wt 66.6 kg (146 lb 11.5 oz)   LMP  (LMP Unknown)   BMI 24.41 kg/m²     General: The patient is a 83 y.o. female in no apparent distress, the patient is oriented to person, place and time.   Psych: Normal mood and affect  HEENT: Vision grossly intact, hearing intact to the spoken word.  Lungs: Respirations unlabored.  Gait: Normal station and gait, no difficulty with toe or heel walk.   Skin: Dorsal lumbar skin negative for rashes, lesions, hairy patches and surgical scars.  Range of motion: Lumbar range of motion is acceptable. There is mild lumbar tenderness to palpation.  Spinal Balance: Global saggital and coronal spinal balance acceptable, no significant for scoliosis and kyphosis.  Musculoskeletal: No pain with the range of motion of the bilateral hips. No trochanteric tenderness to palpation.  Vascular: Bilateral lower extremities warm and " "well perfused, Dorsalis pedis pulses 2+ bilaterally.  Neurological: Normal strength and tone in all major motor groups in the bilateral lower extremities. Normal sensation to light touch in the L2-S1 dermatomes bilaterally.  Deep tendon reflexes symmetric 2+ in the bilateral lower extremities.  Negative Babinski bilaterally.  Straight leg raise negative bilaterally.     IMAGING:   Today I personally reviewed AP, Lat and Flex/Ex upright L-spine films that demonstrate T12 compression fracture.  dextroconvex curvature with moderate DDD throughout.     Thoracic MRI (3/27/23) shows subacute T12 vertebral body burst fracture with moderate height loss and mild retropulsion resulting in mild spinal canal stenosis.    Body mass index is 24.41 kg/m².    No results found for: "HGBA1C"      ASSESSMENT/PLAN:    Nereyda was seen today for low-back pain.    Diagnoses and all orders for this visit:    Chronic right-sided thoracic back pain  -     Discontinue: tiZANidine (ZANAFLEX) 2 MG tablet; Take 1 tablet (2 mg total) by mouth every 6 (six) hours as needed (muscle spasms).  -     Procedure Order to Pain Management; Future  -     tiZANidine (ZANAFLEX) 2 MG tablet; Take 1 tablet (2 mg total) by mouth every 6 (six) hours as needed (muscle spasms).      Today we discussed at length all of the different treatment options including anti-inflammatories, acetaminophen, rest, ice, heat, physical therapy including strengthening and stretching exercises, home exercises, ROM, aerobic conditioning, aqua therapy, other modalities including ultrasound, massage, and dry needling, epidural steroid injections and finally surgical intervention.  right TF MERHDAD ordered with pain mgmt. She may follow up 2 weeks after if her pain persists.     "

## 2023-08-23 ENCOUNTER — TELEPHONE (OUTPATIENT)
Dept: ORTHOPEDICS | Facility: CLINIC | Age: 83
End: 2023-08-23
Payer: MEDICARE

## 2023-08-23 ENCOUNTER — PES CALL (OUTPATIENT)
Dept: ADMINISTRATIVE | Facility: CLINIC | Age: 83
End: 2023-08-23
Payer: MEDICARE

## 2023-08-23 DIAGNOSIS — M51.36 DDD (DEGENERATIVE DISC DISEASE), LUMBAR: Primary | ICD-10-CM

## 2023-08-23 NOTE — TELEPHONE ENCOUNTER
Spoke to patient daughter regarding clarification of the area of back pain. Daughter Valeri stated that it is the low area of the back. Stated that her x-ray will be scheduled at the [facility] for 1:00 pm on 08/29/2023. Stated thank you. Thanks.

## 2023-08-29 ENCOUNTER — TELEPHONE (OUTPATIENT)
Dept: PAIN MEDICINE | Facility: CLINIC | Age: 83
End: 2023-08-29
Payer: MEDICARE

## 2023-08-29 ENCOUNTER — OFFICE VISIT (OUTPATIENT)
Dept: ORTHOPEDICS | Facility: CLINIC | Age: 83
End: 2023-08-29
Payer: MEDICARE

## 2023-08-29 ENCOUNTER — HOSPITAL ENCOUNTER (OUTPATIENT)
Dept: RADIOLOGY | Facility: HOSPITAL | Age: 83
Discharge: HOME | End: 2023-08-29
Attending: REGISTERED NURSE
Payer: MEDICARE

## 2023-08-29 VITALS — WEIGHT: 146.69 LBS | HEIGHT: 65 IN | BODY MASS INDEX: 24.44 KG/M2

## 2023-08-29 DIAGNOSIS — G89.29 CHRONIC RIGHT-SIDED THORACIC BACK PAIN: Primary | ICD-10-CM

## 2023-08-29 DIAGNOSIS — M54.14 THORACIC RADICULOPATHY: Primary | ICD-10-CM

## 2023-08-29 DIAGNOSIS — M51.36 DDD (DEGENERATIVE DISC DISEASE), LUMBAR: ICD-10-CM

## 2023-08-29 DIAGNOSIS — M54.6 THORACIC SPINE PAIN: ICD-10-CM

## 2023-08-29 DIAGNOSIS — M54.6 CHRONIC RIGHT-SIDED THORACIC BACK PAIN: Primary | ICD-10-CM

## 2023-08-29 PROCEDURE — 1101F PR PT FALLS ASSESS DOC 0-1 FALLS W/OUT INJ PAST YR: ICD-10-PCS | Mod: HCNC,CPTII,S$GLB, | Performed by: REGISTERED NURSE

## 2023-08-29 PROCEDURE — 1157F ADVNC CARE PLAN IN RCRD: CPT | Mod: HCNC,CPTII,S$GLB, | Performed by: REGISTERED NURSE

## 2023-08-29 PROCEDURE — 72110 XR LUMBAR SPINE AP AND LAT WITH FLEX/EXT: ICD-10-PCS | Mod: 26,HCNC,, | Performed by: RADIOLOGY

## 2023-08-29 PROCEDURE — 1159F PR MEDICATION LIST DOCUMENTED IN MEDICAL RECORD: ICD-10-PCS | Mod: HCNC,CPTII,S$GLB, | Performed by: REGISTERED NURSE

## 2023-08-29 PROCEDURE — 3288F FALL RISK ASSESSMENT DOCD: CPT | Mod: HCNC,CPTII,S$GLB, | Performed by: REGISTERED NURSE

## 2023-08-29 PROCEDURE — 72110 X-RAY EXAM L-2 SPINE 4/>VWS: CPT | Mod: 26,HCNC,, | Performed by: RADIOLOGY

## 2023-08-29 PROCEDURE — 1157F PR ADVANCE CARE PLAN OR EQUIV PRESENT IN MEDICAL RECORD: ICD-10-PCS | Mod: HCNC,CPTII,S$GLB, | Performed by: REGISTERED NURSE

## 2023-08-29 PROCEDURE — 3288F PR FALLS RISK ASSESSMENT DOCUMENTED: ICD-10-PCS | Mod: HCNC,CPTII,S$GLB, | Performed by: REGISTERED NURSE

## 2023-08-29 PROCEDURE — 99999 PR PBB SHADOW E&M-EST. PATIENT-LVL III: CPT | Mod: PBBFAC,HCNC,, | Performed by: REGISTERED NURSE

## 2023-08-29 PROCEDURE — 99999 PR PBB SHADOW E&M-EST. PATIENT-LVL III: ICD-10-PCS | Mod: PBBFAC,HCNC,, | Performed by: REGISTERED NURSE

## 2023-08-29 PROCEDURE — 1125F AMNT PAIN NOTED PAIN PRSNT: CPT | Mod: HCNC,CPTII,S$GLB, | Performed by: REGISTERED NURSE

## 2023-08-29 PROCEDURE — 1101F PT FALLS ASSESS-DOCD LE1/YR: CPT | Mod: HCNC,CPTII,S$GLB, | Performed by: REGISTERED NURSE

## 2023-08-29 PROCEDURE — 99204 PR OFFICE/OUTPT VISIT, NEW, LEVL IV, 45-59 MIN: ICD-10-PCS | Mod: HCNC,S$GLB,, | Performed by: REGISTERED NURSE

## 2023-08-29 PROCEDURE — 1125F PR PAIN SEVERITY QUANTIFIED, PAIN PRESENT: ICD-10-PCS | Mod: HCNC,CPTII,S$GLB, | Performed by: REGISTERED NURSE

## 2023-08-29 PROCEDURE — 1159F MED LIST DOCD IN RCRD: CPT | Mod: HCNC,CPTII,S$GLB, | Performed by: REGISTERED NURSE

## 2023-08-29 PROCEDURE — 99204 OFFICE O/P NEW MOD 45 MIN: CPT | Mod: HCNC,S$GLB,, | Performed by: REGISTERED NURSE

## 2023-08-29 PROCEDURE — 72110 X-RAY EXAM L-2 SPINE 4/>VWS: CPT | Mod: TC,HCNC

## 2023-08-29 RX ORDER — TIZANIDINE 2 MG/1
2 TABLET ORAL EVERY 6 HOURS PRN
Qty: 60 TABLET | Refills: 4 | Status: SHIPPED | OUTPATIENT
Start: 2023-08-29 | End: 2023-11-12

## 2023-08-29 RX ORDER — TIZANIDINE 2 MG/1
2 TABLET ORAL EVERY 6 HOURS PRN
Qty: 60 TABLET | Refills: 4 | Status: SHIPPED | OUTPATIENT
Start: 2023-08-29 | End: 2023-08-29 | Stop reason: SDUPTHER

## 2023-08-29 NOTE — TELEPHONE ENCOUNTER
----- Message from NIRANJAN Khalil NP sent at 2023  2:03 PM CDT -----  Regarding: Order for CHAS LEONE    Patient Name: CHAS LEONE([id number])  Sex: Female  : 1940      PCP: REY DON    Center: [facility] CENTRAL BILLING OFFICE     Level of Service:40255     RI OFFICE/OUTPT VISIT, NEW, SHONAL IV, 45-59 MIN    Types of orders made on 2023: Medications, Procedure Request    Order Date:2023  Ordering User:CARLI KHALIL   [[id number]]  Encounter Provider:NIRANJAN Khalil NP [[id number]]  Authorizing Provider: NIRANJAN Khalil NP [[id number]]  Supervising Provider:KALIN BENNETT [[id number]]  Type of Supervision:Collaborating Physician  Department:[facility][[id number]]    Common Order Information  Procedure -> Transforaminal Injection (Specify level and laterality) Cmt: right             T11/12 & T12/L1    Order Specific Information  Order:   Procedure Order to Pain Management [Custom: [id number]]  Order #:          [id number]: 1 FUTURE    Priority: Routine  Class: Clinic Performed    Future Order Information      Expires on:2024            Expected by:2023                   Associated Diagnoses      M54.6, G89.29 Chronic right-sided thoracic back pain      Facility Name: -> [facility]           Priority: Routine  Class:   Clinic Performed    Future Order Information      Expires on:2024            Expected by:2023                   Associated Diagnoses      M54.6, G89.29 Chronic right-sided thoracic back pain      Procedure -> Transforaminal Injection (Specify level and laterality) Cmt:                 right T11/12 & T12/L1        Facility Name: -> [facility]

## 2023-09-05 ENCOUNTER — TELEPHONE (OUTPATIENT)
Dept: PAIN MEDICINE | Facility: CLINIC | Age: 83
End: 2023-09-05
Payer: MEDICARE

## 2023-09-05 NOTE — TELEPHONE ENCOUNTER
----- Message from Sharona Weaver MD sent at 9/5/2023  4:20 PM CDT -----  Regarding: RE: Clearance request  Yes she can hold aspirin for 5 days    Sincerely,     Dr Weaver      ----- Message -----  From: Ludy Gonzales  Sent: 9/1/2023   8:39 AM CDT  To: Sharona Weaver MD  Subject: Clearance request                                Good Morning Dr Weaver,   Dr Abreu is scheduling Nereyda Hernandez for a Right T11-12, T12-L1 Transforaminal MEHRDAD on 9/14/23 and we would like to know if we can get clearance for her to stop her ASA 5 days prior to the injection.    Thank you in advance   Ludy Gonzales  Surgery scheduler

## 2023-09-06 NOTE — TELEPHONE ENCOUNTER
Informed pt to discontinue aspirin 5 days before her procedure on  9/14 with Dr. Abreu as approved by Dr. Weaver.

## 2023-09-12 ENCOUNTER — TELEPHONE (OUTPATIENT)
Dept: PAIN MEDICINE | Facility: CLINIC | Age: 83
End: 2023-09-12
Payer: MEDICARE

## 2023-09-12 NOTE — TELEPHONE ENCOUNTER
Called pt to confirm that she will be at her procedure on 9/14. Pt confirms will be there and denies taking antibiotics or blood thinners.

## 2023-09-13 NOTE — PRE-PROCEDURE INSTRUCTIONS
Unable to reach pt via phone. Left message with instructions along with a request for a call back. RN emphasized for pt to call back if they have been taking abx in the past 2 weeks. The following was sent to pt portal.    Dear Nereyda ,    You are scheduled for a procedure with Dr. Edson Beard on 9/14/2023.  Your scheduled arrival time is 7:00am.  This arrival time is roughly 1 hour before your anticipated procedure time to allow sufficient time for pre-op..  Please wear comfortable clothes.  Most patients do not need to change into a gown.  Please do not wear a dress.  This procedure will take place at the [facility] at the corner of [facility] and [facility].  [facility] [place] [facility] to [facility].  The address is:    [address] [place] [address].  [place] [id number]    After entering the building, you will proceed to the second floor where you can check in with registration. You should take any medications that you routinely take for blood pressure, heart medications, thyroid, cholesterol, etc.     The fasting restrictions are dependent on whether or not you are receiving sedation.  Sedation is not available for all procedures.     Your fasting instructions are as follow:  IV sedation. You should not eat for 8 hours and can only drink clear liquids (water or black coffee without cream/sugar) up until 2 hours before your scheduled time.  You CANNOT drive yourself and must have a .    If you are on blood thinners, you need to follow the anticoagulation instructions that had been discussed previously.  You should only stop the blood thinners if it was approved by your primary care physician or your cardiologist.  In the event that you are not able to stop your blood thinners, a blood thinner was not listed on your medication list, or we were not able to get clearance from your cardiologist, then the procedure may have to be postponed/canceled.     IF you were  told to stop your blood thinners, this is how long you should generally hold some of the more common ones.  Remember that stopping blood thinners is only necessary for certain procedures. If you are unsure of your instructions, please call us.   Aspirin - 5 days  Plavix/Clopidogrel - 7 days  Warfarin / Coumadin - 5 days  Eliquis - 3 days  Pradaxa/Dabigatran - 4 days  Xarelto/Rivaroxaban - 3 days    If you are a diabetic, do not take your medication if you will be fasting, but bring it with you. Please plan on being here for roughly 2 hours.     Please call us if you have been sick (running fever, having any flu-like symptoms) or have been taking antibiotics in the past 2 weeks or had any outpatient procedures other than with us (colonoscopy, endoscopy, OBGYN, dental, etc.). If you have been previously COVID positive, you will need to hold off on your procedure until you are symptom free for 10 days. If you did not have any symptoms, you can have your procedure 10 days from your positive test result.      Thank you,  [facility]

## 2023-09-14 ENCOUNTER — HOSPITAL ENCOUNTER (OUTPATIENT)
Facility: HOSPITAL | Age: 83
Discharge: HOME | End: 2023-09-14
Attending: STUDENT IN AN ORGANIZED HEALTH CARE EDUCATION/TRAINING PROGRAM | Admitting: STUDENT IN AN ORGANIZED HEALTH CARE EDUCATION/TRAINING PROGRAM
Payer: MEDICARE

## 2023-09-14 VITALS
HEART RATE: 59 BPM | BODY MASS INDEX: 24.41 KG/M2 | TEMPERATURE: 98 F | RESPIRATION RATE: 16 BRPM | DIASTOLIC BLOOD PRESSURE: 87 MMHG | SYSTOLIC BLOOD PRESSURE: 152 MMHG | OXYGEN SATURATION: 97 % | WEIGHT: 143 LBS | HEIGHT: 64 IN

## 2023-09-14 DIAGNOSIS — M54.14 THORACIC RADICULOPATHY: Primary | ICD-10-CM

## 2023-09-14 PROCEDURE — 64479 NJX AA&/STRD TFRM EPI C/T 1: CPT | Mod: RT,,, | Performed by: STUDENT IN AN ORGANIZED HEALTH CARE EDUCATION/TRAINING PROGRAM

## 2023-09-14 PROCEDURE — 64479 NJX AA&/STRD TFRM EPI C/T 1: CPT | Mod: RT | Performed by: STUDENT IN AN ORGANIZED HEALTH CARE EDUCATION/TRAINING PROGRAM

## 2023-09-14 PROCEDURE — 25000003 PHARM REV CODE 250: Performed by: STUDENT IN AN ORGANIZED HEALTH CARE EDUCATION/TRAINING PROGRAM

## 2023-09-14 PROCEDURE — 64480 NJX AA&/STRD TFRM EPI C/T EA: CPT | Mod: RT,,, | Performed by: STUDENT IN AN ORGANIZED HEALTH CARE EDUCATION/TRAINING PROGRAM

## 2023-09-14 PROCEDURE — 64480 PRA INJECT ANES/STEROID FORAMEN CERV/THORACIC W IMG GUIDE ,EA ADD LEVEL: ICD-10-PCS | Mod: RT,,, | Performed by: STUDENT IN AN ORGANIZED HEALTH CARE EDUCATION/TRAINING PROGRAM

## 2023-09-14 PROCEDURE — 64480 NJX AA&/STRD TFRM EPI C/T EA: CPT | Mod: RT | Performed by: STUDENT IN AN ORGANIZED HEALTH CARE EDUCATION/TRAINING PROGRAM

## 2023-09-14 PROCEDURE — 64479 PR INJECT ANES/STEROID FORAMEN CERV/THORACIC W IMG GUIDE ,1 LEVEL: ICD-10-PCS | Mod: RT,,, | Performed by: STUDENT IN AN ORGANIZED HEALTH CARE EDUCATION/TRAINING PROGRAM

## 2023-09-14 PROCEDURE — 63600175 PHARM REV CODE 636 W HCPCS: Performed by: STUDENT IN AN ORGANIZED HEALTH CARE EDUCATION/TRAINING PROGRAM

## 2023-09-14 PROCEDURE — 25500020 PHARM REV CODE 255: Performed by: STUDENT IN AN ORGANIZED HEALTH CARE EDUCATION/TRAINING PROGRAM

## 2023-09-14 RX ORDER — FENTANYL CITRATE 50 UG/ML
25 INJECTION, SOLUTION INTRAMUSCULAR; INTRAVENOUS ONCE AS NEEDED
Status: COMPLETED | OUTPATIENT
Start: 2023-09-14 | End: 2023-09-14

## 2023-09-14 RX ORDER — MIDAZOLAM HYDROCHLORIDE 1 MG/ML
2 INJECTION INTRAMUSCULAR; INTRAVENOUS ONCE AS NEEDED
Status: COMPLETED | OUTPATIENT
Start: 2023-09-14 | End: 2023-09-14

## 2023-09-14 RX ORDER — SODIUM CHLORIDE 9 MG/ML
500 INJECTION, SOLUTION INTRAVENOUS CONTINUOUS
Status: DISCONTINUED | OUTPATIENT
Start: 2023-09-14 | End: 2023-09-14 | Stop reason: HOSPADM

## 2023-09-14 RX ORDER — LIDOCAINE HYDROCHLORIDE 20 MG/ML
INJECTION, SOLUTION EPIDURAL; INFILTRATION; INTRACAUDAL; PERINEURAL
Status: DISCONTINUED | OUTPATIENT
Start: 2023-09-14 | End: 2023-09-14 | Stop reason: HOSPADM

## 2023-09-14 RX ORDER — DEXAMETHASONE SODIUM PHOSPHATE 10 MG/ML
INJECTION INTRAMUSCULAR; INTRAVENOUS
Status: DISCONTINUED | OUTPATIENT
Start: 2023-09-14 | End: 2023-09-14 | Stop reason: HOSPADM

## 2023-09-14 RX ORDER — LIDOCAINE HYDROCHLORIDE 10 MG/ML
INJECTION, SOLUTION EPIDURAL; INFILTRATION; INTRACAUDAL; PERINEURAL
Status: DISCONTINUED | OUTPATIENT
Start: 2023-09-14 | End: 2023-09-14 | Stop reason: HOSPADM

## 2023-09-14 NOTE — OP NOTE
Thoracic Transforaminal Epidural Steroid Injection  Right  T11/12 and T12/L1 under Fluoroscopic Guidance    The procedure, risks, benefits, and options were discussed with the patient. There are no contraindications to the procedure. The patent expressed understanding and agreed to the procedure. Informed written consent was obtained prior to the start of the procedure and can be found in the patient's chart.    PATIENT NAME: Mrs. Nereyda Hernandez   MRN: [id number]     DATE OF PROCEDURE: 09/14/2023    PROCEDURE: Thoracic Transforaminal Epidural Steroid Injection Right  T11/12 and T12/L1 under Fluoroscopic Guidance    PRE-OP DIAGNOSIS: Thoracic radiculopathy [M54.14] Thoracic radiculopathy [M54.14]    POST-OP DIAGNOSIS: Same    PHYSICIAN: Edson Beard DO    ASSISTANTS: NONE     MEDICATIONS INJECTED: Preservative-free Decadron 10mg with 1cc of Lidocaine 1% MPF     LOCAL ANESTHETIC INJECTED: Xylocaine 2%     SEDATION: Versed 2mg and Fentanyl 25mcg                                                                                                                                                                                     Conscious sedation ordered by M.D. Patient re-evaluation prior to administration of conscious sedation. No changes noted in patient's status from initial evaluation. The patient's vital signs were monitored by RN and patient remained hemodynamically stable throughout the procedure.    Event Time In   Sedation Start 0827   Sedation End 0832       ESTIMATED BLOOD LOSS: None    COMPLICATIONS: None    TECHNIQUE: Time-out was performed to identify the patient and procedure to be performed. With the patient laying in the prone position, the surgical area was prepped and draped in the usual sterile fashion using ChloraPrep and a fenestrated drape. The levels were determined under fluoroscopy guidance. Skin anesthesia was achieved by injecting Lidocaine 2% over the injection sites. The transforaminal spaces  were then approached with a 22 gauge, 3.5 inch spinal quinke needle that was introduced under fluoroscopic guidance in the AP and Lateral views. Once the needle tip was in the area of the transforaminal space, and there was no blood, CSF or paraesthesias, contrast dye Omnipaque (300mg/mL) was injected to confirm placement and there was no vascular runoff. Fluoroscopic imaging in the AP and lateral views revealed a clear outline of the spinal nerve with proximal spread of agent through the neural foramen into the epidural space. 3 mL of the medication mixture listed above was injected slowly. Displacement of the radio opaque contrast after injection of the medication confirmed that the medication went into the area of the transforaminal spaces. The needles were removed and bleeding was nil. A sterile dressing was applied. No specimens collected. The patient tolerated the procedure well.     The patient was monitored after the procedure in the recovery area. They were given post-procedure and discharge instructions to follow at home. The patient was discharged in a stable condition.      Edson Soriano DO

## 2023-09-14 NOTE — PATIENT INSTRUCTIONS
[facility]  [facility]  [employer] service # [phone]    POST-PROCEDURE INSTRUCTIONS:    Today you had an injection that included a steroid medications.  The steroid may or may not have been mixed with a local anesthetic when it was injected.   If the injection was in the neck, you may feel some pressure, numbness, or slight weakness in the arm after the procedure for a short period of time (this is a normal response), if this persists for longer than 1 day please contact our office or go to the emergency room.  If the injection was in the low back, you may feel some pressure, numbness, or slight weakness in the leg after the procedure for a short period of time (this is a normal response), if this persists for longer than 1 day please contact our office or go to the emergency room.  You may get side effects from the steroid.  This is not uncommon.  Symptoms include: elevated blood sugar, elevated blood pressure, headache, flushing, nausea, insomnia.  These symptoms are transient and will resolve within 1-3 days.  If symptoms last longer than this please contact our office or head to the emergency room.  Steroid medications can take anywhere from 3-14 days to take effect (rarely longer).  You may notice that your pain worsens for a short period of time after the injection, this would not be unusual due to the pressure and trauma from the needle.    If you do not have a follow up appointment scheduled, please contact my office (or the office of the physician who referred you for the procedure) to get a post-procedure follow up scheduled 2-4 weeks after the procedure.  This can be done as a virtual visit if that is more convenient for you.      What you need to do:    Keep a record of your response to the injection you had today.    How much relief did you get?   When did the relief start and how long did it last?  Were you able to decrease the use of any of your pain  medications?  Were you able to increase your level of activity?  How long did the relief last?    What to watch out for:    If you experience any of the following symptoms after your procedure, please notify the messaging service immediately (see above for contact information):   fever (increased oral temperature)   bleeding or swelling at the injection site,    drainage, rash or redness at the injection site    possible signs of infection    increased pain at the injection site   worsening of your usual pain   severe headache   new or worsening numbness    new arm and/or leg weakness, or    changes in bowel and/or bladder function: urinating or defecating on yourself and not knowing that you did it.    PLEASE FOLLOW ALL INSTRUCTIONS CAREFULLY     Do not engage in strenuous activity (e.g., lifting or pushing heavy objects or repeated bending) for 24 hours.     Do not take a bath, swim or use Jacuzzi for 24 hours after procedure. (A shower is fine).   Remove any Band-Aids when you get home.    Use cold/ice, as needed for comfort.  We recommend the use of cold therapy alternating on for 20 minutes, off for 20 minutes.    Do not apply direct heat (heating pad or heat packs) to the injection site for 24 hours.     Resume your usual medications, unless instructed otherwise by your Pain Physician.     If you are on warfarin (Coumadin) or other blood thinner, resume this medication as instructed by your prescribing Physician.    IF AT ANY POINT YOU ARE VERY CONCERNED ABOUT YOUR SYMPTOMS, PLEASE GO TO THE EMERGENCY ROOM.    If you develop worsening pain, weakness, numbness, lose bowel or bladder control (i.e., having an accident where you did not even know you had to go to the bathroom and suddenly noticed you soiled yourself), saddle anesthesia (a loss of sensation restricted to the area of the buttocks, anus and between the legs -- i.e., those parts of your body that would touch a saddle if you were sitting on one) you  need to go immediately to the emergency department for evaluation and treatment.    ----------------------------------------------------------------------------------------------------------------------------------------------------------------  If you received Sedation please read the following instructions:  POST SEDATION INSTRUCTIONS    Today you received intravenous medication (also known as sedation) that was used to help you relax and/or decrease discomfort during your procedure. This medication will be acting in your body for the next 24 hours, so you might feel a little tired or sleepy. This feeling will slowly wear off.   Common side effects associated with these medications include: drowsiness, dizziness, sleepiness, confusion, feeling excited, difficulty remembering things, lack of steadiness with walking or balance, loss of fine muscle control, slowed reflexes, difficulty focusing, and blurred vision.  Some over-the-counter and prescription medications (e.g., muscle relaxants, opioids, mood-altering medications, sedatives/hypnotics, antihistamines) can interact with the intravenous medication you received and cause an increased risk of the side effects listed above in addition to other potentially life threatening side effects. Use extreme caution if you are taking such medications, and consult with your Pain Physician or prescribing physician if you have any questions.  For the next 12-24 hours:    DO NOT--Drive a car, operate machinery or power tools   DO NOT--Drink any alcoholic beverages (not even beer), they may dangerously increase the risk of side effects.    DO NOT--Make any important legal or business decisions or sign important documents.  We advise you to have someone to assist you at home. Move slowly and carefully. Do not make sudden changes in position. Be aware of dizziness or light-headedness and move accordingly.   If you seek medical treatment within 24 hours, let the nurse or doctor  caring for you know that you have received the above medications. If you have any questions or concerns related to your sedation or treatment today please contact us.

## 2023-09-14 NOTE — PLAN OF CARE
VSS. Consent and site wayne pending. All patient concerns addressed. Patient's family available for ride home. Patient belongings placed behind stretcher. Pre procedure complete. Call light in reach of patient. Side rails up x2.

## 2023-09-14 NOTE — DISCHARGE INSTRUCTIONS
[facility]  [facility]  [employer] service # [phone]    POST-PROCEDURE INSTRUCTIONS:    Today you had an injection that included a steroid medications.  The steroid may or may not have been mixed with a local anesthetic when it was injected.   If the injection was in the neck, you may feel some pressure, numbness, or slight weakness in the arm after the procedure for a short period of time (this is a normal response), if this persists for longer than 1 day please contact our office or go to the emergency room.  If the injection was in the low back, you may feel some pressure, numbness, or slight weakness in the leg after the procedure for a short period of time (this is a normal response), if this persists for longer than 1 day please contact our office or go to the emergency room.  You may get side effects from the steroid.  This is not uncommon.  Symptoms include: elevated blood sugar, elevated blood pressure, headache, flushing, nausea, insomnia.  These symptoms are transient and will resolve within 1-3 days.  If symptoms last longer than this please contact our office or head to the emergency room.  Steroid medications can take anywhere from 3-14 days to take effect (rarely longer).  You may notice that your pain worsens for a short period of time after the injection, this would not be unusual due to the pressure and trauma from the needle.    If you do not have a follow up appointment scheduled, please contact my office (or the office of the physician who referred you for the procedure) to get a post-procedure follow up scheduled 2-4 weeks after the procedure.  This can be done as a virtual visit if that is more convenient for you.      What you need to do:    Keep a record of your response to the injection you had today.    How much relief did you get?   When did the relief start and how long did it last?  Were you able to decrease the use of any of your pain  medications?  Were you able to increase your level of activity?  How long did the relief last?    What to watch out for:    If you experience any of the following symptoms after your procedure, please notify the messaging service immediately (see above for contact information):   fever (increased oral temperature)   bleeding or swelling at the injection site,    drainage, rash or redness at the injection site    possible signs of infection    increased pain at the injection site   worsening of your usual pain   severe headache   new or worsening numbness    new arm and/or leg weakness, or    changes in bowel and/or bladder function: urinating or defecating on yourself and not knowing that you did it.    PLEASE FOLLOW ALL INSTRUCTIONS CAREFULLY     Do not engage in strenuous activity (e.g., lifting or pushing heavy objects or repeated bending) for 24 hours.     Do not take a bath, swim or use Jacuzzi for 24 hours after procedure. (A shower is fine).   Remove any Band-Aids when you get home.    Use cold/ice, as needed for comfort.  We recommend the use of cold therapy alternating on for 20 minutes, off for 20 minutes.    Do not apply direct heat (heating pad or heat packs) to the injection site for 24 hours.     Resume your usual medications, unless instructed otherwise by your Pain Physician.     If you are on warfarin (Coumadin) or other blood thinner, resume this medication as instructed by your prescribing Physician.    IF AT ANY POINT YOU ARE VERY CONCERNED ABOUT YOUR SYMPTOMS, PLEASE GO TO THE EMERGENCY ROOM.    If you develop worsening pain, weakness, numbness, lose bowel or bladder control (i.e., having an accident where you did not even know you had to go to the bathroom and suddenly noticed you soiled yourself), saddle anesthesia (a loss of sensation restricted to the area of the buttocks, anus and between the legs -- i.e., those parts of your body that would touch a saddle if you were sitting on one) you  need to go immediately to the emergency department for evaluation and treatment.    ----------------------------------------------------------------------------------------------------------------------------------------------------------------  If you received Sedation please read the following instructions:  POST SEDATION INSTRUCTIONS    Today you received intravenous medication (also known as sedation) that was used to help you relax and/or decrease discomfort during your procedure. This medication will be acting in your body for the next 24 hours, so you might feel a little tired or sleepy. This feeling will slowly wear off.   Common side effects associated with these medications include: drowsiness, dizziness, sleepiness, confusion, feeling excited, difficulty remembering things, lack of steadiness with walking or balance, loss of fine muscle control, slowed reflexes, difficulty focusing, and blurred vision.  Some over-the-counter and prescription medications (e.g., muscle relaxants, opioids, mood-altering medications, sedatives/hypnotics, antihistamines) can interact with the intravenous medication you received and cause an increased risk of the side effects listed above in addition to other potentially life threatening side effects. Use extreme caution if you are taking such medications, and consult with your Pain Physician or prescribing physician if you have any questions.  For the next 12-24 hours:    DO NOT--Drive a car, operate machinery or power tools   DO NOT--Drink any alcoholic beverages (not even beer), they may dangerously increase the risk of side effects.    DO NOT--Make any important legal or business decisions or sign important documents.  We advise you to have someone to assist you at home. Move slowly and carefully. Do not make sudden changes in position. Be aware of dizziness or light-headedness and move accordingly.   If you seek medical treatment within 24 hours, let the nurse or doctor  caring for you know that you have received the above medications. If you have any questions or concerns related to your sedation or treatment today please contact us.

## 2023-09-14 NOTE — PLAN OF CARE
Discharge instructions given and explained to patient  with verbalization of understanding all instructions. Patients v/s stable, denies n/v and tolerating po, rates pain level tolerable, IV removed,ambulated in restroom. No distress noted. No c/o of weakness or numbness. Escorted pt to ride via wheelchair. .

## 2023-09-14 NOTE — DISCHARGE SUMMARY
[facility]  Discharge Note  Short Stay    Procedure(s) (LRB):  Right T11-T12, T12-L1 TFESI (Right)      OUTCOME: Patient tolerated treatment/procedure well without complication and is now ready for discharge.    DISPOSITION: Home or Self Care    FINAL DIAGNOSIS:  <principal problem not specified>    FOLLOWUP: In clinic    DISCHARGE INSTRUCTIONS:  No discharge procedures on file.     TIME SPENT ON DISCHARGE: 10 minutes

## 2023-09-19 ENCOUNTER — TELEPHONE (OUTPATIENT)
Dept: ADMINISTRATIVE | Facility: CLINIC | Age: 83
End: 2023-09-19
Payer: MEDICARE

## 2023-09-20 ENCOUNTER — OFFICE VISIT (OUTPATIENT)
Dept: FAMILY MEDICINE | Facility: CLINIC | Age: 83
End: 2023-09-20
Payer: MEDICARE

## 2023-09-20 VITALS
SYSTOLIC BLOOD PRESSURE: 130 MMHG | DIASTOLIC BLOOD PRESSURE: 70 MMHG | BODY MASS INDEX: 24.94 KG/M2 | WEIGHT: 146.06 LBS | HEART RATE: 70 BPM | OXYGEN SATURATION: 96 % | HEIGHT: 64 IN

## 2023-09-20 DIAGNOSIS — N39.41 URGE INCONTINENCE: ICD-10-CM

## 2023-09-20 DIAGNOSIS — I70.0 CALCIFICATION OF AORTA: ICD-10-CM

## 2023-09-20 DIAGNOSIS — M54.14 THORACIC RADICULOPATHY: ICD-10-CM

## 2023-09-20 DIAGNOSIS — F51.04 PSYCHOPHYSIOLOGICAL INSOMNIA: Chronic | ICD-10-CM

## 2023-09-20 DIAGNOSIS — F32.A ANXIETY AND DEPRESSION: Chronic | ICD-10-CM

## 2023-09-20 DIAGNOSIS — R91.8 MULTIPLE PULMONARY NODULES DETERMINED BY COMPUTED TOMOGRAPHY OF LUNG: ICD-10-CM

## 2023-09-20 DIAGNOSIS — F41.9 ANXIETY AND DEPRESSION: Chronic | ICD-10-CM

## 2023-09-20 DIAGNOSIS — I10 ESSENTIAL HYPERTENSION: Chronic | ICD-10-CM

## 2023-09-20 DIAGNOSIS — Z00.00 ENCOUNTER FOR PREVENTIVE HEALTH EXAMINATION: Primary | ICD-10-CM

## 2023-09-20 DIAGNOSIS — M81.0 AGE-RELATED OSTEOPOROSIS WITHOUT CURRENT PATHOLOGICAL FRACTURE: Chronic | ICD-10-CM

## 2023-09-20 DIAGNOSIS — Z00.00 ENCOUNTER FOR MEDICARE ANNUAL WELLNESS EXAM: ICD-10-CM

## 2023-09-20 DIAGNOSIS — D50.8 OTHER IRON DEFICIENCY ANEMIA: ICD-10-CM

## 2023-09-20 DIAGNOSIS — R26.9 ABNORMALITY OF GAIT AND MOBILITY: ICD-10-CM

## 2023-09-20 DIAGNOSIS — G47.8 SLEEP AROUSAL DISORDER: Chronic | ICD-10-CM

## 2023-09-20 DIAGNOSIS — R06.00 DYSPNEA, UNSPECIFIED TYPE: Chronic | ICD-10-CM

## 2023-09-20 DIAGNOSIS — K21.9 GASTROESOPHAGEAL REFLUX DISEASE WITHOUT ESOPHAGITIS: ICD-10-CM

## 2023-09-20 DIAGNOSIS — Z23 FLU VACCINE NEED: ICD-10-CM

## 2023-09-20 DIAGNOSIS — E78.2 MIXED HYPERLIPIDEMIA: Chronic | ICD-10-CM

## 2023-09-20 PROCEDURE — 1157F ADVNC CARE PLAN IN RCRD: CPT | Mod: HCNC,CPTII,S$GLB,

## 2023-09-20 PROCEDURE — 1100F PTFALLS ASSESS-DOCD GE2>/YR: CPT | Mod: HCNC,CPTII,S$GLB,

## 2023-09-20 PROCEDURE — 3288F PR FALLS RISK ASSESSMENT DOCUMENTED: ICD-10-PCS | Mod: HCNC,CPTII,S$GLB,

## 2023-09-20 PROCEDURE — G0439 PPPS, SUBSEQ VISIT: HCPCS | Mod: HCNC,S$GLB,,

## 2023-09-20 PROCEDURE — 3078F DIAST BP <80 MM HG: CPT | Mod: HCNC,CPTII,S$GLB,

## 2023-09-20 PROCEDURE — 1100F PR PT FALLS ASSESS DOC 2+ FALLS/FALL W/INJURY/YR: ICD-10-PCS | Mod: HCNC,CPTII,S$GLB,

## 2023-09-20 PROCEDURE — 90694 VACC AIIV4 NO PRSRV 0.5ML IM: CPT | Mod: HCNC,S$GLB,,

## 2023-09-20 PROCEDURE — 3078F PR MOST RECENT DIASTOLIC BLOOD PRESSURE < 80 MM HG: ICD-10-PCS | Mod: HCNC,CPTII,S$GLB,

## 2023-09-20 PROCEDURE — G0439 PR MEDICARE ANNUAL WELLNESS SUBSEQUENT VISIT: ICD-10-PCS | Mod: HCNC,S$GLB,,

## 2023-09-20 PROCEDURE — 1157F PR ADVANCE CARE PLAN OR EQUIV PRESENT IN MEDICAL RECORD: ICD-10-PCS | Mod: HCNC,CPTII,S$GLB,

## 2023-09-20 PROCEDURE — 90694 FLU VACCINE - QUADRIVALENT - ADJUVANTED: ICD-10-PCS | Mod: HCNC,S$GLB,,

## 2023-09-20 PROCEDURE — 1125F AMNT PAIN NOTED PAIN PRSNT: CPT | Mod: HCNC,CPTII,S$GLB,

## 2023-09-20 PROCEDURE — G0008 ADMIN INFLUENZA VIRUS VAC: HCPCS | Mod: HCNC,S$GLB,,

## 2023-09-20 PROCEDURE — 1159F PR MEDICATION LIST DOCUMENTED IN MEDICAL RECORD: ICD-10-PCS | Mod: HCNC,CPTII,S$GLB,

## 2023-09-20 PROCEDURE — G0008 FLU VACCINE - QUADRIVALENT - ADJUVANTED: ICD-10-PCS | Mod: HCNC,S$GLB,,

## 2023-09-20 PROCEDURE — 1160F RVW MEDS BY RX/DR IN RCRD: CPT | Mod: HCNC,CPTII,S$GLB,

## 2023-09-20 PROCEDURE — 3075F PR MOST RECENT SYSTOLIC BLOOD PRESS GE 130-139MM HG: ICD-10-PCS | Mod: HCNC,CPTII,S$GLB,

## 2023-09-20 PROCEDURE — 1170F FXNL STATUS ASSESSED: CPT | Mod: HCNC,CPTII,S$GLB,

## 2023-09-20 PROCEDURE — 1170F PR FUNCTIONAL STATUS ASSESSED: ICD-10-PCS | Mod: HCNC,CPTII,S$GLB,

## 2023-09-20 PROCEDURE — 3288F FALL RISK ASSESSMENT DOCD: CPT | Mod: HCNC,CPTII,S$GLB,

## 2023-09-20 PROCEDURE — 99999 PR PBB SHADOW E&M-EST. PATIENT-LVL V: CPT | Mod: PBBFAC,HCNC,,

## 2023-09-20 PROCEDURE — 1160F PR REVIEW ALL MEDS BY PRESCRIBER/CLIN PHARMACIST DOCUMENTED: ICD-10-PCS | Mod: HCNC,CPTII,S$GLB,

## 2023-09-20 PROCEDURE — 3075F SYST BP GE 130 - 139MM HG: CPT | Mod: HCNC,CPTII,S$GLB,

## 2023-09-20 PROCEDURE — 1159F MED LIST DOCD IN RCRD: CPT | Mod: HCNC,CPTII,S$GLB,

## 2023-09-20 PROCEDURE — 99999 PR PBB SHADOW E&M-EST. PATIENT-LVL V: ICD-10-PCS | Mod: PBBFAC,HCNC,,

## 2023-09-20 PROCEDURE — 1125F PR PAIN SEVERITY QUANTIFIED, PAIN PRESENT: ICD-10-PCS | Mod: HCNC,CPTII,S$GLB,

## 2023-09-20 NOTE — PATIENT INSTRUCTIONS
Counseling and Referral of Other Preventative  (Italic type indicates deductible and co-insurance are waived)    Patient Name: Nereyad Hernandez  Today's Date: 9/20/2023    Health Maintenance       Date Due Completion Date    Influenza Vaccine (1) 09/01/2023 9/27/2022    COVID-19 Vaccine (6 - Pfizer series) 08/14/2024 (Originally 1/27/2023) 9/27/2022    Lipid Panel 05/22/2024 5/22/2023    TETANUS VACCINE 06/09/2024 6/9/2014    Aspirin/Antiplatelet Therapy 08/29/2024 9/20/2023    DEXA Scan 12/29/2024 12/29/2022    Override on 8/12/2013: Done        Orders Placed This Encounter   Procedures    Influenza (FLUAD) - Quadrivalent (Adjuvanted) *Preferred* (65+) (PF)     The following information is provided to all patients.  This information is to help you find resources for any of the problems found today that may be affecting your health:                Living healthy guide: www.[facility].[facility]      Understanding Diabetes: www.diabetes.org      Eating healthy: www.cdc.gov/healthyweight      [facility] home safety checklist: www.cdc.gov/steadi/patient.html      Agency on Aging: www.goea.[place].gov      Alcoholics anonymous (AA): www.aa.org      Physical Activity: www.gonzalo.nih.gov/wk2csaf      Tobacco use: www.quitwithusla.org

## 2023-09-20 NOTE — PROGRESS NOTES
"      Nereyda Hernandez presented for a  Medicare AWV and comprehensive Health Risk Assessment today. The following components were reviewed and updated:    Medical history  Family History  Social history  Allergies and Current Medications  Health Risk Assessment  Health Maintenance  Care Team         ** See Completed Assessments for Annual Wellness Visit within the encounter summary.**         The following assessments were completed:  Living Situation  CAGE  Depression Screening  Timed Get Up and Go  Whisper Test  Cognitive Function Screening      Nutrition Screening  ADL Screening  PAQ Screening        Vitals:    09/20/23 1350   BP: 130/70   BP Location: Left arm   Patient Position: Sitting   BP Method: Medium (Manual)   Pulse: 70   SpO2: 96%   Weight: 66.3 kg (146 lb 0.9 oz)   Height: 5' 4" (1.626 m)     Body mass index is 25.07 kg/m².  Physical Exam  Vitals reviewed.   Constitutional:       Appearance: Normal appearance. She is well-developed and well-groomed.   Eyes:      Comments: Wearing glasses    Cardiovascular:      Rate and Rhythm: Normal rate and regular rhythm.   Pulmonary:      Effort: Pulmonary effort is normal. No respiratory distress.      Breath sounds: No wheezing, rhonchi or rales.   Skin:     Coloration: Skin is not pale.   Neurological:      General: No focal deficit present.      Mental Status: She is alert and oriented to person, place, and time.   Psychiatric:         Speech: Speech normal.         Behavior: Behavior is cooperative.               Diagnoses and health risks identified today and associated recommendations/orders:    1. Encounter for preventive health examination  Screenings performed, as noted above. Personal preventative testing needs reviewed.       2. Encounter for Medicare annual wellness exam  - Ambulatory Referral/Consult to Enhanced Annual Wellness Visit (eAWV)    3. Calcification of aorta  4. Essential hypertension  5. Mixed hyperlipidemia  Chronic; stable on medication. " Followed by PCP.    6. Anxiety and depression  Chronic; stable on medication. Followed by PCP.    7. Dyspnea, unspecified type  Chronic; stable on medication. Followed by Pulmonology.     8. Multiple pulmonary nodules determined by computed tomography of lung  Chronic; stable. Repeat CT scan scheduled for 09/28 with a f/u visit with Pulmonology on 09/29. Followed by Pulmonology.     9. Urge incontinence  Chronic. Stable. Followed by PCP.     10. Other iron deficiency anemia  Chronic. Stable. Followed by PCP.     11. Age-related osteoporosis without current pathological fracture  Chronic; stable on medication. Followed by PCP.    12. Gastroesophageal reflux disease without esophagitis  Chronic; stable on medication. Followed by PCP. Encouraged to f/u with ENT regarding hoarseness.     13. Psychophysiological insomnia  14. Sleep arousal disorder  Scheduled with sleep medicine on 09/28. Controlled with medication.     15. Thoracic radiculopathy  Chronic. Stable. S/p epidural steroid injection with Pain Medicine 09/14/2023.     16. Abnormality of gait and mobility  Encouraged use of ambulatory assistive devices as needed. Followed by PCP/orthopedics.     17. Flu vaccine need  - Influenza (FLUAD) - Quadrivalent (Adjuvanted) *Preferred* (65+) (PF)      Review for Opioid Screening: Patient does not have rx for Opioids.    Review for Substance Use Disorders: Patient does not use substance.    Provided Nereyda with a 5-10 year written screening schedule and personal prevention plan. Recommendations were developed using the USPSTF age appropriate recommendations. Education, counseling, and referrals were provided as needed. After Visit Summary printed and given to patient which includes a list of additional screenings\tests needed.    Follow up in about 1 year (around 9/20/2024) for your next annual wellness visit.    Mery Benites NP      Advance Care Planning     I offered to discuss advanced care planning, including how to  pick a person who would make decisions for you if you were unable to make them for yourself, called a health care power of , and what kind of decisions you might make such as use of life sustaining treatments such as ventilators and tube feeding when faced with a life limiting illness recorded on a living will that they will need to know. (How you want to be cared for as you near the end of your natural life)     X  Patient has advanced directives on file, which we reviewed, and they do not wish to make changes. Patient has healthcare POA on file. We discussed living will and patient was provided with forms.

## 2023-09-25 DIAGNOSIS — F51.04 PSYCHOPHYSIOLOGICAL INSOMNIA: Chronic | ICD-10-CM

## 2023-09-25 DIAGNOSIS — I10 ESSENTIAL HYPERTENSION: Chronic | ICD-10-CM

## 2023-09-25 RX ORDER — TRIAMTERENE AND HYDROCHLOROTHIAZIDE 37.5; 25 MG/1; MG/1
1 CAPSULE ORAL EVERY MORNING
Qty: 90 CAPSULE | Refills: 3 | Status: SHIPPED | OUTPATIENT
Start: 2023-09-25

## 2023-09-26 RX ORDER — AMITRIPTYLINE HYDROCHLORIDE 10 MG/1
TABLET, FILM COATED ORAL
Qty: 270 TABLET | Refills: 3 | Status: SHIPPED | OUTPATIENT
Start: 2023-09-26

## 2023-09-28 ENCOUNTER — OFFICE VISIT (OUTPATIENT)
Dept: SLEEP MEDICINE | Facility: CLINIC | Age: 83
End: 2023-09-28
Payer: MEDICARE

## 2023-09-28 VITALS
DIASTOLIC BLOOD PRESSURE: 84 MMHG | BODY MASS INDEX: 24.75 KG/M2 | SYSTOLIC BLOOD PRESSURE: 134 MMHG | HEART RATE: 68 BPM | HEIGHT: 64 IN | WEIGHT: 145 LBS

## 2023-09-28 DIAGNOSIS — G47.30 SLEEP APNEA, UNSPECIFIED TYPE: Primary | ICD-10-CM

## 2023-09-28 PROCEDURE — 1101F PT FALLS ASSESS-DOCD LE1/YR: CPT | Mod: CPTII,S$GLB,, | Performed by: NURSE PRACTITIONER

## 2023-09-28 PROCEDURE — 99999 PR PBB SHADOW E&M-EST. PATIENT-LVL III: ICD-10-PCS | Mod: PBBFAC,HCNC,, | Performed by: NURSE PRACTITIONER

## 2023-09-28 PROCEDURE — 3075F SYST BP GE 130 - 139MM HG: CPT | Mod: CPTII,S$GLB,, | Performed by: NURSE PRACTITIONER

## 2023-09-28 PROCEDURE — 1157F ADVNC CARE PLAN IN RCRD: CPT | Mod: CPTII,S$GLB,, | Performed by: NURSE PRACTITIONER

## 2023-09-28 PROCEDURE — 1159F PR MEDICATION LIST DOCUMENTED IN MEDICAL RECORD: ICD-10-PCS | Mod: CPTII,S$GLB,, | Performed by: NURSE PRACTITIONER

## 2023-09-28 PROCEDURE — 3079F DIAST BP 80-89 MM HG: CPT | Mod: CPTII,S$GLB,, | Performed by: NURSE PRACTITIONER

## 2023-09-28 PROCEDURE — 1157F PR ADVANCE CARE PLAN OR EQUIV PRESENT IN MEDICAL RECORD: ICD-10-PCS | Mod: CPTII,S$GLB,, | Performed by: NURSE PRACTITIONER

## 2023-09-28 PROCEDURE — 3079F PR MOST RECENT DIASTOLIC BLOOD PRESSURE 80-89 MM HG: ICD-10-PCS | Mod: CPTII,S$GLB,, | Performed by: NURSE PRACTITIONER

## 2023-09-28 PROCEDURE — 1159F MED LIST DOCD IN RCRD: CPT | Mod: CPTII,S$GLB,, | Performed by: NURSE PRACTITIONER

## 2023-09-28 PROCEDURE — 99999 PR PBB SHADOW E&M-EST. PATIENT-LVL III: CPT | Mod: PBBFAC,HCNC,, | Performed by: NURSE PRACTITIONER

## 2023-09-28 PROCEDURE — 1101F PR PT FALLS ASSESS DOC 0-1 FALLS W/OUT INJ PAST YR: ICD-10-PCS | Mod: CPTII,S$GLB,, | Performed by: NURSE PRACTITIONER

## 2023-09-28 PROCEDURE — 99214 PR OFFICE/OUTPT VISIT, EST, LEVL IV, 30-39 MIN: ICD-10-PCS | Mod: S$GLB,,, | Performed by: NURSE PRACTITIONER

## 2023-09-28 PROCEDURE — 3075F PR MOST RECENT SYSTOLIC BLOOD PRESS GE 130-139MM HG: ICD-10-PCS | Mod: CPTII,S$GLB,, | Performed by: NURSE PRACTITIONER

## 2023-09-28 PROCEDURE — 99214 OFFICE O/P EST MOD 30 MIN: CPT | Mod: S$GLB,,, | Performed by: NURSE PRACTITIONER

## 2023-09-28 PROCEDURE — 3288F FALL RISK ASSESSMENT DOCD: CPT | Mod: CPTII,S$GLB,, | Performed by: NURSE PRACTITIONER

## 2023-09-28 PROCEDURE — 1125F PR PAIN SEVERITY QUANTIFIED, PAIN PRESENT: ICD-10-PCS | Mod: CPTII,S$GLB,, | Performed by: NURSE PRACTITIONER

## 2023-09-28 PROCEDURE — 1125F AMNT PAIN NOTED PAIN PRSNT: CPT | Mod: CPTII,S$GLB,, | Performed by: NURSE PRACTITIONER

## 2023-09-28 PROCEDURE — 3288F PR FALLS RISK ASSESSMENT DOCUMENTED: ICD-10-PCS | Mod: CPTII,S$GLB,, | Performed by: NURSE PRACTITIONER

## 2023-09-28 NOTE — PROGRESS NOTES
"Last seen by Dr. Preciado 2021    She has worsening shortness of breath with exertion and daytime tiredness. Can't figure out why. Seeing lot of specialists/tests. Seeing pulm again tomorrow, had recent CT scan. More tired/dyspnea since 2022.   Hx spinal fracture 12/2022, takes muscle relaxant  30mg generic elavil qhs "won't sleep if dont take it"  In bed 8:30p prayers/reads/tv watches and out of bed 9-10a    Dx KANWAL 2004 with ahi of 9.4.  At that time, patient was on cpap of 9 cm H20.  Pt used machine nightly until 2021  +leakage around full face mask.  +snoring without machine.  +fatigue upon awakening  Requal PSG 2019 AHI 1.1      /84 (BP Location: Left arm, Patient Position: Sitting)   Pulse 68   Ht 5' 4" (1.626 m)   Wt 65.8 kg (145 lb)   LMP  (LMP Unknown)   BMI 24.89 kg/m²       Assessment:  Sleep apnea unspecified  Dyspnea    Plan:  Repeat PSG  Get pulse ox to check O2 levels during daytime/consider repeat 6MWT, see pulm as scheduled  Improve sleep habits, avoid bedroom until sleepy, aviod >9hr time inb ed/not necessarily sleep time to ideally not take Amitriptyline, discussed high risk medication      "

## 2023-09-29 ENCOUNTER — OFFICE VISIT (OUTPATIENT)
Dept: PULMONOLOGY | Facility: CLINIC | Age: 83
End: 2023-09-29
Payer: MEDICARE

## 2023-09-29 VITALS
BODY MASS INDEX: 24.81 KG/M2 | HEIGHT: 64 IN | SYSTOLIC BLOOD PRESSURE: 132 MMHG | OXYGEN SATURATION: 96 % | HEART RATE: 71 BPM | DIASTOLIC BLOOD PRESSURE: 70 MMHG | WEIGHT: 145.31 LBS

## 2023-09-29 DIAGNOSIS — R06.09 DYSPNEA ON EXERTION: Primary | ICD-10-CM

## 2023-09-29 PROCEDURE — 99999 PR PBB SHADOW E&M-EST. PATIENT-LVL III: ICD-10-PCS | Mod: PBBFAC,HCNC,, | Performed by: INTERNAL MEDICINE

## 2023-09-29 PROCEDURE — 99213 OFFICE O/P EST LOW 20 MIN: CPT | Mod: HCNC,S$GLB,, | Performed by: INTERNAL MEDICINE

## 2023-09-29 PROCEDURE — 1101F PT FALLS ASSESS-DOCD LE1/YR: CPT | Mod: HCNC,CPTII,S$GLB, | Performed by: INTERNAL MEDICINE

## 2023-09-29 PROCEDURE — 3075F SYST BP GE 130 - 139MM HG: CPT | Mod: HCNC,CPTII,S$GLB, | Performed by: INTERNAL MEDICINE

## 2023-09-29 PROCEDURE — 1101F PR PT FALLS ASSESS DOC 0-1 FALLS W/OUT INJ PAST YR: ICD-10-PCS | Mod: HCNC,CPTII,S$GLB, | Performed by: INTERNAL MEDICINE

## 2023-09-29 PROCEDURE — 3288F FALL RISK ASSESSMENT DOCD: CPT | Mod: HCNC,CPTII,S$GLB, | Performed by: INTERNAL MEDICINE

## 2023-09-29 PROCEDURE — 99999 PR PBB SHADOW E&M-EST. PATIENT-LVL III: CPT | Mod: PBBFAC,HCNC,, | Performed by: INTERNAL MEDICINE

## 2023-09-29 PROCEDURE — 3288F PR FALLS RISK ASSESSMENT DOCUMENTED: ICD-10-PCS | Mod: HCNC,CPTII,S$GLB, | Performed by: INTERNAL MEDICINE

## 2023-09-29 PROCEDURE — 3078F DIAST BP <80 MM HG: CPT | Mod: HCNC,CPTII,S$GLB, | Performed by: INTERNAL MEDICINE

## 2023-09-29 PROCEDURE — 3075F PR MOST RECENT SYSTOLIC BLOOD PRESS GE 130-139MM HG: ICD-10-PCS | Mod: HCNC,CPTII,S$GLB, | Performed by: INTERNAL MEDICINE

## 2023-09-29 PROCEDURE — 1159F PR MEDICATION LIST DOCUMENTED IN MEDICAL RECORD: ICD-10-PCS | Mod: HCNC,CPTII,S$GLB, | Performed by: INTERNAL MEDICINE

## 2023-09-29 PROCEDURE — 99213 PR OFFICE/OUTPT VISIT, EST, LEVL III, 20-29 MIN: ICD-10-PCS | Mod: HCNC,S$GLB,, | Performed by: INTERNAL MEDICINE

## 2023-09-29 PROCEDURE — 1159F MED LIST DOCD IN RCRD: CPT | Mod: HCNC,CPTII,S$GLB, | Performed by: INTERNAL MEDICINE

## 2023-09-29 PROCEDURE — 1157F ADVNC CARE PLAN IN RCRD: CPT | Mod: HCNC,CPTII,S$GLB, | Performed by: INTERNAL MEDICINE

## 2023-09-29 PROCEDURE — 1157F PR ADVANCE CARE PLAN OR EQUIV PRESENT IN MEDICAL RECORD: ICD-10-PCS | Mod: HCNC,CPTII,S$GLB, | Performed by: INTERNAL MEDICINE

## 2023-09-29 PROCEDURE — 3078F PR MOST RECENT DIASTOLIC BLOOD PRESSURE < 80 MM HG: ICD-10-PCS | Mod: HCNC,CPTII,S$GLB, | Performed by: INTERNAL MEDICINE

## 2023-09-29 NOTE — ASSESSMENT & PLAN NOTE
PFT showing decrease in diffusing capacity. Mild restriction with no change in FVC. No evidence of cause of shortness of breath on CT chest. Pt concerned it may be her BP medication. Discussed increasing activity to help with endurance.

## 2023-09-29 NOTE — PROGRESS NOTES
"Subjective:      Patient ID: Nereyda Hernandez is a 83 y.o. female.    Chief Complaint: Shortness of Breath and Abnormal Ct Scan    Pt is an 81 yo CW pmh HTN, HLD, who presents for evaluation of Abnormal chest CT and progressive dyspnea on exertion. Pt states that she believes it started approximately a year ago and daughter who is present states that it has been affecting her quality of life. Is no longer able to garden like she used to. She gets short of breath with walking distances such as from the parking garage to clinic. When she grocery shops she has to walk slower and hold onto the buggy to keep from becoming short of breath. Has episodes of coughing with eating and sensation of aspiration. 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 She found to have vertebral fracture that she had an injection for without much relief. Trialed inhaler, but does not feel that it is helping very much.     Review of Systems   Constitutional:  Positive for activity change and fatigue. Negative for weight loss and weight gain.   HENT:  Negative for postnasal drip and congestion.    Respiratory:  Positive for cough (intermittent), sputum production (thick clear) and dyspnea on extertion. Negative for hemoptysis, shortness of breath, previous hospitialization due to pulmonary problems and use of rescue inhaler.    Cardiovascular:  Positive for leg swelling. Negative for chest pain and palpitations.   Gastrointestinal:  Positive for acid reflux. Negative for nausea and vomiting.   Neurological:  Negative for syncope and light-headedness.   Psychiatric/Behavioral:  Negative for confusion and " sleep disturbance. The patient is not nervous/anxious.        Objective:     Physical Exam   Constitutional: She is oriented to person, place, and time. She appears well-developed and well-nourished. She is not obese.   HENT:   Head: Normocephalic.   Musculoskeletal:         General: Edema (1+ pitting edema ankle) present.   Neurological: She is alert and oriented to person, place, and time. Gait normal.   Skin: No cyanosis. Nails show no clubbing.   Psychiatric: She has a normal mood and affect. Her behavior is normal. Judgment and thought content normal.   Vitals reviewed.    Personal Diagnostic Review    CT of chest performed on 3/22/23 without contrast revealed bilateral apical scaring. Left lingular bronchiectasis with surrounding tree and bud. Multiple areas of RML and RLL of mucus impaction. RML pulmonary nodule. Multiple small RLL pulmonary nodules.    CT of chest performed on 23 without contrast showing bilateral apical scarring. RML bronchiectasis. Multiple stable pulmonary nodules.      Echocardiogram: 22  The left ventricle is normal in size with normal systolic function.  The estimated ejection fraction is 55%.  Normal left ventricular diastolic function.  Normal right ventricular size with normal right ventricular systolic function.  Mild-to-moderate aortic regurgitation.  Mild tricuspid regurgitation.  Mild pulmonic regurgitation.  Normal central venous pressure (3 mmHg).  The estimated PA systolic pressure is 32 mmHg.    Echo: 23  The left ventricle is normal in size with normal systolic function.  The estimated ejection fraction is 58%.  Normal left ventricular diastolic function.  Mild aortic regurgitation.  Intermediate central venous pressure (8 mmHg).  The estimated PA systolic pressure is 31 mmHg.     Pulmonary function tests:   20  FEV1: 1.40L  (76.3 % predicted),   FVC:  1.94L (80.3 % predicted),   FEV1/FVC:  72,   T.29L (89.9 % predicted),   DLCO: 18.50 (97.1 %  "predicted)    3/31/23  FEV1: 1.45L (86%)  FVC: 1.94L (88.4%)  FEV1/FVC: 75  T.16L (90.3%)  DLCO: 12.06 (66.5%)        2023     1:48 PM 2023     1:50 PM 2023     8:55 AM 2023     8:45 AM 2023     8:36 AM 2023     8:33 AM 2023     8:30 AM   Pulmonary Function Tests   SpO2  96 % 97 % 94 % 94 % 96 % 93 %   Height 5' 4" (1.626 m) 5' 4" (1.626 m)        Weight 65.8 kg (145 lb) 66.3 kg (146 lb 0.9 oz)        BMI (Calculated) 24.9 25.1             Assessment:     1. Dyspnea on exertion      Outpatient Encounter Medications as of 2023   Medication Sig Dispense Refill    alendronate (FOSAMAX) 70 MG tablet Take 1 tablet (70 mg total) by mouth every 7 days. 12 tablet 3    amitriptyline (ELAVIL) 10 MG tablet TAKE 3 TABLETS EVERY NIGHT AS NEEDED FOR INSOMNIA 270 tablet 3    aspirin (ECOTRIN) 81 MG EC tablet Take 81 mg by mouth once daily.      azelastine (ASTELIN) 137 mcg (0.1 %) nasal spray 1 spray (137 mcg total) by Nasal route 2 (two) times daily. 30 mL 3    BENEFIBER, WHEAT DEXTRIN, ORAL Take by mouth. PRN      calcium citrate-vitamin D3 500 mg calcium -400 unit Chew Take 1 tablet by mouth 2 (two) times daily. 12.5mcg      EScitalopram oxalate (LEXAPRO) 20 MG tablet TAKE 1 TABLET (20 MG TOTAL) BY MOUTH ONCE DAILY. 90 tablet 3    fish oil-omega-3 fatty acids 300-1,000 mg capsule Take 2 g by mouth once daily.      Lactobacillus acidophilus (PROBIOTIC ACIDOPHILUS ORAL) Take by mouth.      latanoprost 0.005 % ophthalmic solution 1 drop every evening.      losartan (COZAAR) 25 MG tablet TAKE 1 TABLET ONE TIME DAILY 90 tablet 3    metoprolol tartrate 37.5 mg Tab Take 37.5 mg by mouth 2 (two) times daily. 180 tablet 3    MULTIVIT-IRON-MIN-FOLIC ACID 3,500-18-0.4 UNIT-MG-MG ORAL CHEW Take by mouth.      pantoprazole (PROTONIX) 40 MG tablet TAKE 1 TABLET EVERY DAY 90 tablet 3    pravastatin (PRAVACHOL) 20 MG tablet Take 1 tablet (20 mg total) by mouth once daily. 90 tablet 3    " tiotropium-olodateroL (STIOLTO RESPIMAT) 2.5-2.5 mcg/actuation Mist Inhale 2 puffs into the lungs once daily. Controller 4 g 6    tiZANidine (ZANAFLEX) 2 MG tablet Take 1 tablet (2 mg total) by mouth every 6 (six) hours as needed (muscle spasms). (Patient not taking: Reported on 9/20/2023) 60 tablet 4    triamterene-hydrochlorothiazide 37.5-25 mg (DYAZIDE) 37.5-25 mg per capsule TAKE 1 CAPSULE EVERY MORNING 90 capsule 3    vit A/vit C/vit E/zinc/copper (PRESERVISION AREDS ORAL) Take by mouth. Bottle does not say a but does say Lutein      [DISCONTINUED] amitriptyline (ELAVIL) 10 MG tablet Take 3 tablets (30 mg total) by mouth nightly as needed for Insomnia. 270 tablet 2    [DISCONTINUED] triamterene-hydrochlorothiazide 37.5-25 mg (DYAZIDE) 37.5-25 mg per capsule Take 1 capsule by mouth every morning. 90 capsule 3     No facility-administered encounter medications on file as of 9/29/2023.     No orders of the defined types were placed in this encounter.    Plan:     Dyspnea  PFT showing decrease in diffusing capacity. Mild restriction with no change in FVC. No evidence of cause of shortness of breath on CT chest. Pt concerned it may be her BP medication. Discussed increasing activity to help with endurance.     Follow up as needed or with new or worsening issues.     Yandel Hi MD  [facility]

## 2023-10-05 ENCOUNTER — TELEPHONE (OUTPATIENT)
Dept: SLEEP MEDICINE | Facility: OTHER | Age: 83
End: 2023-10-05
Payer: MEDICARE

## 2023-10-11 ENCOUNTER — HOSPITAL ENCOUNTER (OUTPATIENT)
Dept: SLEEP MEDICINE | Facility: HOSPITAL | Age: 83
Discharge: HOME | End: 2023-10-11
Attending: PSYCHIATRY & NEUROLOGY | Admitting: PSYCHIATRY & NEUROLOGY
Payer: MEDICARE

## 2023-10-11 DIAGNOSIS — G47.30 SLEEP APNEA, UNSPECIFIED TYPE: ICD-10-CM

## 2023-10-11 DIAGNOSIS — G47.33 OSA (OBSTRUCTIVE SLEEP APNEA): Primary | ICD-10-CM

## 2023-10-11 PROCEDURE — 95810 POLYSOM 6/> YRS 4/> PARAM: CPT | Mod: HCNC

## 2023-10-12 PROBLEM — G47.30 SLEEP APNEA: Status: ACTIVE | Noted: 2023-10-12

## 2023-10-12 NOTE — PROGRESS NOTES
Education was done via explanation of sleep study process and procedure. All questions were answered.    Pt did not meet criteria for CPAP. Some respiratory events were observed. AZRA sleep was obtained.    Low sat of 89% was observed in study. EKG revealed rare PAC and bradycardia. Soft snoring was heard. Thank you letter was given in a.m

## 2023-10-20 PROBLEM — G47.33 OSA (OBSTRUCTIVE SLEEP APNEA): Status: ACTIVE | Noted: 2023-10-12

## 2023-10-20 PROCEDURE — 95810 PR POLYSOMNOGRAPHY, 4 OR MORE: ICD-10-PCS | Mod: 26,HCNC,, | Performed by: PSYCHIATRY & NEUROLOGY

## 2023-10-20 PROCEDURE — 95810 POLYSOM 6/> YRS 4/> PARAM: CPT | Mod: 26,HCNC,, | Performed by: PSYCHIATRY & NEUROLOGY

## 2023-10-21 NOTE — PROCEDURES
"    Diagnostic Report  [facility] - [address], [place] [address]  Phone: [phone]  Fax: [phone]           Patient Name: CHAS LEONE Study Date: 10/11/2023   YOB: 1940 Patient MRN: [id number]   Age:  83 year     Sex: Female Referring Physician: MAHENDRA SANCHEZ N.P   Height: 5' 4" Recording Tech: Kendra Reza RRT RPSGT   Weight: 145.0 lbs Scoring Tech: Austin Espinosa RPSGT   BMI:  25.1 AASM:  1B   AHI: 5.9 Interpreting Physician: Barb Isidro MD     Low oxygen sat: 81.0%   RDI: 26.8        Polysomnogram Data: A full night polysomnogram recorded the standard physiologic parameters including EEG, EOG, EMG, EKG, nasal and oral airflow.  Respiratory parameters of chest and abdominal movements were recorded with [place]-Crystal motion transducers.  Oxygen saturation was recorded by pulse oximetry.    Sleep architecture: This is a baseline polysomnogram. At light's out, the patient fell asleep in 61.2 minutes and slept for 37.6% of the time. Total sleep time (TST) was 172.5 minutes. 53.3% of TST was in Stage N1 sleep, 0.0% TST in slow wave sleep, and 1.4% TST in REM sleep. The REM latency was 395.0 minutes. Sleep architecture was significantly disrupted due to underlying sleep apnea.     Respiratory: Mild  snoring was present. The polysomnogram revealed a presence of 7 obstructive, 1 central, and - mixed apneas resulting in a Total Apnea index of 2.8 events per hour.  There were 9 hypopneas resulting in a Total Hypopnea index of 3.1 events per hour.  The combined Apnea/Hypopnea index was 5.9 events per hour.  There were a total of 60 RERA events resulting in a Respiratory Disturbance Index (RDI) of 26.8 events per hour.  Mean oxygen saturation was 94.4%.  The lowest oxygen saturation during sleep was 82.0%.  Time spent ?88% oxygen saturation was - minutes (-).The patient did not qualify for a split night study due to an insufficient number of events in the first half of the " "study.    Motor movement / Parasomnia: There were no significant  limb movements of sleep noted. The total limb movement index was - (- with arousal).     Cardiac: Cardiac rhythm monitoring revealed a sinus rhythm.  with occasional PAC's. The average pulse rate was 57.0 bpm.  The minimum pulse rate was 35.0 bpm while the maximum pulse rate was 126.0 bpm.      Patient perception: On a post-sleep study questionnaire, the patient indicated that sleep was the same in the lab than compared to home.    IMPRESSION:  Obstructive Sleep Apnea (G47.33),  moderate based on AASM criteria. Met CMS criteria for KANWAL.     RECOMMENDATION:    CPAP titration study, if the patient is interested in this treatment modality.  In the interim, the patient should avoid supine position sleep, since sleep disordered breathing was most prevalent in this sleeping position.        I have reviewed the attached data report and the raw data tracings of this study epoch-by-epoch and have determined that the recording quality and scoring of events are sufficient to allow for interpretation and electronically signed by:      Barb Isidro MD 10/11/2023                              [facility]    Diagnostic PSG Report    Patient Name: CHAS LEONE Study Date: 10/11/2023   YOB: 1940 MRN #: [id number]   Age: 83 year SEVERIANO #: [id number]   Sex: Female Referring Provider: MAHENDRA SANCHEZ N.P   Height: 5' 4" Recording Tech: Kendra Reza RRT RPSGT   Weight: 145.0 lbs Scoring Tech: Austin Espinosa RRT RPSGT   BMI: 25.1 Interpreting Physician: Barb Isidro MD   ESS: - Neck Circumference: -     Study Overview    Lights Off: 09:35:15 PM  Count Index   Lights On: 05:14:15 AM Awakenings: 77 26.8   Time in Bed: 459.0 min. Arousals: 150 52.2   Total Sleep Time: 172.5 min. Apneas & Hypopneas: 17 5.9    Sleep Efficiency: 37.6% Limb Movements: - -   Sleep Latency: 61.2 min. Snores: - -   Wake After Sleep Onset: 225.0 min. " Desaturations: 11 4.2    REM Latency from Sleep Onset: 395.0 min. Minimum SpO2 TST: 82.0%        Sleep Architecture   % of Time in Bed    Stages Time (mins) % Sleep Time   Wake 287.0    Stage N1 92.0 53.3%   Stage N2 78.0 45.2%   Stage N3 0.0 0.0%   REM 2.5 1.4%         Arousal Summary     NREM REM Sleep Index   Respiratory Arousals 33 - 33 11.5   PLM Arousals - - - -   Isolated Limb Movement Arousals - - - -   Spontaneous Arousals 116 1 117 40.7   Total 149 1 150 52.2       Limb Movement Summary     Count Index   Isolated Limb Movements - -   Periodic Limb Movements (PLMs) - -   Total Limb Movements - -         Respiratory Summary     By Sleep Stage By Body Position Total    NREM REM Supine Non-Supine    Time (min) 170.0 2.5 - 172.5 172.5           Obstructive Apnea 7 - - 7 7   Mixed Apnea - - - - -   Central Apnea 1 - - 1 1   Total Apneas 8 - - 8 8   Total Apnea Index 2.8 - - 2.8 2.8           Hypopnea 9 - - 9 9   Hypopnea Index 3.2 - - 3.1 3.1           Apnea & Hypopnea 17 - - 17 17   Apnea & Hypopnea Index 6.0 - - 5.9 5.9           RERAs 60 - - 60 60   RERA Index 21.2 - - 20.9 20.9           RDI 27.2 - - 26.8 26.8      Scoring Criteria: Hypopneas scored at Choose an item.% desaturation criteria.    Respiratory Event Durations     Apnea Hypopnea    NREM REM NREM REM   Average (seconds) 12.8 - 15.8 -   Maximum (seconds) 14.9 - 25.4 -       Oxygen Saturation Summary     Wake NREM REM TST TIB   Average SpO2 94.5% 94.3% 93.2% 94.3% 94.4%   Minimum SpO2 81.0% 82.0% 92.0% 82.0% 81.0%   Maximum SpO2 99.0% 98.0% 94.0% 98.0% 99.0%       Oxygen Saturation Distribution    Range (%) Time in range (min) Time in range (%)   90.0 - 100.0 429.0 94.8%   80.0 - 90.0 10.5 2.3%   70.0 - 80.0 - -   60.0 - 70.0 - -   50.0 - 60.0 - -   0.0 - 50.0 - -   Time Spent ?88% SpO2    Range (%) Time in range (min) Time in range (%)   0.0 - 88.0 7.5 1.7%          Count Index   Desaturations 11 4.2      Cardiac Summary     Wake NREM REM Sleep  Total   Average Pulse Rate (BPM) 57.5 56.3 57.6 56.3 57.0   Minimum Pulse Rate (BPM) 35.0 44.0 56.0 44.0 35.0   Maximum Pulse Rate (BPM) 126.0 64.0 59.0 64.0 126.0     Pulse Rate Distribution    Range (bpm) Time in range (min) Time in range (%)   0.0 - 40.0 0.1 0.0%   40.0 - 60.0 430.8 95.2%   60.0 - 80.0 20.1 4.5%   80.0 - 100.0 1.1 0.3%   100.0 - 120.0 0.1 0.0%   120.0 - 140.0 0.1 0.0%   140.0 - 200.0 - -     EtCO2 Summary    Stage Min (mmHg) Average (mmHg) Max (mmHg)   Wake - - -   NREM(1+2+3) - - -   REM - - -     Range (mmHg) Time in range (min) Time in range (%)   20.0 - 40.0 - -   40.0 - 50.0 - -   50.0 - 55.0 - -   55.0 - 100.0 - -   Excluded data <20.0 & >65.0 459.5 100.0%     TcCO2 Summary    Stage Min (mmHg) Average (mmHg) Max (mmHg)   Wake - - -   NREM(1+2+3) - - -   REM - - -     Range (mmHg) Time in range (min) Time in range (%)   - - -   - - -   - - -   - - -   - - -     Comments    -

## 2023-10-24 ENCOUNTER — PATIENT MESSAGE (OUTPATIENT)
Dept: SLEEP MEDICINE | Facility: CLINIC | Age: 83
End: 2023-10-24
Payer: MEDICARE

## 2023-10-24 DIAGNOSIS — G47.33 OSA (OBSTRUCTIVE SLEEP APNEA): Primary | ICD-10-CM

## 2023-11-03 DIAGNOSIS — G47.33 OSA (OBSTRUCTIVE SLEEP APNEA): Primary | ICD-10-CM

## 2023-11-14 ENCOUNTER — PATIENT MESSAGE (OUTPATIENT)
Dept: ADMINISTRATIVE | Facility: OTHER | Age: 83
End: 2023-11-14
Payer: MEDICARE

## 2023-11-14 ENCOUNTER — OFFICE VISIT (OUTPATIENT)
Dept: FAMILY MEDICINE | Facility: CLINIC | Age: 83
End: 2023-11-14
Payer: MEDICARE

## 2023-11-14 VITALS
OXYGEN SATURATION: 96 % | BODY MASS INDEX: 27.1 KG/M2 | HEART RATE: 71 BPM | WEIGHT: 147.25 LBS | DIASTOLIC BLOOD PRESSURE: 60 MMHG | HEIGHT: 62 IN | SYSTOLIC BLOOD PRESSURE: 120 MMHG

## 2023-11-14 DIAGNOSIS — I10 ESSENTIAL HYPERTENSION: Primary | Chronic | ICD-10-CM

## 2023-11-14 DIAGNOSIS — M81.0 AGE-RELATED OSTEOPOROSIS WITHOUT CURRENT PATHOLOGICAL FRACTURE: Chronic | ICD-10-CM

## 2023-11-14 DIAGNOSIS — J30.9 ALLERGIC RHINITIS, UNSPECIFIED SEASONALITY, UNSPECIFIED TRIGGER: ICD-10-CM

## 2023-11-14 DIAGNOSIS — G47.30 SLEEP APNEA, UNSPECIFIED TYPE: ICD-10-CM

## 2023-11-14 DIAGNOSIS — H61.23 BILATERAL IMPACTED CERUMEN: ICD-10-CM

## 2023-11-14 DIAGNOSIS — L23.0 ALLERGIC CONTACT DERMATITIS DUE TO METALS: ICD-10-CM

## 2023-11-14 DIAGNOSIS — M54.2 NECK PAIN: ICD-10-CM

## 2023-11-14 PROCEDURE — 3288F FALL RISK ASSESSMENT DOCD: CPT | Mod: HCNC,CPTII,S$GLB,

## 2023-11-14 PROCEDURE — 3078F DIAST BP <80 MM HG: CPT | Mod: HCNC,CPTII,S$GLB,

## 2023-11-14 PROCEDURE — 1160F RVW MEDS BY RX/DR IN RCRD: CPT | Mod: HCNC,CPTII,S$GLB,

## 2023-11-14 PROCEDURE — 3288F PR FALLS RISK ASSESSMENT DOCUMENTED: ICD-10-PCS | Mod: HCNC,CPTII,S$GLB,

## 2023-11-14 PROCEDURE — 99999 PR PBB SHADOW E&M-EST. PATIENT-LVL V: ICD-10-PCS | Mod: PBBFAC,HCNC,,

## 2023-11-14 PROCEDURE — 1159F MED LIST DOCD IN RCRD: CPT | Mod: HCNC,CPTII,S$GLB,

## 2023-11-14 PROCEDURE — 1101F PT FALLS ASSESS-DOCD LE1/YR: CPT | Mod: HCNC,CPTII,S$GLB,

## 2023-11-14 PROCEDURE — 1157F PR ADVANCE CARE PLAN OR EQUIV PRESENT IN MEDICAL RECORD: ICD-10-PCS | Mod: HCNC,CPTII,S$GLB,

## 2023-11-14 PROCEDURE — 1125F PR PAIN SEVERITY QUANTIFIED, PAIN PRESENT: ICD-10-PCS | Mod: HCNC,CPTII,S$GLB,

## 2023-11-14 PROCEDURE — 3074F PR MOST RECENT SYSTOLIC BLOOD PRESSURE < 130 MM HG: ICD-10-PCS | Mod: HCNC,CPTII,S$GLB,

## 2023-11-14 PROCEDURE — 1159F PR MEDICATION LIST DOCUMENTED IN MEDICAL RECORD: ICD-10-PCS | Mod: HCNC,CPTII,S$GLB,

## 2023-11-14 PROCEDURE — 1101F PR PT FALLS ASSESS DOC 0-1 FALLS W/OUT INJ PAST YR: ICD-10-PCS | Mod: HCNC,CPTII,S$GLB,

## 2023-11-14 PROCEDURE — 99214 OFFICE O/P EST MOD 30 MIN: CPT | Mod: HCNC,S$GLB,,

## 2023-11-14 PROCEDURE — 1160F PR REVIEW ALL MEDS BY PRESCRIBER/CLIN PHARMACIST DOCUMENTED: ICD-10-PCS | Mod: HCNC,CPTII,S$GLB,

## 2023-11-14 PROCEDURE — 3074F SYST BP LT 130 MM HG: CPT | Mod: HCNC,CPTII,S$GLB,

## 2023-11-14 PROCEDURE — 3078F PR MOST RECENT DIASTOLIC BLOOD PRESSURE < 80 MM HG: ICD-10-PCS | Mod: HCNC,CPTII,S$GLB,

## 2023-11-14 PROCEDURE — 99214 PR OFFICE/OUTPT VISIT, EST, LEVL IV, 30-39 MIN: ICD-10-PCS | Mod: HCNC,S$GLB,,

## 2023-11-14 PROCEDURE — 1157F ADVNC CARE PLAN IN RCRD: CPT | Mod: HCNC,CPTII,S$GLB,

## 2023-11-14 PROCEDURE — 1125F AMNT PAIN NOTED PAIN PRSNT: CPT | Mod: HCNC,CPTII,S$GLB,

## 2023-11-14 PROCEDURE — 99999 PR PBB SHADOW E&M-EST. PATIENT-LVL V: CPT | Mod: PBBFAC,HCNC,,

## 2023-11-14 RX ORDER — TRIAMCINOLONE ACETONIDE 0.25 MG/G
CREAM TOPICAL 2 TIMES DAILY
Qty: 15 G | Refills: 0 | Status: SHIPPED | OUTPATIENT
Start: 2023-11-14 | End: 2024-02-06

## 2023-11-14 RX ORDER — AZELASTINE 1 MG/ML
1 SPRAY, METERED NASAL 2 TIMES DAILY
Qty: 30 ML | Refills: 3 | Status: SHIPPED | OUTPATIENT
Start: 2023-11-14

## 2023-11-14 NOTE — PROGRESS NOTES
Subjective:         Chief Complaint: Follow-up  HPI   Nereyda Hernandez is a 83 y.o. female, patient of Sharona Weaver MD with chronic facial pain, anxiety and depression, dyspnea, hypertension, hyperlipidemia, palpitations, JOSELINE, GERD, osteoporosis, insomnia, back pain, known to me, presents today for a 3 month Follow-up    Pulm: Dr. Hi,  no indication for SOB, instructed to get more active. Reports she goes to exercise class M-Th most of the time and goes to [employer].     Sleep Med: Bettina Lozada NP, patient reports sleeping with her CPAP all night last night and all night Friday night.     Hypertension:  Checks BP: daily   BP ranges between:120s/70s-150s/90s, had had one instance where BP wwas 164/102  Current meds:reports compliance  Would like to start digital medicine.     Reports some redness and itching to the left side of face where eyeglasses frame sits on the left side. Symptoms present for about 2-3 weeks.  Frame does not touch right side of face.     Reports some neck pain, was taking aleve which relieves it sometimes.         Past Medical History:   Diagnosis Date    HATTIE (acute kidney injury) 12/10/2021    Anxiety     Arthritis     Breast cancer 1990    left    Chronic disease anemia     Hyperlipidemia     Hypertension     Insomnia     Lobular carcinoma in situ     KANWAL (obstructive sleep apnea)     Osteoporosis     Rosacea     Squamous cell carcinoma     Stable angina pectoris 8/27/2020    Urinary incontinence     Vertigo        Past Surgical History:   Procedure Laterality Date    ADENOIDECTOMY      BELT ABDOMINOPLASTY      BREAST BIOPSY Left 1990    ex bx    BREAST LUMPECTOMY      COLONOSCOPY N/A 1/28/2020    Procedure: COLONOSCOPY;  Surgeon: Bianka Macias MD;  Location: [facility];  Service: Endoscopy;  Laterality: N/A;    ESOPHAGOGASTRODUODENOSCOPY N/A 1/28/2020    Procedure: EGD (ESOPHAGOGASTRODUODENOSCOPY);  Surgeon: Bianka Macias MD;  Location: [facility];  Service: Endoscopy;   Laterality: N/A;    EYE SURGERY      HERNIA REPAIR      Ventral    INJECTION OF ANESTHETIC AGENT AROUND NERVE Right 2023    Procedure: Right T11-T12, T12-L1 TFESI;  Surgeon: Edson Beard DO;  Location: [facility];  Service: Pain Management;  Laterality: Right;  20 mins    LIVER TRANSPLANT      OOPHORECTOMY      TONSILLECTOMY      TOTAL ABDOMINAL HYSTERECTOMY         Family History   Problem Relation Age of Onset    Cancer Mother         liver    Pancreatic cancer Mother     Heart disease Mother     Depression Mother     Miscarriages / Stillbirths Mother     Hypertension Father     Alzheimer's disease Father     Depression Brother     Depression Brother     Diabetes Brother     Arthritis Brother     Hypertension Brother     Heart disease Brother        Social History     Socioeconomic History    Marital status:     Number of children: 6    Years of education: college   Occupational History    Occupation: retired  for [employer]   Tobacco Use    Smoking status: Former     Current packs/day: 0.00     Average packs/day: 0.5 packs/day for 21.7 years (10.9 ttl pk-yrs)     Types: Cigarettes     Start date: 1959     Quit date: 1980     Years since quittin.1     Passive exposure: Never    Smokeless tobacco: Never   Substance and Sexual Activity    Alcohol use: Not Currently     Alcohol/week: 1.0 standard drink of alcohol     Types: 1 Glasses of wine per week     Comment: Occasionally    Drug use: Never    Sexual activity: Not Currently     Partners: Male     Social Determinants of Health     Financial Resource Strain: Low Risk  (2023)    Overall Financial Resource Strain (CARDIA)     Difficulty of Paying Living Expenses: Not hard at all   Food Insecurity: No Food Insecurity (2023)    Hunger Vital Sign     Worried About Running Out of Food in the Last Year: Never true     Ran Out of Food in the Last Year: Never true   Transportation Needs: No  "Transportation Needs (9/20/2023)    PRAPARE - Transportation     Lack of Transportation (Medical): No     Lack of Transportation (Non-Medical): No   Physical Activity: Inactive (9/20/2023)    Exercise Vital Sign     Days of Exercise per Week: 0 days     Minutes of Exercise per Session: 0 min   Stress: No Stress Concern Present (9/20/2023)    [employer] - Occupational Stress Questionnaire     Feeling of Stress : Not at all   Social Connections: Moderately Integrated (9/20/2023)    Social Connection and Isolation Panel [NHANES]     Frequency of Communication with Friends and Family: More than three times a week     Frequency of Social Gatherings with Friends and Family: Once a week     Attends [employer] Services: More than 4 times per year     Active Member of Clubs or Organizations: Yes     Attends Club or Organization Meetings: More than 4 times per year     Marital Status:    Housing Stability: Unknown (9/20/2023)    Housing Stability Vital Sign     Unable to Pay for Housing in the Last Year: No     Number of Places Lived in the Last Year: 1       Review of Systems   Respiratory:  Negative for shortness of breath.    Musculoskeletal:  Positive for arthralgias and myalgias.   Skin:  Positive for color change and rash.         Objective:     Vitals:    11/14/23 0930   BP: 120/60   BP Location: Left arm   Patient Position: Sitting   BP Method: Large (Manual)   Pulse: 71   SpO2: 96%   Weight: 66.8 kg (147 lb 4.3 oz)   Height: 5' 2" (1.575 m)          Physical Exam  Vitals reviewed.   Constitutional:       Appearance: Normal appearance. She is well-developed and well-groomed.   HENT:      Head: Normocephalic and atraumatic.        Comments: Mild erythematous rash noted, appears to be caused by the metal temples of her eyeglasses. No rash noted to the right side as the temples does not touch her skin. Small gauze placed between left temples of glasses and patient's skin. See attached " photos.      Right Ear: There is impacted cerumen.      Left Ear: There is impacted cerumen.   Neck:      Comments: Pain with movement to the right side. No TTP.  Cardiovascular:      Rate and Rhythm: Normal rate and regular rhythm.      Heart sounds: Normal heart sounds.   Pulmonary:      Effort: Pulmonary effort is normal.      Breath sounds: Normal breath sounds. No wheezing, rhonchi or rales.   Musculoskeletal:         General: Normal range of motion.      Cervical back: Normal range of motion. Pain with movement present. Normal range of motion.      Right lower leg: No edema.      Left lower leg: No edema.   Skin:     General: Skin is warm and dry.      Capillary Refill: Capillary refill takes less than 2 seconds.   Neurological:      General: No focal deficit present.      Mental Status: She is alert and oriented to person, place, and time.   Psychiatric:         Attention and Perception: Attention normal.         Mood and Affect: Mood normal.         Speech: Speech normal.         Behavior: Behavior is cooperative.             Assessment:         ICD-10-CM ICD-9-CM   1. Essential hypertension  I10 401.9   2. 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 Sleep apnea, unspecified type  G47.30 780.57   4. Age-related osteoporosis without current pathological fracture  M81.0 733.01   5. Neck pain  M54.2 723.1   6. Bilateral impacted cerumen  H61.23 380.4   7. Allergic rhinitis, unspecified seasonality, unspecified trigger  J30.9 477.9       Plan:       Essential hypertension  -     Hypertension Digital Medicine (HDMP) Enrollment Order  -     Hypertension Digital Medicine (HDMP): Assign Onboarding Questionnaires  Controlled with current regimen.   Patient would like digital medicine. Order placed and patient set to s/w digital med coordinator after visit.     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  Apply a small amount to the left side of face twice per day for about 5 days. for 5 days  Dispense: 15 g; Refill: 0  Allergic contact dermatitis to patient's eyeglasses frames on left side temple only.   Instructed to apply triamcinolone cream to the affected area only, do not apply to entire face.   Encouraged to have Ophthalmology adjust glasses so that the temples does not touch her face or obtain completely plastic frames.   For now, keep gauze between your skin and the glasses frame.    Sleep apnea, unspecified type  Controlled with CPAP therapy. Followed by Sleep medicine.     Age-related osteoporosis without current pathological fracture  Stable with weekly alendronate.   Continue weight bearing exercises, vit D and calcium.   Next DEXA 2024    Neck pain  Start tylenol arthritis with tizanidine as needed.   RTC or f/u with Ortho prn.     Bilateral impacted cerumen  OTC debrox as directed.     Allergic rhinitis, unspecified seasonality, unspecified trigger  -     azelastine (ASTELIN) 137 mcg (0.1 %) nasal spray; 1 spray (137 mcg total) by Nasal route 2 (two) times daily.  Dispense: 30 mL; Refill: 3      If symptoms worsen, go to ER.  If symptoms do not improve, return to clinic.   Keep appointments with all specialists.     Patient verbalizes understanding and agrees with current treatment plan.      Follow up in about 3 months (around 2/14/2024).     Patient's Medications   New Prescriptions    TRIAMCINOLONE ACETONIDE 0.025% (KENALOG) 0.025 % CREAM    Apply topically 2 (two) times daily. Apply a small amount to the left side of face twice per day for about 5 days. for 5 days   Previous Medications    ALENDRONATE (FOSAMAX) 70 MG TABLET    Take 1 tablet (70 mg total) by mouth every 7 days.    AMITRIPTYLINE (ELAVIL) 10 MG TABLET    TAKE 3 TABLETS EVERY NIGHT AS NEEDED FOR INSOMNIA    ASPIRIN (ECOTRIN) 81 MG EC TABLET    Take 81 mg by mouth once daily.    BENEFIBER, WHEAT DEXTRIN, ORAL    Take by mouth. PRN     CALCIUM CITRATE-VITAMIN D3 500 MG CALCIUM -400 UNIT CHEW    Take 1 tablet by mouth 2 (two) times daily. 12.5mcg    ESCITALOPRAM OXALATE (LEXAPRO) 20 MG TABLET    TAKE 1 TABLET (20 MG TOTAL) BY MOUTH ONCE DAILY.    FISH OIL-OMEGA-3 FATTY ACIDS 300-1,000 MG CAPSULE    Take 2 g by mouth once daily.    LACTOBACILLUS ACIDOPHILUS (PROBIOTIC ACIDOPHILUS ORAL)    Take by mouth.    LATANOPROST 0.005 % OPHTHALMIC SOLUTION    1 drop every evening.    LOSARTAN (COZAAR) 25 MG TABLET    TAKE 1 TABLET ONE TIME DAILY    METOPROLOL TARTRATE 37.5 MG TAB    Take 37.5 mg by mouth 2 (two) times daily.    MULTIVIT-IRON-MIN-FOLIC ACID 3,500-18-0.4 UNIT-MG-MG ORAL CHEW    Take by mouth.    PANTOPRAZOLE (PROTONIX) 40 MG TABLET    TAKE 1 TABLET EVERY DAY    PRAVASTATIN (PRAVACHOL) 20 MG TABLET    Take 1 tablet (20 mg total) by mouth once daily.    TIOTROPIUM-OLODATEROL (STIOLTO RESPIMAT) 2.5-2.5 MCG/ACTUATION MIST    Inhale 2 puffs into the lungs once daily. Controller    TRIAMTERENE-HYDROCHLOROTHIAZIDE 37.5-25 MG (DYAZIDE) 37.5-25 MG PER CAPSULE    TAKE 1 CAPSULE EVERY MORNING    VIT A/VIT C/VIT E/ZINC/COPPER (PRESERVISION AREDS ORAL)    Take by mouth. Bottle does not say a but does say Lutein   Modified Medications    Modified Medication Previous Medication    AZELASTINE (ASTELIN) 137 MCG (0.1 %) NASAL SPRAY azelastine (ASTELIN) 137 mcg (0.1 %) nasal spray       1 spray (137 mcg total) by Nasal route 2 (two) times daily.    1 spray (137 mcg total) by Nasal route 2 (two) times daily.   Discontinued Medications    No medications on file         Mery Benites NP

## 2023-11-14 NOTE — PATIENT INSTRUCTIONS
Start taking tizanidine muscle relaxer for your neck pain to see if this helps. Try tylenol arthritis for the pain.     Have the eye MD and have them adjust your glasses so that the left arm is not touching your face. For now, keep gauze between your skin and the glasses frame. Apply triamcinolone cream to that spot only, no where else on your face.     Get some over the counter debrox for your ears. Follow the instructions on the bottle.

## 2023-12-18 ENCOUNTER — PATIENT MESSAGE (OUTPATIENT)
Dept: FAMILY MEDICINE | Facility: CLINIC | Age: 83
End: 2023-12-18
Payer: MEDICARE

## 2023-12-18 DIAGNOSIS — E78.5 HYPERLIPIDEMIA, UNSPECIFIED HYPERLIPIDEMIA TYPE: Chronic | ICD-10-CM

## 2023-12-18 NOTE — TELEPHONE ENCOUNTER
Care Due:                  Date            Visit Type   Department     Provider  --------------------------------------------------------------------------------                                EP -                              PRIMARY      [facility]  Last Visit: 03-      [facility]  Next Visit: None Scheduled  None         None Found                                                            Last  Test          Frequency    Reason                     Performed    Due Date  --------------------------------------------------------------------------------    Office Visit  12 months..  alendronate..............  03- 03-    CMP.........  12 months..  alendronate, losartan....  03- 03-    Mg Level....  12 months..  alendronate..............  Not Found    Overdue    Phosphate...  12 months..  alendronate..............  Not Found    Overdue    Vitamin D...  12 months..  alendronate..............  Not Found    Overdue    [employer] Embedded Care Due Messages. Reference number: [id number].   12/18/2023 4:39:54 PM CST

## 2023-12-19 RX ORDER — PRAVASTATIN SODIUM 20 MG/1
TABLET ORAL
Qty: 90 TABLET | Refills: 0 | OUTPATIENT
Start: 2023-12-19

## 2023-12-19 NOTE — TELEPHONE ENCOUNTER
Refill Decision Note   Nereyda Hernandez  is requesting a refill authorization.  Brief Assessment and Rationale for Refill:  Quick Discontinue     Medication Therapy Plan:    Pharmacy is requesting new scripts for the following medications without required information, (sig/ frequency/qty/etc)      Medication Reconciliation Completed: No     Comments: Pharmacies have been requesting medications for patients without required information, (sig, frequency, qty, etc.). In addition, requests are sent for medication(s) pt. are currently not taking, and medications patients have never taken.    We have spoken to the pharmacies about these request types and advised their teams previously that we are unable to assess these New Script requests and require all details for these requests. This is a known issue and has been reported.     Note composed:9:18 AM 12/19/2023

## 2023-12-20 ENCOUNTER — TELEPHONE (OUTPATIENT)
Dept: FAMILY MEDICINE | Facility: CLINIC | Age: 83
End: 2023-12-20
Payer: MEDICARE

## 2023-12-20 ENCOUNTER — OFFICE VISIT (OUTPATIENT)
Dept: URGENT CARE | Facility: CLINIC | Age: 83
End: 2023-12-20
Payer: MEDICARE

## 2023-12-20 VITALS
DIASTOLIC BLOOD PRESSURE: 62 MMHG | RESPIRATION RATE: 16 BRPM | WEIGHT: 143 LBS | BODY MASS INDEX: 26.31 KG/M2 | SYSTOLIC BLOOD PRESSURE: 122 MMHG | HEART RATE: 57 BPM | HEIGHT: 62 IN | OXYGEN SATURATION: 96 % | TEMPERATURE: 98 F

## 2023-12-20 DIAGNOSIS — B96.89 BACTERIAL SINUSITIS: Primary | ICD-10-CM

## 2023-12-20 DIAGNOSIS — J32.9 BACTERIAL SINUSITIS: Primary | ICD-10-CM

## 2023-12-20 PROCEDURE — 99213 PR OFFICE/OUTPT VISIT, EST, LEVL III, 20-29 MIN: ICD-10-PCS | Mod: S$GLB,,, | Performed by: PHYSICIAN ASSISTANT

## 2023-12-20 PROCEDURE — 99213 OFFICE O/P EST LOW 20 MIN: CPT | Mod: S$GLB,,, | Performed by: PHYSICIAN ASSISTANT

## 2023-12-20 RX ORDER — VITAMIN A 3000 MCG
1 CAPSULE ORAL
Qty: 60 ML | Refills: 12 | Status: SHIPPED | OUTPATIENT
Start: 2023-12-20

## 2023-12-20 RX ORDER — TIZANIDINE 2 MG/1
4 TABLET ORAL 2 TIMES DAILY
COMMUNITY
Start: 2023-12-12 | End: 2024-02-06 | Stop reason: SDUPTHER

## 2023-12-20 RX ORDER — FLUTICASONE PROPIONATE 50 MCG
1 SPRAY, SUSPENSION (ML) NASAL DAILY
Qty: 16 G | Refills: 3 | Status: SHIPPED | OUTPATIENT
Start: 2023-12-20

## 2023-12-20 RX ORDER — CEFDINIR 300 MG/1
300 CAPSULE ORAL 2 TIMES DAILY
Qty: 20 CAPSULE | Refills: 0 | Status: SHIPPED | OUTPATIENT
Start: 2023-12-20 | End: 2023-12-30

## 2023-12-20 NOTE — PROGRESS NOTES
"Subjective:      Patient ID: Nereyda Hernandez is a 83 y.o. female.    Vitals:  height is 5' 2" (1.575 m) and weight is 64.9 kg (143 lb). Her oral temperature is 98.3 °F (36.8 °C). Her blood pressure is 122/62 and her pulse is 57 (abnormal). Her respiration is 16 and oxygen saturation is 96%.     Chief Complaint: Sinus Problem    Pt complains of nasal congestion with thick mucus that started 2 weeks ago. She said she coughs sometimes but is unable to get anything up. Pt denies fever.  Patient states she has not getting better despite over-the-counter medications in his actually getting worse.  I nasal discharge is worsening    Sinus Problem  This is a new problem. Episode onset: 2 weeks ago. The problem has been gradually worsening since onset. There has been no fever. Her pain is at a severity of 0/10. She is experiencing no pain. Associated symptoms include congestion, coughing and sinus pressure. Pertinent negatives include no sore throat. Treatments tried: nasal spray, claritin.       Constitution: Negative for fever.   HENT:  Positive for congestion and sinus pressure. Negative for sore throat.    Respiratory:  Positive for cough. Negative for sputum production.    Skin:  Negative for erythema.      Objective:     Physical Exam   Constitutional: She is oriented to person, place, and time. She appears well-developed. She is cooperative.  Non-toxic appearance. She does not appear ill. No distress.   HENT:   Head: Normocephalic and atraumatic.   Ears:   Right Ear: Hearing, tympanic membrane, external ear and ear canal normal.   Left Ear: Hearing, tympanic membrane, external ear and ear canal normal.   Nose: Rhinorrhea and congestion present. No mucosal edema or nasal deformity. No epistaxis. Right sinus exhibits no maxillary sinus tenderness and no frontal sinus tenderness. Left sinus exhibits no maxillary sinus tenderness and no frontal sinus tenderness.   Mouth/Throat: Uvula is midline, oropharynx is clear and moist " and mucous membranes are normal. No trismus in the jaw. Normal dentition. No uvula swelling. No oropharyngeal exudate, posterior oropharyngeal edema or posterior oropharyngeal erythema.      Comments: Bilateral nares hyperemic slightly swollen.  Eyes: Conjunctivae, EOM and lids are normal. Pupils are equal, round, and reactive to light. Right eye exhibits no discharge. Left eye exhibits no discharge. No scleral icterus.   Neck: Trachea normal and phonation normal. Neck supple. No JVD present. No tracheal deviation present. No thyromegaly present. No edema present. No erythema present. No neck rigidity present.   Cardiovascular: Normal rate, regular rhythm, normal heart sounds and normal pulses.   No murmur heard.Exam reveals no gallop and no friction rub.   Pulmonary/Chest: Effort normal and breath sounds normal. No stridor. No respiratory distress. She has no decreased breath sounds. She has no wheezes. She has no rhonchi. She has no rales. She exhibits no tenderness.   Abdominal: Normal appearance. She exhibits no distension. Soft. There is no abdominal tenderness. There is no rebound and no guarding.   Musculoskeletal: Normal range of motion.         General: No deformity. Normal range of motion.   Neurological: She is alert and oriented to person, place, and time. She exhibits normal muscle tone. Coordination normal.   Skin: Skin is warm, dry, intact, not diaphoretic, not pale and no rash. Capillary refill takes less than 2 seconds. No erythema   Psychiatric: Her speech is normal and behavior is normal. Judgment and thought content normal.   Nursing note and vitals reviewed.     No results found.     Assessment:     1. Bacterial sinusitis        Plan:       Bacterial sinusitis  -     cefdinir (OMNICEF) 300 MG capsule; Take 1 capsule (300 mg total) by mouth 2 (two) times daily. for 10 days  Dispense: 20 capsule; Refill: 0  -     fluticasone propionate (FLONASE) 50 mcg/actuation nasal spray; 1 spray (50 mcg total)  by Each Nostril route once daily.  Dispense: 16 g; Refill: 3  -     sodium chloride (SALINE NASAL) 0.65 % nasal spray; 1 spray by Nasal route as needed for Congestion.  Dispense: 60 mL; Refill: 12      Follow up if symptoms worsen or fail to improve, for F/U with PCP or ED. There are no Patient Instructions on file for this visit.

## 2023-12-20 NOTE — TELEPHONE ENCOUNTER
----- Message from Sharri Eldridge sent at 12/20/2023  9:26 AM CST -----  Contact: pt  Type:  Same Day Appointment Request    Caller is requesting a same day appointment.  Caller declined first available appointment listed below.    Name of Caller:pt   When is the first available appointment?  Symptoms:sinus infection   Best Call Back Number:[phone]  Additional Information:

## 2023-12-20 NOTE — TELEPHONE ENCOUNTER
Informed pt we do not have any appt until the 26th of December. Pt understood and said she will go to [facility].

## 2024-01-03 DIAGNOSIS — E78.5 HYPERLIPIDEMIA, UNSPECIFIED HYPERLIPIDEMIA TYPE: Chronic | ICD-10-CM

## 2024-01-03 NOTE — TELEPHONE ENCOUNTER
Leonides sent a refill request for Nereyda Hernandez. Last Dr visit was on 5/4/2023 with Filipe. Canceled several appts afterward.

## 2024-01-04 RX ORDER — PRAVASTATIN SODIUM 20 MG/1
20 TABLET ORAL DAILY
Qty: 90 TABLET | Refills: 3 | Status: SHIPPED | OUTPATIENT
Start: 2024-01-04

## 2024-01-12 RX ORDER — ALENDRONATE SODIUM 70 MG/1
70 TABLET ORAL
Qty: 12 TABLET | Refills: 3 | Status: SHIPPED | OUTPATIENT
Start: 2024-01-12

## 2024-01-12 NOTE — TELEPHONE ENCOUNTER
No care due was identified.  [facility] Embedded Care Due Messages. Reference number: [id number].   1/12/2024 1:49:56 PM CST

## 2024-01-12 NOTE — TELEPHONE ENCOUNTER
----- Message from Petra Holt sent at 1/12/2024 11:54 AM CST -----  Type:  RX Refill Request    Who Called: Pt   Refill or New Rx: Refill   RX Name and Strength:   alendronate (FOSAMAX) 70 MG tablet  Preferred Pharmacy with phone number:  [facility] - [facility] [address]  [address] [address]  Phone: [phone] Fax: [phone]  Local or Mail Order: Mail Order   Ordering Provider: Meg   Would the patient rather a call back or a response via [facility]? Call back   Best Call Back Number: [phone]  Additional Information: Please be advised, pt states that she is out of medication

## 2024-02-06 ENCOUNTER — OFFICE VISIT (OUTPATIENT)
Dept: FAMILY MEDICINE | Facility: CLINIC | Age: 84
End: 2024-02-06
Payer: MEDICARE

## 2024-02-06 VITALS
DIASTOLIC BLOOD PRESSURE: 78 MMHG | BODY MASS INDEX: 26.72 KG/M2 | WEIGHT: 145.19 LBS | OXYGEN SATURATION: 96 % | HEART RATE: 69 BPM | SYSTOLIC BLOOD PRESSURE: 136 MMHG | HEIGHT: 62 IN

## 2024-02-06 DIAGNOSIS — I70.0 CALCIFICATION OF AORTA: ICD-10-CM

## 2024-02-06 DIAGNOSIS — F32.A ANXIETY AND DEPRESSION: Chronic | ICD-10-CM

## 2024-02-06 DIAGNOSIS — E78.2 MIXED HYPERLIPIDEMIA: Chronic | ICD-10-CM

## 2024-02-06 DIAGNOSIS — F51.04 PSYCHOPHYSIOLOGICAL INSOMNIA: Chronic | ICD-10-CM

## 2024-02-06 DIAGNOSIS — J47.9 BRONCHIECTASIS, UNCOMPLICATED: Primary | ICD-10-CM

## 2024-02-06 DIAGNOSIS — R26.89 POOR BALANCE: ICD-10-CM

## 2024-02-06 DIAGNOSIS — I10 ESSENTIAL HYPERTENSION: Chronic | ICD-10-CM

## 2024-02-06 DIAGNOSIS — F41.9 ANXIETY AND DEPRESSION: Chronic | ICD-10-CM

## 2024-02-06 DIAGNOSIS — M81.0 AGE-RELATED OSTEOPOROSIS WITHOUT CURRENT PATHOLOGICAL FRACTURE: ICD-10-CM

## 2024-02-06 DIAGNOSIS — R91.8 MULTIPLE PULMONARY NODULES DETERMINED BY COMPUTED TOMOGRAPHY OF LUNG: ICD-10-CM

## 2024-02-06 PROCEDURE — 3078F DIAST BP <80 MM HG: CPT | Mod: HCNC,CPTII,S$GLB, | Performed by: FAMILY MEDICINE

## 2024-02-06 PROCEDURE — 1125F AMNT PAIN NOTED PAIN PRSNT: CPT | Mod: HCNC,CPTII,S$GLB, | Performed by: FAMILY MEDICINE

## 2024-02-06 PROCEDURE — 3288F FALL RISK ASSESSMENT DOCD: CPT | Mod: HCNC,CPTII,S$GLB, | Performed by: FAMILY MEDICINE

## 2024-02-06 PROCEDURE — 99214 OFFICE O/P EST MOD 30 MIN: CPT | Mod: HCNC,S$GLB,, | Performed by: FAMILY MEDICINE

## 2024-02-06 PROCEDURE — 99999 PR PBB SHADOW E&M-EST. PATIENT-LVL V: CPT | Mod: PBBFAC,HCNC,, | Performed by: FAMILY MEDICINE

## 2024-02-06 PROCEDURE — 3075F SYST BP GE 130 - 139MM HG: CPT | Mod: HCNC,CPTII,S$GLB, | Performed by: FAMILY MEDICINE

## 2024-02-06 PROCEDURE — 1160F RVW MEDS BY RX/DR IN RCRD: CPT | Mod: HCNC,CPTII,S$GLB, | Performed by: FAMILY MEDICINE

## 2024-02-06 PROCEDURE — 1157F ADVNC CARE PLAN IN RCRD: CPT | Mod: HCNC,CPTII,S$GLB, | Performed by: FAMILY MEDICINE

## 2024-02-06 PROCEDURE — 1159F MED LIST DOCD IN RCRD: CPT | Mod: HCNC,CPTII,S$GLB, | Performed by: FAMILY MEDICINE

## 2024-02-06 PROCEDURE — 1101F PT FALLS ASSESS-DOCD LE1/YR: CPT | Mod: HCNC,CPTII,S$GLB, | Performed by: FAMILY MEDICINE

## 2024-02-06 RX ORDER — TIZANIDINE 2 MG/1
4 TABLET ORAL 2 TIMES DAILY
Qty: 180 TABLET | Refills: 3 | Status: SHIPPED | OUTPATIENT
Start: 2024-02-06

## 2024-02-06 RX ORDER — TIZANIDINE 2 MG/1
4 TABLET ORAL 2 TIMES DAILY
Qty: 90 TABLET | Refills: 3 | Status: SHIPPED | OUTPATIENT
Start: 2024-02-06 | End: 2024-02-06

## 2024-02-06 NOTE — PROGRESS NOTES
PATIENT VISIT FAMILY MEDICINE    CC:   Chief Complaint   Patient presents with    Follow-up    Hypertension    Headache     From back of head to the top    Hand Pain     thumbs    Fatigue    Low-back Pain       HPI: Nereyda Hernandez  is a 83 y.o. female:    Patient is known to me.  Patient presents routine follow up on chronic conditions    Lower back pain  Ever since her fall  No further falls or bowel/bladder changes  Is tolerable -- she'd prefer not to take additional medication    Headaches are posterior, mild. She is using CPAP more consistently for the past 1 month     Still feels tired but better overall. Still notes dypsnea. She saw Pulm 9/23 with no further cause of dyspnea determined    PMHX:   Past Medical History:   Diagnosis Date    HATTIE (acute kidney injury) 12/10/2021    Anxiety     Arthritis     Breast cancer 1990    left    Chronic disease anemia     Hyperlipidemia     Hypertension     Insomnia     Lobular carcinoma in situ     KANWAL (obstructive sleep apnea)     Osteoporosis     Rosacea     Squamous cell carcinoma     Stable angina pectoris 8/27/2020    Urinary incontinence     Vertigo        PSHX:   Past Surgical History:   Procedure Laterality Date    ADENOIDECTOMY      BELT ABDOMINOPLASTY      BREAST BIOPSY Left 1990    ex bx    BREAST LUMPECTOMY      COLONOSCOPY N/A 1/28/2020    Procedure: COLONOSCOPY;  Surgeon: Bianka Macias MD;  Location: [facility];  Service: Endoscopy;  Laterality: N/A;    ESOPHAGOGASTRODUODENOSCOPY N/A 1/28/2020    Procedure: EGD (ESOPHAGOGASTRODUODENOSCOPY);  Surgeon: Bianka Macias MD;  Location: [facility];  Service: Endoscopy;  Laterality: N/A;    EYE SURGERY      HERNIA REPAIR      Ventral    INJECTION OF ANESTHETIC AGENT AROUND NERVE Right 9/14/2023    Procedure: Right T11-T12, T12-L1 TFESI;  Surgeon: Edson Beard DO;  Location: [facility];  Service: Pain Management;  Laterality: Right;  20 mins    LIVER TRANSPLANT      OOPHORECTOMY       TONSILLECTOMY      TOTAL ABDOMINAL HYSTERECTOMY         FAMHX:   Family History   Problem Relation Age of Onset    Cancer Mother         liver    Pancreatic cancer Mother     Heart disease Mother     Depression Mother     Miscarriages / Stillbirths Mother     Hypertension Father     Alzheimer's disease Father     Depression Brother     Depression Brother     Diabetes Brother     Arthritis Brother     Hypertension Brother     Heart disease Brother        SOCHX:   Social History     Socioeconomic History    Marital status:     Number of children: 6    Years of education: college   Occupational History    Occupation: retired  for [employer]   Tobacco Use    Smoking status: Former     Current packs/day: 0.00     Average packs/day: 0.5 packs/day for 21.7 years (10.9 ttl pk-yrs)     Types: Cigarettes     Start date: 1959     Quit date: 1980     Years since quittin.4     Passive exposure: Never    Smokeless tobacco: Never   Substance and Sexual Activity    Alcohol use: Not Currently     Alcohol/week: 1.0 standard drink of alcohol     Types: 1 Glasses of wine per week     Comment: Occasionally    Drug use: Never    Sexual activity: Not Currently     Partners: Male     Social Determinants of Health     Financial Resource Strain: Low Risk  (2023)    Overall Financial Resource Strain (CARDIA)     Difficulty of Paying Living Expenses: Not hard at all   Food Insecurity: No Food Insecurity (2023)    Hunger Vital Sign     Worried About Running Out of Food in the Last Year: Never true     Ran Out of Food in the Last Year: Never true   Transportation Needs: No Transportation Needs (2023)    PRAPARE - Transportation     Lack of Transportation (Medical): No     Lack of Transportation (Non-Medical): No   Physical Activity: Inactive (2023)    Exercise Vital Sign     Days of Exercise per Week: 0 days     Minutes of Exercise per Session: 0 min   Stress: No Stress Concern Present  (9/20/2023)    [employer] [place] [employer] - Occupational Stress Questionnaire     Feeling of Stress : Not at all   Social Connections: Moderately Integrated (9/20/2023)    Social Connection and Isolation Panel [NHANES]     Frequency of Communication with Friends and Family: More than three times a week     Frequency of Social Gatherings with Friends and Family: Once a week     Attends Holiness Services: More than 4 times per year     Active Member of Clubs or Organizations: Yes     Attends Club or Organization Meetings: More than 4 times per year     Marital Status:    Housing Stability: Unknown (9/20/2023)    Housing Stability Vital Sign     Unable to Pay for Housing in the Last Year: No     Number of Places Lived in the Last Year: 1       ALLERGIES:   Review of patient's allergies indicates:   Allergen Reactions    Sulfa (sulfonamide antibiotics) Nausea Only     PT WAS CHILD, NOT ALLERGIC       MEDS:   Current Outpatient Medications:     alendronate (FOSAMAX) 70 MG tablet, Take 1 tablet (70 mg total) by mouth every 7 days., Disp: 12 tablet, Rfl: 3    amitriptyline (ELAVIL) 10 MG tablet, TAKE 3 TABLETS EVERY NIGHT AS NEEDED FOR INSOMNIA, Disp: 270 tablet, Rfl: 3    aspirin (ECOTRIN) 81 MG EC tablet, Take 81 mg by mouth once daily., Disp: , Rfl:     azelastine (ASTELIN) 137 mcg (0.1 %) nasal spray, 1 spray (137 mcg total) by Nasal route 2 (two) times daily., Disp: 30 mL, Rfl: 3    BENEFIBER, WHEAT DEXTRIN, ORAL, Take by mouth. PRN, Disp: , Rfl:     calcium citrate-vitamin D3 500 mg calcium -400 unit Chew, Take 1 tablet by mouth 2 (two) times daily. 12.5mcg, Disp: , Rfl:     EScitalopram oxalate (LEXAPRO) 20 MG tablet, TAKE 1 TABLET (20 MG TOTAL) BY MOUTH ONCE DAILY., Disp: 90 tablet, Rfl: 3    fish oil-omega-3 fatty acids 300-1,000 mg capsule, Take 2 g by mouth once daily., Disp: , Rfl:     fluticasone propionate (FLONASE) 50 mcg/actuation nasal spray, 1 spray (50 mcg total) by Each Nostril  "route once daily., Disp: 16 g, Rfl: 3    Lactobacillus acidophilus (PROBIOTIC ACIDOPHILUS ORAL), Take by mouth., Disp: , Rfl:     latanoprost 0.005 % ophthalmic solution, 1 drop every evening., Disp: , Rfl:     losartan (COZAAR) 25 MG tablet, TAKE 1 TABLET ONE TIME DAILY, Disp: 90 tablet, Rfl: 3    metoprolol tartrate 37.5 mg Tab, Take 37.5 mg by mouth 2 (two) times daily., Disp: 180 tablet, Rfl: 3    MULTIVIT-IRON-MIN-FOLIC ACID 3,500-18-0.4 UNIT-MG-MG ORAL CHEW, Take by mouth., Disp: , Rfl:     pantoprazole (PROTONIX) 40 MG tablet, TAKE 1 TABLET EVERY DAY, Disp: 90 tablet, Rfl: 3    pravastatin (PRAVACHOL) 20 MG tablet, Take 1 tablet (20 mg total) by mouth once daily., Disp: 90 tablet, Rfl: 3    sodium chloride (SALINE NASAL) 0.65 % nasal spray, 1 spray by Nasal route as needed for Congestion., Disp: 60 mL, Rfl: 12    triamcinolone acetonide 0.025% (KENALOG) 0.025 % cream, Apply topically 2 (two) times daily. Apply a small amount to the left side of face twice per day for about 5 days. for 5 days, Disp: 15 g, Rfl: 0    triamterene-hydrochlorothiazide 37.5-25 mg (DYAZIDE) 37.5-25 mg per capsule, TAKE 1 CAPSULE EVERY MORNING, Disp: 90 capsule, Rfl: 3    vit A/vit C/vit E/zinc/copper (PRESERVISION AREDS ORAL), Take by mouth. Bottle does not say a but does say Lutein, Disp: , Rfl:     tiotropium-olodateroL (STIOLTO RESPIMAT) 2.5-2.5 mcg/actuation Mist, Inhale 2 puffs into the lungs once daily. Controller (Patient not taking: Reported on 11/14/2023), Disp: 4 g, Rfl: 6    tiZANidine (ZANAFLEX) 2 MG tablet, Take 2 tablets (4 mg total) by mouth 2 (two) times daily., Disp: 180 tablet, Rfl: 3    OBJECTIVE:   Vitals:    02/06/24 1256   BP: 136/78   BP Location: Left arm   Patient Position: Sitting   BP Method: Large (Manual)   Pulse: 69   SpO2: 96%   Weight: 65.9 kg (145 lb 2.8 oz)   Height: 5' 2" (1.575 m)     Body mass index is 26.55 kg/m².      Physical Exam  Vitals and nursing note reviewed.   Constitutional:       " "Appearance: Normal appearance.   HENT:      Head: Normocephalic.   Eyes:      General:         Right eye: No discharge.         Left eye: No discharge.      Extraocular Movements: Extraocular movements intact.   Cardiovascular:      Rate and Rhythm: Normal rate and regular rhythm.      Heart sounds: Normal heart sounds.   Pulmonary:      Effort: Pulmonary effort is normal.      Breath sounds: Normal breath sounds.   Abdominal:      General: Bowel sounds are normal. There is no distension.      Palpations: Abdomen is soft. There is no mass.      Tenderness: There is no abdominal tenderness. There is no guarding or rebound.      Hernia: No hernia is present.   Skin:     Comments: No obvious rash on exposed skin   Neurological:      Mental Status: She is alert.   Psychiatric:         Behavior: Behavior normal.           LABS:   A1C:      CBC:  Recent Labs   Lab 03/22/23  1424   WBC 6.10   RBC 3.84 L   Hemoglobin 12.0   Hematocrit 35.8 L   Platelets 238   MCV 93   MCH 31.3 H   MCHC 33.5     CMP:  Recent Labs   Lab 03/22/23  1424   Glucose 99   Calcium 9.9   Albumin 4.7   Total Protein 8.0   Sodium 140   Potassium 4.8   CO2 32 H   Chloride 101   BUN 21 H   Creatinine 0.81   Alkaline Phosphatase 64   ALT 32   AST 38   Total Bilirubin 0.5     LIPIDS:  Recent Labs   Lab 11/08/22  0803 05/22/23  1027   TSH 1.790  --    HDL  --  56   Cholesterol  --  173   Triglycerides  --  97   LDL Cholesterol  --  97.6   HDL/Cholesterol Ratio  --  32.4   Non-HDL Cholesterol  --  117   Total Cholesterol/HDL Ratio  --  3.1     TSH:  Recent Labs   Lab 11/08/22  0803   TSH 1.790         ASSESSMENT & PLAN:    Problem List Items Addressed This Visit          Psychiatric    Anxiety and depression (Chronic)    Overview     Stable. Continue current medical therapy      Paxil ineffective. Previously on Zoloft but self d/c'ed as she was "feeling better".               Pulmonary    Bronchiectasis, uncomplicated - Primary (Chronic)    Overview     Per " prior CT. Dyspnea is stable. She has seen Pulm 9/23.          Multiple pulmonary nodules determined by computed tomography of lung    Overview     Stable per last CT. Last saw pulm 9/23 with no further testing recommended            Cardiac/Vascular    Calcification of aorta (Chronic)    Overview     Seen on Chest X-ray 12/19/2022         Essential hypertension (Chronic)    Overview     Well controlled. Continue current regimen           Relevant Orders    CBC Auto Differential    Comprehensive Metabolic Panel    Mixed hyperlipidemia (Chronic)    Overview     Stable. Continue current medical therapy              Endocrine    Age-related osteoporosis without current pathological fracture (Chronic)    Overview     Will repeat dexa in December. Continue fosamax (started 2019 per chart)    T vertebral fx s/p kyphoplasty 2022.          Relevant Orders    DXA Bone Density Axial Skeleton 1 or more sites    Vitamin D       Other    Poor balance (Chronic)    Overview     Improved. Continue maintaining physical activity as able         Relevant Orders    Vitamin B12    Psychophysiological insomnia (Chronic)    Overview     Stable. Continue current medical therapy      Prior treatments which were ineffective or with noted side effects: trazodone, mirtazapine, melatonin,  gabapentin, ambien                Follow up in about 6 months (around 8/6/2024) for blood pressure.      RTC/ED precautions discussed where applicable.   Encouraged patient to let me know if there are any further questions/concerns.     Advise patient/caretaker to check with insurance regarding orders to avoid unexpected fees/costs.     The patient/caretaker indicates understanding of these issues and agrees with the plan.    Dr. Sharona Weaver MD  Family Medicine

## 2024-02-19 DIAGNOSIS — G47.33 OSA (OBSTRUCTIVE SLEEP APNEA): Primary | ICD-10-CM

## 2024-02-23 DIAGNOSIS — D64.9 ANEMIA, UNSPECIFIED TYPE: Primary | ICD-10-CM

## 2024-03-18 DIAGNOSIS — K21.9 GASTROESOPHAGEAL REFLUX DISEASE WITHOUT ESOPHAGITIS: ICD-10-CM

## 2024-03-19 RX ORDER — PANTOPRAZOLE SODIUM 40 MG/1
TABLET, DELAYED RELEASE ORAL
Qty: 90 TABLET | Refills: 3 | Status: SHIPPED | OUTPATIENT
Start: 2024-03-19 | End: 2024-06-18

## 2024-03-25 NOTE — TELEPHONE ENCOUNTER
Care Due:                  Date            Visit Type   Department     Provider  --------------------------------------------------------------------------------                                EP -                              PRIMARY [facility]  Last Visit: 02-      CARE ([facility])   [facility]  [place]                               -                              PRIMARY      [facility]  Next Visit: 08-      [facility]                                                            Last  Test          Frequency    Reason                     Performed    Due Date  --------------------------------------------------------------------------------    Mg Level....  12 months..  alendronate..............  Not Found    Overdue    Phosphate...  12 months..  alendronate..............  Not Found    Overdue    [employer] Embedded Care Due Messages. Reference number: [id number].   3/25/2024 1:39:31 PM CDT   26-Mar-2022

## 2024-03-26 RX ORDER — LOSARTAN POTASSIUM 25 MG/1
TABLET ORAL
Qty: 90 TABLET | Refills: 0 | OUTPATIENT
Start: 2024-03-26

## 2024-03-26 NOTE — TELEPHONE ENCOUNTER
Refill Decision Note   Nereyda Hernandez  is requesting a refill authorization.  Brief Assessment and Rationale for Refill:  Quick Discontinue     Medication Therapy Plan:    Pharmacy is requesting new scripts for the following medications without required information, (sig/ frequency/qty/etc)      Medication Reconciliation Completed: No     Comments: Pharmacies have been requesting medications for patients without required information, (sig, frequency, qty, etc.). In addition, requests are sent for medication(s) pt. are currently not taking, and medications patients have never taken.    We have spoken to the pharmacies about these request types and advised their teams previously that we are unable to assess these New Script requests and require all details for these requests. This is a known issue and has been reported.     Note composed:7:15 AM 03/26/2024

## 2024-03-27 ENCOUNTER — TELEPHONE (OUTPATIENT)
Dept: FAMILY MEDICINE | Facility: CLINIC | Age: 84
End: 2024-03-27
Payer: MEDICARE

## 2024-03-27 RX ORDER — LOSARTAN POTASSIUM 25 MG/1
25 TABLET ORAL DAILY
Qty: 90 TABLET | Refills: 3 | Status: SHIPPED | OUTPATIENT
Start: 2024-03-27

## 2024-03-27 NOTE — TELEPHONE ENCOUNTER
----- Message from Emmie Amaya sent at 3/27/2024 10:22 AM CDT -----  Type:  Needs Medical Advice    Who Called:  Pt   Would the patient rather a call back or a response via [employer]? Callback   Best Call Back Number:  [phone]  Additional Information: Caller requesting a callback from this provider office to f/u on fax sent for medication for   losartan (COZAAR) 25 MG tablet (Refill)

## 2024-03-27 NOTE — TELEPHONE ENCOUNTER
No care due was identified.  Health [facility] Embedded Care Due Messages. Reference number: [id number].   3/27/2024 10:35:30 AM CDT

## 2024-03-27 NOTE — TELEPHONE ENCOUNTER
----- Message from Emmie Amaya sent at 3/27/2024 10:22 AM CDT -----  Type:  Needs Medical Advice    Who Called:  Pt   Would the patient rather a call back or a response via [employer]? Callback   Best Call Back Number:  [phone]  Additional Information: Caller requesting a callback from this provider office to f/u on fax sent for medication for   losartan (COZAAR) 25 MG tablet (Refill)

## 2024-05-16 ENCOUNTER — OFFICE VISIT (OUTPATIENT)
Dept: SLEEP MEDICINE | Facility: CLINIC | Age: 84
End: 2024-05-16
Attending: PSYCHIATRY & NEUROLOGY
Payer: MEDICARE

## 2024-05-16 VITALS
SYSTOLIC BLOOD PRESSURE: 134 MMHG | HEIGHT: 62 IN | WEIGHT: 147 LBS | HEART RATE: 63 BPM | BODY MASS INDEX: 27.05 KG/M2 | DIASTOLIC BLOOD PRESSURE: 61 MMHG

## 2024-05-16 DIAGNOSIS — G47.33 OSA (OBSTRUCTIVE SLEEP APNEA): Primary | ICD-10-CM

## 2024-05-16 PROCEDURE — 99214 OFFICE O/P EST MOD 30 MIN: CPT | Mod: S$GLB,,, | Performed by: NURSE PRACTITIONER

## 2024-05-16 PROCEDURE — 3078F DIAST BP <80 MM HG: CPT | Mod: CPTII,S$GLB,, | Performed by: NURSE PRACTITIONER

## 2024-05-16 PROCEDURE — 99999 PR PBB SHADOW E&M-EST. PATIENT-LVL II: CPT | Mod: PBBFAC,,, | Performed by: NURSE PRACTITIONER

## 2024-05-16 PROCEDURE — 3075F SYST BP GE 130 - 139MM HG: CPT | Mod: CPTII,S$GLB,, | Performed by: NURSE PRACTITIONER

## 2024-05-16 PROCEDURE — 1101F PT FALLS ASSESS-DOCD LE1/YR: CPT | Mod: CPTII,S$GLB,, | Performed by: NURSE PRACTITIONER

## 2024-05-16 PROCEDURE — 3288F FALL RISK ASSESSMENT DOCD: CPT | Mod: CPTII,S$GLB,, | Performed by: NURSE PRACTITIONER

## 2024-05-16 PROCEDURE — 1157F ADVNC CARE PLAN IN RCRD: CPT | Mod: CPTII,S$GLB,, | Performed by: NURSE PRACTITIONER

## 2024-05-16 NOTE — PROGRESS NOTES
"Cc: KANWAL    Underwent PSG 10/2023 and setup apap 5-12cm/F20 mask. Leaks better. Still tired upon awakening but gets out of bed and gets going. JOSELINE- shortness of breath. Using almost qhs, hair days not. Snoring presumed resolved  Interrogation 78/90d used avg 8.7h/n 90% tile 8.1cm. AHI 1.2    Hx spinal fracture 12/2022, takes muscle relaxant  30mg generic elavil qhs "won't sleep if dont take it"  In bed 8:30p prayers/reads/tv watches and out of bed 9-10a    Dx KANWAL 2004 with ahi of 9.4.  At that time, patient was on cpap of 9 cm H20.  Pt used machine nightly until 2021  +leakage around full face mask.  +snoring without machine.  +fatigue upon awakening  Requal PSG 2019 AHI 1.1  Requal PSG 10/2023 AHI 5.9/low sat 82% (<3hr tst)      Assessment  KANWAL, mild. She is adherent with APAP, benefits overall, AHI<5      Plan:  Continue apap 5-12cm.   Discussed control of KANWAL and not candidate for Inspire  Rtc 1yr, sooner if needed   "

## 2024-06-04 ENCOUNTER — TELEPHONE (OUTPATIENT)
Dept: FAMILY MEDICINE | Facility: CLINIC | Age: 84
End: 2024-06-04
Payer: MEDICARE

## 2024-06-04 NOTE — TELEPHONE ENCOUNTER
----- Message from Bria Mcginnis sent at 6/4/2024 12:33 PM CDT -----  Type:  Same Day Appointment Request    Caller is requesting a same day appointment.  Caller declined first available appointment listed below.    Name of Caller: Nereyda Hernandez  When is the first available appointment? 12/12/24  Symptoms: Chest pains ( I day )  Best Call Back Number:  [phone]  Additional Information:

## 2024-06-18 DIAGNOSIS — I10 ESSENTIAL HYPERTENSION: Chronic | ICD-10-CM

## 2024-06-18 DIAGNOSIS — E78.5 HYPERLIPIDEMIA, UNSPECIFIED HYPERLIPIDEMIA TYPE: Chronic | ICD-10-CM

## 2024-06-18 DIAGNOSIS — K21.9 GASTROESOPHAGEAL REFLUX DISEASE WITHOUT ESOPHAGITIS: ICD-10-CM

## 2024-06-18 RX ORDER — METOPROLOL TARTRATE 37.5 MG/1
1 TABLET, FILM COATED ORAL 2 TIMES DAILY
Qty: 180 TABLET | Refills: 3 | Status: SHIPPED | OUTPATIENT
Start: 2024-06-18

## 2024-06-18 RX ORDER — TRIAMTERENE AND HYDROCHLOROTHIAZIDE 37.5; 25 MG/1; MG/1
1 CAPSULE ORAL DAILY
Qty: 90 CAPSULE | Refills: 3 | Status: SHIPPED | OUTPATIENT
Start: 2024-06-18

## 2024-06-18 RX ORDER — PRAVASTATIN SODIUM 20 MG/1
20 TABLET ORAL DAILY
Qty: 90 TABLET | Refills: 3 | Status: SHIPPED | OUTPATIENT
Start: 2024-06-18

## 2024-06-18 RX ORDER — PANTOPRAZOLE SODIUM 40 MG/1
40 TABLET, DELAYED RELEASE ORAL DAILY
Qty: 90 TABLET | Refills: 3 | Status: SHIPPED | OUTPATIENT
Start: 2024-06-18

## 2024-06-18 NOTE — TELEPHONE ENCOUNTER
Care Due:                  Date            Visit Type   Department     Provider  --------------------------------------------------------------------------------                                EP -                              PRIMARY      [facility]  Last Visit: 02-      [facility]  Next Visit: None Scheduled  None         None Found                                                            Last  Test          Frequency    Reason                     Performed    Due Date  --------------------------------------------------------------------------------    Mg Level....  12 months..  alendronate..............  Not Found    Overdue    Phosphate...  12 months..  alendronate..............  Not Found    Overdue    [employer] Embedded Care Due Messages. Reference number: [id number].   6/18/2024 11:02:38 AM CDT

## 2024-06-25 DIAGNOSIS — F41.9 ANXIETY AND DEPRESSION: Chronic | ICD-10-CM

## 2024-06-25 DIAGNOSIS — F32.A ANXIETY AND DEPRESSION: Chronic | ICD-10-CM

## 2024-06-25 NOTE — TELEPHONE ENCOUNTER
Care Due:                  Date            Visit Type   Department     Provider  --------------------------------------------------------------------------------                                EP -                              PRIMARY      [facility]  Last Visit: 02-      [facility]  Next Visit: None Scheduled  None         None Found                                                            Last  Test          Frequency    Reason                     Performed    Due Date  --------------------------------------------------------------------------------    Lipid Panel.  12 months..  pravastatin..............  05- 05-    Health [facility] Embedded Care Due Messages. Reference number: [id number].   6/25/2024 3:47:14 PM CDT

## 2024-06-26 RX ORDER — ESCITALOPRAM OXALATE 20 MG/1
20 TABLET ORAL DAILY
Qty: 90 TABLET | Refills: 3 | Status: SHIPPED | OUTPATIENT
Start: 2024-06-26 | End: 2025-06-21

## 2024-08-21 ENCOUNTER — OFFICE VISIT (OUTPATIENT)
Dept: FAMILY MEDICINE | Facility: CLINIC | Age: 84
End: 2024-08-21
Payer: MEDICARE

## 2024-08-21 VITALS
HEART RATE: 61 BPM | OXYGEN SATURATION: 97 % | DIASTOLIC BLOOD PRESSURE: 72 MMHG | BODY MASS INDEX: 26.65 KG/M2 | SYSTOLIC BLOOD PRESSURE: 146 MMHG | WEIGHT: 144.81 LBS | HEIGHT: 62 IN

## 2024-08-21 DIAGNOSIS — F41.9 ANXIETY AND DEPRESSION: Chronic | ICD-10-CM

## 2024-08-21 DIAGNOSIS — R79.9 ABNORMAL FINDING OF BLOOD CHEMISTRY: ICD-10-CM

## 2024-08-21 DIAGNOSIS — J47.9 BRONCHIECTASIS, UNCOMPLICATED: Chronic | ICD-10-CM

## 2024-08-21 DIAGNOSIS — E78.2 MIXED HYPERLIPIDEMIA: Chronic | ICD-10-CM

## 2024-08-21 DIAGNOSIS — M81.0 AGE-RELATED OSTEOPOROSIS WITHOUT CURRENT PATHOLOGICAL FRACTURE: Chronic | ICD-10-CM

## 2024-08-21 DIAGNOSIS — R00.2 PALPITATIONS: Chronic | ICD-10-CM

## 2024-08-21 DIAGNOSIS — F51.04 PSYCHOPHYSIOLOGICAL INSOMNIA: Chronic | ICD-10-CM

## 2024-08-21 DIAGNOSIS — F32.A ANXIETY AND DEPRESSION: Chronic | ICD-10-CM

## 2024-08-21 DIAGNOSIS — I10 ESSENTIAL HYPERTENSION: Primary | Chronic | ICD-10-CM

## 2024-08-21 DIAGNOSIS — K59.04 CHRONIC IDIOPATHIC CONSTIPATION: ICD-10-CM

## 2024-08-21 DIAGNOSIS — H61.23 BILATERAL IMPACTED CERUMEN: ICD-10-CM

## 2024-08-21 PROBLEM — I20.89 STABLE ANGINA PECTORIS: Chronic | Status: RESOLVED | Noted: 2020-08-27 | Resolved: 2024-08-21

## 2024-08-21 PROBLEM — I20.89 STABLE ANGINA PECTORIS: Chronic | Status: ACTIVE | Noted: 2020-08-27

## 2024-08-21 PROCEDURE — 3077F SYST BP >= 140 MM HG: CPT | Mod: HCNC,CPTII,S$GLB, | Performed by: FAMILY MEDICINE

## 2024-08-21 PROCEDURE — 1157F ADVNC CARE PLAN IN RCRD: CPT | Mod: HCNC,CPTII,S$GLB, | Performed by: FAMILY MEDICINE

## 2024-08-21 PROCEDURE — 99214 OFFICE O/P EST MOD 30 MIN: CPT | Mod: HCNC,25,S$GLB, | Performed by: FAMILY MEDICINE

## 2024-08-21 PROCEDURE — 90715 TDAP VACCINE 7 YRS/> IM: CPT | Mod: HCNC,S$GLB,, | Performed by: FAMILY MEDICINE

## 2024-08-21 PROCEDURE — 3288F FALL RISK ASSESSMENT DOCD: CPT | Mod: HCNC,CPTII,S$GLB, | Performed by: FAMILY MEDICINE

## 2024-08-21 PROCEDURE — 1125F AMNT PAIN NOTED PAIN PRSNT: CPT | Mod: HCNC,CPTII,S$GLB, | Performed by: FAMILY MEDICINE

## 2024-08-21 PROCEDURE — 99999 PR PBB SHADOW E&M-EST. PATIENT-LVL IV: CPT | Mod: PBBFAC,HCNC,, | Performed by: FAMILY MEDICINE

## 2024-08-21 PROCEDURE — 1159F MED LIST DOCD IN RCRD: CPT | Mod: HCNC,CPTII,S$GLB, | Performed by: FAMILY MEDICINE

## 2024-08-21 PROCEDURE — 3078F DIAST BP <80 MM HG: CPT | Mod: HCNC,CPTII,S$GLB, | Performed by: FAMILY MEDICINE

## 2024-08-21 PROCEDURE — 1101F PT FALLS ASSESS-DOCD LE1/YR: CPT | Mod: HCNC,CPTII,S$GLB, | Performed by: FAMILY MEDICINE

## 2024-08-21 PROCEDURE — 1160F RVW MEDS BY RX/DR IN RCRD: CPT | Mod: HCNC,CPTII,S$GLB, | Performed by: FAMILY MEDICINE

## 2024-08-21 PROCEDURE — 90471 IMMUNIZATION ADMIN: CPT | Mod: HCNC,S$GLB,, | Performed by: FAMILY MEDICINE

## 2024-08-21 RX ORDER — BUDESONIDE AND FORMOTEROL FUMARATE DIHYDRATE 80; 4.5 UG/1; UG/1
2 AEROSOL RESPIRATORY (INHALATION) DAILY
Qty: 10.2 G | Refills: 5 | Status: SHIPPED | OUTPATIENT
Start: 2024-08-21 | End: 2024-08-21

## 2024-08-21 RX ORDER — DILTIAZEM HYDROCHLORIDE 60 MG/1
2 TABLET, FILM COATED ORAL 2 TIMES DAILY
Qty: 6.9 G | Refills: 11 | Status: SHIPPED | OUTPATIENT
Start: 2024-08-21 | End: 2025-08-21

## 2024-08-21 RX ORDER — TIZANIDINE 2 MG/1
2 TABLET ORAL NIGHTLY PRN
Start: 2024-08-21

## 2024-08-21 NOTE — PROGRESS NOTES
"PATIENT VISIT FAMILY MEDICINE    CC:   Chief Complaint   Patient presents with    Follow-up    Hypertension     Stated during evening it has been running 170s/90s    Headache       HPI: Nereyda Hernandez  is a 84 y.o. female:    Patient is known to me.  Patient presents routine follow up on chronic conditions    Patient reports feeling tired, says she's having " hard heart beats". Reports high blood pressure readings at home recently. Reports muscular fatigue in the back. Reports cough for the last couple months, with accompanying hoarseness, and SOB with exertion. Not taking inhaler as prescribed by Pulm as "it didn't work".  Reports constipation.        PMHX:   Past Medical History:   Diagnosis Date    HATTIE (acute kidney injury) 12/10/2021    Anxiety     Arthritis     Breast cancer 1990    left    Chronic disease anemia     Hyperlipidemia     Hypertension     Insomnia     Lobular carcinoma in situ     KANWAL (obstructive sleep apnea)     Osteoporosis     Rosacea     Squamous cell carcinoma     Stable angina pectoris 08/27/2020    Stable angina pectoris 08/27/2020    Urinary incontinence     Vertigo        PSHX:   Past Surgical History:   Procedure Laterality Date    ADENOIDECTOMY      BELT ABDOMINOPLASTY      BREAST BIOPSY Left 1990    ex bx    BREAST LUMPECTOMY      COLONOSCOPY N/A 1/28/2020    Procedure: COLONOSCOPY;  Surgeon: Bianka Macias MD;  Location: [facility];  Service: Endoscopy;  Laterality: N/A;    ESOPHAGOGASTRODUODENOSCOPY N/A 1/28/2020    Procedure: EGD (ESOPHAGOGASTRODUODENOSCOPY);  Surgeon: Bianka Macias MD;  Location: [facility];  Service: Endoscopy;  Laterality: N/A;    EYE SURGERY      HERNIA REPAIR      Ventral    INJECTION OF ANESTHETIC AGENT AROUND NERVE Right 9/14/2023    Procedure: Right T11-T12, T12-L1 TFESI;  Surgeon: Edson Beard DO;  Location: [facility];  Service: Pain Management;  Laterality: Right;  20 mins    LIVER TRANSPLANT      OOPHORECTOMY      " TONSILLECTOMY      TOTAL ABDOMINAL HYSTERECTOMY         FAMHX:   Family History   Problem Relation Name Age of Onset    Cancer Mother [place]         liver    Pancreatic cancer Mother Kami [address]     Heart disease Mother [place]     Depression Mother [place] [address]     Miscarriages / Stillbirths Mother [place] [facility]     Hypertension Father [place]     Alzheimer's disease Father [place]     Depression Brother Brian     Depression Brother Osmany     Diabetes Brother Dagoberto     Arthritis Brother Dagoberto     Hypertension Brother Ray     Heart disease Brother Ray        SOCHX:   Social History     Socioeconomic History    Marital status:     Number of children: 6    Years of education: college   Occupational History    Occupation: retired  for [employer]   Tobacco Use    Smoking status: Former     Current packs/day: 0.00     Average packs/day: 0.5 packs/day for 21.7 years (10.9 ttl pk-yrs)     Types: Cigarettes     Start date: 1959     Quit date: 1980     Years since quittin.9     Passive exposure: Never    Smokeless tobacco: Never   Substance and Sexual Activity    Alcohol use: Not Currently     Alcohol/week: 1.0 standard drink of alcohol     Types: 1 Glasses of wine per week     Comment: Occasionally    Drug use: Never    Sexual activity: Not Currently     Partners: Male     Social Determinants of Health     Financial Resource Strain: Low Risk  (2023)    Overall Financial Resource Strain (CARDIA)     Difficulty of Paying Living Expenses: Not hard at all   Food Insecurity: No Food Insecurity (2023)    Hunger Vital Sign     Worried About Running Out of Food in the Last Year: Never true     Ran Out of Food in the Last Year: Never true   Transportation Needs: No Transportation Needs (2023)    PRAPARE - Transportation     Lack of Transportation (Medical): No     Lack of Transportation  (Non-Medical): No   Physical Activity: Inactive (9/20/2023)    Exercise Vital Sign     Days of Exercise per Week: 0 days     Minutes of Exercise per Session: 0 min   Stress: No Stress Concern Present (9/20/2023)    [employer] - Occupational Stress Questionnaire     Feeling of Stress : Not at all   Housing Stability: Unknown (9/20/2023)    Housing Stability Vital Sign     Unable to Pay for Housing in the Last Year: No     Number of Places Lived in the Last Year: 1       ALLERGIES:   Review of patient's allergies indicates:   Allergen Reactions    Sulfa (sulfonamide antibiotics) Nausea Only     PT WAS CHILD, NOT ALLERGIC       MEDS:   Current Outpatient Medications:     alendronate (FOSAMAX) 70 MG tablet, Take 1 tablet (70 mg total) by mouth every 7 days., Disp: 12 tablet, Rfl: 3    amitriptyline (ELAVIL) 10 MG tablet, TAKE 3 TABLETS EVERY NIGHT AS NEEDED FOR INSOMNIA, Disp: 270 tablet, Rfl: 3    aspirin (ECOTRIN) 81 MG EC tablet, Take 81 mg by mouth once daily., Disp: , Rfl:     BENEFIBER, WHEAT DEXTRIN, ORAL, Take by mouth. PRN, Disp: , Rfl:     calcium citrate-vitamin D3 500 mg calcium -400 unit Chew, Take 1 tablet by mouth 2 (two) times daily. 12.5mcg, Disp: , Rfl:     EScitalopram oxalate (LEXAPRO) 20 MG tablet, Take 1 tablet (20 mg total) by mouth once daily., Disp: 90 tablet, Rfl: 3    fish oil-omega-3 fatty acids 300-1,000 mg capsule, Take 2 g by mouth once daily., Disp: , Rfl:     latanoprost 0.005 % ophthalmic solution, 1 drop every evening., Disp: , Rfl:     losartan (COZAAR) 25 MG tablet, Take 1 tablet (25 mg total) by mouth once daily., Disp: 90 tablet, Rfl: 3    metoprolol tartrate 37.5 mg Tab, Take 1 tablet by mouth 2 (two) times a day., Disp: 180 tablet, Rfl: 3    MULTIVIT-IRON-MIN-FOLIC ACID 3,500-18-0.4 UNIT-MG-MG ORAL CHEW, Take by mouth., Disp: , Rfl:     pantoprazole (PROTONIX) 40 MG tablet, Take 1 tablet (40 mg total) by mouth once daily., Disp: 90 tablet, Rfl: 3     "pravastatin (PRAVACHOL) 20 MG tablet, Take 1 tablet (20 mg total) by mouth once daily., Disp: 90 tablet, Rfl: 3    triamterene-hydrochlorothiazide 37.5-25 mg (DYAZIDE) 37.5-25 mg per capsule, Take 1 capsule by mouth once daily., Disp: 90 capsule, Rfl: 3    vit A/vit C/vit E/zinc/copper (PRESERVISION AREDS ORAL), Take by mouth. Bottle does not say a but does say Lutein, Disp: , Rfl:     budesonide-formoterol 80-4.5 mcg (SYMBICORT) 80-4.5 mcg/actuation HFAA, Inhale 2 puffs into the lungs 2 (two) times a day. Controller, Disp: 6.9 g, Rfl: 11    carbamide peroxide (DEBROX) 6.5 % otic solution, Place 5 drops into both ears 2 (two) times daily., Disp: 18 mL, Rfl: 0    tiZANidine (ZANAFLEX) 2 MG tablet, Take 1 tablet (2 mg total) by mouth nightly as needed., Disp: , Rfl:   No current facility-administered medications for this visit.    OBJECTIVE:   Vitals:    08/21/24 1331   BP: (!) 146/72   BP Location: Left arm   Patient Position: Sitting   BP Method: Medium (Manual)   Pulse: 61   SpO2: 97%   Weight: 65.7 kg (144 lb 13.5 oz)   Height: 5' 2" (1.575 m)     Body mass index is 26.49 kg/m².      Physical Exam  Vitals and nursing note reviewed.   Constitutional:       Appearance: Normal appearance.   HENT:      Head: Normocephalic.   Eyes:      General:         Right eye: No discharge.         Left eye: No discharge.      Extraocular Movements: Extraocular movements intact.   Cardiovascular:      Rate and Rhythm: Normal rate and regular rhythm.   Pulmonary:      Effort: Pulmonary effort is normal.      Breath sounds: Normal breath sounds.   Abdominal:      General: Abdomen is flat. Bowel sounds are normal. There is no distension.      Palpations: Abdomen is soft. There is no mass.      Tenderness: There is no abdominal tenderness. There is no guarding or rebound.      Hernia: No hernia is present.   Neurological:      Mental Status: She is alert.   Psychiatric:         Behavior: Behavior normal.           LABS:   A1C:    " "  CBC:  Recent Labs   Lab 06/04/24  1430   WBC 10.42   RBC 3.71 L   Hemoglobin 11.3 L   Hematocrit 33.8 L   Platelets 290   MCV 91   MCH 30.5   MCHC 33.4     CMP:  Recent Labs   Lab 06/04/24  1430   Glucose 119 H   Calcium 10.0   Albumin 3.4 L   Total Protein 8.0   Sodium 136   Potassium 3.9   CO2 23   Chloride 101   BUN 19   Creatinine 1.2   Alkaline Phosphatase 83   ALT 14   AST 21   Total Bilirubin 0.4     LIPIDS:  Recent Labs   Lab 11/08/22  0803 05/22/23  1027   TSH 1.790  --    HDL  --  56   Cholesterol  --  173   Triglycerides  --  97   LDL Cholesterol  --  97.6   HDL/Cholesterol Ratio  --  32.4   Non-HDL Cholesterol  --  117   Total Cholesterol/HDL Ratio  --  3.1     TSH:  Recent Labs   Lab 11/08/22  0803   TSH 1.790         ASSESSMENT & PLAN:    Problem List Items Addressed This Visit          Psychiatric    Anxiety and depression (Chronic)    Overview     Stable. Continue current medical therapy      Paxil ineffective. Previously on Zoloft but self d/c'ed as she was "feeling better".               Pulmonary    Bronchiectasis, uncomplicated (Chronic)    Overview     Per prior CT. Dyspnea is stable. She has seen Pulm 9/23. Stiolto ineffective. Trial of Symbicort.             Cardiac/Vascular    Essential hypertension - Primary (Chronic)    Overview     Stable. Enrolled in digital medicine. Continue to monitor. Will check readings and if remain elevated can consider increasing losartan.            Relevant Orders    CBC Auto Differential    Mixed hyperlipidemia (Chronic)    Overview     Stable. Continue current medical therapy           Palpitations (Chronic)    Overview     Continue metoprolol. Holter monitor normal in 2023, 2022.          Relevant Orders    CBC Auto Differential    Comprehensive Metabolic Panel       Endocrine    Age-related osteoporosis without current pathological fracture (Chronic)    Overview     Will repeat dexa in December. Continue fosamax (started 2019 per chart)    T vertebral fx " s/p kyphoplasty 2022.             Other    Psychophysiological insomnia (Chronic)    Overview     Stable. Continue current medical therapy      Prior treatments which were ineffective or with noted side effects: trazodone, mirtazapine, melatonin,  gabapentin, ambien              Other Visit Diagnoses       Bilateral impacted cerumen    incomplete removal in clinic. Home care instructions givens     Relevant Orders    Ear wax removal (Completed)    Abnormal finding of blood chemistry    on previous labs     Relevant Orders    Hemoglobin A1C   Chronic idiopathic constipation - suspect she is having constipation with overflow diarrhea. Advised to try miralax at half dose            Follow up in about 6 months (around 2/21/2025) for blood pressure.      RTC/ED precautions discussed where applicable.   Encouraged patient to let me know if there are any further questions/concerns.     Advise patient/caretaker to check with insurance regarding orders to avoid unexpected fees/costs.     The patient/caretaker indicates understanding of these issues and agrees with the plan.    Hugo Arcelia, MS4  Family Medicine    Visit today included increased complexity associated with the care of the episodic problem constipation, impacted cerumen addressed and managing the longitudinal care of the patient due to the serious and/or complex managed problem(s) as documented above .      I hereby acknowledge that I am relying upon documentation authored by a medical student working under my supervision and further I hereby attest that I have verified the student documentation or findings by personally re-performing the physical exam and medical decision making activities of the Evaluation and Management service to be billed.  Sharona Weaver

## 2024-08-21 NOTE — PATIENT INSTRUCTIONS
Apply 10-15 drops of DEBROX to each ear and let it sit for 10-15 minutes. You can then tilt your head to let the ear wax drain. Do this nightly for 7 days.     Try half a dose of miralax for your constipation

## 2024-08-29 ENCOUNTER — TELEPHONE (OUTPATIENT)
Dept: FAMILY MEDICINE | Facility: CLINIC | Age: 84
End: 2024-08-29
Payer: MEDICARE

## 2024-08-29 DIAGNOSIS — Z12.31 SCREENING MAMMOGRAM FOR BREAST CANCER: Primary | ICD-10-CM

## 2024-08-29 NOTE — TELEPHONE ENCOUNTER
----- Message from Nanci Nguyen sent at 8/29/2024 12:21 PM CDT -----  Type:  Mammogram    Caller is requesting to schedule their annual mammogram appointment.  Order is not listed in EPIC.  Please enter order and contact patient to schedule.  Name of Caller:Pt   Where would they like the mammogram performed?[facility]  Would the patient rather a call back or a response via [facility]? Call back  Best Call Back Number:[phone]  Additional Information:

## 2024-11-22 DIAGNOSIS — F51.04 PSYCHOPHYSIOLOGICAL INSOMNIA: Chronic | ICD-10-CM

## 2024-11-22 RX ORDER — AMITRIPTYLINE HYDROCHLORIDE 10 MG/1
10 TABLET, FILM COATED ORAL NIGHTLY PRN
Qty: 270 TABLET | Refills: 0 | Status: SHIPPED | OUTPATIENT
Start: 2024-11-22

## 2024-11-22 NOTE — TELEPHONE ENCOUNTER
----- Message from Trey sent at 11/22/2024  9:48 AM CST -----  Contact: pt  Type: Requesting refill     Who Called: PT  Regarding: would like a one month supply at [facility] and the rest to go to [facility]  amitriptyline (ELAVIL) 10 MG tablet 270 tablet Sig: TAKE 3 TABLETS EVERY NIGHT AS NEEDED FOR INSOMNIA  Sent to pharmacy as: amitriptyline (ELAVIL) 10 MG tablet      Would the patient rather a call back or a response via [facility]? Call back    Best Call Back Number: [phone]     Additional Information: [facility] - [place], [place] - [address]   Phone: [phone]  Fax: [phone]

## 2024-11-22 NOTE — TELEPHONE ENCOUNTER
Care Due:                  Date            Visit Type   Department     Provider  --------------------------------------------------------------------------------                                EP -                              PRIMARY [facility]  Last Visit: 08-      CARE ([facility])   [facility]  [place]                               -                              PRIMARY [facility]  Next Visit: 02-      [facility] ([facility]                                                            Last  Test          Frequency    Reason                     Performed    Due Date  --------------------------------------------------------------------------------    Lipid Panel.  12 months..  pravastatin..............  05- 05-    Mg Level....  12 months..  alendronate..............  Not Found    Overdue    Phosphate...  12 months..  alendronate..............  Not Found    Overdue    [employer] Embedded Care Due Messages. Reference number: [id number].   11/22/2024 10:05:57 AM CST

## 2024-12-18 RX ORDER — ALENDRONATE SODIUM 70 MG/1
70 TABLET ORAL
Qty: 12 TABLET | Refills: 3 | Status: SHIPPED | OUTPATIENT
Start: 2024-12-18

## 2024-12-18 NOTE — TELEPHONE ENCOUNTER
----- Message from Brionna sent at 12/18/2024 10:04 AM CST -----  Contact: pt  Type:  RX Refill Request    Who Called: pt  Refill or New Rx:refill  RX Name and Strength:alendronate (FOSAMAX) 70 MG tablet  Preferred Pharmacy with phone number:[facility] - [place], [place] - [address]  [address] [address] [facility] [address]  Phone: [phone] Fax: [phone]  Local or Mail Order:local  Ordering Provider:Meg  Would the patient rather a call back or a response via [facility]? call  Best Call Back Number:[phone]  Additional Information:

## 2024-12-18 NOTE — TELEPHONE ENCOUNTER
No care due was identified.  Health [facility] Embedded Care Due Messages. Reference number: [id number].   12/18/2024 10:13:13 AM CST

## 2024-12-23 ENCOUNTER — PATIENT MESSAGE (OUTPATIENT)
Dept: FAMILY MEDICINE | Facility: CLINIC | Age: 84
End: 2024-12-23
Payer: MEDICARE

## 2024-12-23 DIAGNOSIS — F51.04 PSYCHOPHYSIOLOGICAL INSOMNIA: Chronic | ICD-10-CM

## 2024-12-23 RX ORDER — AMITRIPTYLINE HYDROCHLORIDE 10 MG/1
10 TABLET, FILM COATED ORAL NIGHTLY PRN
Qty: 270 TABLET | Refills: 0 | Status: SHIPPED | OUTPATIENT
Start: 2024-12-23

## 2024-12-23 RX ORDER — ALENDRONATE SODIUM 70 MG/1
70 TABLET ORAL
Qty: 12 TABLET | Refills: 3 | Status: SHIPPED | OUTPATIENT
Start: 2024-12-23

## 2024-12-23 NOTE — TELEPHONE ENCOUNTER
----- Message from Shaunna sent at 12/23/2024  1:11 PM CST -----  Type: RX REFILL REQUEST          Who Called: Patient          Refill or New Rx:  Refill          Rx Name and Strength:    alendronate (FOSAMAX) 70 MG tablet  amitriptyline (ELAVIL) 10 MG tablet    Pt is needing a refill; pt says [facility] says they never received the refill request for Fosamax; pt would like for both medications to be sent over  to [facility] this time; please advise        Would the patient rather a call back or a response via [facility]? Call        Best Call Back Number: [phone]          Additional Information:    [facility] - [place], [place] - [address]  [address]  [facility] [address]  Phone: [phone] Fax: [phone]

## 2024-12-23 NOTE — TELEPHONE ENCOUNTER
No care due was identified.  [facility] Embedded Care Due Messages. Reference number: [id number].   12/23/2024 1:25:12 PM CST

## 2024-12-27 ENCOUNTER — TELEPHONE (OUTPATIENT)
Dept: FAMILY MEDICINE | Facility: CLINIC | Age: 84
End: 2024-12-27
Payer: MEDICARE

## 2024-12-27 ENCOUNTER — PATIENT MESSAGE (OUTPATIENT)
Dept: FAMILY MEDICINE | Facility: CLINIC | Age: 84
End: 2024-12-27
Payer: MEDICARE

## 2024-12-27 NOTE — TELEPHONE ENCOUNTER
----- Message from Cassie sent at 12/27/2024 12:15 PM CST -----  Type:  RX Refill Request    Who Called: Pt   Refill or New Rx: Refill   RX Name and Strength:losartan (COZAAR) 25 MG tablet  Preferred Pharmacy with phone number:[facility] - [facility] [address] [facility]  [address] [facility] [address]  Phone: [phone]  Local or Mail Order: Local  Ordering Provider:Meg   Would the patient rather a call back or a response via [facility]? Call   Best Call Back Number:[phone]    Additional Information:

## 2024-12-30 NOTE — TELEPHONE ENCOUNTER
----- Message from Nanci sent at 12/30/2024 12:30 PM CST -----  Type:  RX Refill Request    Who Called: Pt   Refill or New Rx:RX  RX Name and Strength:losartan (COZAAR) 25 MG tablet  How is the patient currently taking it? (ex. 1XDay):1x  Is this a 30 day or 90 day RX:90  Preferred Pharmacy with phone number:[facility] - [facility] [address]   Phone: [phone]  Fax: [phone]  Would the patient rather a call back or a response via [facility]? Call back   Best Call Back Number:[phone]  Additional Information: spoke with the Pharmacy and last script that was received for medication was in March with refills  and there are no more refills     Patient only have a few day of medication

## 2024-12-30 NOTE — TELEPHONE ENCOUNTER
No care due was identified.  Health [facility] Embedded Care Due Messages. Reference number: [id number].   12/30/2024 1:33:16 PM CST

## 2024-12-31 RX ORDER — LOSARTAN POTASSIUM 25 MG/1
25 TABLET ORAL DAILY
Qty: 90 TABLET | Refills: 3 | Status: SHIPPED | OUTPATIENT
Start: 2024-12-31

## 2025-01-15 DIAGNOSIS — F51.04 PSYCHOPHYSIOLOGICAL INSOMNIA: Chronic | ICD-10-CM

## 2025-01-15 NOTE — TELEPHONE ENCOUNTER
Spoke to pt and she stated that she needs a refill for elavil, 10 mg tablet [facility]. I informed her that I would route it to Dr Weaver to be ordered, pt stated verbal understanding.

## 2025-01-15 NOTE — TELEPHONE ENCOUNTER
No care due was identified.  Health [facility] Embedded Care Due Messages. Reference number: [id number].   1/15/2025 10:10:59 AM CST

## 2025-01-15 NOTE — TELEPHONE ENCOUNTER
----- Message from Chelsea sent at 1/15/2025  9:29 AM CST -----  Type:  Needs Medical Advice    Who Called: pt  Would the patient rather a call back or a response via [facility]? call  Best Call Back Number:  [phone]  Additional Information: pt would like to speak to office regarding medication amitriptyline (ELAVIL) 10 MG tablet states last time it was refilled her appt was cancelled

## 2025-01-16 RX ORDER — AMITRIPTYLINE HYDROCHLORIDE 10 MG/1
10 TABLET, FILM COATED ORAL NIGHTLY PRN
Qty: 270 TABLET | Refills: 3 | Status: SHIPPED | OUTPATIENT
Start: 2025-01-16 | End: 2025-01-16

## 2025-01-16 RX ORDER — AMITRIPTYLINE HYDROCHLORIDE 10 MG/1
30 TABLET, FILM COATED ORAL NIGHTLY PRN
Qty: 270 TABLET | Refills: 3 | Status: SHIPPED | OUTPATIENT
Start: 2025-01-16

## 2025-01-16 NOTE — TELEPHONE ENCOUNTER
----- Message from Cassie sent at 1/16/2025  9:20 AM CST -----  Type:  RX Refill Request    Who Called: Pt   Refill or New Rx: Refill   RX Name and Strength:amitriptyline (ELAVIL) 10 MG tablet   Preferred Pharmacy with phone number:[facility] - [place], [place] - [address]  [address] [facility] [address]  Phone: [phone]  Local or Mail Order: Local   Ordering Provider: Meg   Would the patient rather a call back or a response via [facility]? Call   Best Call Back Number:[phone]   Additional Information:  pt spoke to Krysta yesterday and pharmacy this morning, have not received refills orders. Pt states its suppose to be 90 day supply

## 2025-01-16 NOTE — TELEPHONE ENCOUNTER
Spoke to pt and informed her that I did send the medication to Dr Weaver to refill but that she was in clinic and will refill it as soon as she is able, and just hasn't be able to order it.

## 2025-01-24 ENCOUNTER — TELEPHONE (OUTPATIENT)
Dept: FAMILY MEDICINE | Facility: CLINIC | Age: 85
End: 2025-01-24
Payer: MEDICARE

## 2025-01-24 NOTE — TELEPHONE ENCOUNTER
Spoke to pt and she said she would give it a try but was reluctant about giving herself an injection every day but she is willing. She is also concerned about the price and if she can continue to take her alendronate.

## 2025-01-24 NOTE — TELEPHONE ENCOUNTER
----- Message from Sharona Weaver MD sent at 1/21/2025  1:38 PM CST -----  Please call patient:  -her bone scan shows osteoporosis and high fracture risk despite being on alendronate for years. I recommend we try a medication called Forteo which is a daily injection she can take at home. If she agrees, I will send this to her. If it turns out to be too expensive, we can try Prolia. Let me know what she says.

## 2025-02-04 ENCOUNTER — OFFICE VISIT (OUTPATIENT)
Dept: GASTROENTEROLOGY | Facility: CLINIC | Age: 85
End: 2025-02-04
Payer: MEDICARE

## 2025-02-04 VITALS
DIASTOLIC BLOOD PRESSURE: 87 MMHG | BODY MASS INDEX: 25.92 KG/M2 | WEIGHT: 140.88 LBS | HEART RATE: 63 BPM | HEIGHT: 62 IN | SYSTOLIC BLOOD PRESSURE: 146 MMHG

## 2025-02-04 DIAGNOSIS — K21.9 GASTROESOPHAGEAL REFLUX DISEASE WITHOUT ESOPHAGITIS: ICD-10-CM

## 2025-02-04 DIAGNOSIS — R49.0 HOARSENESS: Primary | ICD-10-CM

## 2025-02-04 PROCEDURE — 99214 OFFICE O/P EST MOD 30 MIN: CPT | Mod: S$GLB,,, | Performed by: NURSE PRACTITIONER

## 2025-02-04 PROCEDURE — 1159F MED LIST DOCD IN RCRD: CPT | Mod: CPTII,S$GLB,, | Performed by: NURSE PRACTITIONER

## 2025-02-04 PROCEDURE — 1126F AMNT PAIN NOTED NONE PRSNT: CPT | Mod: CPTII,S$GLB,, | Performed by: NURSE PRACTITIONER

## 2025-02-04 PROCEDURE — 3288F FALL RISK ASSESSMENT DOCD: CPT | Mod: CPTII,S$GLB,, | Performed by: NURSE PRACTITIONER

## 2025-02-04 PROCEDURE — 1101F PT FALLS ASSESS-DOCD LE1/YR: CPT | Mod: CPTII,S$GLB,, | Performed by: NURSE PRACTITIONER

## 2025-02-04 PROCEDURE — 3079F DIAST BP 80-89 MM HG: CPT | Mod: CPTII,S$GLB,, | Performed by: NURSE PRACTITIONER

## 2025-02-04 PROCEDURE — 99999 PR PBB SHADOW E&M-EST. PATIENT-LVL V: CPT | Mod: PBBFAC,,, | Performed by: NURSE PRACTITIONER

## 2025-02-04 PROCEDURE — 3077F SYST BP >= 140 MM HG: CPT | Mod: CPTII,S$GLB,, | Performed by: NURSE PRACTITIONER

## 2025-02-04 PROCEDURE — 1160F RVW MEDS BY RX/DR IN RCRD: CPT | Mod: CPTII,S$GLB,, | Performed by: NURSE PRACTITIONER

## 2025-02-04 PROCEDURE — 1157F ADVNC CARE PLAN IN RCRD: CPT | Mod: CPTII,S$GLB,, | Performed by: NURSE PRACTITIONER

## 2025-02-04 RX ORDER — OMEPRAZOLE 40 MG/1
40 CAPSULE, DELAYED RELEASE ORAL DAILY
Qty: 90 CAPSULE | Refills: 2 | Status: SHIPPED | OUTPATIENT
Start: 2025-02-04 | End: 2026-02-04

## 2025-02-04 NOTE — PROGRESS NOTES
Subjective:       Patient ID: Nereyda Hernandez is a 84 y.o. female.    Chief Complaint: Gastroesophageal Reflux    85 y/o female with history of GERD and IBS presents to clinic with c/o hoarseness. Last seen in GI clinic 2021 by Dr. Macias. Patient report hoarseness for the last 3 months. Associated nasal congestion and intermittent dry cough. No post nasal drip or dysphagia. States she was diagnosed with GERD by Dr. Esteves with [facility] years ago. Stopped daily pantoprazole a few weeks ago as she felt medication was not effective. She denies any heartburn, regurgitation, abdominal pain, nausea or vomiting. EGD in 2020 normal. Has intermittent shortness of breath with exertion for over a year. States work up by PCP and pulmonary on going.          Past Medical History:   Diagnosis Date    HATTIE (acute kidney injury) 12/10/2021    Anxiety     Arthritis     Breast cancer 1990    left    Chronic disease anemia     Hyperlipidemia     Hypertension     Insomnia     Lobular carcinoma in situ     KANWAL (obstructive sleep apnea)     Osteoporosis     Rosacea     Squamous cell carcinoma     Stable angina pectoris 08/27/2020    Stable angina pectoris 08/27/2020    Urinary incontinence     Vertigo        Past Surgical History:   Procedure Laterality Date    ADENOIDECTOMY      BELT ABDOMINOPLASTY      BREAST BIOPSY Left 1990    ex bx    BREAST LUMPECTOMY      COLONOSCOPY N/A 1/28/2020    Procedure: COLONOSCOPY;  Surgeon: Bianka Macias MD;  Location: [facility];  Service: Endoscopy;  Laterality: N/A;    ESOPHAGOGASTRODUODENOSCOPY N/A 1/28/2020    Procedure: EGD (ESOPHAGOGASTRODUODENOSCOPY);  Surgeon: Bianka Macias MD;  Location: [facility];  Service: Endoscopy;  Laterality: N/A;    EYE SURGERY      HERNIA REPAIR      Ventral    INJECTION OF ANESTHETIC AGENT AROUND NERVE Right 9/14/2023    Procedure: Right T11-T12, T12-L1 TFESI;  Surgeon: Edson Beard DO;  Location: [facility];  Service: Pain Management;   Laterality: Right;  20 mins    LIVER TRANSPLANT      OOPHORECTOMY      TONSILLECTOMY      TOTAL ABDOMINAL HYSTERECTOMY         Family History   Problem Relation Name Age of Onset    Cancer Mother Kami [place]         liver    Pancreatic cancer Mother Kami [place]     Heart disease Mother Kami [place]     Depression Mother Kami [place]     Miscarriages / Stillbirths Mother [place]ami [place]     Hypertension Father Brian Vicente     Alzheimer's disease Father [place]     Depression Brother Brian     Depression Brother Osmany     Diabetes Brother Dagoberto     Arthritis Brother Dagoberto     Hypertension Brother Ray     Heart disease Brother Ray        Social History     Socioeconomic History    Marital status:     Number of children: 6    Years of education: college   Occupational History    Occupation: retired  for [employer]   Tobacco Use    Smoking status: Former     Current packs/day: 0.00     Average packs/day: 0.5 packs/day for 21.7 years (10.9 ttl pk-yrs)     Types: Cigarettes     Start date: 1959     Quit date: 1980     Years since quittin.4     Passive exposure: Never    Smokeless tobacco: Never   Substance and Sexual Activity    Alcohol use: Not Currently     Alcohol/week: 1.0 standard drink of alcohol     Types: 1 Glasses of wine per week     Comment: Occasionally    Drug use: Never    Sexual activity: Not Currently     Partners: Male     Social Drivers of Health     Financial Resource Strain: Low Risk  (2023)    Overall Financial Resource Strain (CARDIA)     Difficulty of Paying Living Expenses: Not hard at all   Food Insecurity: No Food Insecurity (2023)    Hunger Vital Sign     Worried About Running Out of Food in the Last Year: Never true     Ran Out of Food in the Last Year: Never true   Transportation Needs: No Transportation Needs (2023)    PRAPARE - Transportation     Lack of  "Transportation (Medical): No     Lack of Transportation (Non-Medical): No   Physical Activity: Inactive (9/20/2023)    Exercise Vital Sign     Days of Exercise per Week: 0 days     Minutes of Exercise per Session: 0 min   Stress: No Stress Concern Present (9/20/2023)    [employer] [place] [employer] - Occupational Stress Questionnaire     Feeling of Stress : Not at all   Housing Stability: Unknown (9/20/2023)    Housing Stability Vital Sign     Unable to Pay for Housing in the Last Year: No     Number of Places Lived in the Last Year: 1       Review of Systems   Constitutional:  Negative for appetite change and unexpected weight change.   HENT:  Positive for voice change. Negative for trouble swallowing.    Respiratory:  Positive for cough and shortness of breath. Negative for choking.    Cardiovascular:  Negative for chest pain.   Gastrointestinal:  Negative for abdominal pain, blood in stool, constipation, diarrhea, nausea and vomiting.   Hematological:  Negative for adenopathy. Does not bruise/bleed easily.   Psychiatric/Behavioral:  Negative for dysphoric mood.          Objective:     Vitals:    02/04/25 1313   BP: (!) 146/87   BP Location: Left arm   Patient Position: Sitting   Pulse: 63   Weight: 63.9 kg (140 lb 14 oz)   Height: 5' 2" (1.575 m)          Physical Exam  Constitutional:       General: She is not in acute distress.     Appearance: Normal appearance.   HENT:      Head: Normocephalic.   Eyes:      Conjunctiva/sclera: Conjunctivae normal.   Pulmonary:      Effort: Pulmonary effort is normal. No respiratory distress.   Musculoskeletal:         General: Normal range of motion.      Cervical back: Normal range of motion. No tenderness.   Lymphadenopathy:      Cervical: No cervical adenopathy.   Skin:     General: Skin is warm and dry.   Neurological:      Mental Status: She is alert and oriented to person, place, and time.   Psychiatric:         Mood and Affect: Mood normal.         Behavior: " Behavior normal.               Assessment:         ICD-10-CM ICD-9-CM   1. Hoarseness  R49.0 784.42   2. Gastroesophageal reflux disease without esophagitis  K21.9 530.81       Plan:       Hoarseness  -     Ambulatory referral/consult to ENT; Future; Expected date: 02/11/2025  -     FL Esophagram With Barium Tablet; Future; Expected date: 02/04/2025    Gastroesophageal reflux disease without esophagitis  -     omeprazole (PRILOSEC) 40 MG capsule; Take 1 capsule (40 mg total) by mouth once daily.  Dispense: 90 capsule; Refill: 2    - Refer to ENT for voice changes  - Esophagram r/o esophageal dysmotility  - Consider EGD with BRAVO off PPI therapy if no symptoms improvement with omeprazole daily    Follow up if symptoms worsen or fail to improve.     Patient's Medications   New Prescriptions    OMEPRAZOLE (PRILOSEC) 40 MG CAPSULE    Take 1 capsule (40 mg total) by mouth once daily.   Previous Medications    ALENDRONATE (FOSAMAX) 70 MG TABLET    Take 1 tablet (70 mg total) by mouth every 7 days.    AMITRIPTYLINE (ELAVIL) 10 MG TABLET    Take 3 tablets (30 mg total) by mouth nightly as needed for Insomnia.    ASPIRIN (ECOTRIN) 81 MG EC TABLET    Take 81 mg by mouth once daily.    BENEFIBER, WHEAT DEXTRIN, ORAL    Take by mouth. PRN    BUDESONIDE-FORMOTEROL 80-4.5 MCG (SYMBICORT) 80-4.5 MCG/ACTUATION HFAA    Inhale 2 puffs into the lungs 2 (two) times a day. Controller    CALCIUM CITRATE-VITAMIN D3 500 MG CALCIUM -400 UNIT CHEW    Take 1 tablet by mouth 2 (two) times daily. 12.5mcg    CARBAMIDE PEROXIDE (DEBROX) 6.5 % OTIC SOLUTION    Place 5 drops into both ears 2 (two) times daily.    ESCITALOPRAM OXALATE (LEXAPRO) 20 MG TABLET    Take 1 tablet (20 mg total) by mouth once daily.    FISH OIL-OMEGA-3 FATTY ACIDS 300-1,000 MG CAPSULE    Take 2 g by mouth once daily.    LATANOPROST 0.005 % OPHTHALMIC SOLUTION    1 drop every evening.    LOSARTAN (COZAAR) 25 MG TABLET    Take 1 tablet (25 mg total) by mouth once daily.     METOPROLOL TARTRATE 37.5 MG TAB    Take 1 tablet by mouth 2 (two) times a day.    MULTIVIT-IRON-MIN-FOLIC ACID 3,500-18-0.4 UNIT-MG-MG ORAL CHEW    Take by mouth.    PRAVASTATIN (PRAVACHOL) 20 MG TABLET    Take 1 tablet (20 mg total) by mouth once daily.    TIZANIDINE (ZANAFLEX) 2 MG TABLET    Take 1 tablet (2 mg total) by mouth nightly as needed.    TRIAMTERENE-HYDROCHLOROTHIAZIDE 37.5-25 MG (DYAZIDE) 37.5-25 MG PER CAPSULE    Take 1 capsule by mouth once daily.    VIT A/VIT C/VIT E/ZINC/COPPER (PRESERVISION AREDS ORAL)    Take by mouth. Bottle does not say a but does say Lutein   Modified Medications    No medications on file   Discontinued Medications    PANTOPRAZOLE (PROTONIX) 40 MG TABLET    Take 1 tablet (40 mg total) by mouth once daily.

## 2025-02-06 ENCOUNTER — OFFICE VISIT (OUTPATIENT)
Dept: OTOLARYNGOLOGY | Facility: CLINIC | Age: 85
End: 2025-02-06
Payer: MEDICARE

## 2025-02-06 VITALS — SYSTOLIC BLOOD PRESSURE: 163 MMHG | WEIGHT: 132.5 LBS | DIASTOLIC BLOOD PRESSURE: 94 MMHG | BODY MASS INDEX: 24.23 KG/M2

## 2025-02-06 DIAGNOSIS — R49.0 DYSPHONIA: ICD-10-CM

## 2025-02-06 DIAGNOSIS — G89.29 CHRONIC FACIAL PAIN: ICD-10-CM

## 2025-02-06 DIAGNOSIS — R51.9 CHRONIC FACIAL PAIN: ICD-10-CM

## 2025-02-06 DIAGNOSIS — J34.89 SINUS PRESSURE: Primary | ICD-10-CM

## 2025-02-06 DIAGNOSIS — R49.0 HOARSENESS: ICD-10-CM

## 2025-02-06 DIAGNOSIS — J38.7 PRESBYLARYNGES: ICD-10-CM

## 2025-02-06 PROCEDURE — 3080F DIAST BP >= 90 MM HG: CPT | Mod: HCNC,CPTII,S$GLB, | Performed by: NURSE PRACTITIONER

## 2025-02-06 PROCEDURE — 99999 PR PBB SHADOW E&M-EST. PATIENT-LVL IV: CPT | Mod: PBBFAC,HCNC,, | Performed by: NURSE PRACTITIONER

## 2025-02-06 PROCEDURE — 99213 OFFICE O/P EST LOW 20 MIN: CPT | Mod: HCNC,S$GLB,, | Performed by: NURSE PRACTITIONER

## 2025-02-06 PROCEDURE — 1157F ADVNC CARE PLAN IN RCRD: CPT | Mod: HCNC,CPTII,S$GLB, | Performed by: NURSE PRACTITIONER

## 2025-02-06 PROCEDURE — 3077F SYST BP >= 140 MM HG: CPT | Mod: HCNC,CPTII,S$GLB, | Performed by: NURSE PRACTITIONER

## 2025-02-06 PROCEDURE — 1159F MED LIST DOCD IN RCRD: CPT | Mod: HCNC,CPTII,S$GLB, | Performed by: NURSE PRACTITIONER

## 2025-02-06 PROCEDURE — 1160F RVW MEDS BY RX/DR IN RCRD: CPT | Mod: HCNC,CPTII,S$GLB, | Performed by: NURSE PRACTITIONER

## 2025-02-06 NOTE — Clinical Note
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 I referred her for voice therapy. Would you also augment her cords? She is really bothered by her voice.   Thanks!

## 2025-02-06 NOTE — PROGRESS NOTES
Subjective     Patient ID: Nereyda Hernandez is a 84 y.o. female.    Chief Complaint: hoarse    HPI    Nereyda Hernandez is an 84 year old female who was referred for dysphonia. She has had this for several years. She was treated for reflux without improvement in her voice. Prolonged voice use will sometimes make her voice worse. She has shortness of breath with activity. Cardiac and pulmonology workup has been normal. She is also concerned with pain to her left face and upper gum. Her dentist has told her that her root canals are in good condition and has suggested it may be sinus related.     Past Medical History:   Diagnosis Date    HATTIE (acute kidney injury) 12/10/2021    Anxiety     Arthritis     Breast cancer 1990    left    Chronic disease anemia     Hyperlipidemia     Hypertension     Insomnia     Lobular carcinoma in situ     KANWAL (obstructive sleep apnea)     Osteoporosis     Rosacea     Squamous cell carcinoma     Stable angina pectoris 08/27/2020    Stable angina pectoris 08/27/2020    Urinary incontinence     Vertigo        Past Surgical History:   Procedure Laterality Date    ADENOIDECTOMY      BELT ABDOMINOPLASTY      BREAST BIOPSY Left 1990    ex bx    BREAST LUMPECTOMY      COLONOSCOPY N/A 1/28/2020    Procedure: COLONOSCOPY;  Surgeon: Bianka Macias MD;  Location: [facility];  Service: Endoscopy;  Laterality: N/A;    ESOPHAGOGASTRODUODENOSCOPY N/A 1/28/2020    Procedure: EGD (ESOPHAGOGASTRODUODENOSCOPY);  Surgeon: Bianka Macias MD;  Location: [facility];  Service: Endoscopy;  Laterality: N/A;    EYE SURGERY      HERNIA REPAIR      Ventral    INJECTION OF ANESTHETIC AGENT AROUND NERVE Right 9/14/2023    Procedure: Right T11-T12, T12-L1 TFESI;  Surgeon: Edson Beard DO;  Location: [facility];  Service: Pain Management;  Laterality: Right;  20 mins    LIVER TRANSPLANT      OOPHORECTOMY      TONSILLECTOMY      TOTAL ABDOMINAL HYSTERECTOMY           Current Outpatient Medications:      alendronate (FOSAMAX) 70 MG tablet, Take 1 tablet (70 mg total) by mouth every 7 days., Disp: 12 tablet, Rfl: 3    amitriptyline (ELAVIL) 10 MG tablet, Take 3 tablets (30 mg total) by mouth nightly as needed for Insomnia., Disp: 270 tablet, Rfl: 3    aspirin (ECOTRIN) 81 MG EC tablet, Take 81 mg by mouth once daily., Disp: , Rfl:     BENEFIBER, WHEAT DEXTRIN, ORAL, Take by mouth. PRN, Disp: , Rfl:     calcium citrate-vitamin D3 500 mg calcium -400 unit Chew, Take 1 tablet by mouth 2 (two) times daily. 12.5mcg, Disp: , Rfl:     carbamide peroxide (DEBROX) 6.5 % otic solution, Place 5 drops into both ears 2 (two) times daily., Disp: 18 mL, Rfl: 0    EScitalopram oxalate (LEXAPRO) 20 MG tablet, Take 1 tablet (20 mg total) by mouth once daily., Disp: 90 tablet, Rfl: 3    fish oil-omega-3 fatty acids 300-1,000 mg capsule, Take 2 g by mouth once daily., Disp: , Rfl:     latanoprost 0.005 % ophthalmic solution, 1 drop every evening., Disp: , Rfl:     losartan (COZAAR) 25 MG tablet, Take 1 tablet (25 mg total) by mouth once daily., Disp: 90 tablet, Rfl: 3    metoprolol tartrate 37.5 mg Tab, Take 1 tablet by mouth 2 (two) times a day., Disp: 180 tablet, Rfl: 3    MULTIVIT-IRON-MIN-FOLIC ACID 3,500-18-0.4 UNIT-MG-MG ORAL CHEW, Take by mouth., Disp: , Rfl:     omeprazole (PRILOSEC) 40 MG capsule, Take 1 capsule (40 mg total) by mouth once daily., Disp: 90 capsule, Rfl: 2    pravastatin (PRAVACHOL) 20 MG tablet, Take 1 tablet (20 mg total) by mouth once daily., Disp: 90 tablet, Rfl: 3    tiZANidine (ZANAFLEX) 2 MG tablet, Take 1 tablet (2 mg total) by mouth nightly as needed., Disp: , Rfl:     triamterene-hydrochlorothiazide 37.5-25 mg (DYAZIDE) 37.5-25 mg per capsule, Take 1 capsule by mouth once daily., Disp: 90 capsule, Rfl: 3    vit A/vit C/vit E/zinc/copper (PRESERVISION AREDS ORAL), Take by mouth. Bottle does not say a but does say Lutein, Disp: , Rfl:     budesonide-formoterol 80-4.5 mcg (SYMBICORT) 80-4.5 mcg/actuation  HFAA, Inhale 2 puffs into the lungs 2 (two) times a day. Controller (Patient not taking: Reported on 2025), Disp: 6.9 g, Rfl: 11  No current facility-administered medications for this visit.    Review of patient's allergies indicates:   Allergen Reactions    Sulfa (sulfonamide antibiotics) Nausea Only     PT WAS CHILD, NOT ALLERGIC       Social History     Socioeconomic History    Marital status:     Number of children: 6    Years of education: college   Occupational History    Occupation: retired  for [employer]   Tobacco Use    Smoking status: Former     Current packs/day: 0.00     Average packs/day: 0.5 packs/day for 21.7 years (10.9 ttl pk-yrs)     Types: Cigarettes     Start date: 1959     Quit date: 1980     Years since quittin.4     Passive exposure: Never    Smokeless tobacco: Never   Substance and Sexual Activity    Alcohol use: Not Currently     Alcohol/week: 1.0 standard drink of alcohol     Types: 1 Glasses of wine per week     Comment: Occasionally    Drug use: Never    Sexual activity: Not Currently     Partners: Male     Social Drivers of Health     Financial Resource Strain: Low Risk  (2023)    Overall Financial Resource Strain (CARDIA)     Difficulty of Paying Living Expenses: Not hard at all   Food Insecurity: No Food Insecurity (2023)    Hunger Vital Sign     Worried About Running Out of Food in the Last Year: Never true     Ran Out of Food in the Last Year: Never true   Transportation Needs: No Transportation Needs (2023)    PRAPARE - Transportation     Lack of Transportation (Medical): No     Lack of Transportation (Non-Medical): No   Physical Activity: Inactive (2023)    Exercise Vital Sign     Days of Exercise per Week: 0 days     Minutes of Exercise per Session: 0 min   Stress: No Stress Concern Present (2023)    [employer] [place] [employer] - Occupational Stress Questionnaire     Feeling of Stress : Not at all    Housing Stability: Unknown (9/20/2023)    Housing Stability Vital Sign     Unable to Pay for Housing in the Last Year: No     Number of Places Lived in the Last Year: 1       Family History   Problem Relation Name Age of Onset    Cancer Mother Kami allan         liver    Pancreatic cancer Mother Kami [place]     Heart disease Mother Kami [place]     Depression Mother Kami [place]     Miscarriages / Stillbirths Mother Kami allan     Hypertension Father Brian Vicente     Alzheimer's disease Father Brian [place]     Depression Brother Brian     Depression Brother Osmany     Diabetes Brother Dagoberto     Arthritis Brother Dagoberto     Hypertension Brother Ray     Heart disease Brother Ray          Review of Systems   Constitutional:  Negative for appetite change, chills, diaphoresis, fatigue, fever and unexpected weight change.   HENT:  Positive for voice change. Negative for nasal congestion, dental problem, drooling, ear discharge, ear pain, facial swelling, hearing loss, mouth sores, nosebleeds, postnasal drip, rhinorrhea, sinus pressure/congestion, sneezing, sore throat, tinnitus and trouble swallowing.    Eyes:  Negative for pain, discharge, redness and itching.   Respiratory:  Positive for cough. Negative for shortness of breath.    Cardiovascular:  Negative for chest pain.   Gastrointestinal:  Negative for abdominal distention, abdominal pain, diarrhea, nausea and vomiting.   Endocrine: Negative for cold intolerance and heat intolerance.   Genitourinary:  Negative for difficulty urinating.   Musculoskeletal:  Negative for neck pain and neck stiffness.   Integumentary:  Negative for rash.   Neurological:  Negative for dizziness, weakness and headaches.   Hematological:  Negative for adenopathy.          Objective     Physical Exam  Vitals reviewed.   Constitutional:       General: She is not in acute distress.     Appearance: She is  well-developed. She is not ill-appearing or diaphoretic.   HENT:      Head: Normocephalic and atraumatic.      Jaw: No trismus.      Right Ear: Hearing and external ear normal.      Left Ear: Hearing and external ear normal.      Nose: Nose normal. No nasal deformity, mucosal edema or rhinorrhea.      Right Sinus: No maxillary sinus tenderness or frontal sinus tenderness.      Left Sinus: No maxillary sinus tenderness or frontal sinus tenderness.      Mouth/Throat:      Lips: Pink. No lesions.      Mouth: Mucous membranes are moist. Mucous membranes are not pale, not dry and not cyanotic. No oral lesions.      Dentition: Normal dentition. Does not have dentures. No dental caries.      Tongue: No lesions. Tongue does not deviate from midline.      Palate: No mass and lesions.      Pharynx: Uvula midline. No pharyngeal swelling, oropharyngeal exudate, posterior oropharyngeal erythema or uvula swelling.      Tonsils: No tonsillar abscesses.      Comments: Flexible Laryngeal Videostroboscopy (42664): Laryngeal videostroboscopy is indicated to assess laryngeal vibratory biomechanics and vocal fold oscillation, which cannot be assessed with a plain light examination. This was carried out transnasally with a distal chip videoendoscope. After verbal consent was obtained, the patient was positioned and the nose was topically decongested with 1% phenylephrine and topically anesthetized with 4% lidocaine. The endoscope was passed through the most patent nasal cavity and positioned to image the nasopharynx, larynx, and hypopharynx in detail. The following features were examined: nasopharyngeal, laryngeal, hypopharyngeal masses; velopharyngeal strength, closure, and symmetry of motion; vocal fold range and symmetry of motion; laryngeal mucosal edema, erythema, inflammation, and hydration; salivary pooling; and gross laryngeal sensation. During phonation, the vocal folds were assessed for glottal closure; mucosal wave; vocal fold  lesions; vibratory periodicity, amplitude, and phase symmetry; and vertical height match. The equipment was removed. The patient tolerated the procedure well without complication. 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        Right eye: No discharge.         Left eye: No discharge.      Conjunctiva/sclera: Conjunctivae normal.   Neck:      Thyroid: No thyroid mass or thyromegaly.      Vascular: No JVD.      Trachea: Trachea and phonation normal. No tracheal tenderness or tracheal deviation.      Comments: Salivary glands - there are no lesions or asymmetric findings in the submandibular or parotid glands    Cardiovascular:      Rate and Rhythm: Normal rate.   Pulmonary:      Effort: Pulmonary effort is normal. No respiratory distress.      Breath sounds: No stridor.   Musculoskeletal:      Cervical back: Normal range of motion and neck supple.   Lymphadenopathy:      Head:      Right side of head: No submental, submandibular, tonsillar or preauricular adenopathy.      Left side of head: No submental, submandibular, tonsillar or preauricular adenopathy.      Cervical: No cervical adenopathy.   Skin:     General: Skin is warm and dry.      Coloration: Skin is not pale.      Findings: No erythema or rash.   Neurological:      Mental Status: She is alert and oriented to person, place, and time.      Cranial Nerves: No cranial nerve deficit.   Psychiatric:         Behavior: Behavior normal. Behavior is cooperative.         Thought Content: Thought content normal.          Assessment and Plan     1. Sinus pressure  -     CT Sinuses without Contrast; Future; Expected date: 02/06/2025    2. Hoarseness  -     Ambulatory referral/consult to ENT    3. Presbylarynges  Assessment & Plan:  Age related thinning of the vocal folds. Recommended voice therapy to restore voice. If there is  minimal improvement with therapy, consider vocal fold augmentation. Questions answered.    Orders:  -     Ambulatory referral/consult to Speech Therapy; Future; Expected date: 02/13/2025    4. Dysphonia  -     Ambulatory referral/consult to Speech Therapy; Future; Expected date: 02/13/2025    5. Chronic facial pain  Assessment & Plan:  CT sinus ordered.                 No follow-ups on file.

## 2025-02-07 ENCOUNTER — TELEPHONE (OUTPATIENT)
Dept: SPEECH THERAPY | Facility: HOSPITAL | Age: 85
End: 2025-02-07
Payer: MEDICARE

## 2025-02-10 PROBLEM — R49.0 DYSPHONIA: Status: ACTIVE | Noted: 2025-02-10

## 2025-02-10 PROBLEM — J38.7 PRESBYLARYNGES: Status: ACTIVE | Noted: 2025-02-10

## 2025-02-17 ENCOUNTER — PATIENT MESSAGE (OUTPATIENT)
Dept: OTOLARYNGOLOGY | Facility: CLINIC | Age: 85
End: 2025-02-17
Payer: MEDICARE

## 2025-02-17 ENCOUNTER — CLINICAL SUPPORT (OUTPATIENT)
Dept: SPEECH THERAPY | Facility: HOSPITAL | Age: 85
End: 2025-02-17
Payer: MEDICARE

## 2025-02-17 DIAGNOSIS — J38.7 PRESBYLARYNGES: ICD-10-CM

## 2025-02-17 DIAGNOSIS — R49.0 DYSPHONIA: Primary | ICD-10-CM

## 2025-02-17 PROCEDURE — 92524 BEHAVRAL QUALIT ANALYS VOICE: CPT | Mod: GN,HCNC

## 2025-02-17 NOTE — PROGRESS NOTES
Referring provider: Kami Armas  Reason for visit: Behavioral and qualitative analysis of voice and resonance (CPT 72758)    Subjective / History    Mrs. Nereyda Hernandez is a 84 y.o. female referred for voice evaluation (CPT 23357) by BEA Armas. She presents with complaints of a hoarse voice which began about 3-4 years ago and has been slowly worsening. The patient also reported the following complaints:  voice waxes and wanes, voice worse in afternoon/evening, and reduced volume. She states that about 3 years ago she met with Dr. Zavala and completed a laryngoscopy that was reportedly unremarkable other than signs of LPR (per note on 22: No lesions of glottic or supraglottic larynx. Normal vocal fold mobility. There is posterior commissure erythema and edema.) Patient does not work outside the home. She is a retired , but denies issues with her voice during her career.     Swallowing: no issues  Breathing:  SOB with exertion     Smokin packs/day, quit ~  Caffeine: 0 cups/day   Reflux: no, but reports mild symptoms with grits specifically and takes medication as precaution  Water: 10-20 oz/day     Stroboscopy findings (per BEA Armas on 2025)  -vocal folds are thin  -there is moderate glottic insufficiency and concentric supraglottic hyperfunction      Past Medical History:   Diagnosis Date    HATTIE (acute kidney injury) 12/10/2021    Anxiety     Arthritis     Breast cancer     left    Chronic disease anemia     Hyperlipidemia     Hypertension     Insomnia     Lobular carcinoma in situ     KANWAL (obstructive sleep apnea)     Osteoporosis     Rosacea     Squamous cell carcinoma     Stable angina pectoris 2020    Stable angina pectoris 2020    Urinary incontinence     Vertigo      Medications Ordered Prior to Encounter[1]    Objective    Perceptual/behavioral assessment  -CAPE-V Overall Score: 30  -Quality: rough, strained, and breathy  -Volume: decreased  projection  -Pitch: low F0  -Flexibility: appropriate for age and gender norms  -Habitual respiratory pattern: diaphragmatic    Education / Stimulability Trials  Discussed importance of vocal hygiene including: hydration. Counseled pt on the purpose and effects of injection augmentation procedure per her request. Patient was stimulable for improved voice using SOVT + exuberant voice exercises. She experienced success achieving a clearer voice after 3 trials of straw phonation + more energy in the voice, and reported the clearer voice sounded less rough. She was able to maintain the clearer voice for 1-2 sentences before needing to reset. She demonstrated growing awareness of her voicing behavior and was able to discriminate between a clearer voice and a rougher voice with 60% accuracy. Encouraged practicing exercises several times daily in isolation and into short phrases to solidify muscle memory patterns and reduce extralaryngeal tension during speech tasks. She was amenable to all suggestions.     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 Prognosis for continued improvement is good.     Recommendations / POC    -Recommend 3-5 sessions of voice therapy over 6-10 weeks with a speech-language pathologist to optimize glottal postures for improved vocal function, vocal efficiency, and ease of phonation  -Continue exercises as discussed in session  -Contact clinician with any further questions     Functional goals  Length Status Goal   Long term Initiated  Patient will implement and adhere to vocal hygiene protocols on a daily basis, including the elimination of phonotraumatic behaviors.    Long term Initiated  Patient and clinician will facilitate changes in vocal function in order to restore functional use of voice for daily occupational, social, and emotional demands.    Long term Initiated  Patient will re-establish phonation with  adequate balance of airflow and resonance with decreased muscle tension.    Long term Initiated   Patient will improve coordination of respiration and phonation for efficient vocal production at a conversational level.    Long term Initiated  Patient will increase the energy used to produce voice, resulting in an increase in volume and clarity of speech.    Short term Initiated  Patient will complete SOVT exercises and exuberant voice exercises 3-5x daily to strengthen and balance the intrinsic laryngeal musculature and maximize glottic closure without medial hyperfunction.   Short term Initiated  Patient will improve the quality, efficiency, and ease of phonation through resonant and/or airflow exercises from the syllable to conversation level with 80% accuracy.   Short term Initiated  Patient will discriminate between easy and strained phonation with 80% accuracy.    Short term Initiated  Patient will demonstrate the ability to increase awareness of voicing behavior through self-monitoring to facilitate generalization in functional speaking situations with 80% accuracy.    Short term Initiated   Patient will use an energized voice with clear speech in 80% of spontaneous conversations.       Michael Marrero MA, CCC-SLP       [1]   Current Outpatient Medications on File Prior to Visit   Medication Sig Dispense Refill    alendronate (FOSAMAX) 70 MG tablet Take 1 tablet (70 mg total) by mouth every 7 days. 12 tablet 3    amitriptyline (ELAVIL) 10 MG tablet Take 3 tablets (30 mg total) by mouth nightly as needed for Insomnia. 270 tablet 3    aspirin (ECOTRIN) 81 MG EC tablet Take 81 mg by mouth once daily.      BENEFIBER, WHEAT DEXTRIN, ORAL Take by mouth. PRN      budesonide-formoterol 80-4.5 mcg (SYMBICORT) 80-4.5 mcg/actuation HFAA Inhale 2 puffs into the lungs 2 (two) times a day. Controller (Patient not taking: Reported on 2/6/2025) 6.9 g 11    calcium citrate-vitamin D3 500 mg calcium -400 unit Chew Take 1 tablet by  mouth 2 (two) times daily. 12.5mcg      carbamide peroxide (DEBROX) 6.5 % otic solution Place 5 drops into both ears 2 (two) times daily. 18 mL 0    EScitalopram oxalate (LEXAPRO) 20 MG tablet Take 1 tablet (20 mg total) by mouth once daily. 90 tablet 3    fish oil-omega-3 fatty acids 300-1,000 mg capsule Take 2 g by mouth once daily.      latanoprost 0.005 % ophthalmic solution 1 drop every evening.      losartan (COZAAR) 25 MG tablet Take 1 tablet (25 mg total) by mouth once daily. 90 tablet 3    metoprolol tartrate 37.5 mg Tab Take 1 tablet by mouth 2 (two) times a day. 180 tablet 3    MULTIVIT-IRON-MIN-FOLIC ACID 3,500-18-0.4 UNIT-MG-MG ORAL CHEW Take by mouth.      omeprazole (PRILOSEC) 40 MG capsule Take 1 capsule (40 mg total) by mouth once daily. 90 capsule 2    pravastatin (PRAVACHOL) 20 MG tablet Take 1 tablet (20 mg total) by mouth once daily. 90 tablet 3    tiZANidine (ZANAFLEX) 2 MG tablet Take 1 tablet (2 mg total) by mouth nightly as needed.      triamterene-hydrochlorothiazide 37.5-25 mg (DYAZIDE) 37.5-25 mg per capsule Take 1 capsule by mouth once daily. 90 capsule 3    vit A/vit C/vit E/zinc/copper (PRESERVISION AREDS ORAL) Take by mouth. Bottle does not say a but does say Lutein       No current facility-administered medications on file prior to visit.

## 2025-02-20 NOTE — PLAN OF CARE
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 Prognosis for continued improvement is good.     Recommendations / POC    -Recommend 3-5 sessions of voice therapy over 6-10 weeks with a speech-language pathologist to optimize glottal postures for improved vocal function, vocal efficiency, and ease of phonation  -Continue exercises as discussed in session  -Contact clinician with any further questions     Functional goals  Length Status Goal   Long term Initiated  Patient will implement and adhere to vocal hygiene protocols on a daily basis, including the elimination of phonotraumatic behaviors.    Long term Initiated  Patient and clinician will facilitate changes in vocal function in order to restore functional use of voice for daily occupational, social, and emotional demands.    Long term Initiated  Patient will re-establish phonation with adequate balance of airflow and resonance with decreased muscle tension.    Long term Initiated   Patient will improve coordination of respiration and phonation for efficient vocal production at a conversational level.    Long term Initiated  Patient will increase the energy used to produce voice, resulting in an increase in volume and clarity of speech.    Short term Initiated  Patient will complete SOVT exercises and exuberant voice exercises 3-5x daily to strengthen and balance the intrinsic laryngeal musculature and maximize glottic closure without medial hyperfunction.   Short term Initiated  Patient will improve the quality, efficiency, and ease of phonation through resonant and/or airflow exercises from the syllable to conversation level with 80% accuracy.   Short term Initiated  Patient will discriminate between easy and strained phonation with 80% accuracy.    Short term Initiated  Patient will demonstrate the ability to increase awareness of voicing behavior through self-monitoring to facilitate  generalization in functional speaking situations with 80% accuracy.    Short term Initiated   Patient will use an energized voice with clear speech in 80% of spontaneous conversations.

## 2025-02-25 ENCOUNTER — OFFICE VISIT (OUTPATIENT)
Dept: FAMILY MEDICINE | Facility: CLINIC | Age: 85
End: 2025-02-25
Payer: MEDICARE

## 2025-02-25 ENCOUNTER — E-CONSULT (OUTPATIENT)
Dept: ENDOCRINOLOGY | Facility: CLINIC | Age: 85
End: 2025-02-25
Payer: MEDICARE

## 2025-02-25 VITALS
DIASTOLIC BLOOD PRESSURE: 80 MMHG | BODY MASS INDEX: 26.29 KG/M2 | OXYGEN SATURATION: 98 % | HEIGHT: 62 IN | SYSTOLIC BLOOD PRESSURE: 140 MMHG | WEIGHT: 142.88 LBS | HEART RATE: 60 BPM

## 2025-02-25 DIAGNOSIS — J47.9 BRONCHIECTASIS, UNCOMPLICATED: ICD-10-CM

## 2025-02-25 DIAGNOSIS — R49.0 DYSPHONIA: ICD-10-CM

## 2025-02-25 DIAGNOSIS — M81.0 AGE-RELATED OSTEOPOROSIS WITHOUT CURRENT PATHOLOGICAL FRACTURE: Primary | Chronic | ICD-10-CM

## 2025-02-25 DIAGNOSIS — M81.0 AGE-RELATED OSTEOPOROSIS WITHOUT CURRENT PATHOLOGICAL FRACTURE: Chronic | ICD-10-CM

## 2025-02-25 DIAGNOSIS — R06.00 DYSPNEA, UNSPECIFIED TYPE: Chronic | ICD-10-CM

## 2025-02-25 DIAGNOSIS — F41.9 ANXIETY AND DEPRESSION: Chronic | ICD-10-CM

## 2025-02-25 DIAGNOSIS — F51.04 PSYCHOPHYSIOLOGICAL INSOMNIA: Chronic | ICD-10-CM

## 2025-02-25 DIAGNOSIS — E78.2 MIXED HYPERLIPIDEMIA: Chronic | ICD-10-CM

## 2025-02-25 DIAGNOSIS — F32.A ANXIETY AND DEPRESSION: Chronic | ICD-10-CM

## 2025-02-25 DIAGNOSIS — M51.360 DEGENERATION OF INTERVERTEBRAL DISC OF LUMBAR REGION WITH DISCOGENIC BACK PAIN: ICD-10-CM

## 2025-02-25 DIAGNOSIS — I10 ESSENTIAL HYPERTENSION: Chronic | ICD-10-CM

## 2025-02-25 DIAGNOSIS — Z23 NEED FOR INFLUENZA VACCINATION: Primary | ICD-10-CM

## 2025-02-25 PROCEDURE — 99999 PR PBB SHADOW E&M-EST. PATIENT-LVL IV: CPT | Mod: PBBFAC,HCNC,, | Performed by: FAMILY MEDICINE

## 2025-02-25 RX ORDER — TIZANIDINE 2 MG/1
2 TABLET ORAL NIGHTLY PRN
Qty: 90 TABLET | Refills: 3 | Status: SHIPPED | OUTPATIENT
Start: 2025-02-25

## 2025-02-25 NOTE — PATIENT INSTRUCTIONS
You can take ibuprofen 400-600mg once every other day as needed for back pain.    89 y o female with PMHx of HTN, ambulates with walker at baseline, was BIBA from home for a fall. EMS reports that patient's daughter saw her lying on the floor via nanny cam and called 911. Pt has a home health aid who usually visits her daily but has not visited in the past 2 days. Today, pt was attempting to open her refrigerator when she fell back and struck her head. Pt notes mild bilateral hip pain. Denies headache, chest pain, LOC, neck pain, back pain, cough, fever, chills, SOB, dizziness, or other sx. Pt with hx of a mechanical fall last week.     Old chart review on 3/9: On exam, pt's right lower leg was noted to be more erythematous than the left. Concerned for cellulitis. Pt found to have UTI at the time and possible cellulitis. Note of pt applying Cefadroxil. Unknown if pt has been taking her medication at home. No

## 2025-02-25 NOTE — CONSULTS
[facility]  Response for E-Consult     Patient Name: Nereyda Hernandez  MRN: [id number]  Primary Care Provider: Sharona Weaver MD   Requesting Provider: Sharona Weaver MD  E-Consult to Endocrinology  Consult performed by: Best Merino DO  Consult ordered by: Sharona Weaver MD  Reason for consult: Osteoporosis  Assessment/Recommendations: See note      I have reviewed the prior bone density scans and current medications.  Since the T scores and Z scores are stable compared to prior the patient should be able to undergo a drug holiday from Fosamax.  The FRAX score remains high but the actual density measurements are not worsening.  She has completed at least 5 years of Fosamax.      I suggest a drug holiday at this time with plans to repeat bone density scan in 2 years time to monitor for changes while on holiday.  If bone density worsening at that time or if she has a fragility fracture then therapy should be restarted with alternate options a consideration at that time such as Prolia.  She will continue to have some benefits from Fosamax on discontinuation given the long half life of the medicine in the bones.      She should continue to have 1696-3623 mg a day of calcium and Vitamin D 800 IU minimum per day.  Weight bearing activity as tolerated and continued fall precautions.      Reach out if questions.      Total time of Consultation: 10 minute    I did not speak to the requesting provider verbally about this.     *This eConsult is based on the clinical data available to me and is furnished without benefit of a physical examination. The eConsult will need to be interpreted in light of any clinical issues or changes in patient status not available to me at the time of filing this eConsults. Significant changes in patient condition or level of acuity should result in immediate formal consultation and reevaluation. Please alert me if you have further questions.    Thank you for this eConsult  referral.     DO [facility]

## 2025-03-03 ENCOUNTER — TELEPHONE (OUTPATIENT)
Dept: FAMILY MEDICINE | Facility: CLINIC | Age: 85
End: 2025-03-03
Payer: MEDICARE

## 2025-03-03 NOTE — PROGRESS NOTES
"PATIENT VISIT FAMILY MEDICINE    CC:   Chief Complaint   Patient presents with    Follow-up       HPI:    History of Present Illness    CHIEF COMPLAINT:  Nereyda presents today for follow up of chronic pain and difficulty with shortness of breath.    BACK PAIN:  She reports diffuse back pain that improves with rest and using a back rest pillow in bed, but worsens with movement and activity such as cooking. She denies recent worsening or falls. X-rays from 2023 showed grade one retrolisthesis of L2 on L3 and L3 on L4, as well as multiple levels of disc space narrowing in the lower back. She takes generic arthritis Tylenol for pain management but cannot tolerate regular ibuprofen due to stomach issues.    RESPIRATORY:  She reports chronic shortness of breath for at least three years. CT chest showed bronchiectasis. Trial of inhaler did not improve symptoms. She takes Carcine at night for cough management.    ENT:  She has hoarseness due to weak vocal cords identified during swallow test. She attends speech therapy and performs vocal cord exercises with a straw at least daily.          MEDS: Current Medications[1]    OBJECTIVE:   Vitals:    02/25/25 1308   BP: (!) 140/80   BP Location: Left arm   Patient Position: Sitting   Pulse: 60   SpO2: 98%   Weight: 64.8 kg (142 lb 13.7 oz)   Height: 5' 2" (1.575 m)     Body mass index is 26.13 kg/m².      Physical Exam    Constitutional: Normal appearance.  HENT: Normocephalic.  Eyes: No discharge bilaterally. Extraocular movements intact.  Cardiovascular: Normal rate and regular rhythm. Normal heart sounds.  Pulmonary: Pulmonary effort is normal. Normal breath sounds.  Abdominal: Abdomen is flat. Bowel sounds are normal. No distension. Abdomen is soft. No mass. No tenderness. No guarding. No rebound. No hernia is present.  Skin: Warm. Dry.  Neurological: Alert.  Psychiatric: Behavior normal.  Vitals: Blood pressure: 140/80. Pulse: 60. Weight: 142 lbs.          LABS:   A1C:  Recent " "Labs   Lab 02/21/25  0950   Hemoglobin A1C 5.3     CBC:  Recent Labs   Lab 02/21/25  0950   WBC 4.84   RBC 3.40 L   Hemoglobin 11.1 L   Hematocrit 33.5 L   Platelets 261   MCV 99 H   MCH 32.6 H   MCHC 33.1     CMP:  Recent Labs   Lab 02/21/25  0950   Glucose 94   Calcium 9.6   Albumin 3.6   Total Protein 7.9   Sodium 138   Potassium 4.2   CO2 23   Chloride 102   BUN 22   Creatinine 0.9   Alkaline Phosphatase 65   ALT 21   AST 26   Total Bilirubin 0.4     LIPIDS:  Recent Labs   Lab 11/08/22  0803 05/22/23  1027   TSH 1.790  --    HDL  --  56   Cholesterol  --  173   Triglycerides  --  97   LDL Cholesterol  --  97.6   HDL/Cholesterol Ratio  --  32.4   Non-HDL Cholesterol  --  117   Total Cholesterol/HDL Ratio  --  3.1     TSH:  Recent Labs   Lab 11/08/22  0803   TSH 1.790         ASSESSMENT & PLAN:    Problem List Items Addressed This Visit          Neuro    Degeneration of intervertebral disc of lumbar region with discogenic back pain    Overview   Stable. Okay to take tylenol and NSAID prn.             Psychiatric    Anxiety and depression (Chronic)    Overview   Stable. Continue current medical therapy      Paxil ineffective. Previously on Zoloft but self d/c'ed as she was "feeling better".               ENT    Dysphonia (Chronic)    Overview   Seen by ENT; vocal cord exercises             Pulmonary    Bronchiectasis, uncomplicated (Chronic)    Overview   Per prior CT. Dyspnea is stable. She has seen Pulm 9/23. Stiolto ineffective. Trial of Symbicort.          Dyspnea (Chronic)    Overview   Recent echo without severe findings. Repeat holter and follow up with Cardiology to discuss BP medication adjustment as patient feels these are contributing to symptoms. Advised to keep home BP log and take to her Cardiology appt.             Cardiac/Vascular    Essential hypertension (Chronic)    Overview   Stable. Enrolled in digital medicine. Continue to monitor. Will check readings and if remain elevated can consider " increasing losartan.            Mixed hyperlipidemia (Chronic)    Overview   Stable. Continue current medical therapy              Endocrine    Age-related osteoporosis without current pathological fracture (Chronic)    Overview   See below    T vertebral fx s/p kyphoplasty 2022.          Relevant Orders    E-Consult to Endocrinology (Completed)       Other    Psychophysiological insomnia (Chronic)    Overview   Stable. Continue current medical therapy      Prior treatments which were ineffective or with noted side effects: trazodone, mirtazapine, melatonin,  gabapentin, ambien              Other Visit Diagnoses         Need for influenza vaccination    -  Primary    Relevant Medications    influenza (adjuvanted) (Fluad) 45 mcg/0.5 mL IM vaccine (> or = 64 yo) 0.5 mL (Completed)          OSTEOPOROSIS:  - Evaluated the patient's history of osteoporosis and ongoing back pain.  - Reviewed recent bone scan results to determine treatment options.  - E consult to Endo    FOLLOW UP:  - Follow up in 6 months for BP          RTC/ED precautions discussed where applicable.   Encouraged patient to let me know if there are any further questions/concerns.     Advise patient/caretaker to check with insurance regarding orders to avoid unexpected fees/costs.     The patient/caretaker indicates understanding of these issues and agrees with the plan.    This note was generated with the assistance of ambient listening technology. I attest to having reviewed and edited the generated note for accuracy, though some syntax or spelling errors may persist. Please contact the author of this note for any clarification.      Dr. Sharona Weaver MD  Family Medicine       [1]   Current Outpatient Medications:     alendronate (FOSAMAX) 70 MG tablet, Take 1 tablet (70 mg total) by mouth every 7 days., Disp: 12 tablet, Rfl: 3    amitriptyline (ELAVIL) 10 MG tablet, Take 3 tablets (30 mg total) by mouth nightly as needed for Insomnia., Disp: 270  tablet, Rfl: 3    BENEFIBER, WHEAT DEXTRIN, ORAL, Take by mouth. PRN, Disp: , Rfl:     calcium citrate-vitamin D3 500 mg calcium -400 unit Chew, Take 1 tablet by mouth 2 (two) times daily. 12.5mcg, Disp: , Rfl:     EScitalopram oxalate (LEXAPRO) 20 MG tablet, Take 1 tablet (20 mg total) by mouth once daily., Disp: 90 tablet, Rfl: 3    fish oil-omega-3 fatty acids 300-1,000 mg capsule, Take 2 g by mouth once daily., Disp: , Rfl:     latanoprost 0.005 % ophthalmic solution, 1 drop every evening., Disp: , Rfl:     losartan (COZAAR) 25 MG tablet, Take 1 tablet (25 mg total) by mouth once daily., Disp: 90 tablet, Rfl: 3    metoprolol tartrate 37.5 mg Tab, Take 1 tablet by mouth 2 (two) times a day., Disp: 180 tablet, Rfl: 3    MULTIVIT-IRON-MIN-FOLIC ACID 3,500-18-0.4 UNIT-MG-MG ORAL CHEW, Take by mouth., Disp: , Rfl:     omeprazole (PRILOSEC) 40 MG capsule, Take 1 capsule (40 mg total) by mouth once daily., Disp: 90 capsule, Rfl: 2    pravastatin (PRAVACHOL) 20 MG tablet, Take 1 tablet (20 mg total) by mouth once daily., Disp: 90 tablet, Rfl: 3    triamterene-hydrochlorothiazide 37.5-25 mg (DYAZIDE) 37.5-25 mg per capsule, Take 1 capsule by mouth once daily., Disp: 90 capsule, Rfl: 3    vit A/vit C/vit E/zinc/copper (PRESERVISION AREDS ORAL), Take by mouth. Bottle does not say a but does say Lutein, Disp: , Rfl:     aspirin (ECOTRIN) 81 MG EC tablet, Take 81 mg by mouth once daily., Disp: , Rfl:     tiZANidine (ZANAFLEX) 2 MG tablet, Take 1 tablet (2 mg total) by mouth nightly as needed (muscle spasm)., Disp: 90 tablet, Rfl: 3

## 2025-03-03 NOTE — TELEPHONE ENCOUNTER
----- Message from Sharona Weaver MD sent at 3/2/2025 11:37 PM CST -----  Regarding: osteoporosis  Hi team, please let patient know per E consult recommendations: she can stop the fosamax for now. We will plan to recheck a bone scan in 2 years. She should continue to have 2636-7314 mg a day of calcium and Vitamin D 800 IU minimum per day.  Weight bearing activity as tolerated and continued fall precautions.  Sincerely, Dr Weaver

## 2025-03-05 ENCOUNTER — PATIENT MESSAGE (OUTPATIENT)
Dept: SPEECH THERAPY | Facility: HOSPITAL | Age: 85
End: 2025-03-05
Payer: MEDICARE

## 2025-03-06 ENCOUNTER — CLINICAL SUPPORT (OUTPATIENT)
Dept: SPEECH THERAPY | Facility: HOSPITAL | Age: 85
End: 2025-03-06
Payer: MEDICARE

## 2025-03-06 DIAGNOSIS — J38.7 PRESBYLARYNGES: ICD-10-CM

## 2025-03-06 DIAGNOSIS — R49.0 DYSPHONIA: Primary | ICD-10-CM

## 2025-03-06 PROCEDURE — 92507 TX SP LANG VOICE COMM INDIV: CPT | Mod: GN,95,HCNC

## 2025-03-06 NOTE — PROGRESS NOTES
"Referring provider: Kami Armas  Reason for visit:  Voice treatment (CPT 75695)  Session #1    The patient location is: [place], [place]  The chief complaint leading to consultation is: voice  Visit type: audiovisual  Face to Face time with patient: 48 min  60 minutes of total time spent on the encounter, which includes face to face time and non-face to face time preparing to see the patient (eg, review of tests), Obtaining and/or reviewing separately obtained history, Documenting clinical information in the electronic or other health record, Independently interpreting results (not separately reported) and communicating results to the patient/family/caregiver, or Care coordination (not separately reported).     History / Subjective   I had the pleasure of seeing Mrs. Nereyda Hernandez for her first treatment session following complete voice evaluation on 2/17/25. During that time, slight improvements were noted on SOVT and exuberant voice exercises.    3/6/25: Since evaluation, she reports that her voice is largely the same, although it fluctuates throughout the day. She states that recently she is more concerned with her SOB on exertion, but has already seen pulmonology, and the latest note from 9/29/23 states "PFT showing decrease in diffusing capacity. Mild restriction with no change in FVC. No evidence of cause of shortness of breath on CT chest." She reports practicing with straw phonation once a day, and feels that the exercises are beneficial in the moment, but notes that her voice becomes rough again immediately after or when speaking longer sentences. She states that she has difficulty hearing the difference when her voice is clearer, and often relies on others to tell her if she was successful.     Objective   The primary goal of todays session was to review the HEP and ensure it has been effective and beneficial. This was targeted using SOVT and exuberant voice treatment modalities.    Perceptual/behavioral " "assessment  -CAPE-V Overall Score: 28  -Quality: rough, mildly strained, and breathy  -Volume: decreased projection  -Pitch: low F0  -Flexibility: instability  -Habitual respiratory pattern: diaphragmatic    Treatment  Patient demonstrated how she had been practicing straw phonation + exuberant voice exercises, and adjustments were instructed to increase the clarity of phonation and increase the energy in her voice. She experienced success achieving a clearer voice after 4 trials of straw phonation + more energy in the voice, and reported the clearer voice sounded less gravelly. Began training an increased awareness of forward resonance using a sustained /z/ sound, and she reported she could feel more vibration in her throat when her voice was rough. She was able to immediately achieve a clear voice when instructed to use her voice "at max volume", but reported that felt strained. Utilized positive/negative practice with straw phonation + exuberant voice to train more awareness of her voice, and she reported that she could sometimes hear a difference but could not feel a difference. She was able to maintain the clearer voice for 1 sentence before needing to reset. She demonstrated growing awareness of her voicing behavior and was able to discriminate between a clearer voice and a rougher voice with 55% accuracy. For home practice, clinician reviewed exercises as practiced during the session with the patient.     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 Prognosis for continued improvement is good.     Recommendations / POC    -Continue 3-5 sessions of voice therapy over 6-10 weeks with a speech-language pathologist to optimize glottal postures for improved vocal function, vocal efficiency, and ease of phonation  -Continue exercises as discussed in session  -Contact clinician with any further questions     Functional goals  Length Status " Goal   Long term In progress Patient will implement and adhere to vocal hygiene protocols on a daily basis, including the elimination of phonotraumatic behaviors.    Long term In progress  Patient and clinician will facilitate changes in vocal function in order to restore functional use of voice for daily occupational, social, and emotional demands.    Long term In progress  Patient will re-establish phonation with adequate balance of airflow and resonance with decreased muscle tension.    Long term In progress   Patient will improve coordination of respiration and phonation for efficient vocal production at a conversational level.    Long term In progress  Patient will increase the energy used to produce voice, resulting in an increase in volume and clarity of speech.    Short term In progress  Patient will complete SOVT exercises and exuberant voice exercises 3-5x daily to strengthen and balance the intrinsic laryngeal musculature and maximize glottic closure without medial hyperfunction.   Short term In progress  Patient will improve the quality, efficiency, and ease of phonation through resonant and/or airflow exercises from the syllable to conversation level with 80% accuracy.   Short term In progress  Patient will discriminate between easy and strained phonation with 80% accuracy.    Short term In progress  Patient will demonstrate the ability to increase awareness of voicing behavior through self-monitoring to facilitate generalization in functional speaking situations with 80% accuracy.    Short term In progress   Patient will use an energized voice with clear speech in 80% of spontaneous conversations.       Michael Marrero MA, CCC-SLP

## 2025-03-20 ENCOUNTER — CLINICAL SUPPORT (OUTPATIENT)
Dept: SPEECH THERAPY | Facility: HOSPITAL | Age: 85
End: 2025-03-20
Payer: MEDICARE

## 2025-03-20 DIAGNOSIS — J38.7 PRESBYLARYNGES: ICD-10-CM

## 2025-03-20 DIAGNOSIS — R49.0 DYSPHONIA: Primary | ICD-10-CM

## 2025-03-20 PROCEDURE — 92507 TX SP LANG VOICE COMM INDIV: CPT | Mod: GN,HCNC

## 2025-03-20 NOTE — PROGRESS NOTES
"Referring provider: Kami Armas  Reason for visit:  Voice treatment (CPT 07236)  Session #2    History / Subjective   I had the pleasure of seeing Mrs. Nereyda Hernandez for her second treatment session following complete voice evaluation on 2/17/25. During that time, slight improvements were noted on SOVT and exuberant voice exercises.    3/6/25: Since evaluation, she reports that her voice is largely the same, although it fluctuates throughout the day. She states that recently she is more concerned with her SOB on exertion, but has already seen pulmonology, and the latest note from 9/29/23 states "PFT showing decrease in diffusing capacity. Mild restriction with no change in FVC. No evidence of cause of shortness of breath on CT chest." She reports practicing with straw phonation once a day, and feels that the exercises are beneficial in the moment, but notes that her voice becomes rough again immediately after or when speaking longer sentences. She states that she has difficulty hearing the difference when her voice is clearer, and often relies on others to tell her if she was successful.   3/20/25: Since the previous session, she reports that her voice is largely the same. She reports that she is still suffering from SOB with exertion, and also has experienced phonatory dyspnea while practicing. She reports practicing with straw phonation + exuberant voice about once a day, but states that she is not able to tell a difference before and after the exercises. She is unsure if that is because she is performing the exercises incorrectly, or because she is slightly hard of hearing. She states that her family has told her that her voice waxes and wanes, but overall there is no carryover to conversation yet.     Objective   The primary goal of todays session was to review the HEP and ensure it has been effective and beneficial. This was targeted using SOVT, modified LMRVT, and exuberant voice treatment " "modalities.    Perceptual/behavioral assessment  -CAPE-V Overall Score: 30  -Quality: rough, mildly strained, and breathy  -Volume: decreased projection  -Pitch: low F0  -Flexibility: instability  -Habitual respiratory pattern: diaphragmatic    -CAPE-V after SOVT + exuberant voice: 15    Treatment  Patient demonstrated how she had been practicing straw phonation + exuberant voice exercises, and adjustments were instructed to increase the clarity of phonation and increase the energy in her voice. She experienced success achieving a clearer voice after 8 trials of straw phonation + more energy in the voice, and reported the clearer voice sounded less raspy and felt easy. She reported that there was no physical strain, but there was some mental effort involved in thinking about how she used her voice before speaking. Reviewed increasing awareness of forward resonance using a sustained /z/ sound and modified LMRVT exercises on a hum. She reported she could feel more vibration on her lips during straw phonation after this. Then reviewed exuberant voice by itself, and she was again able to immediately achieve a clear voice when instructed to use her voice "at max volume", but reported that felt strained. Utilized a kazoo for increased auditory feedback in conjunction with straw phonation + exuberant voice, and she experienced great success after 2 trials. She reported that she could immediately hear and feel a difference in how much breath she was using. She reported that the best mental target/prompt for her to achieve this clearer voice was thinking about "enunciating" while speaking. She was able to maintain the clearer voice for 1 sentence before needing to reset. She demonstrated good awareness of her voicing behavior and was able to discriminate between a clearer voice and a rougher voice with 65% accuracy. For home practice, clinician reviewed exercises as practiced during the session with the patient. 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 Prognosis for continued improvement is good.     Recommendations / POC    -Continue 3-5 sessions of voice therapy over 6-10 weeks with a speech-language pathologist to optimize glottal postures for improved vocal function, vocal efficiency, and ease of phonation  -Continue exercises as discussed in session  -Contact clinician with any further questions     Functional goals  Length Status Goal   Long term In progress Patient will implement and adhere to vocal hygiene protocols on a daily basis, including the elimination of phonotraumatic behaviors.    Long term In progress  Patient and clinician will facilitate changes in vocal function in order to restore functional use of voice for daily occupational, social, and emotional demands.    Long term In progress  Patient will re-establish phonation with adequate balance of airflow and resonance with decreased muscle tension.    Long term In progress   Patient will improve coordination of respiration and phonation for efficient vocal production at a conversational level.    Long term In progress  Patient will increase the energy used to produce voice, resulting in an increase in volume and clarity of speech.    Short term In progress  Patient will complete SOVT exercises and exuberant voice exercises 3-5x daily to strengthen and balance the intrinsic laryngeal musculature and maximize glottic closure without medial hyperfunction.   Short term In progress  Patient will improve the quality, efficiency, and ease of phonation through resonant and/or airflow exercises from the syllable to conversation level with 80% accuracy.   Short term In progress  Patient will discriminate between easy and strained phonation with 80% accuracy.    Short term In progress  Patient will demonstrate the ability to increase awareness of voicing behavior through  self-monitoring to facilitate generalization in functional speaking situations with 80% accuracy.    Short term In progress   Patient will use an energized voice with clear speech in 80% of spontaneous conversations.       Michael Marrero MA, CCC-SLP

## 2025-04-02 ENCOUNTER — PATIENT MESSAGE (OUTPATIENT)
Dept: ADMINISTRATIVE | Facility: HOSPITAL | Age: 85
End: 2025-04-02
Payer: MEDICARE

## 2025-04-03 ENCOUNTER — CLINICAL SUPPORT (OUTPATIENT)
Dept: SPEECH THERAPY | Facility: HOSPITAL | Age: 85
End: 2025-04-03
Payer: MEDICARE

## 2025-04-03 DIAGNOSIS — J38.7 PRESBYLARYNGES: ICD-10-CM

## 2025-04-03 DIAGNOSIS — R49.0 DYSPHONIA: Primary | ICD-10-CM

## 2025-04-03 PROCEDURE — 92507 TX SP LANG VOICE COMM INDIV: CPT | Mod: GN,95,HCNC

## 2025-04-03 NOTE — PROGRESS NOTES
"Referring provider: Kami Armas  Reason for visit:  Voice treatment (CPT 10185)  Session #3    The patient location is: [place], [place]  The chief complaint leading to consultation is: voice  Visit type: audiovisual  Face to Face time with patient: 48 min  60 minutes of total time spent on the encounter, which includes face to face time and non-face to face time preparing to see the patient (eg, review of tests), Obtaining and/or reviewing separately obtained history, Documenting clinical information in the electronic or other health record, Independently interpreting results (not separately reported) and communicating results to the patient/family/caregiver, or Care coordination (not separately reported).     History / Subjective   I had the pleasure of seeing Mrs. Nereyda Hernandez for her third treatment session following complete voice evaluation on 2/17/25. During that time, improvements were noted on SOVT and exuberant voice exercises.    3/6/25: Since evaluation, she reports that her voice is largely the same, although it fluctuates throughout the day. She states that recently she is more concerned with her SOB on exertion, but has already seen pulmonology, and the latest note from 9/29/23 states "PFT showing decrease in diffusing capacity. Mild restriction with no change in FVC. No evidence of cause of shortness of breath on CT chest." She reports practicing with straw phonation once a day, and feels that the exercises are beneficial in the moment, but notes that her voice becomes rough again immediately after or when speaking longer sentences. She states that she has difficulty hearing the difference when her voice is clearer, and often relies on others to tell her if she was successful.   3/20/25: Since the previous session, she reports that her voice is largely the same. She reports that she is still suffering from SOB with exertion, and also has experienced phonatory dyspnea while practicing. She reports " "practicing with straw phonation + exuberant voice about once a day, but states that she is not able to tell a difference before and after the exercises. She is unsure if that is because she is performing the exercises incorrectly, or because she is slightly hard of hearing. She states that her family has told her that her voice waxes and wanes, but overall there is no carryover to conversation yet.   4/3/25: Since the previous session, she reports that she has not noticed an overall difference in her voice, but is more aware that her voice sounds improved immediately after the exercises. She reports practicing with straw phonation + kazoo and more energy in her voice about once a day, but admits she has not been attempting to keep the clear voice she attains after practicing going during conversation. She states that she talks with her daughters over the phone almost daily, and they have told her in the past that she sounds clearer, and she recently was able to sing "Happy Birthday" for her grandchild. Incidentally, she reports that she still experiences SOB with exertion, but also with phonation.     Objective   The primary goal of todays session was to review the HEP and ensure it has been effective and beneficial. This was targeted using SOVT, modified LMRVT, and exuberant voice treatment modalities.    Perceptual/behavioral assessment  -CAPE-V Overall Score: 25  -Quality: rough, mildly strained, and breathy  -Volume: decreased projection  -Pitch: low F0  -Flexibility: instability  -Habitual respiratory pattern: diaphragmatic    -CAPE-V after SOVT + exuberant voice: 15    Treatment  Patient demonstrated how she had been practicing straw phonation with kazoo + exuberant voice exercises, and minimal adjustments were needed. She experienced immediate success achieving a clearer voice straw phonation with kazoo + more energy in the voice, and again reported the clearer voice sounded less raspy and felt easy. She " "reported that there was no physical strain, but there was some mental effort involved in thinking about how she used her voice before speaking. She rated the energy needed to keep her voice clear about 6 out of 10. She reported that the best mental target/prompt for her to achieve this clearer voice continues to be "enunciating" while speaking. Reviewed increasing awareness of forward resonance using a sustained /z/ sound and modified LMRVT exercises on a hum. She reported she could feel more vibration on her lips during straw phonation after this. Also trialed straw in water and straw phonation by itself with less success. Then trialed straw and kazoo with exuberant voice in the singing context with the tune "Happy Birthday", and she expereinced immediate success this as well. She was able to maintain the clearer voice for 3-4 sentences before needing to reset. She demonstrated good awareness of her voicing behavior and was able to discriminate between a clearer voice and a rougher voice with 75% accuracy. For home practice, clinician reviewed exercises as practiced during the session with the patient.     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 Prognosis for continued improvement is good.     Recommendations / POC    -Continue 3-5 sessions of voice therapy over 6-10 weeks with a speech-language pathologist to optimize glottal postures for improved vocal function, vocal efficiency, and ease of phonation  -Continue exercises as discussed in session  -Contact clinician with any further questions     Functional goals  Length Status Goal   Long term In progress Patient will implement and adhere to vocal hygiene protocols on a daily basis, including the elimination of phonotraumatic behaviors.    Long term In progress  Patient and clinician will facilitate changes in vocal function in order to restore functional use of voice for daily " occupational, social, and emotional demands.    Long term In progress  Patient will re-establish phonation with adequate balance of airflow and resonance with decreased muscle tension.    Long term In progress   Patient will improve coordination of respiration and phonation for efficient vocal production at a conversational level.    Long term In progress  Patient will increase the energy used to produce voice, resulting in an increase in volume and clarity of speech.    Short term In progress  Patient will complete SOVT exercises and exuberant voice exercises 3-5x daily to strengthen and balance the intrinsic laryngeal musculature and maximize glottic closure without medial hyperfunction.   Short term In progress  Patient will improve the quality, efficiency, and ease of phonation through resonant and/or airflow exercises from the syllable to conversation level with 80% accuracy.   Short term In progress  Patient will discriminate between easy and strained phonation with 80% accuracy.    Short term In progress  Patient will demonstrate the ability to increase awareness of voicing behavior through self-monitoring to facilitate generalization in functional speaking situations with 80% accuracy.    Short term In progress   Patient will use an energized voice with clear speech in 80% of spontaneous conversations.       Michael Marrero MA, CCC-SLP

## 2025-04-08 ENCOUNTER — PATIENT MESSAGE (OUTPATIENT)
Dept: OTOLARYNGOLOGY | Facility: CLINIC | Age: 85
End: 2025-04-08
Payer: MEDICARE

## 2025-04-24 ENCOUNTER — CLINICAL SUPPORT (OUTPATIENT)
Dept: SPEECH THERAPY | Facility: HOSPITAL | Age: 85
End: 2025-04-24
Payer: MEDICARE

## 2025-04-24 DIAGNOSIS — R49.0 DYSPHONIA: Primary | ICD-10-CM

## 2025-04-24 DIAGNOSIS — J38.7 PRESBYLARYNGES: ICD-10-CM

## 2025-04-24 PROCEDURE — 92507 TX SP LANG VOICE COMM INDIV: CPT | Mod: GN,95

## 2025-04-24 NOTE — PROGRESS NOTES
"Referring provider: Kami Armas  Reason for visit:  Voice treatment (CPT 54446)  Session #4    The patient location is: [place], [place]  The chief complaint leading to consultation is: voice  Visit type: audiovisual  Face to Face time with patient: 47 min  60 minutes of total time spent on the encounter, which includes face to face time and non-face to face time preparing to see the patient (eg, review of tests), Obtaining and/or reviewing separately obtained history, Documenting clinical information in the electronic or other health record, Independently interpreting results (not separately reported) and communicating results to the patient/family/caregiver, or Care coordination (not separately reported).     History / Subjective   I had the pleasure of seeing Mrs. Nereyda Hernandez for her fourth treatment session following complete voice evaluation on 2/17/25. During that time, improvements were noted on SOVT and exuberant voice exercises.    3/6/25: Since evaluation, she reports that her voice is largely the same, although it fluctuates throughout the day. She states that recently she is more concerned with her SOB on exertion, but has already seen pulmonology, and the latest note from 9/29/23 states "PFT showing decrease in diffusing capacity. Mild restriction with no change in FVC. No evidence of cause of shortness of breath on CT chest." She reports practicing with straw phonation once a day, and feels that the exercises are beneficial in the moment, but notes that her voice becomes rough again immediately after or when speaking longer sentences. She states that she has difficulty hearing the difference when her voice is clearer, and often relies on others to tell her if she was successful.   3/20/25: Since the previous session, she reports that her voice is largely the same. She reports that she is still suffering from SOB with exertion, and also has experienced phonatory dyspnea while practicing. She reports " "practicing with straw phonation + exuberant voice about once a day, but states that she is not able to tell a difference before and after the exercises. She is unsure if that is because she is performing the exercises incorrectly, or because she is slightly hard of hearing. She states that her family has told her that her voice waxes and wanes, but overall there is no carryover to conversation yet.   4/3/25: Since the previous session, she reports that she has not noticed an overall difference in her voice, but is more aware that her voice sounds improved immediately after the exercises. She reports practicing with straw phonation + kazoo and more energy in her voice about once a day, but admits she has not been attempting to keep the clear voice she attains after practicing going during conversation. She states that she talks with her daughters over the phone almost daily, and they have told her in the past that she sounds clearer, and she recently was able to sing "Happy Birthday" for her grandchild. Incidentally, she reports that she still experiences SOB with exertion, but also with phonation.   4/24/25: Since the previous session, she states that her voice has been more hoarse for the past few days, and she attributes this to allergies although mentioned that the rest of her family was sick with a cold over [facility]. She also reports increased productive throat clearing during this time. However, before then she reports that her voice was much improved and she was able to sustain a stronger, clearer voice during conversation. She states that it required her to constantly pay attention to using more energy in her voice, but she was successfully able to maintain that throughout conversation. She reports practicing with straw phonation + kazoo and exuberant voice about twice a day, and that the exercises continue to be beneficial to clear up her voice. She was able to practice with her daughter over the phone once, " "but does not remember if her daughter mentioned she sounds better. She reports that she attempted to sing happy birthday to one of her grandchildren, and her voice was extremely rough. Overall she is pleased with her progress.     Objective   The primary goal of todays session was to review the HEP and ensure it has been effective and beneficial. This was targeted using SOVT, modified LMRVT, and exuberant voice treatment modalities.    Perceptual/behavioral assessment  -CAPE-V Overall Score: 18  -Quality: rough and breathy  -Volume: decreased projection  -Pitch: low F0  -Flexibility: instability  -Habitual respiratory pattern: diaphragmatic    -CAPE-V after SOVT + exuberant voice: 10    Treatment  Patient demonstrated how she had been practicing straw phonation with kazoo + exuberant voice exercises, and no adjustments were needed. She experienced immediate success achieving a clearer voice with straw phonation and kazoo + more energy in the voice, and again reported the clearer voice sounded less raspy and felt easy. She again reported that there was no physical strain, but there was some mental effort involved in thinking about how she used her voice before speaking. She rated the energy needed to keep her voice clear about 7-8 out of 10. Trialed using a 10/10 energy, and she reported that the effort strained her throat. She reported that the best mental target/prompt for her to achieve this clearer voice continues to be "enunciating" while speaking. Reviewed increasing awareness of forward resonance using a sustained /z/ sound and modified LMRVT exercises on a hum. She reported she could feel more vibration on her lips during straw phonation after this. Then trialed straw and kazoo with exuberant voice in the singing context with the tune "Happy Birthday", and she expereinced immediate success this as well. She was able to maintain the clearer voice for 4-5 min conversation before needing to reset. She " demonstrated good awareness of her voicing behavior and was able to discriminate between a clearer voice and a rougher voice with 85% accuracy. For home practice, clinician reviewed exercises as practiced during the session with the patient.     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 Prognosis for continued improvement is good.     Recommendations / POC    -Patient has met several of her goals, and would like to practice on her own for a while before scheduling the next session. She defers continuation of therapy at this time and will reach out to schedule an appointment if she deems it necessary   -Continue exercises as discussed in session  -Contact clinician with any further questions     Functional goals  Length Status Goal   Long term In progress Patient will implement and adhere to vocal hygiene protocols on a daily basis, including the elimination of phonotraumatic behaviors.    Long term Met Patient and clinician will facilitate changes in vocal function in order to restore functional use of voice for daily occupational, social, and emotional demands.    Long term Met Patient will re-establish phonation with adequate balance of airflow and resonance with decreased muscle tension.    Long term In progress   Patient will improve coordination of respiration and phonation for efficient vocal production at a conversational level.    Long term In progress Patient will increase the energy used to produce voice, resulting in an increase in volume and clarity of speech.    Short term Met Patient will complete SOVT exercises and exuberant voice exercises 3-5x daily to strengthen and balance the intrinsic laryngeal musculature and maximize glottic closure without medial hyperfunction.   Short term Met Patient will improve the quality, efficiency, and ease of phonation through resonant and/or airflow exercises from the syllable to conversation level  with 80% accuracy.   Short term Met Patient will discriminate between easy and strained phonation with 80% accuracy.    Short term Met Patient will demonstrate the ability to increase awareness of voicing behavior through self-monitoring to facilitate generalization in functional speaking situations with 80% accuracy.    Short term In progress   Patient will use an energized voice with clear speech in 80% of spontaneous conversations.       Michael Marrero MA, CCC-SLP

## 2025-05-20 ENCOUNTER — OFFICE VISIT (OUTPATIENT)
Dept: OTOLARYNGOLOGY | Facility: CLINIC | Age: 85
End: 2025-05-20
Payer: MEDICARE

## 2025-05-20 VITALS
BODY MASS INDEX: 26.01 KG/M2 | HEART RATE: 67 BPM | DIASTOLIC BLOOD PRESSURE: 74 MMHG | SYSTOLIC BLOOD PRESSURE: 141 MMHG | WEIGHT: 142.19 LBS

## 2025-05-20 DIAGNOSIS — H61.23 CERUMINOSIS, BILATERAL: Primary | ICD-10-CM

## 2025-05-20 DIAGNOSIS — J34.2 NASAL SEPTAL DEVIATION: ICD-10-CM

## 2025-05-20 DIAGNOSIS — G50.1 ATYPICAL FACIAL PAIN: ICD-10-CM

## 2025-05-20 PROCEDURE — 1126F AMNT PAIN NOTED NONE PRSNT: CPT | Mod: CPTII,HCNC,S$GLB, | Performed by: STUDENT IN AN ORGANIZED HEALTH CARE EDUCATION/TRAINING PROGRAM

## 2025-05-20 PROCEDURE — 99999 PR PBB SHADOW E&M-EST. PATIENT-LVL III: CPT | Mod: PBBFAC,HCNC,, | Performed by: STUDENT IN AN ORGANIZED HEALTH CARE EDUCATION/TRAINING PROGRAM

## 2025-05-20 PROCEDURE — 1160F RVW MEDS BY RX/DR IN RCRD: CPT | Mod: CPTII,HCNC,S$GLB, | Performed by: STUDENT IN AN ORGANIZED HEALTH CARE EDUCATION/TRAINING PROGRAM

## 2025-05-20 PROCEDURE — 99213 OFFICE O/P EST LOW 20 MIN: CPT | Mod: 25,HCNC,S$GLB, | Performed by: STUDENT IN AN ORGANIZED HEALTH CARE EDUCATION/TRAINING PROGRAM

## 2025-05-20 PROCEDURE — 1157F ADVNC CARE PLAN IN RCRD: CPT | Mod: CPTII,HCNC,S$GLB, | Performed by: STUDENT IN AN ORGANIZED HEALTH CARE EDUCATION/TRAINING PROGRAM

## 2025-05-20 PROCEDURE — 1159F MED LIST DOCD IN RCRD: CPT | Mod: CPTII,HCNC,S$GLB, | Performed by: STUDENT IN AN ORGANIZED HEALTH CARE EDUCATION/TRAINING PROGRAM

## 2025-05-20 PROCEDURE — 31231 NASAL ENDOSCOPY DX: CPT | Mod: HCNC,S$GLB,, | Performed by: STUDENT IN AN ORGANIZED HEALTH CARE EDUCATION/TRAINING PROGRAM

## 2025-05-20 PROCEDURE — 3078F DIAST BP <80 MM HG: CPT | Mod: CPTII,HCNC,S$GLB, | Performed by: STUDENT IN AN ORGANIZED HEALTH CARE EDUCATION/TRAINING PROGRAM

## 2025-05-20 PROCEDURE — 3077F SYST BP >= 140 MM HG: CPT | Mod: CPTII,HCNC,S$GLB, | Performed by: STUDENT IN AN ORGANIZED HEALTH CARE EDUCATION/TRAINING PROGRAM

## 2025-05-20 PROCEDURE — 69210 REMOVE IMPACTED EAR WAX UNI: CPT | Mod: 51,HCNC,S$GLB, | Performed by: STUDENT IN AN ORGANIZED HEALTH CARE EDUCATION/TRAINING PROGRAM

## 2025-05-20 NOTE — PROGRESS NOTES
Otolaryngology - Head and Neck Surgery New Patient Visit    5/20/2025    Referring Provider: No ref. provider found    Chief Complaint   Patient presents with     chronic sinusitis/ BEA Flores referral       History of Present Illness, Otolaryngology Specialty-Specific Exam, and Assessment and Plan:     Nereyda Hernandez is a 84 y.o. female who presents for evaluation of bilateral facial pressure, which has been present for about 3 years. She complains of having bilateral upper root canals about 3 years ago. Since than has noticed bilateral facial pressure that lasts for about a hour in the morning.  She denies epistaxis, purulent nasal drainage, vision changes, previous nasal trauma, or nasal obstruction. She has been treated with no medications for this in the past. She has never had allergy testing. She denies previous skullbase surgery. She denies snoring. The assessment of quality and severity of symptoms as measured by the SNOT-22 score is deferred.    She had a CT of the sinuses done on 2/13/25 which I reviewed along with the associated radiology report.    On exam today, the ears are normal. The oral cavity is clear. The neck is clear. The nasopharynx, hypopharynx, and larynx are normal. Nasal endoscopy reveals left septal deviation with spur. Hypertrophic inferior turbinates bilaterally. No nasal masses or nasal polyposis. No purulent drainage in the middle meatus. Nasopharynx clear without lesions. Eustachian tubes WNL.     Impression today is atypical facial pain.  Her she had mind nasal endoscopy findings and CT findings with the patient.  Explained that there was no radiographic or physical exam findings concerning for acute or chronic process involving her sinuses.  I did believe the symptoms may likely be related to her root canal.    Thank you for allowing us to participate in the care of your patient. We will continue to keep you informed of her progress.  She can follow up with me as  needed.    Sincerely yours,    Ayan Gaviria MD      Objective     Physical Examination  Vitals -  weight is 64.5 kg (142 lb 3.2 oz). Her blood pressure is 141/74 (abnormal) and her pulse is 67.   Constitutional - General appearance: Normal. Ability to communicate: Normal.  Head & Face - Overall appearance, scars, masses: Normal. Palpation &/or percussion of face: Normal. Salivary glands: Normal. Facial strength: Normal  ENMT - Otoscopic exam: Normal. Assessment of hearing: Normal. External inspection: Normal. Nasal mucosa, septum, turbinates: Abnormal see exam details. Lips, teeth, gums: Normal. Oropharynx: Normal. Pharyngeal walls/pyriform sinus: Normal. Larynx: Normal. Nasopharynx: Normal  Neck - Neck: Normal. Thyroid: Normal  Lymphatic - Palpation of lymph nodes: Normal  Eyes - Ocular mobility: Normal  Respiratory - Inspection of Chest: Normal  Cardiovascular - Peripheral vascular system: Normal  Neurological/Psychiatric - Orientation: Normal    Review of Systems  ROS     A complete review of systems was obtained 05/20/2025 and reviewed.  The review of systems is negative for symptoms except as described above.    BP (!) 141/74 (BP Location: Right arm, Patient Position: Sitting)   Pulse 67   Wt 64.5 kg (142 lb 3.2 oz)   LMP  (LMP Unknown)   BMI 26.01 kg/m²      Nasal Endoscopy:  5/20/2025    The use of diagnostic nasal endoscopy was considered medically necessary for the evaluation and visualization of the nasal anatomy for symptoms suggestive of nasal or sinus origin. Physical examination (including a nasal speculum evaluation) did not provide sufficient clinical information to establish a diagnosis, or symptoms did not improve or worsened following treatment.     The nasal cavity was decongested with topical 1% phenylephrine and anesthetized with 4% lidocaine.  A rigid 0-degree endoscope was introduced into the nasal cavity.    The patient was seated in the examination chair. After discussion of risks and  "benefits, a nasal endoscope was inserted into the nose the endoscope was passed along the left nasal floor to the nasopharynx. It was then passed between the middle and superior meatus, nasal turbinates, nasal septum, nasopharynx and sphenoethmoid region. The nasal endoscope was withdrawn and there was no complications. An identical procedure was performed on the right side. I was present for the entire procedure.The patient tolerated the above procedure well. The findings of this procedure can be found in the dictated note from 5/20/2025 visit.      Cerumen Removal    Indication: Cerumen impaction    Impacted cerumen was present in the Direct Ophthalmoscopy  and lateral ear canal on both sides.  Informed consent was obtained prior to proceeding.  The impaction was completely removed under direct visualization using an angled hook.  I performed the procedure myself.  There was no significant trauma.    The patient tolerated the procedure well.    Data Reviewed    WBC (K/uL)   Date Value   02/21/2025 4.84     Eosinophil % (%)   Date Value   02/21/2025 0.8     Eos # (K/uL)   Date Value   02/21/2025 0.0     Platelets (K/uL)   Date Value   02/21/2025 261     Glucose (mg/dL)   Date Value   02/21/2025 94     No results found for: "IGE"    I independently reviewed the images of the CT sinuses dated 2/13/25. Pertinent findings include no significance sinonasal opacification.  Patent OMCs bilaterally.  No significant periodontal disease. Left septal deviation.     "

## 2025-06-25 DIAGNOSIS — I10 ESSENTIAL HYPERTENSION: Chronic | ICD-10-CM

## 2025-06-25 DIAGNOSIS — F32.A ANXIETY AND DEPRESSION: Chronic | ICD-10-CM

## 2025-06-25 DIAGNOSIS — E78.5 HYPERLIPIDEMIA, UNSPECIFIED HYPERLIPIDEMIA TYPE: Chronic | ICD-10-CM

## 2025-06-25 DIAGNOSIS — F41.9 ANXIETY AND DEPRESSION: Chronic | ICD-10-CM

## 2025-06-25 RX ORDER — TRIAMTERENE AND HYDROCHLOROTHIAZIDE 37.5; 25 MG/1; MG/1
1 CAPSULE ORAL DAILY
Qty: 90 CAPSULE | Refills: 3 | Status: SHIPPED | OUTPATIENT
Start: 2025-06-25

## 2025-06-25 RX ORDER — PRAVASTATIN SODIUM 20 MG/1
20 TABLET ORAL DAILY
Qty: 90 TABLET | Refills: 3 | Status: SHIPPED | OUTPATIENT
Start: 2025-06-25

## 2025-06-25 RX ORDER — METOPROLOL TARTRATE 37.5 MG/1
1 TABLET, FILM COATED ORAL 2 TIMES DAILY
Qty: 180 TABLET | Refills: 3 | Status: SHIPPED | OUTPATIENT
Start: 2025-06-25

## 2025-06-25 RX ORDER — ESCITALOPRAM OXALATE 20 MG/1
20 TABLET ORAL DAILY
Qty: 90 TABLET | Refills: 3 | Status: SHIPPED | OUTPATIENT
Start: 2025-06-25 | End: 2026-06-20

## 2025-06-25 NOTE — TELEPHONE ENCOUNTER
Care Due:                  Date            Visit Type   Department     Provider  --------------------------------------------------------------------------------                                EP -                              PRIMARY      [facility]  Last Visit: 02-      [facility]                               -                              PRIMARY      [facility]  Next Visit: 09-      [facility]   [facility]                                                            Last  Test          Frequency    Reason                     Performed    Due Date  --------------------------------------------------------------------------------    Lipid Panel.  12 months..  pravastatin..............  05- 05-    [facility] Embedded Care Due Messages. Reference number: [id number].   6/25/2025 11:19:59 AM CDT

## 2025-06-25 NOTE — TELEPHONE ENCOUNTER
Copied from CRM #[id number]. Topic: Medications - Medication Refill  >> Jun 25, 2025 10:45 AM Trey wrote:  Type: Requesting refill    Who Called: PT    Regarding:  EScitalopram oxalate (LEXAPRO) 20 MG tablet  metoprolol tartrate 37.5 mg Tab   pravastatin (PRAVACHOL) 20 MG tablet  triamterene-hydrochlorothiazide 37.5-25 mg (DYAZIDE) 37.5-25 mg per capsule   Would the patient rather a call back or a response via [facility]? Call back  Best Call Back Number:[phone] ()     Pharmacy Information:  [facility] - [place], [place] - [address]  [address] [address]  Phone: [phone] Fax: [phone]  Hours: Not open 24 hours

## 2025-07-08 DIAGNOSIS — F51.04 PSYCHOPHYSIOLOGICAL INSOMNIA: Chronic | ICD-10-CM

## 2025-07-08 NOTE — TELEPHONE ENCOUNTER
No care due was identified.  Health [facility] Embedded Care Due Messages. Reference number: [id number].   7/08/2025 1:41:08 PM CDT

## 2025-07-09 RX ORDER — AMITRIPTYLINE HYDROCHLORIDE 10 MG/1
TABLET, FILM COATED ORAL
Qty: 270 TABLET | Refills: 0 | OUTPATIENT
Start: 2025-07-09

## 2025-07-09 NOTE — TELEPHONE ENCOUNTER
[facility] Note  Quick DC. Inappropriate Request   Refill request requires further review by MD: NO   Medication Therapy Plan: Pharmacy is requesting new script(s) for the following medications without required information, (sig/ frequency/qty/etc)     [facility] action(s):  Quick Discontinue      Duplicate Pended Encounter(s)/ Last Prescribed Details:    Pharmacies have been requesting medications for patients without required information, (sig, frequency, qty, etc.). In addition, requests are sent for medication(s) pt. are currently not taking, and medications patients have never taken.    We have spoken to the pharmacies about these request types and advised their teams previously that we are unable to assess these New Script requests and require all details for these requests. This is a known issue and has been reported.        Medication related problems are not assessed for [facility].   Medication Reconciliation Completed? NO Were there pending details that required adjustment? NO     Automatic Epic Generated Protocol Data Below:   Requested Prescriptions     Pending Prescriptions Disp Refills    amitriptyline (ELAVIL) 10 MG tablet [Pharmacy Med Name: AMITRIPTYLINE 10MG TAB] 270 tablet 0              Appointments      Date Provider   Last Visit   2/25/2025 Sharona Weaver MD   Next Visit   9/24/2025 Sharona Weaver MD        Note composed:10:40 AM 07/09/2025

## 2025-08-21 ENCOUNTER — OFFICE VISIT (OUTPATIENT)
Dept: CARDIOLOGY | Facility: CLINIC | Age: 85
End: 2025-08-21
Payer: MEDICARE

## 2025-08-21 VITALS
WEIGHT: 141.31 LBS | SYSTOLIC BLOOD PRESSURE: 152 MMHG | HEIGHT: 64 IN | BODY MASS INDEX: 24.13 KG/M2 | OXYGEN SATURATION: 98 % | DIASTOLIC BLOOD PRESSURE: 86 MMHG | HEART RATE: 60 BPM

## 2025-08-21 DIAGNOSIS — E78.5 HYPERLIPIDEMIA, UNSPECIFIED HYPERLIPIDEMIA TYPE: ICD-10-CM

## 2025-08-21 DIAGNOSIS — R00.2 PALPITATIONS: ICD-10-CM

## 2025-08-21 DIAGNOSIS — R07.9 CHEST PAIN, UNSPECIFIED TYPE: ICD-10-CM

## 2025-08-21 DIAGNOSIS — I10 ESSENTIAL HYPERTENSION: ICD-10-CM

## 2025-08-21 DIAGNOSIS — R06.02 EXERTIONAL SHORTNESS OF BREATH: Primary | ICD-10-CM

## 2025-08-21 LAB
OHS QRS DURATION: 92 MS
OHS QTC CALCULATION: 424 MS

## 2025-08-21 PROCEDURE — 93000 ELECTROCARDIOGRAM COMPLETE: CPT | Mod: S$GLB,,, | Performed by: STUDENT IN AN ORGANIZED HEALTH CARE EDUCATION/TRAINING PROGRAM

## 2025-08-21 PROCEDURE — 99999 PR PBB SHADOW E&M-EST. PATIENT-LVL III: CPT | Mod: PBBFAC,HCNC,, | Performed by: INTERNAL MEDICINE

## (undated) DEVICE — BANDAGE ADHESIVE